# Patient Record
Sex: FEMALE | Race: BLACK OR AFRICAN AMERICAN | Employment: FULL TIME | URBAN - METROPOLITAN AREA
[De-identification: names, ages, dates, MRNs, and addresses within clinical notes are randomized per-mention and may not be internally consistent; named-entity substitution may affect disease eponyms.]

---

## 2017-01-03 ENCOUNTER — HOSPITAL ENCOUNTER (EMERGENCY)
Age: 27
Discharge: HOME | End: 2017-01-04
Attending: EMERGENCY MEDICINE | Admitting: EMERGENCY MEDICINE
Payer: MEDICAID

## 2017-01-03 DIAGNOSIS — R11.2 NON-INTRACTABLE VOMITING WITH NAUSEA, UNSPECIFIED VOMITING TYPE: Primary | ICD-10-CM

## 2017-01-03 DIAGNOSIS — R42 DIZZINESS: ICD-10-CM

## 2017-01-03 DIAGNOSIS — G43.719 INTRACTABLE CHRONIC MIGRAINE WITHOUT AURA AND WITHOUT STATUS MIGRAINOSUS: ICD-10-CM

## 2017-01-03 LAB
ALBUMIN SERPL BCP-MCNC: 3.5 G/DL (ref 3.5–5)
ALBUMIN/GLOB SERPL: 0.9 {RATIO} (ref 1.1–2.2)
ALP SERPL-CCNC: 114 U/L (ref 45–117)
ALT SERPL-CCNC: 28 U/L (ref 12–78)
ANION GAP BLD CALC-SCNC: 13 MMOL/L (ref 5–15)
APPEARANCE UR: CLEAR
AST SERPL W P-5'-P-CCNC: 15 U/L (ref 15–37)
BASOPHILS # BLD AUTO: 0 K/UL (ref 0–0.1)
BASOPHILS # BLD: 0 % (ref 0–1)
BILIRUB SERPL-MCNC: 0.2 MG/DL (ref 0.2–1)
BILIRUB UR QL: NEGATIVE
BUN SERPL-MCNC: 13 MG/DL (ref 6–20)
BUN/CREAT SERPL: 14 (ref 12–20)
CALCIUM SERPL-MCNC: 8.7 MG/DL (ref 8.5–10.1)
CHLORIDE SERPL-SCNC: 108 MMOL/L (ref 97–108)
CO2 SERPL-SCNC: 22 MMOL/L (ref 21–32)
COLOR UR: NORMAL
CREAT SERPL-MCNC: 0.92 MG/DL (ref 0.55–1.02)
DIFFERENTIAL METHOD BLD: ABNORMAL
EOSINOPHIL # BLD: 0.4 K/UL (ref 0–0.4)
EOSINOPHIL NFR BLD: 4 % (ref 0–7)
ERYTHROCYTE [DISTWIDTH] IN BLOOD BY AUTOMATED COUNT: 14.9 % (ref 11.5–14.5)
GLOBULIN SER CALC-MCNC: 3.7 G/DL (ref 2–4)
GLUCOSE SERPL-MCNC: 139 MG/DL (ref 65–100)
GLUCOSE UR STRIP.AUTO-MCNC: NEGATIVE MG/DL
HCG UR QL: NEGATIVE
HCT VFR BLD AUTO: 38.7 % (ref 35–47)
HGB BLD-MCNC: 12.6 G/DL (ref 11.5–16)
HGB UR QL STRIP: NEGATIVE
KETONES UR QL STRIP.AUTO: NEGATIVE MG/DL
LEUKOCYTE ESTERASE UR QL STRIP.AUTO: NEGATIVE
LIPASE SERPL-CCNC: 194 U/L (ref 73–393)
LYMPHOCYTES # BLD AUTO: 28 % (ref 12–49)
LYMPHOCYTES # BLD: 3.2 K/UL (ref 0.8–3.5)
MCH RBC QN AUTO: 26.1 PG (ref 26–34)
MCHC RBC AUTO-ENTMCNC: 32.6 G/DL (ref 30–36.5)
MCV RBC AUTO: 80.3 FL (ref 80–99)
MONOCYTES # BLD: 0.6 K/UL (ref 0–1)
MONOCYTES NFR BLD AUTO: 5 % (ref 5–13)
NEUTS SEG # BLD: 7.1 K/UL (ref 1.8–8)
NEUTS SEG NFR BLD AUTO: 63 % (ref 32–75)
NITRITE UR QL STRIP.AUTO: NEGATIVE
PH UR STRIP: 7 [PH] (ref 5–8)
PLATELET # BLD AUTO: 242 K/UL (ref 150–400)
POTASSIUM SERPL-SCNC: 3.7 MMOL/L (ref 3.5–5.1)
PROT SERPL-MCNC: 7.2 G/DL (ref 6.4–8.2)
PROT UR STRIP-MCNC: NEGATIVE MG/DL
RBC # BLD AUTO: 4.82 M/UL (ref 3.8–5.2)
SODIUM SERPL-SCNC: 143 MMOL/L (ref 136–145)
SP GR UR REFRACTOMETRY: 1.01 (ref 1–1.03)
UROBILINOGEN UR QL STRIP.AUTO: 0.2 EU/DL (ref 0.2–1)
WBC # BLD AUTO: 11.3 K/UL (ref 3.6–11)

## 2017-01-03 PROCEDURE — 85025 COMPLETE CBC W/AUTO DIFF WBC: CPT | Performed by: EMERGENCY MEDICINE

## 2017-01-03 PROCEDURE — 36415 COLL VENOUS BLD VENIPUNCTURE: CPT | Performed by: EMERGENCY MEDICINE

## 2017-01-03 PROCEDURE — 96361 HYDRATE IV INFUSION ADD-ON: CPT

## 2017-01-03 PROCEDURE — 74011250636 HC RX REV CODE- 250/636: Performed by: EMERGENCY MEDICINE

## 2017-01-03 PROCEDURE — 83690 ASSAY OF LIPASE: CPT | Performed by: EMERGENCY MEDICINE

## 2017-01-03 PROCEDURE — 96374 THER/PROPH/DIAG INJ IV PUSH: CPT

## 2017-01-03 PROCEDURE — 80053 COMPREHEN METABOLIC PANEL: CPT | Performed by: EMERGENCY MEDICINE

## 2017-01-03 PROCEDURE — 99284 EMERGENCY DEPT VISIT MOD MDM: CPT

## 2017-01-03 PROCEDURE — 96375 TX/PRO/DX INJ NEW DRUG ADDON: CPT

## 2017-01-03 PROCEDURE — 93005 ELECTROCARDIOGRAM TRACING: CPT

## 2017-01-03 PROCEDURE — 81025 URINE PREGNANCY TEST: CPT

## 2017-01-03 PROCEDURE — 81003 URINALYSIS AUTO W/O SCOPE: CPT | Performed by: EMERGENCY MEDICINE

## 2017-01-03 RX ORDER — KETOROLAC TROMETHAMINE 30 MG/ML
30 INJECTION, SOLUTION INTRAMUSCULAR; INTRAVENOUS
Status: COMPLETED | OUTPATIENT
Start: 2017-01-03 | End: 2017-01-03

## 2017-01-03 RX ORDER — ONDANSETRON 2 MG/ML
8 INJECTION INTRAMUSCULAR; INTRAVENOUS
Status: COMPLETED | OUTPATIENT
Start: 2017-01-03 | End: 2017-01-03

## 2017-01-03 RX ADMIN — ONDANSETRON 8 MG: 2 INJECTION INTRAMUSCULAR; INTRAVENOUS at 22:40

## 2017-01-03 RX ADMIN — SODIUM CHLORIDE 1000 ML: 900 INJECTION, SOLUTION INTRAVENOUS at 23:44

## 2017-01-03 RX ADMIN — SODIUM CHLORIDE 1000 ML: 900 INJECTION, SOLUTION INTRAVENOUS at 22:39

## 2017-01-03 RX ADMIN — KETOROLAC TROMETHAMINE 30 MG: 30 INJECTION, SOLUTION INTRAMUSCULAR at 22:41

## 2017-01-03 NOTE — LETTER
[facility] EMERGENCY DEPT 
[address] [facility] [address] [id number] 
[phone] Work/School Note Date: 1/3/2017 To Whom It May concern: 
 
Marti Palomino was seen and treated today in the emergency room by the following provider(s): 
Attending Provider: Mandy Dunn MD.   
 
Frankey Coyer may return to work on Thursday.  
 
Sincerely, 
 
 
 
 
Mandy Dunn MD

## 2017-01-04 VITALS
WEIGHT: 220 LBS | RESPIRATION RATE: 16 BRPM | SYSTOLIC BLOOD PRESSURE: 123 MMHG | HEART RATE: 71 BPM | HEIGHT: 66 IN | DIASTOLIC BLOOD PRESSURE: 63 MMHG | TEMPERATURE: 98 F | OXYGEN SATURATION: 98 % | BODY MASS INDEX: 35.36 KG/M2

## 2017-01-04 LAB
ATRIAL RATE: 58 BPM
CALCULATED P AXIS, ECG09: 36 DEGREES
CALCULATED R AXIS, ECG10: 70 DEGREES
CALCULATED T AXIS, ECG11: 35 DEGREES
DIAGNOSIS, 93000: NORMAL
P-R INTERVAL, ECG05: 136 MS
Q-T INTERVAL, ECG07: 404 MS
QRS DURATION, ECG06: 88 MS
QTC CALCULATION (BEZET), ECG08: 396 MS
VENTRICULAR RATE, ECG03: 58 BPM

## 2017-01-04 RX ORDER — ONDANSETRON 8 MG/1
8 TABLET, ORALLY DISINTEGRATING ORAL
Qty: 12 TAB | Refills: 0 | Status: SHIPPED | OUTPATIENT
Start: 2017-01-04 | End: 2017-01-11

## 2017-01-04 NOTE — ED NOTES
Bedside and Verbal shift change report given to Noelle Buerger, RN (oncoming nurse) by Anushka Lr RN (offgoing nurse). Report included the following information SBAR, ED Summary, MAR and Recent Results.

## 2017-01-04 NOTE — ED NOTES
Pt given discharge instructions by Dr Mclean Arm, she verbalizes an understanding, pt stable at time of discharge ambulates to lobby with daughter

## 2017-01-04 NOTE — ED PROVIDER NOTES
HPI Comments: The patient presents to the ED with HA and dizziness. She has chronic migraines every day. She developed vomiting x 4 yesterday with diarrhea x 2 . She also vomited twice today with no more diarrhea. She feels lightheaded. She denies any spinning. She denies fever. Headaches is similar to prior headaches and generalized. HA gradual in onset. HA is worse than her typical headaches but not worst of her life. She has no fever. No meds taken for pain. She has blurred vision with standing. She noets BP is lower with standing, but cannot recall BP. She feels thirsty and has a dry mouth. She also notes chest discomfort in her mid chest today. Of note, she has hx pituitary adenoma and has not seen endo in >1 hrs for insurance reasons. She drove to the ED. The history is provided by the patient. Past Medical History:   Diagnosis Date    Abnormal Pap smear     Anemia     Gastrointestinal disorder      Ulcers    Headache     Herniated disc     Hx gestational diabetes     HX OTHER MEDICAL      trich treated at beginning of pregnancy    HX OTHER MEDICAL      pituitary tumor-benign     Miscarriage     Molar pregnancy     Pap smear for cervical cancer screening 13     Negative    PCOS (polycystic ovarian syndrome)     Pelvic pain in female     Pituitary adenoma ([facility] [address]     Pituitary tumor     Psychiatric problem      depression never on medication    Thyroid activity decreased      hypothyroid awaiting appnt for endo       Past Surgical History:   Procedure Laterality Date    Hx colposcopy  11/3/10    Hx lipoma resection      Hx dilation and curettage      Hx  section           Family History:   Problem Relation Age of Onset    Diabetes Father     Headache Mother        Social History     Social History    Marital status: SINGLE     Spouse name: N/A    Number of children: N/A    Years of education: N/A     Occupational History    Not on file.      Social History Main Topics    Smoking status: Never Smoker    Smokeless tobacco: Never Used    Alcohol use No      Comment: =    Drug use: No    Sexual activity: Yes     Partners: Male     Birth control/ protection: Injection     Other Topics Concern    Not on file     Social History Narrative         ALLERGIES: Tomato    Review of Systems   Constitutional: Negative for appetite change and fever. HENT: Negative for congestion, nosebleeds and sore throat. Eyes: Positive for pain. Negative for discharge. Respiratory: Negative for cough and shortness of breath. Cardiovascular: Negative for chest pain. Gastrointestinal: Positive for diarrhea, nausea and vomiting. Negative for abdominal pain. Genitourinary: Negative for dysuria. Musculoskeletal: Negative. Skin: Negative for rash. Neurological: Positive for dizziness, light-headedness and headaches. Negative for weakness. Hematological: Negative for adenopathy. Psychiatric/Behavioral: Negative. All other systems reviewed and are negative. There were no vitals filed for this visit. Physical Exam   Constitutional: She is oriented to person, place, and time. She appears well-developed and well-nourished. HENT:   Head: Normocephalic and atraumatic. Mouth/Throat: Oropharynx is clear and moist.   Eyes: Conjunctivae and EOM are normal. Pupils are equal, round, and reactive to light. Neck: Normal range of motion. Neck supple. Cardiovascular: Normal rate, regular rhythm and normal heart sounds. Pulmonary/Chest: Effort normal and breath sounds normal.   Abdominal: Soft. Bowel sounds are normal. There is no tenderness. Musculoskeletal: Normal range of motion. She exhibits no edema or tenderness. Neurological: She is alert and oriented to person, place, and time. No cranial nerve deficit. She exhibits normal muscle tone. Coordination normal.   Skin: Skin is warm and dry. Psychiatric: She has a normal mood and affect.  Her behavior is normal.   Nursing note and vitals reviewed. [facility]  ED Course       Procedures             ED EKG interpretation:  Rhythm: normal sinus rhythm and sinus bradycardia; and irregular - sinus arrhythmia. Rate (approx.): 58; Axis: normal; P wave: normal; QRS interval: normal ; ST/T wave: normal; This EKG was interpreted by Luann Johns MD,ED Provider. A/P:  1. Headache - at baseline at discharge. F/u with neuro. 2. Lightheadedness - suspect dehydration in setting of viral GE. Improved. 3. N/V - none in ED. Zofran prn.  4. Pituitary adenoma - f/u with Dr. Joana Terrazas. Patient's results have been reviewed with them. Patient and/or family have verbally conveyed their understanding and agreement of the patient's signs, symptoms, diagnosis, treatment and prognosis and additionally agree to follow up as recommended or return to the Emergency Room should their condition change prior to follow-up. Discharge instructions have also been provided to the patient with some educational information regarding their diagnosis as well a list of reasons why they would want to return to the ER prior to their follow-up appointment should their condition change.

## 2017-01-04 NOTE — DISCHARGE INSTRUCTIONS
We hope that we have addressed all of your medical concerns. The examination and treatment you received in the Emergency Department were for an emergent problem and were not intended as complete care. It is important that you follow up with your healthcare provider(s) for ongoing care. If your symptoms worsen or do not improve as expected, and you are unable to reach your usual health care provider(s), you should return to the Emergency Department. Today's healthcare is undergoing tremendous change, and patient satisfaction surveys are one of the many tools to assess the quality of medical care. You may receive a survey from the [employer] regarding your experience in the Emergency Department. I hope that your experience has been completely positive, particularly the medical care that I provided. As such, please participate in the survey; anything less than excellent does not meet my expectations or intentions. [address] and [facility] participate in nationally recognized quality of care measures. If your blood pressure is greater than 120/80, as reported below, we urge that you seek medical care to address the potential of high blood pressure, commonly known as hypertension. Hypertension can be hereditary or can be caused by certain medical conditions, pain, stress, or \"white coat syndrome. \"       Please make an appointment with your health care provider(s) for follow up of your Emergency Department visit. VITALS:   Patient Vitals for the past 8 hrs:   Temp Pulse Resp BP SpO2   01/03/17 2345 - 62 16 115/64 98 %   01/03/17 2146 97.9 °F (36.6 °C) 70 16 123/71 97 %          Thank you for allowing us to provide you with medical care today. We realize that you have many choices for your emergency care needs. Please choose us in the future for any continued health care needs. [place], [address] [facility].   Office: [phone]            Recent Results (from the past 24 hour(s))   CBC WITH AUTOMATED DIFF    Collection Time: 01/03/17 10:27 PM   Result Value Ref Range    WBC 11.3 (H) 3.6 - 11.0 K/uL    RBC 4.82 3.80 - 5.20 M/uL    HGB 12.6 11.5 - 16.0 g/dL    HCT 38.7 35.0 - 47.0 %    MCV 80.3 80.0 - 99.0 FL    MCH 26.1 26.0 - 34.0 PG    MCHC 32.6 30.0 - 36.5 g/dL    RDW 14.9 (H) 11.5 - 14.5 %    PLATELET 270 771 - 041 K/uL    NEUTROPHILS 63 32 - 75 %    LYMPHOCYTES 28 12 - 49 %    MONOCYTES 5 5 - 13 %    EOSINOPHILS 4 0 - 7 %    BASOPHILS 0 0 - 1 %    ABS. NEUTROPHILS 7.1 1.8 - 8.0 K/UL    ABS. LYMPHOCYTES 3.2 0.8 - 3.5 K/UL    ABS. MONOCYTES 0.6 0.0 - 1.0 K/UL    ABS. EOSINOPHILS 0.4 0.0 - 0.4 K/UL    ABS. BASOPHILS 0.0 0.0 - 0.1 K/UL    DF AUTOMATED     METABOLIC PANEL, COMPREHENSIVE    Collection Time: 01/03/17 10:27 PM   Result Value Ref Range    Sodium 143 136 - 145 mmol/L    Potassium 3.7 3.5 - 5.1 mmol/L    Chloride 108 97 - 108 mmol/L    CO2 22 21 - 32 mmol/L    Anion gap 13 5 - 15 mmol/L    Glucose 139 (H) 65 - 100 mg/dL    BUN 13 6 - 20 MG/DL    Creatinine 0.92 0.55 - 1.02 MG/DL    BUN/Creatinine ratio 14 12 - 20      GFR est AA >60 >60 ml/min/1.73m2    GFR est non-AA >60 >60 ml/min/1.73m2    Calcium 8.7 8.5 - 10.1 MG/DL    Bilirubin, total 0.2 0.2 - 1.0 MG/DL    ALT 28 12 - 78 U/L    AST 15 15 - 37 U/L    Alk.  phosphatase 114 45 - 117 U/L    Protein, total 7.2 6.4 - 8.2 g/dL    Albumin 3.5 3.5 - 5.0 g/dL    Globulin 3.7 2.0 - 4.0 g/dL    A-G Ratio 0.9 (L) 1.1 - 2.2     LIPASE    Collection Time: 01/03/17 10:27 PM   Result Value Ref Range    Lipase 194 73 - 393 U/L   HCG URINE, QL. - POC    Collection Time: 01/03/17 10:42 PM   Result Value Ref Range    Pregnancy test,urine (POC) NEGATIVE  NEG     URINALYSIS W/ RFLX MICROSCOPIC    Collection Time: 01/03/17 10:43 PM   Result Value Ref Range    Color YELLOW/STRAW      Appearance CLEAR CLEAR      Specific gravity 1.010 1.003 - 1.030 pH (UA) 7.0 5.0 - 8.0      Protein NEGATIVE  NEG mg/dL    Glucose NEGATIVE  NEG mg/dL    Ketone NEGATIVE  NEG mg/dL    Bilirubin NEGATIVE  NEG      Blood NEGATIVE  NEG      Urobilinogen 0.2 0.2 - 1.0 EU/dL    Nitrites NEGATIVE  NEG      Leukocyte Esterase NEGATIVE  NEG     EKG, 12 LEAD, INITIAL    Collection Time: 01/03/17 11:41 PM   Result Value Ref Range    Ventricular Rate 58 BPM    Atrial Rate 58 BPM    P-R Interval 136 ms    QRS Duration 88 ms    Q-T Interval 404 ms    QTC Calculation (Bezet) 396 ms    Calculated P Axis 36 degrees    Calculated R Axis 70 degrees    Calculated T Axis 35 degrees    Diagnosis       Sinus bradycardia with sinus arrhythmia  Otherwise normal ECG  When compared with ECG of 05-MAY-2014 19:39,  No significant change was found                Migraine Headache: Care Instructions  Your Care Instructions  Migraines are painful, throbbing headaches that often start on one side of the head. They may cause nausea and vomiting and make you sensitive to light, sound, or smell. Without treatment, migraines can last from 4 hours to a few days. Medicines can help prevent migraines or stop them after they have started. Your doctor can help you find which ones work best for you. Follow-up care is a key part of your treatment and safety. Be sure to make and go to all appointments, and call your doctor if you are having problems. It's also a good idea to know your test results and keep a list of the medicines you take. How can you care for yourself at home? · Do not drive if you have taken a prescription pain medicine. · Rest in a quiet, dark room until your headache is gone. Close your eyes, and try to relax or go to sleep. Don't watch TV or read. · Put a cold, moist cloth or cold pack on the painful area for 10 to 20 minutes at a time. Put a thin cloth between the cold pack and your skin. · Use a warm, moist towel or a heating pad set on low to relax tight shoulder and neck muscles.   · Have someone gently massage your neck and shoulders. · Take your medicines exactly as prescribed. Call your doctor if you think you are having a problem with your medicine. You will get more details on the specific medicines your doctor prescribes. · Be careful not to take pain medicine more often than the instructions allow. You could get worse or more frequent headaches when the medicine wears off. To prevent migraines  · Keep a headache diary so you can figure out what triggers your headaches. Avoiding triggers may help you prevent headaches. Record when each headache began, how long it lasted, and what the pain was like. (Was it throbbing, aching, stabbing, or dull?) Write down any other symptoms you had with the headache, such as nausea, flashing lights or dark spots, or sensitivity to bright light or loud noise. Note if the headache occurred near your period. List anything that might have triggered the headache. Triggers may include certain foods (chocolate, cheese, wine) or odors, smoke, bright light, stress, or lack of sleep. · If your doctor has prescribed medicine for your migraines, take it as directed. You may have medicine that you take only when you get a migraine and medicine that you take all the time to help prevent migraines. ¨ If your doctor has prescribed medicine for when you get a headache, take it at the first sign of a migraine, unless your doctor has given you other instructions. ¨ If your doctor has prescribed medicine to prevent migraines, take it exactly as prescribed. Call your doctor if you think you are having a problem with your medicine. · Find healthy ways to deal with stress. Migraines are most common during or right after stressful times. Take time to relax before and after you do something that has caused a migraine in the past.  · Try to keep your muscles relaxed by keeping good posture. Check your jaw, face, neck, and shoulder muscles for tension. Try to relax them.  When you sit at a desk, change positions often. And make sure to stretch for 30 seconds each hour. · Get plenty of sleep and exercise. · Eat meals on a regular schedule. Avoid foods and drinks that often trigger migraines. These include chocolate, alcohol (especially red wine and port), aspartame, monosodium glutamate (MSG), and some additives found in foods (such as hot dogs, werner, cold cuts, aged cheeses, and pickled foods). · Limit caffeine. Don't drink too much coffee, tea, or soda. But don't quit caffeine suddenly. That can also give you migraines. · Do not smoke or allow others to smoke around you. If you need help quitting, talk to your doctor about stop-smoking programs and medicines. These can increase your chances of quitting for good. · If you are taking birth control pills or hormone therapy, talk to your doctor about whether they are triggering your migraines. When should you call for help? Call 911 anytime you think you may need emergency care. For example, call if:  · You have signs of a stroke. These may include:  ¨ Sudden numbness, paralysis, or weakness in your face, arm, or leg, especially on only one side of your body. ¨ Sudden vision changes. ¨ Sudden trouble speaking. ¨ Sudden confusion or trouble understanding simple statements. ¨ Sudden problems with walking or balance. ¨ A sudden, severe headache that is different from past headaches. Call your doctor now or seek immediate medical care if:  · You have new or worse nausea and vomiting. · You have a new or higher fever. · Your headache gets much worse. Watch closely for changes in your health, and be sure to contact your doctor if:  · You are not getting better after 2 days (48 hours). Where can you learn more? Go to http://mitul-leena.info/. Enter A120 in the search box to learn more about \"Migraine Headache: Care Instructions. \"  Current as of: February 19, 2016  Content Version: 11.1  © 6630-1687 Health[employer] Incorporated. Care instructions adapted under license by "[employer] (which disclaims liability or warranty for this information). If you have questions about a medical condition or this instruction, always ask your healthcare professional. [address] any warranty or liability for your use of this information. Dizziness: Care Instructions  Your Care Instructions  Dizziness is the feeling of unsteadiness or fuzziness in your head. It is different than having vertigo, which is a feeling that the room is spinning or that you are moving or falling. It is also different from lightheadedness, which is the feeling that you are about to faint. It can be hard to know what causes dizziness. Some people feel dizzy when they have migraine headaches. Sometimes bouts of flu can make you feel dizzy. Some medical conditions, such as heart problems or high blood pressure, can make you feel dizzy. Many medicines can cause dizziness, including medicines for high blood pressure, pain, or anxiety. If a medicine causes your symptoms, your doctor may recommend that you stop or change the medicine. If it is a problem with your heart, you may need medicine to help your heart work better. If there is no clear reason for your symptoms, your doctor may suggest watching and waiting for a while to see if the dizziness goes away on its own. Follow-up care is a key part of your treatment and safety. Be sure to make and go to all appointments, and call your doctor if you are having problems. It's also a good idea to know your test results and keep a list of the medicines you take. How can you care for yourself at home? · If your doctor recommends or prescribes medicine, take it exactly as directed. Call your doctor if you think you are having a problem with your medicine. · Do not drive while you feel dizzy. · Try to prevent falls.  Steps you can take include:  ¨ Using nonskid mats, adding grab bars near the tub, and using night-lights. ¨ Clearing your home so that walkways are free of anything you might trip on. ¨ Letting family and friends know that you have been feeling dizzy. This will help them know how to help you. When should you call for help? Call 911 anytime you think you may need emergency care. For example, call if:  · You passed out (lost consciousness). · You have dizziness along with symptoms of a heart attack. These may include:  ¨ Chest pain or pressure, or a strange feeling in the chest.  ¨ Sweating. ¨ Shortness of breath. ¨ Nausea or vomiting. ¨ Pain, pressure, or a strange feeling in the back, neck, jaw, or upper belly or in one or both shoulders or arms. ¨ Lightheadedness or sudden weakness. ¨ A fast or irregular heartbeat. · You have symptoms of a stroke. These may include:  ¨ Sudden numbness, tingling, weakness, or loss of movement in your face, arm, or leg, especially on only one side of your body. ¨ Sudden vision changes. ¨ Sudden trouble speaking. ¨ Sudden confusion or trouble understanding simple statements. ¨ Sudden problems with walking or balance. ¨ A sudden, severe headache that is different from past headaches. Call your doctor now or seek immediate medical care if:  · You feel dizzy and have a fever, headache, or ringing in your ears. · You have new or increased nausea and vomiting. · Your dizziness does not go away or comes back. Watch closely for changes in your health, and be sure to contact your doctor if:  · You do not get better as expected. Where can you learn more? Go to http://mitul-leena.info/. Enter X303 in the search box to learn more about \"Dizziness: Care Instructions. \"  Current as of: May 27, 2016  Content Version: 11.1  © 4088-4240 [employer]. Care instructions adapted under license by [employer] (which disclaims liability or warranty for this information).  If you have questions about a medical condition or this instruction, always ask your healthcare professional. [address] any warranty or liability for your use of this information. Nausea and Vomiting: Care Instructions  Your Care Instructions    When you are nauseated, you may feel weak and sweaty and notice a lot of saliva in your mouth. Nausea often leads to vomiting. Most of the time you do not need to worry about nausea and vomiting, but they can be signs of other illnesses. Two common causes of nausea and vomiting are stomach flu and food poisoning. Nausea and vomiting from viral stomach flu will usually start to improve within 24 hours. Nausea and vomiting from food poisoning may last from 12 to 48 hours. The doctor has checked you carefully, but problems can develop later. If you notice any problems or new symptoms, get medical treatment right away. Follow-up care is a key part of your treatment and safety. Be sure to make and go to all appointments, and call your doctor if you are having problems. It's also a good idea to know your test results and keep a list of the medicines you take. How can you care for yourself at home? · To prevent dehydration, drink plenty of fluids, enough so that your urine is light yellow or clear like water. Choose water and other caffeine-free clear liquids until you feel better. If you have kidney, heart, or liver disease and have to limit fluids, talk with your doctor before you increase the amount of fluids you drink. · Rest in bed until you feel better. · When you are able to eat, try clear soups, mild foods, and liquids until all symptoms are gone for 12 to 48 hours. Other good choices include dry toast, crackers, cooked cereal, and gelatin dessert, such as Jell-O. When should you call for help? Call 911 anytime you think you may need emergency care. For example, call if:  · You passed out (lost consciousness).   Call your doctor now or seek immediate medical care if:  · You have symptoms of dehydration, such as:  ¨ Dry eyes and a dry mouth. ¨ Passing only a little dark urine. ¨ Feeling thirstier than usual.  · You have new or worsening belly pain. · You have a new or higher fever. · You vomit blood or what looks like coffee grounds. Watch closely for changes in your health, and be sure to contact your doctor if:  · You have ongoing nausea and vomiting. · Your vomiting is getting worse. · Your vomiting lasts longer than 2 days. · You are not getting better as expected. Where can you learn more? Go to http://mitul-lenea.info/. Enter 25 [id number] in the search box to learn more about \"Nausea and Vomiting: Care Instructions. \"  Current as of: May 27, 2016  Content Version: 11.1  © 9323-7338 [employer]. Care instructions adapted under license by [employer] (which disclaims liability or warranty for this information). If you have questions about a medical condition or this instruction, always ask your healthcare professional. [place] [address] any warranty or liability for your use of this information. Reduced Vision: Care Instructions  Your Care Instructions    Reduced vision can be caused by many things. These include macular degeneration and glaucoma. When you can't see as well, daily life can be more challenging. But you can do some things to stay independent and keep doing the activities you enjoy. Follow-up care is a key part of your treatment and safety. Be sure to make and go to all appointments, and call your doctor if you are having problems. It's also a good idea to know your test results and keep a list of the medicines you take. How can you care for yourself at home? Use lighting  · Point lighting at what you want to see. Don't point it at your eyes. · Add lamps where you need extra lighting. · Use curtains or shades to adjust how much natural light there is.   · Use good lighting in places where you could easily fall. These include entries and stairways. Use labels  · Label things that are hard to recognize or that could be confusing. This might include medicines, spices, and foods. Use black letters on a white background. Or you can color-code the items. · Shannan Lunch the positions of the temperature settings you use the most on your stove and oven. Also raymon the \"on\" and \"off\" positions. · Raymon the water temperatures you use on faucets in the kitchen and bathroom. To prevent overfilling a sink or bathtub, use waterproof markers or tape to raymon the water level you want. Avoid falls in your home  · Replace or remove any worn carpeting. Tape down or remove area rugs. · Do not wax your floors. Use nonskid, nonglare  on smooth floors. · Remove electrical cords from areas where you need to walk. Or tape them down so you won't trip on them. · Make sure furniture doesn't stick out into areas where you walk. Keep chairs pushed in under tables and desks. Keep all drawers closed. · Keep doors fully opened or fully closed. Don't leave them half-way open or shut. · Use handrails on stairways and ramps. Make sure that they go beyond the top and bottom steps. Then you won't stumble if you miss a step. Use helpful technology  · Use a magnifying lens. You can buy ones that you hold. Or you can buy ones that attach to glasses. Some have lights built in.  · If your budget allows, you may want to think about a video magnifier system. These systems can make print, pictures, or other items bigger on a screen. · If you have a computer:  ¨ Try to adjust the display. You can often change how big the text and pictures appear. Then they will be easier to see and read. ¨ You may want to try special software. Some software can recognize spoken commands or change dictated speech into text. Other software allows computers to speak text and read documents. · Use large-print items.  These include books, newspapers, magazines, and medicine labels. You can also listen to recordings of books. · Think about using devices made for people with low vision. Examples are clocks and watches that announce the time. There are also clocks, telephones, and calculators with extra-large buttons. Be safe while you stay active  · Ask your doctor what physical activities are safe for you. If you bend, lift things, or move fast, it may affect your health or vision. · Ask a friend to read you the instructions for a new exercise and to check your technique. · Walk with someone who can help look for things that may be a danger. · If you swim laps, use a pool that has ropes between the lanes. When should you call for help? Call your doctor now or seek immediate medical care if:  · You have a sudden change in vision. Watch closely for changes in your health, and be sure to contact your doctor if you have new changes in either eye. Where can you learn more? Go to http://mitul-leena.info/. Enter U376 in the search box to learn more about \"Reduced Vision: Care Instructions. \"  Current as of: May 23, 2016  Content Version: 11.1  © 6367-7781 [employer]. Care instructions adapted under license by [employer] (which disclaims liability or warranty for this information). If you have questions about a medical condition or this instruction, always ask your healthcare professional. [address] any warranty or liability for your use of this information. [employer] Activation    Thank you for requesting access to [employer]. Please follow the instructions below to securely access and download your online medical record. [employer] allows you to send messages to your doctor, view your test results, renew your prescriptions, schedule appointments, and more. How Do I Sign Up? 1. In your internet browser, go to [email or web address]  2. Click on the First Time User?  Click Here link in the Sign In box. You will be redirect to the New Member Sign Up page. 3. Enter your [employer] Access Code exactly as it appears below. You will not need to use this code after youve completed the sign-up process. If you do not sign up before the expiration date, you must request a new code. MyChart Access Code: Activation code not generated  Current [employer] Status: Active (This is the date your MyCOilext access code will )    4. Enter the last four digits of your Social Security Number (xxxx) and Date of Birth (mm/dd/yyyy) as indicated and click Submit. You will be taken to the next sign-up page. 5. Create a [employer] ID. This will be your [employer] login ID and cannot be changed, so think of one that is secure and easy to remember. 6. Create a [employer] password. You can change your password at any time. 7. Enter your Password Reset Question and Answer. This can be used at a later time if you forget your password. 8. Enter your e-mail address. You will receive e-mail notification when new information is available in [address] 9. Click Sign Up. You can now view and download portions of your medical record. 10. Click the Download Summary menu link to download a portable copy of your medical information. Additional Information    If you have questions, please visit the Frequently Asked Questions section of the [employer] website at [email or web address] Remember, [employer] is NOT to be used for urgent needs. For medical emergencies, dial 911.

## 2017-01-04 NOTE — ED NOTES
Patient resting on stretcher in no distress. IV fluids infusing via gravity without difficulty as ordered. Patient medicated as ordered. Patient tolerated medication well. Lights dimmed and blanket provided for comfort. Call bell in reach.

## 2017-03-03 ENCOUNTER — OFFICE VISIT (OUTPATIENT)
Dept: ENDOCRINOLOGY | Age: 27
End: 2017-03-03

## 2017-03-03 VITALS
WEIGHT: 218 LBS | HEIGHT: 66 IN | SYSTOLIC BLOOD PRESSURE: 142 MMHG | HEART RATE: 69 BPM | DIASTOLIC BLOOD PRESSURE: 79 MMHG | BODY MASS INDEX: 35.03 KG/M2

## 2017-03-03 DIAGNOSIS — D35.2 PITUITARY ADENOMA (HCC): Primary | ICD-10-CM

## 2017-03-03 DIAGNOSIS — O24.419 GESTATIONAL DIABETES MELLITUS (GDM), ANTEPARTUM, GESTATIONAL DIABETES METHOD OF CONTROL UNSPECIFIED: ICD-10-CM

## 2017-03-03 DIAGNOSIS — R79.89 ELEVATED PROLACTIN LEVEL: ICD-10-CM

## 2017-03-03 NOTE — PATIENT INSTRUCTIONS
History of pituitary adenoma and high prolactin  - check prolactin  - start cabergoline if elevated and follow prolactin      History of gestational diabetes  - check hemoglobin A1c    Watch carbohydrate intake with meals and aim to keep this less than 30-45 grams per meal.    A Mediterranean style diet with 55% or less of calories from carbohydrates has been shown to be very helpful for people with diabetes/pre-diabetes. This diet consists of vegetables, whole fruit, nuts, whole grains, beans, lentils, olive oil, fish, chicken, and less refined grains, red and processed meats. Exercise: work up to 30 minutes 5 days weekly of walking, or any other activity that gets your heart rate up. Make sure you are getting enough sleep - at least 7 hours/night. Work on weight loss efforts by eating less.

## 2017-03-03 NOTE — MR AVS SNAPSHOT
Visit Information Date & Time Provider Department Dept. Phone Encounter #  
 3/3/2017  2:30 PM Le Mendez, [address] [facility] [phone] Follow-up Instructions Return in about 3 months (around 6/3/2017). Upcoming Health Maintenance Date Due INFLUENZA AGE 9 TO ADULT 8/1/2016 PAP AKA CERVICAL CYTOLOGY 10/11/2019 DTaP/Tdap/Td series (2 - Td) 2/21/2024 Allergies as of 3/3/2017  Review Complete On: 3/3/2017 By: Le Mendez MD  
  
 Severity Noted Reaction Type Reactions Tomato  07/31/2013    Angioedema Current Immunizations  Never Reviewed Name Date Tdap 2/21/2014 12:54 PM  
  
 Not reviewed this visit You Were Diagnosed With   
  
 Codes Comments [facility]    -  Primary ICD-10-CM: D35.2 ICD-9-CM: 227.3 Elevated prolactin level (HCC)     ICD-10-CM: E22.9 ICD-9-CM: 253.1 Gestational diabetes mellitus (GDM), antepartum, gestational diabetes method of control unspecified     ICD-10-CM: O23.80 ICD-9-CM: 502.97 Vitals BP  
  
  
  
  
  
 142/79 Vitals History BMI and BSA Data Body Mass Index Body Surface Area  
 35.19 kg/m 2 2.15 m 2 Preferred Pharmacy Pharmacy Name Phone [id number] [address] [facility] [address] [phone] Your Updated Medication List  
  
   
This list is accurate as of: 3/3/17  3:48 PM.  Always use your most recent med list.  
  
  
  
  
 ibuprofen 600 mg tablet Commonly known as:  MOTRIN Take 1 Tab by mouth every six (6) hours as needed for Pain.  
  
 medroxyPROGESTERone 150 mg/mL Syrg Commonly known as:  DEPO-PROVERA INJECT 1ML INTRAMUSCULARLY ONCE FOR 1 DOSE  
  
 topiramate 50 mg tablet Commonly known as:  TOPAMAX  
1 tab in AM and 1-2 tabs in evening. For migraine prevention We Performed the Following HEMOGLOBIN A1C WITH EAG [93181 CPT(R)] LIPID PANEL [35261 CPT(R)] METABOLIC PANEL, COMPREHENSIVE [41441 CPT(R)] PROLACTIN [28222 CPT(R)] T4, FREE [id number] CPT(R)] TSH 3RD GENERATION [90184 CPT(R)] Follow-up Instructions Return in about 3 months (around 6/3/2017). Patient Instructions History of pituitary adenoma and high prolactin - check prolactin 
- start cabergoline if elevated and follow prolactin History of gestational diabetes - check hemoglobin A1c Watch carbohydrate intake with meals and aim to keep this less than 30-45 grams per meal. 
 
A Mediterranean style diet with 55% or less of calories from carbohydrates has been shown to be very helpful for people with diabetes/pre-diabetes. This diet consists of vegetables, whole fruit, nuts, whole grains, beans, lentils, olive oil, fish, chicken, and less refined grains, red and processed meats. Exercise: work up to 30 minutes 5 days weekly of walking, or any other activity that gets your heart rate up. Make sure you are getting enough sleep - at least 7 hours/night. Work on weight loss efforts by eating less. Introducing [facility]! Dear Jeromy Kaminski: Thank you for requesting a [employer] account. Our records indicate that you already have an active [employer] account. You can access your account anytime at [email or web address] Did you know that you can access your hospital and ER discharge instructions at any time in [employer]? You can also review all of your test results from your hospital stay or ER visit. Additional Information If you have questions, please visit the Frequently Asked Questions section of the [employer] website at [email or web address] Remember, [employer] is NOT to be used for urgent needs. For medical emergencies, dial 911. Now available from your iPhone and Android! Please provide this summary of care documentation to your next provider. Your primary care clinician is listed as Galina Lloyd. If you have any questions after today's visit, please call [phone].

## 2017-03-03 NOTE — PROGRESS NOTES
Chief Complaint   Patient presents with    Pituitary Problem    New Patient     History of Present Illness: Sherri Sky is a 28 y.o. female presents for evaluation of pituitary adenoma  She has history of elevated prolactin. She also history of gestational DM (). Dx with prolactinoma at age 15. Had galactorrhea at that time and has had problems with menstrual cycles and fertility as adult. Currently has fullness of breasts and said she has a lot of discharge if she expresses it. Reports that her eye doctors noted temporal vision loss in . They wanted her to see neurosurgeon. MRI at that time showed 6 mm adenoma. Was seeing Dr Hernan Molina as endocrinologist.  Has not seen in years due to insurance problems     History of gestational DM. Feels glucoses are high. Uses daughter's glucose monitor and reports she can have lows in 60s and highs over 400    Currently taking depo-provera for irregular menses and iron defiency anemia that developed.       Social:  2 yo fraternal twins (both girls), 7 yo girl  Daughter has diabetes - dx as type II  About 1 month ago       Past Medical History:   Diagnosis Date    Abnormal Pap smear     Anemia     Gastrointestinal disorder     Ulcers    Headache     Herniated disc     Hx gestational diabetes     HX OTHER MEDICAL     trich treated at beginning of pregnancy    HX OTHER MEDICAL     pituitary tumor-benign     Miscarriage     Molar pregnancy     Pap smear for cervical cancer screening 13    Negative    PCOS (polycystic ovarian syndrome)     Pelvic pain in female     [facility] ([facility]     Pituitary tumor     Psychiatric problem     depression never on medication    Thyroid activity decreased     hypothyroid awaiting appnt for endo     Past Surgical History:   Procedure Laterality Date    HX  SECTION      HX COLPOSCOPY  11/3/10    HX DILATION AND CURETTAGE      HX LIPOMA RESECTION       Current Outpatient Prescriptions Medication Sig    topiramate (TOPAMAX) 50 mg tablet 1 tab in AM and 1-2 tabs in evening. For migraine prevention    ibuprofen (MOTRIN) 600 mg tablet Take 1 Tab by mouth every six (6) hours as needed for Pain.  medroxyPROGESTERone (DEPO-PROVERA) 150 mg/mL syrg INJECT 1ML INTRAMUSCULARLY ONCE FOR 1 DOSE     No current facility-administered medications for this visit. Allergies   Allergen Reactions    Tomato Angioedema     Family History   Problem Relation Age of Onset    Diabetes Father     Headache Mother      Social History     Social History    Marital status: SINGLE     Spouse name: N/A    Number of children: N/A    Years of education: N/A     Occupational History    Not on file. Social History Main Topics    Smoking status: Never Smoker    Smokeless tobacco: Never Used    Alcohol use No      Comment: =    Drug use: No    Sexual activity: Yes     Partners: Male     Birth control/ protection: Injection     Other Topics Concern    Not on file     Social History Narrative       Review of Systems:  - Constitutional Symptoms: no fevers, chills, has had mild wt gain  - Eyes: see HPI  - Cardiovascular: no chest pain or palpitations  - Respiratory: no cough or shortness of breath  - Gastrointestinal: no dysphagia or abdominal pain  - Musculoskeletal: no joint pains or weakness  - Integumentary: no rashes  - Neurological: migraines. Doing better with topiramate.    - Psychiatric: no depression or anxiety  - Endocrine: no heat or cold intolerance, no polyuria or polydipsia    Physical Examination:  Visit Vitals    /79    Pulse 69    Ht 5' 6\" (1.676 m)    Wt 218 lb (98.9 kg)    BMI 35.19 kg/m2   -   - General: pleasant, no distress, normal gait  - HEENT:No scleral/conjunctival injection, EOMI,  MMM, + acanthosis nigricans on neck  - Cardiovascular: regular, normal rate  - Respiratory: normal effort  - Integumentary:  no edema  - Neurological: alert  - Psychiatric: normal mood and affect    Data Reviewed:   MRI brain 1/2015  Reason: Abnormal involuntary movements, headache        REPORT:  INDICATION: 781.0, 784.0, pituitary adenoma, headaches, breast leakage  TECHNIQUE: Thin 2 mm sagittal and coronal T1 weighted images centered on the   sella were obtained followed by intravenous infusion 9.5 mL intravenous   Gadavist repeat thin sagittal and coronal T1-weighted images. Axial FLAIR   T1, and T2-weighted images as well as axial diffusion weighted images and   whole head post gadolinium axial T1 images of the head were also obtained. During the rapid bolus infusion of contrast and dynamic coronal T1-weighted   images through the pituitary. COMPARISON: Prior studies are not available. FINDINGS:  A posterior pituitary bright area demonstrated. The infundibulum is in the midline. Pituitary tissue volume is within normal limits. The right side of the pituitary has a focal area of slight asymmetric   fullness measuring approximately 6 mm which may represent a pituitary   microadenoma. There is also slight heterogeneity on the left, however no   other definite evidence of pituitary mass. The suprasellar cistern and hypothalamus have normal appearance. The parasellar structures including paranasal sinuses and other structures   of the skull base are unremarkable. The ventricular size and configuration are normal.   Tiny focus of T2 hyperintensity in the right anterior insula is nonspecific   and of no definite clinical significance. Otherwise normal signal in the   cerebral hemispheres, brainstem and cerebellum. No abnormal intracranial enhancement. The craniocervical junction is normal.         IMPRESSION:  1. Possible right pituitary 6 mm microadenoma. 2. Attempts are being made to obtain the prior MRI. If successful, an   addendum will be made. Assessment/Plan:   1. Pituitary adenoma ([facility]   - prolactinoma by history  - check prolactin.  Start cabergoline if elevated and follow prolactin to assess response  - also check TSH, Free T4. Taking provera currently, so will not assess gonadotropins. No sx of excess growth hormone. May consider screening for Cushing's once. 2. Elevated prolactin level (HCC)   - see above. 3. Gestational diabetes mellitus (GDM), antepartum, gestational diabetes method of control unspecified   - check A1c.  - see recommendations below regarding diet, exercise and wt loss. 4. BMI 35. Encouraged wt loss via dieting and exercise. Patient Instructions   History of pituitary adenoma and high prolactin  - check prolactin  - start cabergoline if elevated and follow prolactin      History of gestational diabetes  - check hemoglobin A1c    Watch carbohydrate intake with meals and aim to keep this less than 30-45 grams per meal.    A Mediterranean style diet with 55% or less of calories from carbohydrates has been shown to be very helpful for people with diabetes/pre-diabetes. This diet consists of vegetables, whole fruit, nuts, whole grains, beans, lentils, olive oil, fish, chicken, and less refined grains, red and processed meats. Exercise: work up to 30 minutes 5 days weekly of walking, or any other activity that gets your heart rate up. Make sure you are getting enough sleep - at least 7 hours/night. Work on weight loss efforts by eating less. Follow-up Disposition:  Return in about 3 months (around 6/3/2017). Copy sent to:     Addendum:  Component      Latest Ref Rng & Units 3/3/2017 3/3/2017 3/3/2017 3/3/2017           4:12 PM  4:12 PM  4:12 PM  4:12 PM   Glucose      65 - 99 mg/dL  77     BUN      6 - 20 mg/dL  11     Creatinine      0.57 - 1.00 mg/dL  0.81     GFR est non-AA      >59 mL/min/1.73  99     GFR est AA      >59 mL/min/1.73  114     BUN/Creatinine ratio      8 - 20  14     Sodium      134 - 144 mmol/L  144     Potassium      3.5 - 5.2 mmol/L  4.2     Chloride      96 - 106 mmol/L  104     CO2      18 - 29 mmol/L  20 Calcium      8.7 - 10.2 mg/dL  9.4     Protein, total      6.0 - 8.5 g/dL  6.9     Albumin      3.5 - 5.5 g/dL  4.4     GLOBULIN, TOTAL      1.5 - 4.5 g/dL  2.5     A-G Ratio      1.1 - 2.5  1.8     Bilirubin, total      0.0 - 1.2 mg/dL  0.2     Alk. phosphatase      39 - 117 IU/L  117     AST      0 - 40 IU/L  21     ALT      0 - 32 IU/L  25     Cholesterol, total      100 - 199 mg/dL 206 (H)      Triglyceride      0 - 149 mg/dL 93      HDL Cholesterol      >39 mg/dL 47      VLDL, calculated      5 - 40 mg/dL 19      LDL, calculated      0 - 99 mg/dL 140 (H)      Hemoglobin A1c, (calculated)      4.8 - 5.6 %   5.8 (H)    Estimated average glucose      mg/dL   120    Prolactin      4.8 - 23.3 ng/mL       TSH      0.450 - 4.500 uIU/mL       T4, Free      0.82 - 1.77 ng/dL    0.98     Component      Latest Ref Rng & Units 3/3/2017 3/3/2017           4:12 PM  4:12 PM   Glucose      65 - 99 mg/dL     BUN      6 - 20 mg/dL     Creatinine      0.57 - 1.00 mg/dL     GFR est non-AA      >59 mL/min/1.73     GFR est AA      >59 mL/min/1.73     BUN/Creatinine ratio      8 - 20     Sodium      134 - 144 mmol/L     Potassium      3.5 - 5.2 mmol/L     Chloride      96 - 106 mmol/L     CO2      18 - 29 mmol/L     Calcium      8.7 - 10.2 mg/dL     Protein, total      6.0 - 8.5 g/dL     Albumin      3.5 - 5.5 g/dL     GLOBULIN, TOTAL      1.5 - 4.5 g/dL     A-G Ratio      1.1 - 2.5     Bilirubin, total      0.0 - 1.2 mg/dL     Alk.  phosphatase      39 - 117 IU/L     AST      0 - 40 IU/L     ALT      0 - 32 IU/L     Cholesterol, total      100 - 199 mg/dL     Triglyceride      0 - 149 mg/dL     HDL Cholesterol      >39 mg/dL     VLDL, calculated      5 - 40 mg/dL     LDL, calculated      0 - 99 mg/dL     Hemoglobin A1c, (calculated)      4.8 - 5.6 %     Estimated average glucose      mg/dL     Prolactin      4.8 - 23.3 ng/mL  13.9   TSH      0.450 - 4.500 uIU/mL 1.510    T4, Free      0.82 - 1.77 ng/dL     Prolactin normal, as are thyroid tests. Cholesterol and A1c mildly elevated. will recommend wt loss  To evaluate her history of pituitary adenoma, will obtain another MRI and then consider further evaluation with IGF-1 and dexamethasone suppression test.      MRI normal:  INDICATION:  pituitary adenoma    COMPARISON:  1/15/2015    TECHNIQUE:    MR imaging of the brain was performed with particular attention to the pituitary  gland including sagittal T1, axial T1, T2, FLAIR, DWI/ADC; Pre and post contrast  imaging was performed using 9 mL of Gadavist with particular attention to the  sella turcica. FINDINGS:    The ventricles are midline without hydrocephalus. Chevis Hallmark is no significant white  matter disease .  The major intracranial vascular flow-voids are patent. Chevis Hallmark  are no areas of abnormal enhancement. There is mild mucosal thickening and  minimal fluid in the left maxillary sinus.     The pituitary gland is normal in size. There is mild fullness of the right side  of the gland. The subtle focus of hypoenhancement in the right side of the gland  which is approximately 6 mm in size, best seen on coronal images (image 9) is  unchanged.  The infundibulum is midline.  The optic chiasm is normal.  There is  no suprasellar mass.             Impression             IMPRESSION:   1. No significant interval change in 6 mm focus of hypoenhancement in the right  side of the pituitary gland, possibly representing a microadenoma. 2. Otherwise negative MRI of the brain and pituitary. Candance Huger

## 2017-03-04 LAB
ALBUMIN SERPL-MCNC: 4.4 G/DL (ref 3.5–5.5)
ALBUMIN/GLOB SERPL: 1.8 {RATIO} (ref 1.1–2.5)
ALP SERPL-CCNC: 117 IU/L (ref 39–117)
ALT SERPL-CCNC: 25 IU/L (ref 0–32)
AST SERPL-CCNC: 21 IU/L (ref 0–40)
BILIRUB SERPL-MCNC: 0.2 MG/DL (ref 0–1.2)
BUN SERPL-MCNC: 11 MG/DL (ref 6–20)
BUN/CREAT SERPL: 14 (ref 8–20)
CALCIUM SERPL-MCNC: 9.4 MG/DL (ref 8.7–10.2)
CHLORIDE SERPL-SCNC: 104 MMOL/L (ref 96–106)
CHOLEST SERPL-MCNC: 206 MG/DL (ref 100–199)
CO2 SERPL-SCNC: 20 MMOL/L (ref 18–29)
CREAT SERPL-MCNC: 0.81 MG/DL (ref 0.57–1)
EST. AVERAGE GLUCOSE BLD GHB EST-MCNC: 120 MG/DL
GLOBULIN SER CALC-MCNC: 2.5 G/DL (ref 1.5–4.5)
GLUCOSE SERPL-MCNC: 77 MG/DL (ref 65–99)
HBA1C MFR BLD: 5.8 % (ref 4.8–5.6)
HDLC SERPL-MCNC: 47 MG/DL
LDLC SERPL CALC-MCNC: 140 MG/DL (ref 0–99)
POTASSIUM SERPL-SCNC: 4.2 MMOL/L (ref 3.5–5.2)
PROLACTIN SERPL-MCNC: 13.9 NG/ML (ref 4.8–23.3)
PROT SERPL-MCNC: 6.9 G/DL (ref 6–8.5)
SODIUM SERPL-SCNC: 144 MMOL/L (ref 134–144)
T4 FREE SERPL-MCNC: 0.98 NG/DL (ref 0.82–1.77)
TRIGL SERPL-MCNC: 93 MG/DL (ref 0–149)
TSH SERPL DL<=0.005 MIU/L-ACNC: 1.51 UIU/ML (ref 0.45–4.5)
VLDLC SERPL CALC-MCNC: 19 MG/DL (ref 5–40)

## 2017-03-04 NOTE — PROGRESS NOTES
Prolactin normal, as are thyroid tests. Cholesterol and A1c mildly elevated. will recommend wt loss  To evaluate her history of pituitary adenoma, will obtain another MRI and then consider further evaluation with IGF-1 and dexamethasone suppression test.

## 2017-03-09 DIAGNOSIS — D35.2 PITUITARY ADENOMA (HCC): Primary | ICD-10-CM

## 2017-03-22 ENCOUNTER — HOSPITAL ENCOUNTER (OUTPATIENT)
Dept: MRI IMAGING | Age: 27
Discharge: HOME | End: 2017-03-22
Attending: INTERNAL MEDICINE
Payer: MEDICAID

## 2017-03-22 DIAGNOSIS — D35.2 PITUITARY ADENOMA (HCC): ICD-10-CM

## 2017-03-22 PROCEDURE — A9585 GADOBUTROL INJECTION: HCPCS | Performed by: INTERNAL MEDICINE

## 2017-03-22 PROCEDURE — 74011250636 HC RX REV CODE- 250/636: Performed by: INTERNAL MEDICINE

## 2017-03-22 PROCEDURE — 70553 MRI BRAIN STEM W/O & W/DYE: CPT

## 2017-03-22 RX ADMIN — GADOBUTROL 9 ML: 604.72 INJECTION INTRAVENOUS at 18:30

## 2017-04-05 ENCOUNTER — TELEPHONE (OUTPATIENT)
Dept: OBGYN CLINIC | Age: 27
End: 2017-04-05

## 2017-04-05 RX ORDER — MEDROXYPROGESTERONE ACETATE 150 MG/ML
INJECTION, SUSPENSION INTRAMUSCULAR
Qty: 1 SYRINGE | Refills: 1 | Status: SHIPPED | OUTPATIENT
Start: 2017-04-05 | End: 2018-11-02

## 2017-07-20 ENCOUNTER — OFFICE VISIT (OUTPATIENT)
Dept: OBGYN CLINIC | Age: 27
End: 2017-07-20

## 2017-07-20 DIAGNOSIS — Z20.2 POSSIBLE EXPOSURE TO STD: ICD-10-CM

## 2017-07-20 DIAGNOSIS — N89.8 VAGINAL DISCHARGE: Primary | ICD-10-CM

## 2017-07-20 DIAGNOSIS — N93.8 DUB (DYSFUNCTIONAL UTERINE BLEEDING): ICD-10-CM

## 2017-07-20 LAB
HCG URINE, QL. (POC): NEGATIVE
VALID INTERNAL CONTROL?: YES

## 2017-07-20 RX ORDER — FLUCONAZOLE 150 MG/1
150 TABLET ORAL DAILY
Qty: 1 TAB | Refills: 0 | Status: SHIPPED | OUTPATIENT
Start: 2017-07-20 | End: 2017-07-21

## 2017-07-20 NOTE — PROGRESS NOTES
Chief Complaint   Vaginal Discharge      HPI  28 y.o. female complains of dark and bloody vaginal discharge for several days. .No LMP recorded. Patient is not currently having periods (Reason: Chemically Induced). She admits to additional symptoms at this time. Itching and irritation  The patient  denies aggravating factors  She denies exposure to new chemicals ot hygenic agents  Previous treatment included:  None  She also reprots DUB with her last depo shot-- upt negative in the office     Past Medical History:   Diagnosis Date    Abnormal Pap smear     Anemia     Gastrointestinal disorder     Ulcers    Headache     Herniated disc     Hx gestational diabetes     HX OTHER MEDICAL     trich treated at beginning of pregnancy    HX OTHER MEDICAL     pituitary tumor-benign     Miscarriage     Molar pregnancy     Pap smear for cervical cancer screening 13    Negative    PCOS (polycystic ovarian syndrome)     Pelvic pain in female     [facility]     Pituitary tumor     Psychiatric problem     depression never on medication    Thyroid activity decreased     hypothyroid awaiting appnt for endo     Past Surgical History:   Procedure Laterality Date    HX  SECTION      HX COLPOSCOPY  11/3/10    HX DILATION AND CURETTAGE      HX LIPOMA RESECTION       Social History     Occupational History    Not on file. Social History Main Topics    Smoking status: Never Smoker    Smokeless tobacco: Never Used    Alcohol use No      Comment: =    Drug use: No    Sexual activity: Yes     Partners: Male     Birth control/ protection: Injection     Family History   Problem Relation Age of Onset    Diabetes Father     Headache Mother         Allergies   Allergen Reactions    Tomato Angioedema     Prior to Admission medications    Medication Sig Start Date End Date Taking?  Authorizing Provider   medroxyPROGESTERone (DEPO-PROVERA) 150 mg/mL syrg INJECT 1ML INTRAMUSCULARLY ONCE FOR 1 DOSE 4/5/17  Yes Shanda Dallas MD   topiramate (TOPAMAX) 50 mg tablet 1 tab in AM and 1-2 tabs in evening. For migraine prevention 11/16/16   Rosalva Estevez MD   ibuprofen (MOTRIN) 600 mg tablet Take 1 Tab by mouth every six (6) hours as needed for Pain.  11/5/16   Rajwinder Schwab PA-C                      Review of Systems - History obtained from the patient  Constitutional: negative for weight loss, fever, night sweats  Breast: negative for breast lumps, nipple discharge, galactorrhea  GI: negative for change in bowel habits, abdominal pain, black or bloody stools  : negative for frequency, dysuria, hematuria  MSK: negative for back pain, joint pain, muscle pain  Skin: negative for itching, rash, hives  Neuro: negative for dizziness, headache, confusion, weakness  Psych: negative for anxiety, depression, change in mood  Heme/lymph: negative for bleeding, bruising, pallor       Objective:    Visit Vitals    Breastfeeding No       Physical Exam:   PHYSICAL EXAMINATION    Constitutional  · Appearance: well-nourished, well developed, alert, in no acute distress    HENT  · Head and Face: appears normal    Genitourinary  · External Genitalia: normal appearance for age, + discharge present, no tenderness present, no inflammatory lesions present, no masses present, no atrophy present  · Vagina:  + discharge present, otherwise normal vaginal vault without central or paravaginal defects, no inflammatory lesions present, no masses present  · Bladder: non-tender to palpation  · Urethra: appears normal  · Cervix: normal   · Uterus: normal size, shape and consistency  · Adnexa: no adnexal tenderness present, no adnexal masses present  · Perineum: perineum within normal limits, no evidence of trauma, no rashes or skin lesions present  · Anus: anus within normal limits, no hemorrhoids present  · Inguinal Lymph Nodes: no lymphadenopathy present    Skin  · General Inspection: no rash, no lesions identified    Neurologic/Psychiatric  · Mental Status:  · Orientation: grossly oriented to person, place and time  · Mood and Affect: mood normal, affect appropriate    Assessment:   Vaginitis will f/u with nuswab    Plan:   ROV prn if symptoms persist or worsen.

## 2017-07-20 NOTE — MR AVS SNAPSHOT
Visit Information Date & Time Provider Department Dept. Phone Encounter #  
 7/20/2017 10:20 AM Romie Vela MD [facility] [phone] [id number] Upcoming Health Maintenance Date Due INFLUENZA AGE 9 TO ADULT 8/1/2017 PAP AKA CERVICAL CYTOLOGY 10/11/2019 Allergies as of 7/20/2017  Review Complete On: 7/20/2017 By: Romie Vela MD  
  
 Severity Noted Reaction Type Reactions Tomato  07/31/2013    Angioedema Current Immunizations  Never Reviewed Name Date Tdap 2/21/2014 12:54 PM  
  
 Not reviewed this visit You Were Diagnosed With   
  
 Codes Comments Vaginal discharge    -  Primary ICD-10-CM: N89.8 ICD-9-CM: 623.5 Possible exposure to STD     ICD-10-CM: Z20.2 ICD-9-CM: V01.6 DUB (dysfunctional uterine bleeding)     ICD-10-CM: N93.8 ICD-9-CM: 626.8 Vitals Breastfeeding? OB Status Smoking Status No Chemically Induced Never Smoker Preferred Pharmacy Pharmacy Name Phone [id number] [address] [phone] Your Updated Medication List  
  
   
This list is accurate as of: 7/20/17 10:29 AM.  Always use your most recent med list.  
  
  
  
  
 ibuprofen 600 mg tablet Commonly known as:  MOTRIN Take 1 Tab by mouth every six (6) hours as needed for Pain.  
  
 medroxyPROGESTERone 150 mg/mL Syrg Commonly known as:  DEPO-PROVERA INJECT 1ML INTRAMUSCULARLY ONCE FOR 1 DOSE  
  
 topiramate 50 mg tablet Commonly known as:  TOPAMAX  
1 tab in AM and 1-2 tabs in evening. For migraine prevention We Performed the Following AMB POC URINE PREGNANCY TEST, VISUAL COLOR COMPARISON [89512 CPT(R)] [address] [id number] Custom] Introducing [facility]! Dear Selene Dangelo: Thank you for requesting a [employer] account. Our records indicate that you already have an active [employer] account.   You can access your account anytime at [email or web address] Did you know that you can access your hospital and ER discharge instructions at any time in [employer]? You can also review all of your test results from your hospital stay or ER visit. Additional Information If you have questions, please visit the Frequently Asked Questions section of the [employer] website at [email or web address] Remember, [employer] is NOT to be used for urgent needs. For medical emergencies, dial 911. Now available from your iPhone and Android! Please provide this summary of care documentation to your next provider. Your primary care clinician is listed as Tavo Palmerver. If you have any questions after today's visit, please call [phone].

## 2017-07-23 LAB
A VAGINAE DNA VAG QL NAA+PROBE: ABNORMAL SCORE
BVAB2 DNA VAG QL NAA+PROBE: ABNORMAL SCORE
C ALBICANS DNA VAG QL NAA+PROBE: NEGATIVE
C GLABRATA DNA VAG QL NAA+PROBE: NEGATIVE
C TRACH RRNA SPEC QL NAA+PROBE: NEGATIVE
MEGA1 DNA VAG QL NAA+PROBE: ABNORMAL SCORE
N GONORRHOEA RRNA SPEC QL NAA+PROBE: NEGATIVE
T VAGINALIS RRNA SPEC QL NAA+PROBE: POSITIVE

## 2017-07-25 ENCOUNTER — HOSPITAL ENCOUNTER (EMERGENCY)
Age: 27
Discharge: HOME | End: 2017-07-25
Attending: STUDENT IN AN ORGANIZED HEALTH CARE EDUCATION/TRAINING PROGRAM
Payer: MEDICAID

## 2017-07-25 ENCOUNTER — APPOINTMENT (OUTPATIENT)
Dept: GENERAL RADIOLOGY | Age: 27
End: 2017-07-25
Attending: STUDENT IN AN ORGANIZED HEALTH CARE EDUCATION/TRAINING PROGRAM
Payer: MEDICAID

## 2017-07-25 ENCOUNTER — TELEPHONE (OUTPATIENT)
Dept: OBGYN CLINIC | Age: 27
End: 2017-07-25

## 2017-07-25 VITALS
DIASTOLIC BLOOD PRESSURE: 73 MMHG | RESPIRATION RATE: 18 BRPM | OXYGEN SATURATION: 99 % | HEIGHT: 66 IN | SYSTOLIC BLOOD PRESSURE: 137 MMHG | TEMPERATURE: 98.4 F | WEIGHT: 220 LBS | HEART RATE: 73 BPM | BODY MASS INDEX: 35.36 KG/M2

## 2017-07-25 DIAGNOSIS — M25.572 CHRONIC PAIN OF LEFT ANKLE: Primary | ICD-10-CM

## 2017-07-25 DIAGNOSIS — G89.29 CHRONIC PAIN OF LEFT ANKLE: Primary | ICD-10-CM

## 2017-07-25 PROCEDURE — 99283 EMERGENCY DEPT VISIT LOW MDM: CPT

## 2017-07-25 PROCEDURE — L1930 AFO PLASTIC: HCPCS

## 2017-07-25 PROCEDURE — 73610 X-RAY EXAM OF ANKLE: CPT

## 2017-07-25 RX ORDER — METRONIDAZOLE 500 MG/1
2000 TABLET ORAL
Qty: 4 TAB | Refills: 0 | Status: SHIPPED | OUTPATIENT
Start: 2017-07-25 | End: 2017-09-05 | Stop reason: SDUPTHER

## 2017-07-25 NOTE — DISCHARGE INSTRUCTIONS
We hope that we have addressed all of your medical concerns. The examination and treatment you received in the Emergency Department were for an emergent problem and were not intended as complete care. It is important that you follow up with your healthcare provider(s) for ongoing care. If your symptoms worsen or do not improve as expected, and you are unable to reach your usual health care provider(s), you should return to the Emergency Department. Today's healthcare is undergoing tremendous change, and patient satisfaction surveys are one of the many tools to assess the quality of medical care. You may receive a survey from the [employer] organization regarding your experience in the Emergency Department. I hope that your experience has been completely positive, particularly the medical care that I provided. As such, please participate in the survey; anything less than excellent does not meet my expectations or intentions. [facility] and 8 [facility]3] participate in nationally recognized quality of care measures. If your blood pressure is greater than 120/80, as reported below, we urge that you seek medical care to address the potential of high blood pressure, commonly known as hypertension. Hypertension can be hereditary or can be caused by certain medical conditions, pain, stress, or \"white coat syndrome. \"       Please make an appointment with your health care provider(s) for follow up of your Emergency Department visit. VITALS:   Patient Vitals for the past 8 hrs:   Temp Pulse Resp BP SpO2   07/25/17 1047 98.3 °F (36.8 °C) 81 16 131/82 100 %          Thank you for allowing us to provide you with medical care today. We realize that you have many choices for your emergency care needs. Please choose us in the future for any continued health care needs. Cherie Padilla, [address] [place] [address] [address] Office: [phone]            No results found for this or any previous visit (from the past 24 hour(s)). Xr Ankle Lt Min 3 V    Result Date: 7/25/2017  INDICATION:  eval for ankle fx. Left ankle pain for 6 months, worse with walking and makes a popping sound. Exam: AP, lateral, oblique views of the left ankle. FINDINGS: There is no acute fracture or dislocation. Ankle mortise is congruent. Soft tissues are normal. Bones are well-mineralized. IMPRESSION: No acute fracture or dislocation. Chronic Ankle Laxity: Exercises  Your Care Instructions  Here are some examples of typical rehabilitation exercises for your condition. Start each exercise slowly. Ease off the exercise if you start to have pain. Your doctor or physical therapist will tell you when you can start these exercises and which ones will work best for you. How to do the exercises  Resisted ankle inversion    1. Sit on the floor with your good leg crossed over your other leg. 2. Hold both ends of an exercise band and loop the band around the inside of your affected foot. Then press your other foot against the band. 3. Keeping your legs crossed, slowly push your affected foot against the band so that foot moves away from your other foot. Then slowly relax. 4. Repeat 8 to 12 times. Resisted ankle eversion    1. Sit on the floor with your legs straight. 2. Hold both ends of an exercise band and loop the band around the outside of your affected foot. Then press your other foot against the band. 3. Keeping your leg straight, slowly push your affected foot outward against the band and away from your other foot without letting your leg rotate. Then slowly relax. 4. Repeat 8 to 12 times. Resisted ankle dorsiflexion    1. Tie the ends of an exercise band together to form a loop. Attach one end of the loop to a secure object or shut a door on it to hold it in place.  (Or you can have someone hold one end of the loop to provide resistance.)  2. While sitting on the floor or in a chair, loop the other end of the band over the top of your affected foot. 3. Keeping your knee and leg straight, slowly flex your foot to pull back on the exercise band, and then slowly relax. 4. Repeat 8 to 12 times. Single-leg balance    1. Stand on a flat surface with your arms stretched out to your sides like you are making the letter \"T. \" Then lift your good leg off the floor, bending it at the knee. If you are not steady on your feet, use one hand to hold on to a chair, counter, or wall. 2. Standing on the leg with your affected ankle, keep that knee straight. Try to balance on that leg for up to 30 seconds. Then rest for up to 10 seconds. 3. Repeat 6 to 8 times. 4. When you can balance on your affected leg for 30 seconds with your eyes open, try to balance on it with your eyes closed. When you can do this exercise with your eyes closed for 30 seconds and with ease and no pain, try standing on a pillow or piece of foam, and repeat steps 1 through 4. Follow-up care is a key part of your treatment and safety. Be sure to make and go to all appointments, and call your doctor if you are having problems. It's also a good idea to know your test results and keep a list of the medicines you take. Where can you learn more? Go to http://mitul-leena.info/. Enter V742 in the search box to learn more about \"Chronic Ankle Laxity: Exercises. \"  Current as of: March 21, 2017  Content Version: 11.3  © 6905-6293 [employer]. Care instructions adapted under license by [employer] (which disclaims liability or warranty for this information). If you have questions about a medical condition or this instruction, always ask your healthcare professional. [address] any warranty or liability for your use of this information.

## 2017-07-25 NOTE — TELEPHONE ENCOUNTER
Notes Recorded by Ankush Cummings MD on 7/24/2017 at 3:50 PM  Results abnormal, notify pt of results, +trich.  2 flagyl, partner also needs treatment or will get reinfected. Jackeline Beatty 1 month    Pt notified and rx pended for signature

## 2017-07-25 NOTE — ED NOTES
The patient was discharged home by Dr. Kary Coppola and Ezequiel San RN in stable condition. The patient is alert and oriented, is in no respiratory distress. The patient's diagnosis, condition and treatment were explained to patient or parent/guardian. The patient/responsible party expressed understanding. 3 prescriptions given to pt. No work/school note given to pt. A discharge plan has been developed. A  was not involved in the process. Aftercare instructions were given to the patient.

## 2017-07-25 NOTE — ED TRIAGE NOTES
Pt brought to treatment area via wheelchair with c/o \"left ankle pain since 6 months but recently it has gotten worse with walking and it keeps making this popping sound. \"  Pt denies injury to area. Pt reports taking tylenol for pain this morning.

## 2017-07-30 NOTE — ED PROVIDER NOTES
HPI Comments: Ms. Sharee Beasley is a 58-year-old female presenting with left ankle pain. Patient states that she's had left ankle pain for months now suddenly from a softball injury when she inverted her ankle. She is unable to tolerate weight feels that her ankle is \"loose\" patient denies any sensory or weakness in ankle joint. Patient has not seen orthopedics or had physical therapy nor she had any imaging. Patient is a 28 y.o. female presenting with ankle pain. The history is provided by the patient. Ankle Pain    This is a chronic problem. The current episode started more than 1 week ago. The problem occurs constantly. The problem has not changed since onset. The pain is present in the left ankle. The quality of the pain is described as aching. The pain is mild. Pertinent negatives include no numbness and no back pain. The symptoms are aggravated by activity and standing.         Past Medical History:   Diagnosis Date    Abnormal Pap smear     Anemia     Gastrointestinal disorder     Ulcers    Headache     Herniated disc     Hx gestational diabetes     HX OTHER MEDICAL     trich treated at beginning of pregnancy    HX OTHER MEDICAL     pituitary tumor-benign     Miscarriage     Molar pregnancy     Pap smear for cervical cancer screening 13    Negative    PCOS (polycystic ovarian syndrome)     Pelvic pain in female     Pituitary adenoma ([facility] [address]     Pituitary tumor     Psychiatric problem     depression never on medication    Thyroid activity decreased     hypothyroid awaiting appnt for endo       Past Surgical History:   Procedure Laterality Date    HX  SECTION      HX COLPOSCOPY  11/3/10    HX DILATION AND CURETTAGE      HX LIPOMA RESECTION           Family History:   Problem Relation Age of Onset    Diabetes Father     Headache Mother        Social History     Social History    Marital status: SINGLE     Spouse name: N/A    Number of children: N/A    Years of education: N/A Occupational History    Not on file. Social History Main Topics    Smoking status: Never Smoker    Smokeless tobacco: Never Used    Alcohol use No      Comment: =    Drug use: No    Sexual activity: Yes     Partners: Male     Birth control/ protection: Injection     Other Topics Concern    Not on file     Social History Narrative         ALLERGIES: Tomato    Review of Systems   Constitutional: Negative for activity change, diaphoresis, fatigue and fever. HENT: Negative for congestion and sore throat. Eyes: Negative for photophobia and visual disturbance. Respiratory: Negative for chest tightness and shortness of breath. Cardiovascular: Negative for chest pain, palpitations and leg swelling. Gastrointestinal: Negative for abdominal pain, blood in stool, constipation, diarrhea, nausea and vomiting. Genitourinary: Negative for difficulty urinating, dysuria, flank pain, frequency and hematuria. Musculoskeletal: Negative for back pain. Neurological: Negative for dizziness, syncope, numbness and headaches. Vitals:    07/25/17 1047 07/25/17 1206   BP: 131/82 137/73   Pulse: 81 73   Resp: 16 18   Temp: 98.3 °F (36.8 °C) 98.4 °F (36.9 °C)   SpO2: 100% 99%   Weight: 99.8 kg (220 lb)    Height: 5' 6\" (1.676 m)             Physical Exam   Constitutional: She is oriented to person, place, and time. She appears well-developed and well-nourished. No distress. HENT:   Head: Normocephalic and atraumatic. Nose: Nose normal.   Mouth/Throat: Oropharynx is clear and moist. No oropharyngeal exudate. Eyes: Conjunctivae and EOM are normal. Right eye exhibits no discharge. Left eye exhibits no discharge. No scleral icterus. Neck: Normal range of motion. Neck supple. No JVD present. No tracheal deviation present. No thyromegaly present. Cardiovascular: Normal rate, regular rhythm, normal heart sounds and intact distal pulses. Exam reveals no gallop and no friction rub.     No murmur heard.  Pulmonary/Chest: Effort normal and breath sounds normal. No stridor. No respiratory distress. She has no wheezes. She has no rales. She exhibits no tenderness. Abdominal: Bowel sounds are normal. She exhibits no distension and no mass. There is no tenderness. There is no rebound. Musculoskeletal: Normal range of motion. She exhibits no edema, tenderness or deformity. Lymphadenopathy:     She has no cervical adenopathy. Neurological: She is alert and oriented to person, place, and time. No cranial nerve deficit. Coordination normal.   Skin: Skin is warm and dry. No rash noted. She is not diaphoretic. No erythema. No pallor. Psychiatric: She has a normal mood and affect. Her behavior is normal. Judgment and thought content normal.        MDM  Number of Diagnoses or Management Options  Chronic pain of left ankle:   Diagnosis management comments: Chronic ankle pain, ligament injury, fracture. 26-year-old female with left chronic ankle pain and bear weight we'll obtain x-ray to rule out any fracture likely ligamentous versus tendon injury will require orthopedic followup.        Amount and/or Complexity of Data Reviewed  Tests in the radiology section of CPT®: ordered and reviewed    Risk of Complications, Morbidity, and/or Mortality  Presenting problems: low  Diagnostic procedures: low  Management options: low    Patient Progress  Patient progress: stable    ED Course       Procedures

## 2017-08-29 ENCOUNTER — OFFICE VISIT (OUTPATIENT)
Dept: OBGYN CLINIC | Age: 27
End: 2017-08-29

## 2017-08-29 DIAGNOSIS — Z20.2 POSSIBLE EXPOSURE TO STD: Primary | ICD-10-CM

## 2017-08-29 DIAGNOSIS — N89.8 VAGINAL DISCHARGE: ICD-10-CM

## 2017-08-29 RX ORDER — METRONIDAZOLE 500 MG/1
500 TABLET ORAL 2 TIMES DAILY
Qty: 14 TAB | Refills: 0 | Status: SHIPPED | OUTPATIENT
Start: 2017-08-29 | End: 2017-09-05 | Stop reason: SDUPTHER

## 2017-08-29 NOTE — MR AVS SNAPSHOT
Visit Information Date & Time Provider Department Dept. Phone Encounter #  
 8/29/2017 11:10 AM MD Kevin Nassar [phone] [id number] Your Appointments 8/29/2017 11:10 AM  
FOLLOW UP 10 with MD Kevin Nassar ([facility]) Appt Note: fu  
 [address] [facility] [address] [facility] [address] [id number]  
[facility] [address] Upcoming Health Maintenance Date Due INFLUENZA AGE 9 TO ADULT 8/1/2017 PAP AKA CERVICAL CYTOLOGY 10/11/2019 Allergies as of 8/29/2017  Review Complete On: 8/29/2017 By: Atilio Gandhi MD  
  
 Severity Noted Reaction Type Reactions Tomato  07/31/2013    Angioedema Current Immunizations  Never Reviewed Name Date Tdap 2/21/2014 12:54 PM  
  
 Not reviewed this visit You Were Diagnosed With   
  
 Codes Comments Possible exposure to STD    -  Primary ICD-10-CM: Z20.2 ICD-9-CM: V01.6 Vaginal discharge     ICD-10-CM: N89.8 ICD-9-CM: 623.5 Vitals Breastfeeding? OB Status Smoking Status No Chemically Induced Never Smoker Preferred Pharmacy Pharmacy Name Phone [id number] [place] - [address] [phone] Your Updated Medication List  
  
   
This list is accurate as of: 8/29/17 10:41 AM.  Always use your most recent med list.  
  
  
  
  
 ibuprofen 600 mg tablet Commonly known as:  MOTRIN Take 1 Tab by mouth every six (6) hours as needed for Pain.  
  
 medroxyPROGESTERone 150 mg/mL Syrg Commonly known as:  DEPO-PROVERA INJECT 1ML INTRAMUSCULARLY ONCE FOR 1 DOSE  
  
 metroNIDAZOLE 500 mg tablet Commonly known as:  FLAGYL Take 1 Tab by mouth two (2) times a day for 7 days. Prescriptions Sent to Pharmacy  Refills  
 metroNIDAZOLE (FLAGYL) 500 mg tablet 0  
 Sig: Take 1 Tab by mouth two (2) times a day for 7 days. Class: Normal  
 Pharmacy: [id number] - [place] - [address]  #: [phone] Route: Oral  
  
We Performed the Following [address] [id number] Custom] Introducing [facility] SERVICES! Dear Annmarie Saleh: Thank you for requesting a [employer] account. Our records indicate that you already have an active [employer] account. You can access your account anytime at [email or web address]. [employer] Did you know that you can access your hospital and ER discharge instructions at any time in [employer]? You can also review all of your test results from your hospital stay or ER visit. Additional Information If you have questions, please visit the Frequently Asked Questions section of the [employer] website at [email or web address] [employer]/. Remember, [employer] is NOT to be used for urgent needs. For medical emergencies, dial 911. Now available from your iPhone and Android! Please provide this summary of care documentation to your next provider. Your primary care clinician is listed as Kristine Henry. If you have any questions after today's visit, please call [phone].

## 2017-08-29 NOTE — PROGRESS NOTES
Chief Complaint   Vaginitis and Exposure to STD      HPI  28 y.o. female complains of scant vaginal discharge for several weeks. .No LMP recorded. Patient is not currently having periods (Reason: Chemically Induced). She admits to additional symptoms at this time. irritation  The patient  denies aggravating factors  She denies exposure to new chemicals ot hygenic agents  Previous treatment included:  None  ZEYNEP for trich today also. Past Medical History:   Diagnosis Date    Abnormal Pap smear     Anemia     Gastrointestinal disorder     Ulcers    Headache     Herniated disc     Hx gestational diabetes     HX OTHER MEDICAL     trich treated at beginning of pregnancy    HX OTHER MEDICAL     pituitary tumor-benign     Miscarriage     Molar pregnancy     Pap smear for cervical cancer screening 13    Negative    PCOS (polycystic ovarian syndrome)     Pelvic pain in female     Pit[facility] ([facility] [address]     Pituitary tumor     Psychiatric problem     depression never on medication    Thyroid activity decreased     hypothyroid awaiting appnt for endo     Past Surgical History:   Procedure Laterality Date    HX  SECTION      HX COLPOSCOPY  11/3/10    HX DILATION AND CURETTAGE      HX LIPOMA RESECTION       Social History     Occupational History    Not on file. Social History Main Topics    Smoking status: Never Smoker    Smokeless tobacco: Never Used    Alcohol use No      Comment: =    Drug use: No    Sexual activity: Yes     Partners: Male     Birth control/ protection: Injection     Family History   Problem Relation Age of Onset    Diabetes Father     Headache Mother         Allergies   Allergen Reactions    Tomato Angioedema     Prior to Admission medications    Medication Sig Start Date End Date Taking? Authorizing Provider   metroNIDAZOLE (FLAGYL) 500 mg tablet Take 1 Tab by mouth two (2) times a day for 7 days.  17  Torrey Min MD medroxyPROGESTERone (DEPO-PROVERA) 150 mg/mL syrg INJECT 1ML INTRAMUSCULARLY ONCE FOR 1 DOSE 4/5/17   Chetan Ladd MD   ibuprofen (MOTRIN) 600 mg tablet Take 1 Tab by mouth every six (6) hours as needed for Pain.  11/5/16   Rajwinder Schwab PA-C                      Review of Systems - History obtained from the patient  Constitutional: negative for weight loss, fever, night sweats  Breast: negative for breast lumps, nipple discharge, galactorrhea  GI: negative for change in bowel habits, abdominal pain, black or bloody stools  : negative for frequency, dysuria, hematuria  MSK: negative for back pain, joint pain, muscle pain  Skin: negative for itching, rash, hives  Neuro: negative for dizziness, headache, confusion, weakness  Psych: negative for anxiety, depression, change in mood  Heme/lymph: negative for bleeding, bruising, pallor       Objective:    Visit Vitals    Breastfeeding No       Physical Exam:   PHYSICAL EXAMINATION    Constitutional  · Appearance: well-nourished, well developed, alert, in no acute distress    HENT  · Head and Face: appears normal    Genitourinary  · External Genitalia: normal appearance for age, + discharge present, no tenderness present, no inflammatory lesions present, no masses present, no atrophy present  · Vagina:  + discharge present, otherwise normal vaginal vault without central or paravaginal defects, no inflammatory lesions present, no masses present  · Bladder: non-tender to palpation  · Urethra: appears normal  · Cervix: normal   · Uterus: normal size, shape and consistency  · Adnexa: no adnexal tenderness present, no adnexal masses present  · Perineum: perineum within normal limits, no evidence of trauma, no rashes or skin lesions present  · Anus: anus within normal limits, no hemorrhoids present  · Inguinal Lymph Nodes: no lymphadenopathy present    Skin  · General Inspection: no rash, no lesions identified    Neurologic/Psychiatric  · Mental Status:  · Orientation: grossly oriented to person, place and time  · Mood and Affect: mood normal, affect appropriate    Assessment:   Vaginitis- will treat for bv and f/u with nuswab  H/O trich- will f/u with nuswab for eden    Plan:   ROV prn if symptoms persist or worsen.

## 2017-09-05 RX ORDER — METRONIDAZOLE 500 MG/1
2000 TABLET ORAL
Qty: 4 TAB | Refills: 0 | Status: SHIPPED | OUTPATIENT
Start: 2017-09-05 | End: 2017-09-05

## 2017-09-06 ENCOUNTER — PATIENT MESSAGE (OUTPATIENT)
Dept: OBGYN CLINIC | Age: 27
End: 2017-09-06

## 2017-09-06 RX ORDER — NORGESTIMATE AND ETHINYL ESTRADIOL 0.25-0.035
1 KIT ORAL DAILY
Qty: 3 PACKAGE | Refills: 4 | Status: SHIPPED | OUTPATIENT
Start: 2017-09-06 | End: 2017-09-15

## 2017-09-15 ENCOUNTER — HOSPITAL ENCOUNTER (EMERGENCY)
Age: 27
Discharge: HOME | End: 2017-09-15
Attending: EMERGENCY MEDICINE
Payer: MEDICAID

## 2017-09-15 VITALS
HEIGHT: 66 IN | HEART RATE: 74 BPM | BODY MASS INDEX: 35.36 KG/M2 | RESPIRATION RATE: 19 BRPM | SYSTOLIC BLOOD PRESSURE: 170 MMHG | WEIGHT: 220 LBS | OXYGEN SATURATION: 99 % | DIASTOLIC BLOOD PRESSURE: 78 MMHG | TEMPERATURE: 98.5 F

## 2017-09-15 DIAGNOSIS — M79.672 INFLAMMATORY HEEL PAIN, LEFT: Primary | ICD-10-CM

## 2017-09-15 PROCEDURE — 99283 EMERGENCY DEPT VISIT LOW MDM: CPT

## 2017-09-15 RX ORDER — INDOMETHACIN 25 MG/1
25 CAPSULE ORAL 3 TIMES DAILY
Qty: 30 CAP | Refills: 0 | Status: SHIPPED | OUTPATIENT
Start: 2017-09-15 | End: 2017-09-25

## 2017-09-15 NOTE — ED TRIAGE NOTES
PT ambulates to treatment room with steady gate. States she was seen about a month ago for an ankle sprain and [facility] and just [facility]. PT complains of pain 10/10 in left leg and ankle, describes as a sharp stabbing pain.

## 2017-09-15 NOTE — DISCHARGE INSTRUCTIONS
Heel Pain: Care Instructions  Your Care Instructions  You can have heel pain from an injury or from everyday overuse, such as running or walking a lot. Plantar fasciitis is the most common cause of heel pain. In this condition, the bottom of your foot from the front of the heel to the base of the toes is sore and hard to walk on. Your heel can get better with rest, anti-inflammatory pain medicines, and stretching exercises. Follow-up care is a key part of your treatment and safety. Be sure to make and go to all appointments, and call your doctor if you are having problems. Its also a good idea to know your test results and keep a list of the medicines you take. How can you care for yourself at home? · Rest your feet often. Reduce your activity to a level that lets you avoid pain. If possible, do not run or walk on hard surfaces. · Take anti-inflammatory medicines to reduce heel pain. These include ibuprofen (Advil, Motrin) and naproxen (Aleve). Read and follow all instructions on the label. · Put ice or a cold pack on your heel for 10 to 20 minutes at a time. Try to do this every 1 to 2 hours for the next 3 days (when you are awake). Put a thin cloth between the ice and your skin. · If ice isn't helping after 2 or 3 days, try heat, such as a heating pad set on low. · If your doctor says it is okay, try these calf stretches. Tight calf muscles can cause heel pain or make it worse. ¨ Stand about 1 foot from a wall. Place the palms of both hands against the wall at chest level and lean forward against the wall. Put the leg you want to stretch about a step behind your other leg. Keep your back heel on the floor and bend your front knee until you feel a stretch in the back leg. Hold this position for 15 to 30 seconds. Repeat the exercise 2 to 4 times a session. Do 3 to 4 sessions a day. ¨ Sit down on the floor or a mat with your feet stretched in front of you.  Roll up a towel lengthwise, and loop it over the ball of your foot. Holding the towel at both ends, gently pull the towel toward you to stretch your foot. Hold this position for 15 to 30 seconds. Repeat the exercise 2 to 4 times a session. Do 3 to 4 sessions a day. · Wear a night splint if your doctor suggests it. A night splint holds your foot with the toes pointed up. This position gives the bottom of your foot a constant, gentle stretch. · Wear shoes with good arch support. Athletic shoes or shoes with a well-cushioned sole are good choices. · Try a heel lift, heel cup or shoe insert (orthotic) to help cushion your heel. You can buy these at many shoe stores. Use them in both shoes, even if only one foot hurts. · Maintain a healthy weight. This puts less strain on your feet. When should you call for help? Call your doctor now or seek immediate medical care if:  · You have heel pain with fever, redness, or warmth in your heel. · You have numbness or tingling in your heel. Watch closely for changes in your health, and be sure to contact your doctor if:  · You cannot put weight on the sore foot. · Your heel pain lasts more than 2 weeks. Where can you learn more? Go to http://mitul-leena.info/. Enter S299 in the search box to learn more about \"Heel Pain: Care Instructions. \"  Current as of: March 20, 2017  Content Version: 11.3  © 9199-9811 [employer]. Care instructions adapted under license by [employer] (which disclaims liability or warranty for this information). If you have questions about a medical condition or this instruction, always ask your healthcare professional. [place] [address] any warranty or liability for your use of this information.

## 2017-09-15 NOTE — ED PROVIDER NOTES
HPI Comments: Patient is a very pleasant 70-year-old black female who presents emergency Department with complaints of pain in her left heel. She states that she had a left ankle injury within the past few months and has been seeing Dr. Meme Price with [facility] for the same. She is an active female with 3 kids, who plays softball. She has developed some degree of chronic ankle pain. She wears an ankle brace regularly. Over the past 24 hours she has developed pain in her left heel. There was no acute injury. She states the pain is sharp. She denies any other symptoms or complaints at this time. The history is provided by the patient. Past Medical History:   Diagnosis Date    Abnormal Pap smear     Anemia     Gastrointestinal disorder     Ulcers    Headache     Herniated disc     Hx gestational diabetes     HX OTHER MEDICAL     trich treated at beginning of pregnancy    HX OTHER MEDICAL     pituitary tumor-benign     Miscarriage     Molar pregnancy     Pap smear for cervical cancer screening 13    Negative    PCOS (polycystic ovarian syndrome)     Pelvic pain in female     Pituitary adenoma ([facility] [address]     Pituitary tumor     Psychiatric problem     depression never on medication    Thyroid activity decreased     hypothyroid awaiting appnt for endo       Past Surgical History:   Procedure Laterality Date    HX  SECTION      HX COLPOSCOPY  11/3/10    HX DILATION AND CURETTAGE      HX LIPOMA RESECTION           Family History:   Problem Relation Age of Onset    Diabetes Father     Headache Mother        Social History     Social History    Marital status: SINGLE     Spouse name: N/A    Number of children: N/A    Years of education: N/A     Occupational History    Not on file.      Social History Main Topics    Smoking status: Never Smoker    Smokeless tobacco: Never Used    Alcohol use No      Comment: =    Drug use: No    Sexual activity: Yes     Partners: Male Birth control/ protection: Injection     Other Topics Concern    Not on file     Social History Narrative         ALLERGIES: Tomato    Review of Systems   Constitutional: Negative. Negative for appetite change, fever and unexpected weight change. HENT: Negative. Negative for ear pain, hearing loss, nosebleeds, rhinorrhea, sore throat and trouble swallowing. Respiratory: Negative. Negative for cough, chest tightness and shortness of breath. Cardiovascular: Negative. Negative for chest pain and palpitations. Gastrointestinal: Negative. Negative for abdominal distention, abdominal pain, blood in stool and vomiting. Endocrine: Negative. Genitourinary: Negative for dysuria and hematuria. Musculoskeletal: Negative. Negative for back pain and myalgias. Skin: Negative. Negative for rash. Allergic/Immunologic: Negative. Neurological: Negative. Negative for dizziness, syncope, weakness and numbness. Hematological: Negative. Psychiatric/Behavioral: Negative. All other systems reviewed and are negative. Vitals:    09/15/17 0846   BP: 170/78   Pulse: 74   Resp: 19   Temp: 98.5 °F (36.9 °C)   SpO2: 99%   Weight: 99.8 kg (220 lb)   Height: 5' 6\" (1.676 m)            Physical Exam   Constitutional: She is oriented to person, place, and time. She appears well-developed and well-nourished. No distress. HENT:   Head: Normocephalic and atraumatic. Right Ear: External ear normal.   Left Ear: External ear normal.   Nose: Nose normal.   Mouth/Throat: Oropharynx is clear and moist.   Eyes: Conjunctivae and EOM are normal. Pupils are equal, round, and reactive to light. Neck: Normal range of motion. Neck supple. No JVD present. No thyromegaly present. Cardiovascular: Normal rate, regular rhythm, normal heart sounds and intact distal pulses. No murmur heard. Pulmonary/Chest: Effort normal and breath sounds normal. No respiratory distress. She has no wheezes. She has no rales. Abdominal: Soft. Bowel sounds are normal. She exhibits no distension. There is no tenderness. Musculoskeletal: Normal range of motion. She exhibits no edema. Left foot shows no signs of swelling in the ankle or other area. She has a focal point tenderness at the insertion of the Achilles tendon and the dorsal aspect of the calcaneus. There is no erythema. She is neurovascularly intact. She has no plantar tenderness of her foot and no midfoot tenderness. Her ankle is stable. The Achilles tendon is intact. There is no calf tenderness or swelling. Neurological: She is alert and oriented to person, place, and time. No cranial nerve deficit. Skin: Skin is warm and dry. No rash noted. Psychiatric: She has a normal mood and affect. Her behavior is normal. Thought content normal.   Vitals reviewed. MDM  Number of Diagnoses or Management Options  Inflammatory heel pain, left:   Diagnosis management comments: Assessment and plan: Tenderness at the Achilles tendon insertion site. Likely inflammatory. Have her continue to use her ankle brace as previously directed. Provide crutches to help rest the area. Trial of indomethacin. Patient is to follow up with Dr. Janene Schreiber in one week as scheduled. Patient understands and agrees with plan.     Risk of Complications, Morbidity, and/or Mortality  Presenting problems: low  Diagnostic procedures: low  Management options: low    Patient Progress  Patient progress: stable    ED Course       Procedures

## 2017-09-15 NOTE — ED NOTES
Ongoing plan of care with patient discussed and PT verbalized understanding. PT was given crutches and taught proper use, PT was able to demonstrate back proper use. PT resting comfortably on stretcher with call bell within reach; waiting for discharge.

## 2018-03-07 ENCOUNTER — HOSPITAL ENCOUNTER (EMERGENCY)
Age: 28
Discharge: HOME | End: 2018-03-07
Attending: EMERGENCY MEDICINE | Admitting: EMERGENCY MEDICINE
Payer: MEDICAID

## 2018-03-07 ENCOUNTER — APPOINTMENT (OUTPATIENT)
Dept: ULTRASOUND IMAGING | Age: 28
End: 2018-03-07
Attending: EMERGENCY MEDICINE
Payer: MEDICAID

## 2018-03-07 VITALS
OXYGEN SATURATION: 99 % | HEART RATE: 78 BPM | RESPIRATION RATE: 20 BRPM | TEMPERATURE: 98.2 F | WEIGHT: 218.7 LBS | DIASTOLIC BLOOD PRESSURE: 80 MMHG | SYSTOLIC BLOOD PRESSURE: 154 MMHG | BODY MASS INDEX: 35.3 KG/M2

## 2018-03-07 DIAGNOSIS — R10.30 LOWER ABDOMINAL PAIN: ICD-10-CM

## 2018-03-07 DIAGNOSIS — N93.9 VAGINAL BLEEDING: Primary | ICD-10-CM

## 2018-03-07 LAB
ALBUMIN SERPL-MCNC: 3.9 G/DL (ref 3.5–5)
ALBUMIN/GLOB SERPL: 1.1 {RATIO} (ref 1.1–2.2)
ALP SERPL-CCNC: 105 U/L (ref 45–117)
ALT SERPL-CCNC: 30 U/L (ref 12–78)
ANION GAP SERPL CALC-SCNC: 7 MMOL/L (ref 5–15)
AST SERPL-CCNC: 19 U/L (ref 15–37)
BASOPHILS # BLD: 0 K/UL (ref 0–0.1)
BASOPHILS NFR BLD: 0 % (ref 0–1)
BILIRUB SERPL-MCNC: 0.3 MG/DL (ref 0.2–1)
BUN SERPL-MCNC: 13 MG/DL (ref 6–20)
BUN/CREAT SERPL: 15 (ref 12–20)
CALCIUM SERPL-MCNC: 8.8 MG/DL (ref 8.5–10.1)
CHLORIDE SERPL-SCNC: 106 MMOL/L (ref 97–108)
CLUE CELLS VAG QL WET PREP: NORMAL
CO2 SERPL-SCNC: 24 MMOL/L (ref 21–32)
CREAT SERPL-MCNC: 0.88 MG/DL (ref 0.55–1.02)
DIFFERENTIAL METHOD BLD: ABNORMAL
EOSINOPHIL # BLD: 0.3 K/UL (ref 0–0.4)
EOSINOPHIL NFR BLD: 3 % (ref 0–7)
ERYTHROCYTE [DISTWIDTH] IN BLOOD BY AUTOMATED COUNT: 14.9 % (ref 11.5–14.5)
GLOBULIN SER CALC-MCNC: 3.5 G/DL (ref 2–4)
GLUCOSE SERPL-MCNC: 119 MG/DL (ref 65–100)
HCG UR QL: NEGATIVE
HCT VFR BLD AUTO: 37.4 % (ref 35–47)
HGB BLD-MCNC: 12.4 G/DL (ref 11.5–16)
KOH PREP SPEC: NORMAL
LYMPHOCYTES # BLD: 3.8 K/UL (ref 0.8–3.5)
LYMPHOCYTES NFR BLD: 32 % (ref 12–49)
MCH RBC QN AUTO: 27 PG (ref 26–34)
MCHC RBC AUTO-ENTMCNC: 33.2 G/DL (ref 30–36.5)
MCV RBC AUTO: 81.3 FL (ref 80–99)
MONOCYTES # BLD: 0.5 K/UL (ref 0–1)
MONOCYTES NFR BLD: 4 % (ref 5–13)
NEUTS SEG # BLD: 7.5 K/UL (ref 1.8–8)
NEUTS SEG NFR BLD: 61 % (ref 32–75)
PLATELET # BLD AUTO: 238 K/UL (ref 150–400)
PMV BLD AUTO: 10.7 FL (ref 8.9–12.9)
POTASSIUM SERPL-SCNC: 3.3 MMOL/L (ref 3.5–5.1)
PROT SERPL-MCNC: 7.4 G/DL (ref 6.4–8.2)
RBC # BLD AUTO: 4.6 M/UL (ref 3.8–5.2)
SERVICE CMNT-IMP: NORMAL
SODIUM SERPL-SCNC: 137 MMOL/L (ref 136–145)
T VAGINALIS VAG QL WET PREP: NORMAL
WBC # BLD AUTO: 12.2 K/UL (ref 3.6–11)
XXWBCSUS: 0

## 2018-03-07 PROCEDURE — 87491 CHLMYD TRACH DNA AMP PROBE: CPT | Performed by: EMERGENCY MEDICINE

## 2018-03-07 PROCEDURE — 85025 COMPLETE CBC W/AUTO DIFF WBC: CPT | Performed by: EMERGENCY MEDICINE

## 2018-03-07 PROCEDURE — 36415 COLL VENOUS BLD VENIPUNCTURE: CPT | Performed by: EMERGENCY MEDICINE

## 2018-03-07 PROCEDURE — 76856 US EXAM PELVIC COMPLETE: CPT

## 2018-03-07 PROCEDURE — 96374 THER/PROPH/DIAG INJ IV PUSH: CPT

## 2018-03-07 PROCEDURE — 96361 HYDRATE IV INFUSION ADD-ON: CPT

## 2018-03-07 PROCEDURE — 76830 TRANSVAGINAL US NON-OB: CPT

## 2018-03-07 PROCEDURE — 80053 COMPREHEN METABOLIC PANEL: CPT | Performed by: EMERGENCY MEDICINE

## 2018-03-07 PROCEDURE — 81025 URINE PREGNANCY TEST: CPT

## 2018-03-07 PROCEDURE — 87210 SMEAR WET MOUNT SALINE/INK: CPT | Performed by: EMERGENCY MEDICINE

## 2018-03-07 PROCEDURE — 74011250636 HC RX REV CODE- 250/636: Performed by: EMERGENCY MEDICINE

## 2018-03-07 PROCEDURE — 99284 EMERGENCY DEPT VISIT MOD MDM: CPT

## 2018-03-07 RX ORDER — KETOROLAC TROMETHAMINE 30 MG/ML
30 INJECTION, SOLUTION INTRAMUSCULAR; INTRAVENOUS
Status: COMPLETED | OUTPATIENT
Start: 2018-03-07 | End: 2018-03-07

## 2018-03-07 RX ADMIN — SODIUM CHLORIDE 1000 ML: 900 INJECTION, SOLUTION INTRAVENOUS at 20:32

## 2018-03-07 RX ADMIN — KETOROLAC TROMETHAMINE 30 MG: 30 INJECTION, SOLUTION INTRAMUSCULAR at 20:33

## 2018-03-08 NOTE — ED TRIAGE NOTES
Pt arrives ambulatory complaining of \"really bad pains in my stomach and vagina. You know it's like the brown discharge after your period it just started gushing out\". Pt reports that after moving furniture a week ago she had an episode of bright red bleeding and shooting pain for about 2 hours, and it stopped. A few days went by without further incident. She was walking around the mall and felt a gush, went to the restroom and had the brown discharge. This has happened one more time while walking today. No discharge into toilet, only underwear. Pt feels dizzy when she has the bleeding. She is anemic and has needed iron infusions in the past. No menstrual cycle for several years, pt is on depo.

## 2018-03-08 NOTE — ED NOTES
The patient was discharged home by Dr. Mac Aguayo  in stable condition. The patient is alert and oriented, in no respiratory distress and discharge vital signs obtained. The patient's diagnosis, condition and treatment were explained. The patient expressed understanding. No prescriptions given. No work/school note given. A discharge plan has been developed. A  was not involved in the process. Aftercare instructions were given. Pt ambulatory out of the ED.

## 2018-03-08 NOTE — DISCHARGE INSTRUCTIONS
Abdominal Pain: Care Instructions  Your Care Instructions    Abdominal pain has many possible causes. Some aren't serious and get better on their own in a few days. Others need more testing and treatment. If your pain continues or gets worse, you need to be rechecked and may need more tests to find out what is wrong. You may need surgery to correct the problem. Don't ignore new symptoms, such as fever, nausea and vomiting, urination problems, pain that gets worse, and dizziness. These may be signs of a more serious problem. Your doctor may have recommended a follow-up visit in the next 8 to 12 hours. If you are not getting better, you may need more tests or treatment. The doctor has checked you carefully, but problems can develop later. If you notice any problems or new symptoms, get medical treatment right away. Follow-up care is a key part of your treatment and safety. Be sure to make and go to all appointments, and call your doctor if you are having problems. It's also a good idea to know your test results and keep a list of the medicines you take. How can you care for yourself at home? · Rest until you feel better. · To prevent dehydration, drink plenty of fluids, enough so that your urine is light yellow or clear like water. Choose water and other caffeine-free clear liquids until you feel better. If you have kidney, heart, or liver disease and have to limit fluids, talk with your doctor before you increase the amount of fluids you drink. · If your stomach is upset, eat mild foods, such as rice, dry toast or crackers, bananas, and applesauce. Try eating several small meals instead of two or three large ones. · Wait until 48 hours after all symptoms have gone away before you have spicy foods, alcohol, and drinks that contain caffeine. · Do not eat foods that are high in fat. · Avoid anti-inflammatory medicines such as aspirin, ibuprofen (Advil, Motrin), and naproxen (Aleve).  These can cause stomach upset. Talk to your doctor if you take daily aspirin for another health problem. When should you call for help? Call 911 anytime you think you may need emergency care. For example, call if:  ? · You passed out (lost consciousness). ? · You pass maroon or very bloody stools. ? · You vomit blood or what looks like coffee grounds. ? · You have new, severe belly pain. ?Call your doctor now or seek immediate medical care if:  ? · Your pain gets worse, especially if it becomes focused in one area of your belly. ? · You have a new or higher fever. ? · Your stools are black and look like tar, or they have streaks of blood. ? · You have unexpected vaginal bleeding. ? · You have symptoms of a urinary tract infection. These may include:  ¨ Pain when you urinate. ¨ Urinating more often than usual.  ¨ Blood in your urine. ? · You are dizzy or lightheaded, or you feel like you may faint. ? Watch closely for changes in your health, and be sure to contact your doctor if:  ? · You are not getting better after 1 day (24 hours). Where can you learn more? Go to http://mitul-leena.info/. Enter D157 in the search box to learn more about \"Abdominal Pain: Care Instructions. \"  Current as of: March 20, 2017  Content Version: 11.4  © 8163-0006 [employer]. Care instructions adapted under license by [email or web address] (which disclaims liability or warranty for this information). If you have questions about a medical condition or this instruction, always ask your healthcare professional. [place] [address] any warranty or liability for your use of this information. Abnormal Uterine Bleeding: Care Instructions  Your Care Instructions    Abnormal uterine bleeding (AUB) is irregular bleeding from the uterus that is longer or heavier than usual or does not occur at your regular time. Sometimes it is caused by changes in hormone levels.  It can also be caused by growths in the uterus, such as fibroids or polyps. Sometimes a cause cannot be found. You may have heavy bleeding when you are not expecting your period. Your doctor may suggest a pregnancy test, if you think you are pregnant. Follow-up care is a key part of your treatment and safety. Be sure to make and go to all appointments, and call your doctor if you are having problems. It's also a good idea to know your test results and keep a list of the medicines you take. How can you care for yourself at home? · Be safe with medicines. Take pain medicines exactly as directed. ¨ If the doctor gave you a prescription medicine for pain, take it as prescribed. ¨ If you are not taking a prescription pain medicine, ask your doctor if you can take an over-the-counter medicine. · You may be low in iron because of blood loss. Eat a balanced diet that is high in iron and vitamin C. Foods rich in iron include red meat, shellfish, eggs, beans, and leafy green vegetables. Talk to your doctor about whether you need to take iron pills or a multivitamin. When should you call for help? Call 911 anytime you think you may need emergency care. For example, call if:  ? · You passed out (lost consciousness). ?Call your doctor now or seek immediate medical care if:  ? · You have new or worse belly or pelvic pain. ? · You have severe vaginal bleeding. ? · You feel dizzy or lightheaded, or you feel like you may faint. ? Watch closely for changes in your health, and be sure to contact your doctor if:  ? · You think you may be pregnant. ? · Your bleeding gets worse. ? · You do not get better as expected. Where can you learn more? Go to http://mitul-leena.info/. Enter S616 in the search box to learn more about \"Abnormal Uterine Bleeding: Care Instructions. \"  Current as of: October 13, 2016  Content Version: 11.4  © 8506-2400 [employer].  Care instructions adapted under license by [employer] (which disclaims liability or warranty for this information). If you have questions about a medical condition or this instruction, always ask your healthcare professional. [address] any warranty or liability for your use of this information.

## 2018-03-08 NOTE — ED NOTES
Discharge note: The patient was discharged home in stable condition. The patient is alert and oriented, is in no respiratory distress and has vital signsnoted. The patient's diagnosis, condition and treatment were explained to patient by Dr Enma Campvoerde. The patient expressed understanding of discharge instructions and plan of care. A discharge plan has been developed. A  was not involved in the process. Patient offered a wheelchair to ED lobby for discharge but declined at this time. Patient ambulatory to ED lobby to go home.

## 2018-03-09 LAB
C TRACH DNA SPEC QL NAA+PROBE: NEGATIVE
N GONORRHOEA DNA SPEC QL NAA+PROBE: NEGATIVE
SAMPLE TYPE: NORMAL
SERVICE CMNT-IMP: NORMAL
SPECIMEN SOURCE: NORMAL

## 2018-06-04 ENCOUNTER — HOSPITAL ENCOUNTER (OUTPATIENT)
Dept: GENERAL RADIOLOGY | Age: 28
Discharge: HOME | End: 2018-06-04
Attending: FAMILY MEDICINE
Payer: MEDICAID

## 2018-06-04 DIAGNOSIS — R51.9 FACIAL PAIN: ICD-10-CM

## 2018-06-04 PROCEDURE — 70250 X-RAY EXAM OF SKULL: CPT

## 2018-11-02 ENCOUNTER — HOSPITAL ENCOUNTER (EMERGENCY)
Age: 28
Discharge: HOME | End: 2018-11-02
Attending: EMERGENCY MEDICINE
Payer: SUBSIDIZED

## 2018-11-02 VITALS
OXYGEN SATURATION: 98 % | TEMPERATURE: 98.3 F | HEART RATE: 78 BPM | SYSTOLIC BLOOD PRESSURE: 160 MMHG | DIASTOLIC BLOOD PRESSURE: 72 MMHG | RESPIRATION RATE: 16 BRPM | BODY MASS INDEX: 35.16 KG/M2 | WEIGHT: 217.81 LBS

## 2018-11-02 DIAGNOSIS — J06.9 VIRAL UPPER RESPIRATORY TRACT INFECTION: Primary | ICD-10-CM

## 2018-11-02 LAB
FLUAV AG NPH QL IA: NEGATIVE
FLUBV AG NOSE QL IA: NEGATIVE
GLUCOSE BLD STRIP.AUTO-MCNC: 100 MG/DL (ref 65–100)
SERVICE CMNT-IMP: NORMAL

## 2018-11-02 PROCEDURE — 99283 EMERGENCY DEPT VISIT LOW MDM: CPT

## 2018-11-02 PROCEDURE — 82962 GLUCOSE BLOOD TEST: CPT

## 2018-11-02 PROCEDURE — 87804 INFLUENZA ASSAY W/OPTIC: CPT | Performed by: EMERGENCY MEDICINE

## 2018-11-02 RX ORDER — BENZONATATE 100 MG/1
100 CAPSULE ORAL
Qty: 30 CAP | Refills: 0 | Status: SHIPPED | OUTPATIENT
Start: 2018-11-02 | End: 2018-11-09

## 2018-11-03 NOTE — DISCHARGE INSTRUCTIONS
Upper Respiratory Infection (Cold): Care Instructions  Your Care Instructions    An upper respiratory infection, or URI, is an infection of the nose, sinuses, or throat. URIs are spread by coughs, sneezes, and direct contact. The common cold is the most frequent kind of URI. The flu and sinus infections are other kinds of URIs. Almost all URIs are caused by viruses. Antibiotics won't cure them. But you can treat most infections with home care. This may include drinking lots of fluids and taking over-the-counter pain medicine. You will probably feel better in 4 to 10 days. The doctor has checked you carefully, but problems can develop later. If you notice any problems or new symptoms, get medical treatment right away. Follow-up care is a key part of your treatment and safety. Be sure to make and go to all appointments, and call your doctor if you are having problems. It's also a good idea to know your test results and keep a list of the medicines you take. How can you care for yourself at home? · To prevent dehydration, drink plenty of fluids, enough so that your urine is light yellow or clear like water. Choose water and other caffeine-free clear liquids until you feel better. If you have kidney, heart, or liver disease and have to limit fluids, talk with your doctor before you increase the amount of fluids you drink. · Take an over-the-counter pain medicine, such as acetaminophen (Tylenol), ibuprofen (Advil, Motrin), or naproxen (Aleve). Read and follow all instructions on the label. · Before you use cough and cold medicines, check the label. These medicines may not be safe for young children or for people with certain health problems. · Be careful when taking over-the-counter cold or flu medicines and Tylenol at the same time. Many of these medicines have acetaminophen, which is Tylenol. Read the labels to make sure that you are not taking more than the recommended dose.  Too much acetaminophen (Tylenol) can be harmful. · Get plenty of rest.  · Do not smoke or allow others to smoke around you. If you need help quitting, talk to your doctor about stop-smoking programs and medicines. These can increase your chances of quitting for good. When should you call for help? Call 911 anytime you think you may need emergency care. For example, call if:    · You have severe trouble breathing.    Call your doctor now or seek immediate medical care if:    · You seem to be getting much sicker.     · You have new or worse trouble breathing.     · You have a new or higher fever.     · You have a new rash.    Watch closely for changes in your health, and be sure to contact your doctor if:    · You have a new symptom, such as a sore throat, an earache, or sinus pain.     · You cough more deeply or more often, especially if you notice more mucus or a change in the color of your mucus.     · You do not get better as expected. Where can you learn more? Go to http://mitul-leena.info/. Enter S949 in the search box to learn more about \"Upper Respiratory Infection (Cold): Care Instructions. \"  Current as of: December 6, 2017  Content Version: 11.8  © 9865-1170 [employer]. Care instructions adapted under license by [employer] (which disclaims liability or warranty for this information). If you have questions about a medical condition or this instruction, always ask your healthcare professional. [place] [address] any warranty or liability for your use of this information. Viral Respiratory Infection: Care Instructions  Your Care Instructions    Viruses are very small organisms. They grow in number after they enter your body. There are many types that cause different illnesses, such as colds and the mumps. The symptoms of a viral respiratory infection often start quickly. They include a fever, sore throat, and runny nose. You may also just not feel well. Or you may not want to eat much. Most viral respiratory infections are not serious. They usually get better with time and self-care. Antibiotics are not used to treat a viral infection. That's because antibiotics will not help cure a viral illness. In some cases, antiviral medicine can help your body fight a serious viral infection. Follow-up care is a key part of your treatment and safety. Be sure to make and go to all appointments, and call your doctor if you are having problems. It's also a good idea to know your test results and keep a list of the medicines you take. How can you care for yourself at home? · Rest as much as possible until you feel better. · Be safe with medicines. Take your medicine exactly as prescribed. Call your doctor if you think you are having a problem with your medicine. You will get more details on the specific medicine your doctor prescribes. · Take an over-the-counter pain medicine, such as acetaminophen (Tylenol), ibuprofen (Advil, Motrin), or naproxen (Aleve), as needed for pain and fever. Read and follow all instructions on the label. Do not give aspirin to anyone younger than 20. It has been linked to Reye syndrome, a serious illness. · Drink plenty of fluids, enough so that your urine is light yellow or clear like water. Hot fluids, such as tea or soup, may help relieve congestion in your nose and throat. If you have kidney, heart, or liver disease and have to limit fluids, talk with your doctor before you increase the amount of fluids you drink. · Try to clear mucus from your lungs by breathing deeply and coughing. · Gargle with warm salt water once an hour. This can help reduce swelling and throat pain. Use 1 teaspoon of salt mixed in 1 cup of warm water. · Do not smoke or allow others to smoke around you. If you need help quitting, talk to your doctor about stop-smoking programs and medicines. These can increase your chances of quitting for good.   To avoid spreading the virus  · Cough or sneeze into a tissue. Then throw the tissue away. · If you don't have a tissue, use your hand to cover your cough or sneeze. Then clean your hand. You can also cough into your sleeve. · Wash your hands often. Use soap and warm water. Wash for 15 to 20 seconds each time. · If you don't have soap and water near you, you can clean your hands with alcohol wipes or gel. When should you call for help? Call your doctor now or seek immediate medical care if:    · You have a new or higher fever.     · Your fever lasts more than 48 hours.     · You have trouble breathing.     · You have a fever with a stiff neck or a severe headache.     · You are sensitive to light.     · You feel very sleepy or confused.    Watch closely for changes in your health, and be sure to contact your doctor if:    · You do not get better as expected. Where can you learn more? Go to http://mitul-leena.info/. Enter H401 in the search box to learn more about \"Viral Respiratory Infection: Care Instructions. \"  Current as of: December 6, 2017  Content Version: 11.8  © 4123-6467 [employer]. Care instructions adapted under license by [employer] (which disclaims liability or warranty for this information). If you have questions about a medical condition or this instruction, always ask your healthcare professional. [address] any warranty or liability for your use of this information.

## 2018-11-03 NOTE — ED PROVIDER NOTES
Arcadio Emerson is a 34 yo F with history of diabetes who has had cough, nasal congestion, body aches and fever since yesterday. She has been taking Advil cold and sinus but her symptoms have worsened. She denies nausea or vomiting. Past Medical History:  
Diagnosis Date  Abnormal Pap smear  Anemia  Gastrointestinal disorder Ulcers  Headache  Herniated disc  Hx gestational diabetes  HX OTHER MEDICAL   
 trich treated at beginning of pregnancy  HX OTHER MEDICAL   
 pituitary tumor-benign  Miscarriage  Molar pregnancy  Pap smear for cervical cancer screening 13 Negative  PCOS (polycystic ovarian syndrome)  Pelvic pain in female  Pituitary adenoma ([facility] [address]  Pituitary tumor  Psychiatric problem   
 depression never on medication  Thyroid activity decreased   
 hypothyroid awaiting appnt for endo Past Surgical History:  
Procedure Laterality Date  HX  SECTION    HX COLPOSCOPY  11/3/10  
 HX DILATION AND CURETTAGE    
 HX LIPOMA RESECTION Family History:  
Problem Relation Age of Onset  Diabetes Father  Headache Mother Social History Socioeconomic History  Marital status: SINGLE Spouse name: Not on file  Number of children: Not on file  Years of education: Not on file  Highest education level: Not on file Social Needs  Financial resource strain: Not on file  Food insecurity - worry: Not on file  Food insecurity - inability: Not on file  Transportation needs - medical: Not on file  Transportation needs - non-medical: Not on file Occupational History  Not on file Tobacco Use  Smoking status: Never Smoker  Smokeless tobacco: Never Used Substance and Sexual Activity  Alcohol use: No  
  Comment: =  
 Drug use: No  
 Sexual activity: Yes  
  Partners: Male Birth control/protection: Injection Other Topics Concern  Not on file Social History Narrative  Not on file ALLERGIES: Tomato Review of Systems Constitutional: Positive for fever. HENT: Positive for congestion and sore throat. Eyes: Negative for visual disturbance. Respiratory: Positive for cough. Cardiovascular: Negative for chest pain. Gastrointestinal: Negative for abdominal pain. Genitourinary: Negative for dysuria. Musculoskeletal: Positive for myalgias. Skin: Negative for rash. Neurological: Negative for headaches. Vitals:  
 11/02/18 2205 BP: 160/72 Pulse: 78 Resp: 16 Temp: 98.3 °F (36.8 °C) SpO2: 98% Weight: 98.8 kg (217 lb 13 oz) Physical Exam  
Constitutional: She appears well-developed and well-nourished. No distress. HENT:  
Head: Normocephalic and atraumatic. Right Ear: Tympanic membrane normal.  
Left Ear: Tympanic membrane normal.  
Mouth/Throat: Oropharynx is clear and moist.  
Eyes: Conjunctivae and EOM are normal.  
Neck: Normal range of motion and phonation normal.  
Cardiovascular: Normal rate and intact distal pulses. Pulmonary/Chest: Effort normal. No respiratory distress. She has no wheezes. She has no rales. Abdominal: She exhibits no distension. Musculoskeletal: Normal range of motion. She exhibits no tenderness. Neurological: She is alert. She is not disoriented. She exhibits normal muscle tone. Skin: Skin is warm and dry. Capillary refill takes less than 2 seconds. Nursing note and vitals reviewed. MDM Procedures

## 2019-01-17 ENCOUNTER — HOSPITAL ENCOUNTER (EMERGENCY)
Age: 29
Discharge: HOME | End: 2019-01-17
Attending: EMERGENCY MEDICINE
Payer: MEDICAID

## 2019-01-17 VITALS
OXYGEN SATURATION: 100 % | RESPIRATION RATE: 18 BRPM | DIASTOLIC BLOOD PRESSURE: 75 MMHG | HEIGHT: 66 IN | WEIGHT: 217.15 LBS | BODY MASS INDEX: 34.9 KG/M2 | TEMPERATURE: 98.1 F | SYSTOLIC BLOOD PRESSURE: 151 MMHG | HEART RATE: 65 BPM

## 2019-01-17 DIAGNOSIS — M54.16 LUMBAR RADICULOPATHY: ICD-10-CM

## 2019-01-17 DIAGNOSIS — R55 NEAR SYNCOPE: Primary | ICD-10-CM

## 2019-01-17 LAB
ANION GAP BLD CALC-SCNC: 14 MMOL/L (ref 10–20)
APPEARANCE UR: CLEAR
ATRIAL RATE: 64 BPM
BACTERIA URNS QL MICRO: NEGATIVE /HPF
BILIRUB UR QL: NEGATIVE
BUN BLD-MCNC: 15 MG/DL (ref 9–20)
CA-I BLD-MCNC: 0.92 MMOL/L (ref 1.12–1.32)
CALCULATED P AXIS, ECG09: 73 DEGREES
CALCULATED R AXIS, ECG10: 65 DEGREES
CALCULATED T AXIS, ECG11: 25 DEGREES
CHLORIDE BLD-SCNC: 106 MMOL/L (ref 98–107)
CO2 BLD-SCNC: 23 MMOL/L (ref 21–32)
COLOR UR: ABNORMAL
CREAT BLD-MCNC: 0.8 MG/DL (ref 0.6–1.3)
DIAGNOSIS, 93000: NORMAL
EPITH CASTS URNS QL MICRO: ABNORMAL /LPF
GLUCOSE BLD-MCNC: 95 MG/DL (ref 65–100)
GLUCOSE UR STRIP.AUTO-MCNC: NEGATIVE MG/DL
HCG UR QL: NEGATIVE
HCT VFR BLD CALC: 41 % (ref 35–47)
HGB UR QL STRIP: NEGATIVE
KETONES UR QL STRIP.AUTO: NEGATIVE MG/DL
LEUKOCYTE ESTERASE UR QL STRIP.AUTO: NEGATIVE
MUCOUS THREADS URNS QL MICRO: ABNORMAL /LPF
NITRITE UR QL STRIP.AUTO: NEGATIVE
P-R INTERVAL, ECG05: 122 MS
PH UR STRIP: 6.5 [PH] (ref 5–8)
POTASSIUM BLD-SCNC: 4.9 MMOL/L (ref 3.5–5.1)
PROT UR STRIP-MCNC: NEGATIVE MG/DL
Q-T INTERVAL, ECG07: 410 MS
QRS DURATION, ECG06: 96 MS
QTC CALCULATION (BEZET), ECG08: 422 MS
RBC #/AREA URNS HPF: ABNORMAL /HPF (ref 0–5)
SERVICE CMNT-IMP: ABNORMAL
SODIUM BLD-SCNC: 138 MMOL/L (ref 136–145)
SP GR UR REFRACTOMETRY: 1.02 (ref 1–1.03)
UR CULT HOLD, URHOLD: NORMAL
UROBILINOGEN UR QL STRIP.AUTO: 0.2 EU/DL (ref 0.2–1)
VENTRICULAR RATE, ECG03: 64 BPM
WBC URNS QL MICRO: ABNORMAL /HPF (ref 0–4)

## 2019-01-17 PROCEDURE — 96374 THER/PROPH/DIAG INJ IV PUSH: CPT

## 2019-01-17 PROCEDURE — 81001 URINALYSIS AUTO W/SCOPE: CPT

## 2019-01-17 PROCEDURE — 80047 BASIC METABLC PNL IONIZED CA: CPT

## 2019-01-17 PROCEDURE — 74011250636 HC RX REV CODE- 250/636: Performed by: EMERGENCY MEDICINE

## 2019-01-17 PROCEDURE — 93005 ELECTROCARDIOGRAM TRACING: CPT

## 2019-01-17 PROCEDURE — 99284 EMERGENCY DEPT VISIT MOD MDM: CPT

## 2019-01-17 PROCEDURE — 81025 URINE PREGNANCY TEST: CPT

## 2019-01-17 RX ORDER — METHYLPREDNISOLONE 4 MG/1
TABLET ORAL
Qty: 1 DOSE PACK | Refills: 0 | Status: SHIPPED | OUTPATIENT
Start: 2019-01-17 | End: 2019-02-13

## 2019-01-17 RX ORDER — CYCLOBENZAPRINE HCL 10 MG
10 TABLET ORAL
Qty: 12 TAB | Refills: 0 | Status: SHIPPED | OUTPATIENT
Start: 2019-01-17 | End: 2019-02-13

## 2019-01-17 RX ORDER — DEXAMETHASONE SODIUM PHOSPHATE 10 MG/ML
10 INJECTION INTRAMUSCULAR; INTRAVENOUS
Status: COMPLETED | OUTPATIENT
Start: 2019-01-17 | End: 2019-01-17

## 2019-01-17 RX ADMIN — DEXAMETHASONE SODIUM PHOSPHATE 10 MG: 10 INJECTION, SOLUTION INTRAMUSCULAR; INTRAVENOUS at 12:03

## 2019-01-17 NOTE — ED TRIAGE NOTES
\"my back pain started earlier this week and I went to the doctor and he put me on prednisone. I have been dizzy and nauseous. I've thrown up, I have a headache but that's normal for me. My right foot is numb and right leg gives out. \"

## 2019-01-17 NOTE — ED PROVIDER NOTES
34 yo female with hx anemia, pcos, herniated disc pituitary tumor presents with c/o back pain. Reports hx of same but restarted . Denies recent injury. Reports pain in right back raidiating to right posterior thigh. Reports chronic right foot numbness from the injury at the age of 24. Saw her pcp 2 days ago and was placed on 20 mg prednisone daily which hasnt helped. She denies perianal numbness, bowel or bladder incontinence. Reports during episode today she was standing and cooking when she felt lightheaded, sweaty, became nauseated and vomited. Denies cp or sob. Does not think she is pregnant but hasnt had a cycle in months because she was on the depot shot. Reports daily chronic HA's but today is no different. Has taken bc powder for her back with no relief Past Medical History:  
Diagnosis Date  Abnormal Pap smear  Anemia  Gastrointestinal disorder Ulcers  Headache  Herniated disc  Hx gestational diabetes  HX OTHER MEDICAL   
 trich treated at beginning of pregnancy  HX OTHER MEDICAL   
 pituitary tumor-benign  Miscarriage  Molar pregnancy  Pap smear for cervical cancer screening 13 Negative  PCOS (polycystic ovarian syndrome)  Pelvic pain in female  Pituitary adenoma ([facility] [address]  Pituitary tumor  Psychiatric problem   
 depression never on medication  Thyroid activity decreased   
 hypothyroid awaiting appnt for endo Past Surgical History:  
Procedure Laterality Date  HX  SECTION    HX COLPOSCOPY  11/3/10  
 HX DILATION AND CURETTAGE    
 HX LIPOMA RESECTION Family History:  
Problem Relation Age of Onset  Diabetes Father  Headache Mother Social History Socioeconomic History  Marital status: SINGLE Spouse name: Not on file  Number of children: Not on file  Years of education: Not on file  Highest education level: Not on file Social Needs  Financial resource strain: Not on file  Food insecurity - worry: Not on file  Food insecurity - inability: Not on file  Transportation needs - medical: Not on file  Transportation needs - non-medical: Not on file Occupational History  Not on file Tobacco Use  Smoking status: Never Smoker  Smokeless tobacco: Never Used Substance and Sexual Activity  Alcohol use: No  
  Comment: =  
 Drug use: No  
 Sexual activity: Yes  
  Partners: Male Birth control/protection: Injection Other Topics Concern  Not on file Social History Narrative  Not on file ALLERGIES: Tomato Review of Systems Constitutional: Negative for fever. Respiratory: Negative for shortness of breath. Cardiovascular: Negative for chest pain. Gastrointestinal: Positive for nausea and vomiting. Negative for abdominal pain. Genitourinary: Negative for dysuria. Musculoskeletal: Positive for back pain. Neurological: Positive for light-headedness and headaches. Negative for syncope and weakness. All other systems reviewed and are negative. Vitals:  
 01/17/19 1111 BP: 151/75 Pulse: 65 Resp: 18 Temp: 98.1 °F (36.7 °C) SpO2: 100% Weight: 98.5 kg (217 lb 2.5 oz) Height: 5' 6\" (1.676 m) Physical Exam  
Constitutional: She is oriented to person, place, and time. She appears well-developed and well-nourished. HENT:  
Head: Normocephalic and atraumatic. Eyes: Conjunctivae are normal.  
Neck: Normal range of motion. Cardiovascular: Normal rate, regular rhythm, normal heart sounds and intact distal pulses. Pulmonary/Chest: Effort normal and breath sounds normal. No stridor. No respiratory distress. She has no wheezes. Abdominal: Soft. Bowel sounds are normal. She exhibits no distension. There is no tenderness. There is no guarding. Musculoskeletal: Normal range of motion.   
No midline T or L spine ttp or step off; + right slr; sensation grossly intact; 2+ patellar reflexes; 5/5 strength b/l LE; ambulates with steady gait Neurological: She is alert and oriented to person, place, and time. Skin: Skin is warm and dry. Nursing note and vitals reviewed. MDM Number of Diagnoses or Management Options Lumbar radiculopathy:  
Near syncope:  
Diagnosis management comments: No sx of spinal cord compression sounds radicular- steroid dose low. Increase as long as not diabetic, muscle relaxers and refer to [facility] for imaging, PT possible stephane Near syncope- l;ikely due to pain but check ekg, electrolytes, for pregnancy, uti, hct Amount and/or Complexity of Data Reviewed Clinical lab tests: ordered and reviewed Independent visualization of images, tracings, or specimens: yes (ekg) Patient Progress Patient progress: stable Procedures ED EKG interpretation: 
Rhythm: normal sinus rhythm; and regular . Rate (approx.): 64; Axis: normal; P wave: normal; QRS interval: normal ; ST/T wave: non-specific changes EKG documented by Rylie Trotter MD, scribe, as interpreted by Megan London MD, ED MD. 
  
 
syncope likely vasovagal. Follow up pcp. Back appears redicular. Increase steroid dose, flexeril, follow up with ortho spine and return if worsening sx. Dc instructions and rx's given to pt and reviewed

## 2019-01-17 NOTE — DISCHARGE INSTRUCTIONS
Patient Education            We hope that we have addressed all of your medical concerns. The examination and treatment you received in the Emergency Department were for an emergent problem and were not intended as complete care. It is important that you follow up with your healthcare provider(s) for ongoing care. If your symptoms worsen or do not improve as expected, and you are unable to reach your usual health care provider(s), you should return to the Emergency Department. Today's healthcare is undergoing tremendous change, and patient satisfaction surveys are one of the many tools to assess the quality of medical care. You may receive a survey from the [email or web address] regarding your experience in the Emergency Department. I hope that your experience has been completely positive, particularly the medical care that I provided. As such, please participate in the survey; anything less than excellent does not meet my expectations or intentions. [facility] and 8 [facility] participate in nationally recognized quality of care measures. If your blood pressure is greater than 120/80, as reported below, we urge that you seek medical care to address the potential of high blood pressure, commonly known as hypertension. Hypertension can be hereditary or can be caused by certain medical conditions, pain, stress, or \"white coat syndrome. \"       Please make an appointment with your health care provider(s) for follow up of your Emergency Department visit. VITALS:   Patient Vitals for the past 8 hrs:   Temp Pulse Resp BP SpO2   01/17/19 1111 98.1 °F (36.7 °C) 65 18 151/75 100 %          Thank you for allowing us to provide you with medical care today. We realize that you have many choices for your emergency care needs. Please choose us in the future for any continued health care needs.       Javier Wilkerson MD    [facility] Emergency [facility].   Office: [phone]            Recent Results (from the past 24 hour(s))   EKG, 12 LEAD, INITIAL    Collection Time: 01/17/19 11:23 AM   Result Value Ref Range    Ventricular Rate 64 BPM    Atrial Rate 64 BPM    P-R Interval 122 ms    QRS Duration 96 ms    Q-T Interval 410 ms    QTC Calculation (Bezet) 422 ms    Calculated P Axis 73 degrees    Calculated R Axis 65 degrees    Calculated T Axis 25 degrees    Diagnosis       Normal sinus rhythm  Normal ECG  When compared with ECG of 03-JAN-2017 23:41,  No significant change was found     URINALYSIS W/MICROSCOPIC    Collection Time: 01/17/19 11:32 AM   Result Value Ref Range    Color YELLOW/STRAW      Appearance CLEAR CLEAR      Specific gravity 1.020 1.003 - 1.030      pH (UA) 6.5 5.0 - 8.0      Protein NEGATIVE  NEG mg/dL    Glucose NEGATIVE  NEG mg/dL    Ketone NEGATIVE  NEG mg/dL    Bilirubin NEGATIVE  NEG      Blood NEGATIVE  NEG      Urobilinogen 0.2 0.2 - 1.0 EU/dL    Nitrites NEGATIVE  NEG      Leukocyte Esterase NEGATIVE  NEG      WBC 0-4 0 - 4 /hpf    RBC 0-5 0 - 5 /hpf    Epithelial cells FEW FEW /lpf    Bacteria NEGATIVE  NEG /hpf    Mucus 2+ (A) NEG /lpf   URINE CULTURE HOLD SAMPLE    Collection Time: 01/17/19 11:32 AM   Result Value Ref Range    Urine culture hold        URINE ON HOLD IN MICROBIOLOGY DEPT FOR 3 DAYS. IF UNPRESERVED URINE IS SUBMITTED, IT CANNOT BE USED FOR ADDITIONAL TESTING AFTER 24 HRS, RECOLLECTION WILL BE REQUIRED.    POC CHEM8    Collection Time: 01/17/19 11:33 AM   Result Value Ref Range    Calcium, ionized (POC) 0.92 (L) 1.12 - 1.32 mmol/L    Sodium (POC) 138 136 - 145 mmol/L    Potassium (POC) 4.9 3.5 - 5.1 mmol/L    Chloride (POC) 106 98 - 107 mmol/L    CO2 (POC) 23 21 - 32 mmol/L    Anion gap (POC) 14 10 - 20 mmol/L    Glucose (POC) 95 65 - 100 mg/dL    BUN (POC) 15 9 - 20 mg/dL    Creatinine (POC) 0.8 0.6 - 1.3 mg/dL    GFRAA, POC >60 >60 ml/min/1.73m2    GFRNA, POC >60 >60 ml/min/1.73m2    Hematocrit (POC) 41 35.0 - 47.0 %    Comment Notified RN or MD immediately by      HCG URINE, QL. - POC    Collection Time: 01/17/19 11:35 AM   Result Value Ref Range    Pregnancy test,urine (POC) NEGATIVE  NEG         No results found. Back Pain: Care Instructions  Your Care Instructions    Back pain has many possible causes. It is often related to problems with muscles and ligaments of the back. It may also be related to problems with the nerves, discs, or bones of the back. Moving, lifting, standing, sitting, or sleeping in an awkward way can strain the back. Sometimes you don't notice the injury until later. Arthritis is another common cause of back pain. Although it may hurt a lot, back pain usually improves on its own within several weeks. Most people recover in 12 weeks or less. Using good home treatment and being careful not to stress your back can help you feel better sooner. Follow-up care is a key part of your treatment and safety. Be sure to make and go to all appointments, and call your doctor if you are having problems. It's also a good idea to know your test results and keep a list of the medicines you take. How can you care for yourself at home? · Sit or lie in positions that are most comfortable and reduce your pain. Try one of these positions when you lie down:  ? Lie on your back with your knees bent and supported by large pillows. ? Lie on the floor with your legs on the seat of a sofa or chair. ? Lie on your side with your knees and hips bent and a pillow between your legs. ? Lie on your stomach if it does not make pain worse. · Do not sit up in bed, and avoid soft couches and twisted positions. Bed rest can help relieve pain at first, but it delays healing. Avoid bed rest after the first day of back pain. · Change positions every 30 minutes. If you must sit for long periods of time, take breaks from sitting. Get up and walk around, or lie in a comfortable position.   · Try using a heating pad on a low or medium setting for 15 to 20 minutes every 2 or 3 hours. Try a warm shower in place of one session with the heating pad. · You can also try an ice pack for 10 to 15 minutes every 2 to 3 hours. Put a thin cloth between the ice pack and your skin. · Take pain medicines exactly as directed. ? If the doctor gave you a prescription medicine for pain, take it as prescribed. ? If you are not taking a prescription pain medicine, ask your doctor if you can take an over-the-counter medicine. · Take short walks several times a day. You can start with 5 to 10 minutes, 3 or 4 times a day, and work up to longer walks. Walk on level surfaces and avoid hills and stairs until your back is better. · Return to work and other activities as soon as you can. Continued rest without activity is usually not good for your back. · To prevent future back pain, do exercises to stretch and strengthen your back and stomach. Learn how to use good posture, safe lifting techniques, and proper body mechanics. When should you call for help? Call your doctor now or seek immediate medical care if:    · You have new or worsening numbness in your legs.     · You have new or worsening weakness in your legs. (This could make it hard to stand up.)     · You lose control of your bladder or bowels.    Watch closely for changes in your health, and be sure to contact your doctor if:    · You have a fever, lose weight, or don't feel well.     · You do not get better as expected. Where can you learn more? Go to http://mitul-leena.info/. Enter O047 in the search box to learn more about \"Back Pain: Care Instructions. \"  Current as of: November 29, 2017  Content Version: 11.8  © 8845-1058 [employer]. Care instructions adapted under license by [employer] (which disclaims liability or warranty for this information).  If you have questions about a medical condition or this instruction, always ask your healthcare professional. [place] [address] any warranty or liability for your use of this information. Patient Education        Learning About How to Have a Healthy Back  What causes back pain? Back pain is often caused by overuse, strain, or injury. For example, people often hurt their backs playing sports or working in the yard, being jolted in a car accident, or lifting something too heavy. Aging plays a part too. Your bones and muscles tend to lose strength as you age, which makes injury more likely. The spongy discs between the bones of the spine (vertebrae) may suffer from wear and tear and no longer provide enough cushion between the bones. A disc that bulges or breaks open (herniated disc) can press on nerves, causing back pain. In some people, back pain is the result of arthritis, broken vertebrae caused by bone loss (osteoporosis), illness, or a spine problem. Although most people have back pain at one time or another, there are steps you can take to make it less likely. How can you have a healthy back? Reduce stress on your back through good posture  Slumping or slouching alone may not cause low back pain. But after the back has been strained or injured, bad posture can make pain worse. · Sleep in a position that maintains your back's normal curves and on a mattress that feels comfortable. Sleep on your side with a pillow between your knees, or sleep on your back with a pillow under your knees. These positions can reduce strain on your back. · Stand and sit up straight. \"Good posture\" generally means your ears, shoulders, and hips are in a straight line. · If you must stand for a long time, put one foot on a stool, ledge, or box. Switch feet every now and then. · Sit in a chair that is low enough to let you place both feet flat on the floor with both knees nearly level with your hips. If your chair or desk is too high, use a footrest to raise your knees.  Place a small pillow, a rolled-up towel, or a lumbar roll in the curve of your back if you need extra support. · Try a kneeling chair, which helps tilt your hips forward. This takes pressure off your lower back. · Try sitting on an exercise ball. It can rock from side to side, which helps keep your back loose. · When driving, keep your knees nearly level with your hips. Sit straight, and drive with both hands on the steering wheel. Your arms should be in a slightly bent position. Reduce stress on your back through careful lifting  · Squat down, bending at the hips and knees only. If you need to, put one knee to the floor and extend your other knee in front of you, bent at a right angle (half kneeling). · Press your chest straight forward. This helps keep your upper back straight while keeping a slight arch in your low back. · Hold the load as close to your body as possible, at the level of your belly button (navel). · Use your feet to change direction, taking small steps. · Lead with your hips as you change direction. Keep your shoulders in line with your hips as you move. · Set down your load carefully, squatting with your knees and hips only. Exercise and stretch your back  · Do some exercise on most days of the week, if your doctor says it is okay. You can walk, run, swim, or cycle. · Stretch your back muscles. Here are a few exercises to try:  ? Lie on your back, and gently pull one bent knee to your chest. Put that foot back on the floor, and then pull the other knee to your chest.  ? Do pelvic tilts. Lie on your back with your knees bent. Tighten your stomach muscles. Pull your belly button (navel) in and up toward your ribs. You should feel like your back is pressing to the floor and your hips and pelvis are slightly lifting off the floor. Hold for 6 seconds while breathing smoothly. ? Sit with your back flat against a wall. · Keep your core muscles strong.  The muscles of your back, belly (abdomen), and buttocks support your spine. ? Pull in your belly and imagine pulling your navel toward your spine. Hold this for 6 seconds, then relax. Remember to keep breathing normally as you tense your muscles. ? Do curl-ups. Always do them with your knees bent. Keep your low back on the floor, and curl your shoulders toward your knees using a smooth, slow motion. Keep your arms folded across your chest. If this bothers your neck, try putting your hands behind your neck (not your head), with your elbows spread apart. ? Lie on your back with your knees bent and your feet flat on the floor. Tighten your belly muscles, and then push with your feet and raise your buttocks up a few inches. Hold this position 6 seconds as you continue to breathe normally, then lower yourself slowly to the floor. Repeat 8 to 12 times. ? If you like group exercise, try Pilates or yoga. These classes have poses that strengthen the core muscles. Lead a healthy lifestyle  · Stay at a healthy weight to avoid strain on your back. · Do not smoke. Smoking increases the risk of osteoporosis, which weakens the spine. If you need help quitting, talk to your doctor about stop-smoking programs and medicines. These can increase your chances of quitting for good. Where can you learn more? Go to http://mitul-leena.info/. Enter L315 in the search box to learn more about \"Learning About How to Have a Healthy Back. \"  Current as of: November 29, 2017  Content Version: 11.8  © 7413-9930 [employer]. Care instructions adapted under license by [employer] (which disclaims liability or warranty for this information). If you have questions about a medical condition or this instruction, always ask your healthcare professional. [place] [address] any warranty or liability for your use of this information.          Patient Education        Back Pain, Emergency or Urgent Symptoms: Care Instructions  Your Care Instructions    Many people have back pain at one time or another. In most cases, pain gets better with self-care that includes over-the-counter pain medicine, ice, heat, and exercises. Unless you have symptoms of a severe injury or heart attack, you may be able to give yourself a few days before you call a doctor. But some back problems are very serious. Do not ignore symptoms that need to be checked right away. Follow-up care is a key part of your treatment and safety. Be sure to make and go to all appointments, and call your doctor if you are having problems. It's also a good idea to know your test results and keep a list of the medicines you take. How can you care for yourself at home? · Sit or lie in positions that are most comfortable and that reduce your pain. Try one of these positions when you lie down:  ? Lie on your back with your knees bent and supported by large pillows. ? Lie on the floor with your legs on the seat of a sofa or chair. ? Lie on your side with your knees and hips bent and a pillow between your legs. ? Lie on your stomach if it does not make pain worse. · Do not sit up in bed, and avoid soft couches and twisted positions. Bed rest can help relieve pain at first, but it delays healing. Avoid bed rest after the first day. · Change positions every 30 minutes. If you must sit for long periods of time, take breaks from sitting. Get up and walk around, or lie flat. · Try using a heating pad on a low or medium setting, for 15 to 20 minutes every 2 or 3 hours. Try a warm shower in place of one session with the heating pad. You can also buy single-use heat wraps that last up to 8 hours. You can also try ice or cold packs on your back for 10 to 20 minutes at a time, several times a day. (Put a thin cloth between the ice pack and your skin.) This reduces pain and makes it easier to be active and exercise. · Take pain medicines exactly as directed.   ? If the doctor gave you a prescription medicine for pain, take it as prescribed. ? If you are not taking a prescription pain medicine, ask your doctor if you can take an over-the-counter medicine. When should you call for help? Call 911 anytime you think you may need emergency care. For example, call if:    · You are unable to move a leg at all.     · You have back pain with severe belly pain.     · You have symptoms of a heart attack. These may include:  ? Chest pain or pressure, or a strange feeling in the chest.  ? Sweating. ? Shortness of breath. ? Nausea or vomiting. ? Pain, pressure, or a strange feeling in the back, neck, jaw, or upper belly or in one or both shoulders or arms. ? Lightheadedness or sudden weakness. ? A fast or irregular heartbeat. After you call 911, the  may tell you to chew 1 adult-strength or 2 to 4 low-dose aspirin. Wait for an ambulance. Do not try to drive yourself.    Call your doctor now or seek immediate medical care if:    · You have new or worse symptoms in your arms, legs, chest, belly, or buttocks. Symptoms may include:  ? Numbness or tingling. ? Weakness. ? Pain.     · You lose bladder or bowel control.     · You have back pain and:  ? You have injured your back while lifting or doing some other activity. Call if the pain is severe, has not gone away after 1 or 2 days, and you cannot do your normal daily activities. ? You have had a back injury before that needed treatment. ? Your pain has lasted longer than 4 weeks. ? You have had weight loss you cannot explain. ? You have a fever. ? You are age 48 or older. ? You have cancer now or have had it before.    Watch closely for changes in your health, and be sure to contact your doctor if you are not getting better as expected. Where can you learn more? Go to http://mitul-leena.info/. Enter P053 in the search box to learn more about \"Back Pain, Emergency or Urgent Symptoms: Care Instructions. \"  Current as of: November 20, 2017  Content Version: 11.8  © 3408-8751 [employer]. Care instructions adapted under license by [employer] (which disclaims liability or warranty for this information). If you have questions about a medical condition or this instruction, always ask your healthcare professional. [address] any warranty or liability for your use of this information. Patient Education        Lightheadedness or Faintness: Care Instructions  Your Care Instructions  Lightheadedness is a feeling that you are about to faint or \"pass out. \" You do not feel as if you or your surroundings are moving. It is different from vertigo, which is the feeling that you or things around you are spinning or tilting. Lightheadedness usually goes away or gets better when you lie down. If lightheadedness gets worse, it can lead to a fainting spell. It is common to feel lightheaded from time to time. Lightheadedness usually is not caused by a serious problem. It often is caused by a short-lasting drop in blood pressure and blood flow to your head that occurs when you get up too quickly from a seated or lying position. Follow-up care is a key part of your treatment and safety. Be sure to make and go to all appointments, and call your doctor if you are having problems. It's also a good idea to know your test results and keep a list of the medicines you take. How can you care for yourself at home? · Lie down for 1 or 2 minutes when you feel lightheaded. After lying down, sit up slowly and remain sitting for 1 to 2 minutes before slowly standing up. · Avoid movements, positions, or activities that have made you lightheaded in the past.  · Get plenty of rest, especially if you have a cold or flu, which can cause lightheadedness. · Make sure you drink plenty of fluids, especially if you have a fever or have been sweating.   · Do not drive or put yourself and others in danger while you feel lightheaded. When should you call for help? Call 911 anytime you think you may need emergency care. For example, call if:    · You have symptoms of a stroke. These may include:  ? Sudden numbness, tingling, weakness, or loss of movement in your face, arm, or leg, especially on only one side of your body. ? Sudden vision changes. ? Sudden trouble speaking. ? Sudden confusion or trouble understanding simple statements. ? Sudden problems with walking or balance. ? A sudden, severe headache that is different from past headaches.     · You have symptoms of a heart attack. These may include:  ? Chest pain or pressure, or a strange feeling in the chest.  ? Sweating. ? Shortness of breath. ? Nausea or vomiting. ? Pain, pressure, or a strange feeling in the back, neck, jaw, or upper belly or in one or both shoulders or arms. ? Lightheadedness or sudden weakness. ? A fast or irregular heartbeat. After you call 911, the  may tell you to chew 1 adult-strength or 2 to 4 low-dose aspirin. Wait for an ambulance. Do not try to drive yourself.    Watch closely for changes in your health, and be sure to contact your doctor if:    · Your lightheadedness gets worse or does not get better with home care. Where can you learn more? Go to http://mitul-leena.info/. Enter Q562 in the search box to learn more about \"Lightheadedness or Faintness: Care Instructions. \"  Current as of: November 20, 2017  Content Version: 11.8  © 9280-7327 [employer]. Care instructions adapted under license by [employer] (which disclaims liability or warranty for this information). If you have questions about a medical condition or this instruction, always ask your healthcare professional. [address] any warranty or liability for your use of this information.

## 2019-01-20 ENCOUNTER — HOSPITAL ENCOUNTER (EMERGENCY)
Age: 29
Discharge: HOME | End: 2019-01-20
Attending: EMERGENCY MEDICINE | Admitting: EMERGENCY MEDICINE
Payer: MEDICAID

## 2019-01-20 ENCOUNTER — APPOINTMENT (OUTPATIENT)
Dept: GENERAL RADIOLOGY | Age: 29
End: 2019-01-20
Attending: EMERGENCY MEDICINE
Payer: MEDICAID

## 2019-01-20 VITALS
RESPIRATION RATE: 17 BRPM | HEART RATE: 89 BPM | HEIGHT: 66 IN | BODY MASS INDEX: 34.72 KG/M2 | TEMPERATURE: 98.3 F | SYSTOLIC BLOOD PRESSURE: 143 MMHG | WEIGHT: 216 LBS | DIASTOLIC BLOOD PRESSURE: 81 MMHG | OXYGEN SATURATION: 99 %

## 2019-01-20 DIAGNOSIS — S83.91XA SPRAIN OF RIGHT KNEE, UNSPECIFIED LIGAMENT, INITIAL ENCOUNTER: Primary | ICD-10-CM

## 2019-01-20 PROCEDURE — 99283 EMERGENCY DEPT VISIT LOW MDM: CPT

## 2019-01-20 PROCEDURE — L1830 KO IMMOB CANVAS LONG PRE OTS: HCPCS

## 2019-01-20 PROCEDURE — 73562 X-RAY EXAM OF KNEE 3: CPT

## 2019-01-20 NOTE — ED PROVIDER NOTES
The history is provided by the patient. Fall Incident onset: this morning. She fell from a height of ground level. She landed on hard floor. The point of impact was the right knee. The pain is present in the right knee. The pain is severe. Pertinent negatives include no loss of consciousness and no laceration. The symptoms are aggravated by use of injured limb and ambulation. Knee Pain This is a new problem. The problem has been gradually worsening. The pain is present in the right knee. The quality of the pain is described as aching (burning). The pain is at a severity of 10/10. Associated symptoms include stiffness and back pain. Pertinent negatives include no neck pain. The symptoms are aggravated by standing, palpation and movement. There has been a history of trauma (fell after her leg gave out and hit the knee). Past Medical History:  
Diagnosis Date  Abnormal Pap smear  Anemia  Gastrointestinal disorder Ulcers  Headache  Herniated disc  Hx gestational diabetes  HX OTHER MEDICAL   
 trich treated at beginning of pregnancy  HX OTHER MEDICAL   
 pituitary tumor-benign  Miscarriage  Molar pregnancy  Pap smear for cervical cancer screening 13 Negative  PCOS (polycystic ovarian syndrome)  Pelvic pain in female  Pituitary adenoma ([facility] [address]  Pituitary tumor  Psychiatric problem   
 depression never on medication  Thyroid activity decreased   
 hypothyroid awaiting appnt for endo Past Surgical History:  
Procedure Laterality Date  HX  SECTION    HX COLPOSCOPY  11/3/10  
 HX DILATION AND CURETTAGE    
 HX LIPOMA RESECTION Family History:  
Problem Relation Age of Onset  Diabetes Father  Headache Mother Social History Socioeconomic History  Marital status: SINGLE Spouse name: Not on file  Number of children: Not on file  Years of education: Not on file  Highest education level: Not on file Social Needs  Financial resource strain: Not on file  Food insecurity - worry: Not on file  Food insecurity - inability: Not on file  Transportation needs - medical: Not on file  Transportation needs - non-medical: Not on file Occupational History  Not on file Tobacco Use  Smoking status: Never Smoker  Smokeless tobacco: Never Used Substance and Sexual Activity  Alcohol use: No  
  Comment: =  
 Drug use: No  
 Sexual activity: Yes  
  Partners: Male Birth control/protection: Injection Other Topics Concern  Not on file Social History Narrative  Not on file ALLERGIES: Tomato Review of Systems Respiratory: Negative. Cardiovascular: Negative for chest pain, palpitations and leg swelling. Genitourinary: Negative. Musculoskeletal: Positive for back pain and stiffness. Negative for neck pain. Right knee pain Neurological: Negative. Negative for loss of consciousness. Vitals:  
 01/20/19 1723 BP: 143/81 Pulse: 89 Resp: 17 Temp: 98.3 °F (36.8 °C) SpO2: 99% Weight: 98 kg (216 lb) Height: 5' 6\" (1.676 m) Physical Exam  
Constitutional: She is oriented to person, place, and time. She appears well-developed and well-nourished. HENT:  
Head: Normocephalic and atraumatic. Cardiovascular: Normal rate and intact distal pulses. Musculoskeletal: She exhibits tenderness. She exhibits no deformity. Right knee: She exhibits decreased range of motion and swelling. She exhibits no effusion, no ecchymosis, no deformity, no laceration and no erythema. Tenderness found. Medial joint line and lateral joint line tenderness noted. Neurological: She is alert and oriented to person, place, and time. Ambulated into the ED with limp - keeping right leg stiff and not bending the knee due to pain Skin: No laceration noted. Nursing note and vitals reviewed.  
  
 
TORY 
 Number of Diagnoses or Management Options Sprain of right knee, unspecified ligament, initial encounter:  
Diagnosis management comments: Right knee injury - check x-ray and if no fracture - will place in knee immobilizer and treat as a sprain and refer to ortho Amount and/or Complexity of Data Reviewed Tests in the radiology section of CPT®: ordered and reviewed Independent visualization of images, tracings, or specimens: yes (Right knee - no fracture seen) Splint, Long Leg Date/Time: 1/20/2019 6:24 PM 
Performed by: Meghana Dobbins MD 
Authorized by: Meghana Dobbins MD  
 
Consent:  
  Consent obtained:  Verbal 
  Consent given by:  Patient Risks discussed:  Pain Alternatives discussed:  Referral 
Pre-procedure details:  
  Sensation:  Normal 
  Skin color:  Normal 
Procedure details:  
  Laterality:  Right Location:  Knee Knee:  R knee Splint type:  Knee immobilizer Supplies:  Prefabricated splint Post-procedure details:  
  Pain:  Improved Sensation:  Normal 
  Patient tolerance of procedure: Tolerated well, no immediate complications VITALS:  
Patient Vitals for the past 8 hrs: 
 Temp Pulse Resp BP SpO2  
01/20/19 1723 98.3 °F (36.8 °C) 89 17 143/81 99 % No results found for this or any previous visit (from the past 24 hour(s)). Xr Knee Rt 3 V Result Date: 1/20/2019 EXAM: XR KNEE RT 3 V INDICATION: trauma. COMPARISON: 6/9/2011. FINDINGS: Three views of the right knee demonstrate no fracture or other acute osseous or articular abnormality. There is no effusion. IMPRESSION: No acute abnormality.

## 2019-01-20 NOTE — ED NOTES
Patient discharged home after receiving discharge instructions from MD.  Patient voiced understanding and doesn't have any questions at this time. Patient in no distress at this time. Pt ambulated out of the ER with knee immobilizer intact

## 2019-01-20 NOTE — DISCHARGE INSTRUCTIONS
Patient Education        Knee Sprain: Care Instructions  Your Care Instructions    A knee sprain is one or more stretched, partly torn, or completely torn knee ligaments. Ligaments are bands of ropelike tissue that connect bone to bone and make the knee stable. The knee has four main ligaments. Knee sprains often happen because of a twisting or bending injury from sports such as skiing, basketball, soccer, or football. The knee turns one way while the lower or upper leg goes another way. A sprain also can happen when the knee is hit from the side or the front. If a knee ligament is slightly stretched, you will probably need only home treatment. You may need a splint or brace (immobilizer) for a partly torn ligament. A complete tear may need surgery. A minor knee sprain may take up to 6 weeks to heal, while a severe sprain may take months. Follow-up care is a key part of your treatment and safety. Be sure to make and go to all appointments, and call your doctor if you are having problems. It's also a good idea to know your test results and keep a list of the medicines you take. How can you care for yourself at home? · Follow instructions about how much weight you can put on your leg and how to walk with crutches. · Prop up your leg on a pillow when you ice it or anytime you sit or lie down for the next 3 days. Try to keep it above the level of your heart. This will help reduce swelling. · Put ice or a cold pack on your knee for 10 to 20 minutes at a time. Try to do this every 1 to 2 hours for the next 3 days (when you are awake) or until the swelling goes down. Put a thin cloth between the ice and your skin. Do not get the splint wet. · If you have an elastic bandage, make sure it is snug but not so tight that your leg is numb, tingles, or swells below the bandage. You can loosen the bandage if it is too tight. · Your doctor may recommend a brace (immobilizer) to support your knee while it heals.  Wear it as directed. · Ask your doctor if you can take an over-the-counter pain medicine, such as acetaminophen (Tylenol), ibuprofen (Advil, Motrin), or naproxen (Aleve). Be safe with medicines. Read and follow all instructions on the label. When should you call for help? Call 911 anytime you think you may need emergency care. For example, call if:    · You have sudden chest pain and shortness of breath, or you cough up blood.    Call your doctor now or seek immediate medical care if:    · You have increased or severe pain.     · You cannot move your toes or ankle.     · Your foot is cool or pale or changes color.     · You have tingling, weakness, or numbness in your foot or leg.     · Your splint or brace feels too tight.     · You are unable to straighten the knee, or the knee \"locks. \"     · You have signs of a blood clot in your leg, such as:  ? Pain in your calf, back of the knee, thigh, or groin. ? Redness and swelling in your leg.    Watch closely for changes in your health, and be sure to contact your doctor if:    · Your pain is not getting better or is getting worse. Where can you learn more? Go to http://mitul-leena.info/. Enter N406 in the search box to learn more about \"Knee Sprain: Care Instructions. \"  Current as of: September 20, 2018  Content Version: 11.9  © 9708-3728 [email or web address], [employer]. Care instructions adapted under license by [employer] (which disclaims liability or warranty for this information). If you have questions about a medical condition or this instruction, always ask your healthcare professional. [address] any warranty or liability for your use of this information.

## 2019-01-20 NOTE — ED TRIAGE NOTES
Pt presents to ED with c/o pain in right knee after falling today. Pt states she was seen here two days ago for back pain. Pt is able to ambulate w/o difficulty.

## 2019-02-13 ENCOUNTER — OFFICE VISIT (OUTPATIENT)
Dept: OBGYN CLINIC | Age: 29
End: 2019-02-13

## 2019-02-13 VITALS
HEIGHT: 66 IN | SYSTOLIC BLOOD PRESSURE: 122 MMHG | DIASTOLIC BLOOD PRESSURE: 82 MMHG | BODY MASS INDEX: 35.03 KG/M2 | WEIGHT: 218 LBS

## 2019-02-13 DIAGNOSIS — Z01.419 WELL FEMALE EXAM WITH ROUTINE GYNECOLOGICAL EXAM: Primary | ICD-10-CM

## 2019-02-13 DIAGNOSIS — N89.8 VAGINAL DISCHARGE: ICD-10-CM

## 2019-02-13 DIAGNOSIS — N91.2 AMENORRHEA: ICD-10-CM

## 2019-02-13 PROBLEM — E66.01 SEVERE OBESITY (HCC): Status: ACTIVE | Noted: 2019-02-13

## 2019-02-13 LAB
HCG URINE, QL. (POC): NEGATIVE
VALID INTERNAL CONTROL?: YES

## 2019-02-13 RX ORDER — MEDROXYPROGESTERONE ACETATE 10 MG/1
10 TABLET ORAL DAILY
Qty: 10 TAB | Refills: 0 | Status: SHIPPED | OUTPATIENT
Start: 2019-02-13 | End: 2019-03-16

## 2019-02-13 RX ORDER — FLUCONAZOLE 150 MG/1
150 TABLET ORAL DAILY
Qty: 3 TAB | Refills: 2 | Status: SHIPPED | OUTPATIENT
Start: 2019-02-13 | End: 2019-02-16

## 2019-02-13 NOTE — PATIENT INSTRUCTIONS
Pap Test: Care Instructions  Your Care Instructions    The Pap test (also called a Pap smear) is a screening test for cancer of the cervix, which is the lower part of the uterus that opens into the vagina. The test can help your doctor find early changes in the cells that could lead to cancer. The sample of cells taken during your test has been sent to a lab so that an expert can look at the cells. It usually takes a week or two to get the results back. Follow-up care is a key part of your treatment and safety. Be sure to make and go to all appointments, and call your doctor if you are having problems. It's also a good idea to know your test results and keep a list of the medicines you take. What do the results mean? · A normal result means that the test did not find any abnormal cells in the sample. · An abnormal result can mean many things. Most of these are not cancer. The results of your test may be abnormal because:  ? You have an infection of the vagina or cervix, such as a yeast infection. ? You have an IUD (intrauterine device for birth control). ? You have low estrogen levels after menopause that are causing the cells to change. ? You have cell changes that may be a sign of precancer or cancer. The results are ranked based on how serious the changes might be. There are many other reasons why you might not get a normal result. If the results were abnormal, you may need to get another test within a few weeks or months. If the results show changes that could be a sign of cancer, you may need a test called a colposcopy, which provides a more complete view of the cervix. Sometimes the lab cannot use the sample because it does not contain enough cells or was not preserved well. If so, you may need to have the test again. This is not common, but it does happen from time to time. When should you call for help?   Watch closely for changes in your health, and be sure to contact your doctor if:    · You have vaginal bleeding or pain for more than 2 days after the test. It is normal to have a small amount of bleeding for a day or two after the test.   Where can you learn more? Go to http://mitul-leena.info/. Enter G307 in the search box to learn more about \"Pap Test: Care Instructions. \"  Current as of: March 27, 2018  Content Version: 11.9  © 4709-0996 [employer]. Care instructions adapted under license by [employer] (which disclaims liability or warranty for this information). If you have questions about a medical condition or this instruction, always ask your healthcare professional. [address] any warranty or liability for your use of this information.

## 2019-02-13 NOTE — PROGRESS NOTES
Annual exam ages 21-44    [facility] is a [address] [place] [address],  26 y.o. female [address] No LMP recorded. Patient is not currently having periods (Reason: Medically Induced). .    She presents for her annual checkup. She is having significant amenorrhea x 6 months since stopping depo. She is trying for pregnancy. She also c/o vaginal discharge and itching. Menstrual status:    Her periods have been absent. She denies dysmenorrhea. She reports no premenstrual symptoms. Contraception:    The current method of family planning is none. Sexual history:     She  reports that she currently engages in sexual activity and has had partners who are Male. She reports using the following method of birth control/protection: None. .    Medical conditions:    Since her last annual GYN exam about two years ago, she has not the following changes in her health history: none. Pap and Mammogram History:    Her most recent Pap smear was normal, obtained 2 year(s) ago.     The patient has never had a mammogram.    Breast Cancer History/Substance Abuse: negative    Past Medical History:   Diagnosis Date    Abnormal Pap smear     Anemia     Gastrointestinal disorder     Ulcers    Headache     Herniated disc     Hx gestational diabetes     HX OTHER MEDICAL     trich treated at beginning of pregnancy    HX OTHER MEDICAL     pituitary tumor-benign     Miscarriage     Molar pregnancy     Pap smear for cervical cancer screening 13    Negative    PCOS (polycystic ovarian syndrome)     Pelvic pain in female     [facility] adenoma ([facility] [address]     Pituitary tumor     Psychiatric problem     depression never on medication    Thyroid activity decreased     hypothyroid awaiting appnt for endo     Past Surgical History:   Procedure Laterality Date    HX  SECTION      HX COLPOSCOPY  11/3/10    HX DILATION AND CURETTAGE      HX LIPOMA RESECTION           Allergies: Tomato     Tobacco History: reports that  has never smoked. she has never used smokeless tobacco.  Alcohol Abuse:  reports that she does not drink alcohol. Drug Abuse:  reports that she does not use drugs.     Family Medical/Cancer History:   Family History   Problem Relation Age of Onset    Diabetes Father     Headache Mother         Review of Systems - History obtained from the patient  Constitutional: negative for weight loss, fever, night sweats  HEENT: negative for hearing loss, earache, congestion, snoring, sorethroat  CV: negative for chest pain, palpitations, edema  Resp: negative for cough, shortness of breath, wheezing  GI: negative for change in bowel habits, abdominal pain, black or bloody stools  : negative for frequency, dysuria, hematuria, vaginal discharge  MSK: negative for back pain, joint pain, muscle pain  Breast: negative for breast lumps, nipple discharge, galactorrhea  Skin :negative for itching, rash, hives  Neuro: negative for dizziness, headache, confusion, weakness  Psych: negative for anxiety, depression, change in mood  Heme/lymph: negative for bleeding, bruising, pallor    Physical Exam    Visit Vitals  /82   Ht 5' 6\" (1.676 m)   Wt 218 lb (98.9 kg)   BMI 35.19 kg/m²       Constitutional  · Appearance: well-nourished, well developed, alert, in no acute distress    HENT  · Head and Face: appears normal    Neck  · Inspection/Palpation: normal appearance, no masses or tenderness  · Lymph Nodes: no lymphadenopathy present  · Thyroid: gland size normal, nontender, no nodules or masses present on palpation    Chest  · Respiratory Effort: breathing unlabored  · Auscultation: normal breath sounds    Cardiovascular  · Heart:  · Auscultation: regular rate and rhythm without murmur    Breasts  · Inspection of Breasts: breasts symmetrical, no skin changes, no discharge present, nipple appearance normal, no skin retraction present  · Palpation of Breasts and Axillae: no masses present on palpation, no breast tenderness  · Axillary Lymph Nodes: no lymphadenopathy present    Gastrointestinal  · Abdominal Examination: abdomen non-tender to palpation, normal bowel sounds, no masses present  · Liver and spleen: no hepatomegaly present, spleen not palpable  · Hernias: no hernias identified    Genitourinary  · External Genitalia: normal appearance for age, no discharge present, no tenderness present, no inflammatory lesions present, no masses present, no atrophy present  · Vagina: normal vaginal vault without central or paravaginal defects, + discharge present, no inflammatory lesions present, no masses present  · Bladder: non-tender to palpation  · Urethra: appears normal  · Cervix: normal   · Uterus: normal size, shape and consistency  · Adnexa: no adnexal tenderness present, no adnexal masses present  · Perineum: perineum within normal limits, no evidence of trauma, no rashes or skin lesions present  · Anus: anus within normal limits, no hemorrhoids present  · Inguinal Lymph Nodes: no lymphadenopathy present    Skin  · General Inspection: no rash, no lesions identified    Neurologic/Psychiatric  · Mental Status:  · Orientation: grossly oriented to person, place and time  · Mood and Affect: mood normal, affect appropriate    . Assessment:  Routine gynecologic examination  Her current medical status is satisfactory with no evidence of significant gynecologic issues. Amenorrhea- h/o hyperproloactinemia and PCOS. Will repeat labs today.     Vaginal d/c- c/w yeast, will treat with diflucan    Plan:  Counseled re: diet, exercise, healthy lifestyle  Return for yearly wellness visits  Pt counseled regarding co-testing for high risk HPV with pap  Rec screening mammo at either 34 or 39

## 2019-02-15 ENCOUNTER — OFFICE VISIT (OUTPATIENT)
Dept: NEUROLOGY | Age: 29
End: 2019-02-15

## 2019-02-15 ENCOUNTER — TELEPHONE (OUTPATIENT)
Dept: NEUROLOGY | Age: 29
End: 2019-02-15

## 2019-02-15 VITALS
HEART RATE: 82 BPM | HEIGHT: 66 IN | OXYGEN SATURATION: 97 % | WEIGHT: 215 LBS | SYSTOLIC BLOOD PRESSURE: 108 MMHG | BODY MASS INDEX: 34.55 KG/M2 | DIASTOLIC BLOOD PRESSURE: 70 MMHG | RESPIRATION RATE: 16 BRPM

## 2019-02-15 DIAGNOSIS — G43.719 INTRACTABLE CHRONIC MIGRAINE WITHOUT AURA AND WITHOUT STATUS MIGRAINOSUS: Primary | ICD-10-CM

## 2019-02-15 DIAGNOSIS — R20.0 NUMBNESS OF RIGHT HAND: ICD-10-CM

## 2019-02-15 RX ORDER — TOPIRAMATE 50 MG/1
TABLET, FILM COATED ORAL
Qty: 63 TAB | Refills: 0 | Status: SHIPPED | OUTPATIENT
Start: 2019-02-15 | End: 2019-03-14 | Stop reason: SDUPTHER

## 2019-02-15 RX ORDER — TOPIRAMATE 50 MG/1
TABLET, FILM COATED ORAL
Qty: 90 TAB | Refills: 1 | Status: SHIPPED | OUTPATIENT
Start: 2019-02-15 | End: 2019-03-15

## 2019-02-15 RX ORDER — RIZATRIPTAN BENZOATE 10 MG/1
TABLET ORAL
Qty: 9 TAB | Refills: 2 | Status: SHIPPED | OUTPATIENT
Start: 2019-02-15 | End: 2019-07-01

## 2019-02-15 NOTE — TELEPHONE ENCOUNTER
----- Message from Sarah Allred sent at 2/15/2019 11:43 AM EST -----  Regarding: Dr. Harman Goodell  Pt stated she was just in and didn't receive one  of the medications that were prescribed. She would like it  electronically sent to [facility] on [address] in [place]. She still needs \"rivatriptan\" sent. Pt's contact is [phone].

## 2019-02-15 NOTE — PROGRESS NOTES
Interval HPI:   This is a 26 y.o. female who is following up for     Chief Complaint   Patient presents with    Migraine       1) Chronic daily headache, chronic migraine  Last seen in Nov 2016. Couldn't follow up till now due to loss of insurance. Was taking Topamax 50 mg BID at the time and complaining of daily headache with 2-3 days a month of migraine. Hasn't been able to get her Topamax for a long time, having daily headache with more frequent migraine and taking tylenol every day to treat headaches. # of headache days a week: 7   # of migraine days a month: 5       2) Right hand twitching, numbness  Pt describes that episodically she's awakened at night by burning sensation in right hand. Separately, when her her headaches are very painful, she notices that the muscles in her right hand seem to twitch, finger tips feel numb, and 3-4th fingers lock or stiffen. Brief ROS: as above or otherwise negative    Allergies   Allergen Reactions    Tomato Angioedema     Current Outpatient Medications   Medication Sig Dispense Refill    topiramate (TOPAMAX) 50 mg tablet Week 1: one tab at bedtime. Week 2: one tab twice a day. Week 3 and 4: one tab in AM and 2 tabs in PM.  NO REFILL 63 Tab 0    topiramate (TOPAMAX) 50 mg tablet Take 1 tab in AM and 2 tabs in PM.  To reduce headache frequency. 90 Tab 1    rizatriptan (MAXALT) 10 mg tablet 1 tablet at onset of migraine. May repeat in 2 hours if needed. Limit use to 2 days per week 9 Tab 2    medroxyPROGESTERone (PROVERA) 10 mg tablet Take 1 Tab by mouth daily. 10 Tab 0    fluconazole (DIFLUCAN) 150 mg tablet Take 1 Tab by mouth daily for 3 doses.  3 Tab 2       Past Medical History:   Diagnosis Date    Abnormal Pap smear     Anemia     Gastrointestinal disorder     Ulcers    Headache     Herniated disc     Hx gestational diabetes     HX OTHER MEDICAL     trich treated at beginning of pregnancy    HX OTHER MEDICAL     pituitary tumor-benign     Miscarriage     Molar pregnancy     Pap smear for cervical cancer screening 13    Negative    PCOS (polycystic ovarian syndrome)     Pelvic pain in female     [facility] [address]     Pituitary tumor     Psychiatric problem     depression never on medication    Thyroid activity decreased     hypothyroid awaiting appnt for endo       Past Surgical History:   Procedure Laterality Date    HX  SECTION      HX COLPOSCOPY  11/3/10    HX DILATION AND CURETTAGE      HX LIPOMA RESECTION         Family History   Problem Relation Age of Onset    Diabetes Father     Headache Mother        Social History     Socioeconomic History    Marital status: SINGLE     Spouse name: Not on file    Number of children: Not on file    Years of education: Not on file    Highest education level: Not on file   Social Needs    Financial resource strain: Not on file    Food insecurity - worry: Not on file    Food insecurity - inability: Not on file   [employer] needs - medical: Not on file   [employer] needs - non-medical: Not on file   Occupational History    Not on file   Tobacco Use    Smoking status: Never Smoker    Smokeless tobacco: Never Used   Substance and Sexual Activity    Alcohol use: No     Comment: =    Drug use: No    Sexual activity: Yes     Partners: Male     Birth control/protection: None   Other Topics Concern    Not on file   Social History Narrative    Not on file       Physical Exam  Blood pressure 108/70, pulse 82, resp. rate 16, height 5' 6\" (1.676 m), weight 97.5 kg (215 lb), SpO2 97 %. No acute distress  Neck: no stiffness  Skin: no rashes    Focused Neurological Exam     Mental status: Alert and oriented to person, place situation. Language: normal fluency and comprehension; no dysarthria.       CNs:   Visual fields grossly normal  Extraocular movements intact, no nystagmus  Face appears symmetric and facial strength normal.    Hearing is intact to casual conversation. Sensory:  Intact LT in all extremities  Motor: 5/5 strength in all extremities  Reflexes: 2+ patellars, 1+ biceps   Gait: normal    Impression      ICD-10-CM ICD-9-CM    1. Intractable chronic migraine without aura and without status migrainosus G43.719 346.71 topiramate (TOPAMAX) 50 mg tablet      topiramate (TOPAMAX) 50 mg tablet      rizatriptan (MAXALT) 10 mg tablet   2. Numbness of right hand R20.0 782. 0 EMG LIMITED         1) Chronic daily headache, intractable migraine without aura, medication overuse HA  Advised pt to stop all OTC headache pain relievers  Restarted Topamax 50 mg working up to 50 mg AM/ 100 mg PM  Rx'd Maxalt 10 mg to use up to 2 days/ week for migraine    2) Right hand numbness/ pain  Check EMG right arm    3) Right hand muscle twitching/ tremor  Discussed with patient that this is most likely some physical response to her migraine as it seems to occur when her headache is severe.     4) Follow up in 3 months

## 2019-02-15 NOTE — TELEPHONE ENCOUNTER
Left VM for patient to let her know that the rizatriptan was e-scribed to her pharmacy already. I told her in the message to call back if she has any other questions.

## 2019-02-17 LAB
17OHP SERPL-MCNC: 67 NG/DL
ALBUMIN SERPL-MCNC: 4.7 G/DL (ref 3.5–5.5)
ALBUMIN/GLOB SERPL: 2 {RATIO} (ref 1.2–2.2)
ALP SERPL-CCNC: 109 IU/L (ref 39–117)
ALT SERPL-CCNC: 21 IU/L (ref 0–32)
AST SERPL-CCNC: 22 IU/L (ref 0–40)
BILIRUB SERPL-MCNC: 0.3 MG/DL (ref 0–1.2)
BUN SERPL-MCNC: 13 MG/DL (ref 6–20)
BUN/CREAT SERPL: 15 (ref 9–23)
CALCIUM SERPL-MCNC: 9.7 MG/DL (ref 8.7–10.2)
CHLORIDE SERPL-SCNC: 105 MMOL/L (ref 96–106)
CO2 SERPL-SCNC: 23 MMOL/L (ref 20–29)
CREAT SERPL-MCNC: 0.89 MG/DL (ref 0.57–1)
ESTRADIOL SERPL-MCNC: 43.2 PG/ML
FSH SERPL-ACNC: 7.2 MIU/ML
FT4I SERPL CALC-MCNC: 1.6 (ref 1.2–4.9)
GLOBULIN SER CALC-MCNC: 2.3 G/DL (ref 1.5–4.5)
GLUCOSE SERPL-MCNC: 91 MG/DL (ref 65–99)
LH SERPL-ACNC: 9.8 MIU/ML
POTASSIUM SERPL-SCNC: 4.3 MMOL/L (ref 3.5–5.2)
PROLACTIN SERPL-MCNC: 12.6 NG/ML (ref 4.8–23.3)
PROT SERPL-MCNC: 7 G/DL (ref 6–8.5)
SODIUM SERPL-SCNC: 145 MMOL/L (ref 134–144)
T3RU NFR SERPL: 23 % (ref 24–39)
T4 SERPL-MCNC: 6.8 UG/DL (ref 4.5–12)
TESTOST FREE SERPL-MCNC: 3.2 PG/ML (ref 0–4.2)
TESTOST SERPL-MCNC: 47 NG/DL (ref 8–48)
TSH SERPL DL<=0.005 MIU/L-ACNC: 1.39 UIU/ML (ref 0.45–4.5)

## 2019-02-20 LAB
CYTOLOGIST CVX/VAG CYTO: ABNORMAL
CYTOLOGY CVX/VAG DOC THIN PREP: ABNORMAL
CYTOLOGY HISTORY:: ABNORMAL
DX ICD CODE: ABNORMAL
HPV I/H RISK 1 DNA CVX QL PROBE+SIG AMP: POSITIVE
HPV16 DNA CVX QL PROBE+SIG AMP: NEGATIVE
HPV18 DNA CVX QL PROBE+SIG AMP: NEGATIVE
Lab: ABNORMAL
Lab: ABNORMAL
OTHER STN SPEC: ABNORMAL
PATH REPORT.FINAL DX SPEC: ABNORMAL
STAT OF ADQ CVX/VAG CYTO-IMP: ABNORMAL

## 2019-02-25 ENCOUNTER — OFFICE VISIT (OUTPATIENT)
Dept: NEUROLOGY | Age: 29
End: 2019-02-25

## 2019-02-25 ENCOUNTER — TELEPHONE (OUTPATIENT)
Dept: OBGYN CLINIC | Age: 29
End: 2019-02-25

## 2019-02-25 DIAGNOSIS — R20.0 NUMBNESS OF RIGHT HAND: Primary | ICD-10-CM

## 2019-02-25 NOTE — PROCEDURES
EMG/ NCS Report  DR[facility]ITATION  - [facility]  [facility]  [facility], [address]   Ph: [phone]   FAX: [phone]/ [phone]  Test Date:  2019    Patient: Jodie Underwood : 1990 Physician: Selena Lagos M.D. Sex: Female Height: ' \" Ref Phys: Selena Lagos M.D.   ID#: [id number] Weight:  lbs. Technician: Bibiana Dawson     Patient History:  CC: numbness, tingling in the rt. hand for the last 2 yrs. Focused exam:  Normal strength, sensation and 1+ reflexes in right upper extremity    EMG & NCV Findings:  Evaluation of the right median motor nerve showed normal distal onset latency (3.0 ms), normal amplitude (14.4 mV), and normal conduction velocity (Elbow-Wrist, 58 m/s). The right ulnar motor nerve showed normal distal onset latency (2.7 ms), normal amplitude (15.5 mV), normal conduction velocity (B Elbow-Wrist, 54 m/s), and normal conduction velocity (A Elbow-B Elbow, 56 m/s). The right median sensory, the right radial sensory, and the right ulnar sensory nerves showed normal distal peak latency (R3.1, R2.0, R3.1 ms) and normal amplitude (R24.7, R69.7, R37.0 µV). The right median/ulnar (palm) comparison nerve showed normal distal onset latency (Median Palm, 1.3 ms), normal distal peak latency (Median Palm, 1.9 ms), normal amplitude (Median Palm, 16.1 µV), normal distal onset latency (Ulnar Palm, 1.3 ms), normal distal peak latency (Ulnar Palm, 1.8 ms), and normal amplitude (Ulnar Palm, 11.6 µV). All F Wave latencies were within normal limits. All examined muscles (as indicated in the following table) showed no evidence of electrical instability. Impression:    Normal right upper extremity nerve conduction study and EMG. No electrodiagnostic evidence of CTS, ulnar neuropathy or right cervical radiculopathy.      ___________________________  Selena Lagos M.D.      Nerve Conduction Studies  Anti Sensory Summary Table     Stim Site NR Peak (ms) Norm Peak (ms) P-T Amp (µV) Norm P-T Amp Site1 Site2 Dist (cm)   Right Median Anti Sensory (2nd Digit)  28.7°C   Wrist    3.1 <4 24.7 >13 Wrist 2nd Digit 14.0   Elbow    3.1  25.0  Elbow Wrist 0.0   Right Radial Anti Sensory (Base 1st Digit)  29.4°C   Wrist    2.0 <2.8 69.7 >11 Wrist Base 1st Digit 10.0   Right Ulnar Anti Sensory (5th Digit)  29.2°C   Wrist    3.1 <4.0 37.0 >9 Wrist 5th Digit 14.0   B Elbow    3.1  41.0  B Elbow Wrist 0.0     Motor Summary Table     Stim Site NR Onset (ms) Norm Onset (ms) O-P Amp (mV) Norm O-P Amp Amp (Prev) (%) Site1 Site2 Dist (cm) Young (m/s) Norm Young (m/s)   Right Median Motor (Abd Poll Brev)  29.4°C   Wrist    3.0 <4.5 14.4 >4.1 100.0 Wrist Abd Poll Brev 8.0     Elbow    6.6  11.8  81.9 Elbow Wrist 21.0 58 >49   Right Ulnar Motor (Abd Dig Minimi)  29.4°C   Wrist    2.7 <3.1 15.5 >7.0 100.0 Wrist Abd Dig Minimi 8.0  >50   B Elbow    6.2  12.1  78.1 B Elbow Wrist 19.0 54 >50   A Elbow    8.0  13.2  109.1 A Elbow B Elbow 10.0 56 >50     Comparison Summary Table     Stim Site NR Peak (ms) P-T Amp (µV) Site1 Site2 Dist (cm) Delta-0 (ms)   Right Median/Ulnar Palm Comparison (Wrist)  29.2°C   Median Palm    1.9 23.5 Median Palm Ulnar Palm 8.0 0.0   Ulnar Palm    1.8 20.9         F Wave Studies     NR F-Lat (ms) Lat Norm (ms) L-R F-Lat (ms) L-R Lat Norm   Right Ulnar (Mrkrs) (Abd Dig Min)  29.4°C      25.59 <32  <2.5     EMG     Side Muscle Nerve Root Ins Act Fibs Psw Recrt Duration Amp Poly Comment   Right Abd Poll Brev Median C8-T1 Nml Nml Nml Nml Nml Nml Nml    Right 1stDorInt Ulnar C8-T1 Nml Nml Nml Nml Nml Nml Nml    Right PronatorTeres Median C6-7 Nml Nml Nml Nml Nml Nml Nml    Right Biceps Musculocut C5-6 Nml Nml Nml Nml Nml Nml Nml    Right Deltoid Axillary C5-6 Nml Nml Nml Nml Nml Nml Nml    Right Lower Cerv Parasp Rami C7,T1 Nml Nml Nml Nml Nml Nml Nml      Waveforms:

## 2019-02-25 NOTE — TELEPHONE ENCOUNTER
Patient is calling to discuss lab work. Result Notes for PAP IG, HPV AND RFX HPV 34/98,89([id number])     Notes recorded by Pawan Major on 2/25/2019 at 12:51 PM EST  mychart msg sent  ------    Notes recorded by Jennyfer Medrano MD on 2/20/2019 at 2:12 PM EST  Results abnormal, notify pt of results, +HPV but 16/18 negative, repeat cotesting in 1 year, tickle     patient Result Comments     Viewed by Jacobo Murguia on 2/18/2019  3:49 PM   Written by Jennyfer Medrano MD on 2/18/2019  3:42 PM   Your recent blood work was normal.         Discussed with patient and need for cotesting in one year. Stop smoking if a smoker. Add Folic acid in a prenatal vitamin. Call prn for any questions or concerns.

## 2019-03-01 DIAGNOSIS — R20.0 NUMBNESS OF RIGHT HAND: ICD-10-CM

## 2019-03-11 ENCOUNTER — HOSPITAL ENCOUNTER (EMERGENCY)
Age: 29
Discharge: HOME | End: 2019-03-11
Attending: EMERGENCY MEDICINE | Admitting: EMERGENCY MEDICINE
Payer: MEDICAID

## 2019-03-11 VITALS
OXYGEN SATURATION: 99 % | SYSTOLIC BLOOD PRESSURE: 135 MMHG | HEIGHT: 66 IN | TEMPERATURE: 98.1 F | BODY MASS INDEX: 33.75 KG/M2 | DIASTOLIC BLOOD PRESSURE: 97 MMHG | RESPIRATION RATE: 16 BRPM | WEIGHT: 210 LBS | HEART RATE: 74 BPM

## 2019-03-11 DIAGNOSIS — J06.9 VIRAL URI: Primary | ICD-10-CM

## 2019-03-11 DIAGNOSIS — B34.9 VIRAL ILLNESS: ICD-10-CM

## 2019-03-11 LAB
FLUAV AG NPH QL IA: NEGATIVE
FLUBV AG NOSE QL IA: NEGATIVE

## 2019-03-11 PROCEDURE — 99282 EMERGENCY DEPT VISIT SF MDM: CPT

## 2019-03-11 PROCEDURE — 87804 INFLUENZA ASSAY W/OPTIC: CPT

## 2019-03-12 NOTE — ED PROVIDER NOTES
Pt ambulates to treatment area she states that today she began with headache, runny nose, sore throat, chills and feeling bad all over. She is concerned for the flu. The history is provided by the patient. No  was used. Flu   This is a new problem. The current episode started 6 to 12 hours ago. The problem occurs constantly. The problem has not changed since onset. Patient reports a subjective fever - was not measured. Associated symptoms include chills and sore throat. Pertinent negatives include no chest pain, no ear pain, no rhinorrhea, no myalgias, no shortness of breath, no vomiting and no confusion. She has tried nothing for the symptoms. She is not a smoker. Her past medical history does not include bronchitis, pneumonia or asthma.         Past Medical History:   Diagnosis Date    Abnormal Pap smear     Anemia     Gastrointestinal disorder     Ulcers    Headache     Herniated disc     Hx gestational diabetes     HX OTHER MEDICAL     trich treated at beginning of pregnancy    HX OTHER MEDICAL     pituitary tumor-benign     Miscarriage     Molar pregnancy     Pap smear for cervical cancer screening 19 neg HPV POS- rp pap one year    PCOS (polycystic ovarian syndrome)     Pelvic pain in female     Pituitary adenoma ([facility] [address]     Pituitary tumor     Psychiatric problem     depression never on medication    Thyroid activity decreased     hypothyroid awaiting appnt for endo       Past Surgical History:   Procedure Laterality Date    HX  SECTION      HX COLPOSCOPY  11/3/10    HX DILATION AND CURETTAGE      HX LIPOMA RESECTION           Family History:   Problem Relation Age of Onset    Diabetes Father     Headache Mother        Social History     Socioeconomic History    Marital status: SINGLE     Spouse name: Not on file    Number of children: Not on file    Years of education: Not on file    Highest education level: Not on file   Social Needs    Financial resource strain: Not on file    Food insecurity - worry: Not on file    Food insecurity - inability: Not on file   [employer] needs - medical: Not on file   [employer] needs - non-medical: Not on file   Occupational History    Not on file   Tobacco Use    Smoking status: Never Smoker    Smokeless tobacco: Never Used   Substance and Sexual Activity    Alcohol use: No     Comment: =    Drug use: No    Sexual activity: Yes     Partners: Male     Birth control/protection: None   Other Topics Concern    Not on file   Social History Narrative    Not on file     ALLERGIES: Tomato    Review of Systems   Constitutional: Positive for chills. Negative for appetite change, fever and unexpected weight change. HENT: Positive for sore throat. Negative for ear pain, hearing loss, rhinorrhea and trouble swallowing. Eyes: Negative for pain and visual disturbance. Respiratory: Negative for cough, chest tightness and shortness of breath. Cardiovascular: Negative for chest pain and palpitations. Gastrointestinal: Negative for abdominal distention, abdominal pain, blood in stool and vomiting. Genitourinary: Negative for dysuria, hematuria and urgency. Musculoskeletal: Negative for back pain and myalgias. Skin: Negative for rash. Neurological: Negative for dizziness, syncope, weakness and numbness. Psychiatric/Behavioral: Negative for confusion and suicidal ideas. All other systems reviewed and are negative. Vitals:    03/11/19 2231   BP: (!) 135/97   Pulse: 74   Resp: 16   Temp: 98.1 °F (36.7 °C)   SpO2: 99%   Weight: 95.3 kg (210 lb)   Height: 5' 6\" (1.676 m)            Physical Exam   Constitutional: She is oriented to person, place, and time. She appears well-developed and well-nourished. No distress. HENT:   Head: Normocephalic and atraumatic.    Right Ear: External ear normal.   Left Ear: External ear normal.   Nose: Nose normal.   Mouth/Throat: Oropharynx is clear and moist. No oropharyngeal exudate. Eyes: Conjunctivae and EOM are normal. Pupils are equal, round, and reactive to light. Right eye exhibits no discharge. Left eye exhibits no discharge. No scleral icterus. Neck: Normal range of motion. Neck supple. No JVD present. No tracheal deviation present. Cardiovascular: Normal rate, regular rhythm, normal heart sounds and intact distal pulses. Exam reveals no gallop and no friction rub. No murmur heard. Pulmonary/Chest: Effort normal and breath sounds normal. No stridor. No respiratory distress. She has no decreased breath sounds. She has no wheezes. She has no rhonchi. She has no rales. She exhibits no tenderness. Abdominal: Soft. Bowel sounds are normal. She exhibits no distension. There is no tenderness. There is no rebound and no guarding. Musculoskeletal: Normal range of motion. She exhibits no edema or tenderness. Neurological: She is alert and oriented to person, place, and time. She has normal strength and normal reflexes. She displays normal reflexes. No cranial nerve deficit or sensory deficit. She exhibits normal muscle tone. Coordination normal. GCS eye subscore is 4. GCS verbal subscore is 5. GCS motor subscore is 6. Skin: Skin is warm and dry. Capillary refill takes less than 2 seconds. No rash noted. She is not diaphoretic. No erythema. No pallor. Psychiatric: She has a normal mood and affect. Her behavior is normal. Judgment and thought content normal.   Nursing note and vitals reviewed.        MDM  Number of Diagnoses or Management Options  Viral illness:   Viral URI:      Amount and/or Complexity of Data Reviewed  Clinical lab tests: ordered and reviewed    Risk of Complications, Morbidity, and/or Mortality  Presenting problems: moderate  Diagnostic procedures: low  Management options: low    Patient Progress  Patient progress: stable         Procedures    Chief Complaint   Patient presents with    Flu Like Symptoms       The patient's presenting problems have been discussed, and they are in agreement with the care plan formulated and outlined with them. I have encouraged them to ask questions as they arise throughout their visit. MEDICATIONS GIVEN:  Medications - No data to display    LABS REVIEWED:  Recent Results (from the past 24 hour(s))   INFLUENZA A & B AG (RAPID TEST)    Collection Time: 03/11/19 10:37 PM   Result Value Ref Range    Influenza A Antigen NEGATIVE  NEG      Influenza B Antigen NEGATIVE  NEG         VITAL SIGNS:  Patient Vitals for the past 12 hrs:   Temp Pulse Resp BP SpO2   03/11/19 2231 98.1 °F (36.7 °C) 74 16 (!) 135/97 99 %       RADIOLOGY RESULTS:  The following have been ordered and reviewed:  No results found. PROGRESS NOTES:  Discussed results and plan with patient. Patient will be discharged home with pcp follow up. Patient instructed to return to the emergency room for any worsening symptoms or any other concerns. DIAGNOSIS:    1. Viral URI    2. Viral illness        PLAN:  Follow-up Information     Follow up With Specialties Details Why Contact Info    Eliana Dalton MD Pediatrics, Family Practice In 1 week As needed [address]  [address] [address] [phone]      [address] [place] [address] DEPT Emergency Medicine  If symptoms worsen [address] [place] [address] Road  [facility]  [phone]        Discharge Medication List as of 3/11/2019 11:15 PM      CONTINUE these medications which have NOT CHANGED    Details   !! topiramate (TOPAMAX) 50 mg tablet Week 1: one tab at bedtime. Week 2: one tab twice a day. Week 3 and 4: one tab in AM and 2 tabs in PM.  NO REFILL, Print, Disp-63 Tab, R-0      rizatriptan (MAXALT) 10 mg tablet 1 tablet at onset of migraine. May repeat in 2 hours if needed. Limit use to 2 days per week, Normal, Disp-9 Tab, R-2      !! topiramate (TOPAMAX) 50 mg tablet Take 1 tab in AM and 2 tabs in PM.  To reduce headache frequency. , Print, Disp-90 Tab, R-1      medroxyPROGESTERone (PROVERA) 10 mg tablet Take 1 Tab by mouth daily. , Normal, Disp-10 Tab, R-0       !! - Potential duplicate medications found. Please discuss with provider. ED COURSE: The patient's hospital course has been uncomplicated.

## 2019-03-12 NOTE — ED TRIAGE NOTES
Pt ambulates to treatment area she states that today she began with headache, runny nose, sore throat, chills and feeling bad all over.   She is concerned for the flu

## 2019-03-12 NOTE — DISCHARGE INSTRUCTIONS
We hope that we have addressed all of your medical concerns. The examination and treatment you received in the Emergency Department were for an emergent problem and were not intended as complete care. It is important that you follow up with your healthcare provider(s) for ongoing care. If your symptoms worsen or do not improve as expected, and you are unable to reach your usual health care provider(s), you should return to the Emergency Department. Today's healthcare is undergoing tremendous change, and patient satisfaction surveys are one of the many tools to assess the quality of medical care. You may receive a survey from the [employer] regarding your experience in the Emergency Department. I hope that your experience has been completely positive, particularly the medical care that I provided. As such, please participate in the survey; anything less than excellent does not meet my expectations or intentions. [address] [place] [address] and [employer] participate in nationally recognized quality of care measures. If your blood pressure is greater than 120/80, as reported below, we urge that you seek medical care to address the potential of high blood pressure, commonly known as hypertension. Hypertension can be hereditary or can be caused by certain medical conditions, pain, stress, or \"white coat syndrome. \"       Please make an appointment with your health care provider(s) for follow up of your Emergency Department visit. VITALS:   Patient Vitals for the past 8 hrs:   Temp Pulse Resp BP SpO2   19 2231 98.1 °F (36.7 °C) 74 16 (!) 135/97 99 %          Thank you for allowing us to provide you with medical care today. We realize that you have many choices for your emergency care needs. Please choose us in the future for any continued health care needs. Vera Ortega, [address] Office: [phone]            Recent Results (from the past 24 hour(s))   INFLUENZA A & B AG (RAPID TEST)    Collection Time: 03/11/19 10:37 PM   Result Value Ref Range    Influenza A Antigen NEGATIVE  NEG      Influenza B Antigen NEGATIVE  NEG         No results found. Patient Education        Viral Respiratory Infection: Care Instructions  Your Care Instructions    Viruses are very small organisms. They grow in number after they enter your body. There are many types that cause different illnesses, such as colds and the mumps. The symptoms of a viral respiratory infection often start quickly. They include a fever, sore throat, and runny nose. You may also just not feel well. Or you may not want to eat much. Most viral respiratory infections are not serious. They usually get better with time and self-care. Antibiotics are not used to treat a viral infection. That's because antibiotics will not help cure a viral illness. In some cases, antiviral medicine can help your body fight a serious viral infection. Follow-up care is a key part of your treatment and safety. Be sure to make and go to all appointments, and call your doctor if you are having problems. It's also a good idea to know your test results and keep a list of the medicines you take. How can you care for yourself at home? · Rest as much as possible until you feel better. · Be safe with medicines. Take your medicine exactly as prescribed. Call your doctor if you think you are having a problem with your medicine. You will get more details on the specific medicine your doctor prescribes. · Take an over-the-counter pain medicine, such as acetaminophen (Tylenol), ibuprofen (Advil, Motrin), or naproxen (Aleve), as needed for pain and fever. Read and follow all instructions on the label. Do not give aspirin to anyone younger than 20. It has been linked to Reye syndrome, a serious illness.   · Drink plenty of fluids, enough so that your urine is light yellow or clear like water. Hot fluids, such as tea or soup, may help relieve congestion in your nose and throat. If you have kidney, heart, or liver disease and have to limit fluids, talk with your doctor before you increase the amount of fluids you drink. · Try to clear mucus from your lungs by breathing deeply and coughing. · Gargle with warm salt water once an hour. This can help reduce swelling and throat pain. Use 1 teaspoon of salt mixed in 1 cup of warm water. · Do not smoke or allow others to smoke around you. If you need help quitting, talk to your doctor about stop-smoking programs and medicines. These can increase your chances of quitting for good. To avoid spreading the virus  · Cough or sneeze into a tissue. Then throw the tissue away. · If you don't have a tissue, use your hand to cover your cough or sneeze. Then clean your hand. You can also cough into your sleeve. · Wash your hands often. Use soap and warm water. Wash for 15 to 20 seconds each time. · If you don't have soap and water near you, you can clean your hands with alcohol wipes or gel. When should you call for help? Call your doctor now or seek immediate medical care if:    · You have a new or higher fever.     · Your fever lasts more than 48 hours.     · You have trouble breathing.     · You have a fever with a stiff neck or a severe headache.     · You are sensitive to light.     · You feel very sleepy or confused.    Watch closely for changes in your health, and be sure to contact your doctor if:    · You do not get better as expected. Where can you learn more? Go to http://mitul-leena.info/. Enter G698 in the search box to learn more about \"Viral Respiratory Infection: Care Instructions. \"  Current as of: September 5, 2018  Content Version: 11.9  © 8540-9965 [employer].  Care instructions adapted under license by [employer] (which disclaims liability or warranty for this information). If you have questions about a medical condition or this instruction, always ask your healthcare professional. [address] any warranty or liability for your use of this information. Patient Education        Viral Infections: Care Instructions  Your Care Instructions    You don't feel well, but it's not clear what's causing it. You may have a viral infection. Viruses cause many illnesses, such as the common cold, influenza, fever, rashes, and the diarrhea, nausea, and vomiting that are often called \"stomach flu. \" You may wonder if antibiotic medicines could make you feel better. But antibiotics only treat infections caused by bacteria. They don't work on viruses. The good news is that viral infections usually aren't serious. Most will go away in a few days without medical treatment. In the meantime, there are a few things you can do to make yourself more comfortable. Follow-up care is a key part of your treatment and safety. Be sure to make and go to all appointments, and call your doctor if you are having problems. It's also a good idea to know your test results and keep a list of the medicines you take. How can you care for yourself at home? · Get plenty of rest if you feel tired. · Take an over-the-counter pain medicine if needed, such as acetaminophen (Tylenol), ibuprofen (Advil, Motrin), or naproxen (Aleve). Read and follow all instructions on the label. · Be careful when taking over-the-counter cold or flu medicines and Tylenol at the same time. Many of these medicines have acetaminophen, which is Tylenol. Read the labels to make sure that you are not taking more than the recommended dose. Too much acetaminophen (Tylenol) can be harmful. · Drink plenty of fluids, enough so that your urine is light yellow or clear like water.  If you have kidney, heart, or liver disease and have to limit fluids, talk with your doctor before you increase the amount of fluids you drink.  · Stay home from work, school, and other public places while you have a fever. When should you call for help? Call 911 anytime you think you may need emergency care. For example, call if:    · You have severe trouble breathing.     · You passed out (lost consciousness).    Call your doctor now or seek immediate medical care if:    · You seem to be getting much sicker.     · You have a new or higher fever.     · You have blood in your stools.     · You have new belly pain, or your pain gets worse.     · You have a new rash.    Watch closely for changes in your health, and be sure to contact your doctor if:    · You start to get better and then get worse.     · You do not get better as expected. Where can you learn more? Go to http://mitul-leena.info/. Enter X065 in the search box to learn more about \"Viral Infections: Care Instructions. \"  Current as of: July 30, 2018  Content Version: 11.9  © 7615-2719 [employer]. Care instructions adapted under license by [employer] (which disclaims liability or warranty for this information). If you have questions about a medical condition or this instruction, always ask your healthcare professional. [place] [address] any warranty or liability for your use of this information.

## 2019-03-16 ENCOUNTER — HOSPITAL ENCOUNTER (EMERGENCY)
Age: 29
Discharge: HOME | End: 2019-03-16
Attending: EMERGENCY MEDICINE | Admitting: EMERGENCY MEDICINE
Payer: MEDICAID

## 2019-03-16 VITALS
BODY MASS INDEX: 33.91 KG/M2 | HEART RATE: 77 BPM | TEMPERATURE: 98.1 F | SYSTOLIC BLOOD PRESSURE: 144 MMHG | RESPIRATION RATE: 20 BRPM | OXYGEN SATURATION: 98 % | WEIGHT: 211 LBS | HEIGHT: 66 IN | DIASTOLIC BLOOD PRESSURE: 70 MMHG

## 2019-03-16 DIAGNOSIS — G89.29 CHRONIC PAIN OF LEFT KNEE: Primary | ICD-10-CM

## 2019-03-16 DIAGNOSIS — M25.562 CHRONIC PAIN OF LEFT KNEE: Primary | ICD-10-CM

## 2019-03-16 PROCEDURE — 99282 EMERGENCY DEPT VISIT SF MDM: CPT

## 2019-03-16 RX ORDER — NAPROXEN 500 MG/1
500 TABLET ORAL 2 TIMES DAILY WITH MEALS
Qty: 20 TAB | Refills: 0 | Status: SHIPPED | OUTPATIENT
Start: 2019-03-16 | End: 2019-03-26

## 2019-03-16 NOTE — ED TRIAGE NOTES
Patient complains of right knee pain and swelling. Reports symptoms have been ongoing for two months.  Being seen by ortho

## 2019-03-16 NOTE — ED PROVIDER NOTES
25 yo F with a hx of anemia, POCS, pituitary tumor (see list)  presents ambulatory to the ED for evaluation of chronic R knee pain which she states has been evaluated in the ED and by Dr. Naibl Moore at [facility]. Pt states sx started in January after she states she fell onto R knee. Denies new trauma or injury. States she was advised by Orthopedics to apply ice which she states makes sx worse. Pt reports pain and swelling to R knee worse after activity. Tx PTA includes heat and BC Powder. Past Medical History:  No date: Abnormal Pap smear  No date: Anemia  No date: Gastrointestinal disorder      Comment:  Ulcers  No date: Headache  No date: Herniated disc  No date: Hx gestational diabetes  No date: HX OTHER MEDICAL      Comment:  trich treated at beginning of pregnancy  No date: HX OTHER MEDICAL      Comment:  pituitary tumor-benign   No date: Miscarriage  No date: Molar pregnancy  2/13/19 neg HPV POS- rp pap one year: Pap smear for cervical cancer   screening  No date: PCOS (polycystic ovarian syndrome)  No date: Pelvic pain in female  No date: Pituitary adenoma ([facility] [address]  No date: Pituitary tumor  No date: Psychiatric problem      Comment:  depression never on medication  No date: Thyroid activity decreased      Comment:  hypothyroid awaiting appnt for endo      Social History    Socioeconomic History      Marital status: SINGLE      Spouse name: Not on file      Number of children: Not on file      Years of education: Not on file      Highest education level: Not on file    Social Needs      Financial resource strain: Not on file      Food insecurity - worry: Not on file      Food insecurity - inability: Not on file      Transportation needs - medical: Not on file      Transportation needs - non-medical: Not on file    Occupational History      Not on file    Tobacco Use      Smoking status: Never Smoker      Smokeless tobacco: Never Used    Substance and Sexual Activity      Alcohol use:  No Comment: =      Drug use: No      Sexual activity: Yes        Partners: Male        Birth control/protection: None    Other Topics      Concerns:        Not on file    Social History Narrative      Not on file               Past Medical History:   Diagnosis Date    Abnormal Pap smear     Anemia     Gastrointestinal disorder     Ulcers    Headache     Herniated disc     Hx gestational diabetes     HX OTHER MEDICAL     trich treated at beginning of pregnancy    HX OTHER MEDICAL     pituitary tumor-benign     Miscarriage     Molar pregnancy     Pap smear for cervical cancer screening 19 neg HPV POS- rp pap one year    PCOS (polycystic ovarian syndrome)     Pelvic pain in female     Pituitary adenoma ([facility] [address]     Pituitary tumor     Psychiatric problem     depression never on medication    Thyroid activity decreased     hypothyroid awaiting appnt for endo       Past Surgical History:   Procedure Laterality Date    HX  SECTION      HX COLPOSCOPY  11/3/10    HX DILATION AND CURETTAGE      HX LIPOMA RESECTION           Family History:   Problem Relation Age of Onset    Diabetes Father     Headache Mother        Social History     Socioeconomic History    Marital status: SINGLE     Spouse name: Not on file    Number of children: Not on file    Years of education: Not on file    Highest education level: Not on file   Social Needs    Financial resource strain: Not on file    Food insecurity - worry: Not on file    Food insecurity - inability: Not on file   quietrevolution needs - medical: Not on file   [employer]revolution needs - non-medical: Not on file   Occupational History    Not on file   Tobacco Use    Smoking status: Never Smoker    Smokeless tobacco: Never Used   Substance and Sexual Activity    Alcohol use: No     Comment: =    Drug use: No    Sexual activity: Yes     Partners: Male     Birth control/protection: None   Other Topics Concern    Not on file   Social History Narrative    Not on file         ALLERGIES: Tomato    Review of Systems   Constitutional: Negative for appetite change, fatigue and fever. HENT: Negative for congestion and facial swelling. Eyes: Negative for discharge. Respiratory: Negative for cough and shortness of breath. Cardiovascular: Negative for leg swelling. Gastrointestinal: Negative for nausea. Musculoskeletal: Negative for back pain, joint swelling and neck pain. R knee pain    Skin: Negative for color change. Neurological: Negative for seizures and facial asymmetry. Psychiatric/Behavioral: Negative for confusion. Vitals:    03/16/19 1934   BP: 144/70   Pulse: 77   Resp: 20   Temp: 98.1 °F (36.7 °C)   SpO2: 98%   Weight: 95.7 kg (211 lb)   Height: 5' 6\" (1.676 m)            Physical Exam   Constitutional: She is oriented to person, place, and time. She appears well-developed and well-nourished. No distress. HENT:   Head: Normocephalic and atraumatic. Eyes: Conjunctivae and EOM are normal. Pupils are equal, round, and reactive to light. Neck: Normal range of motion. Neck supple. Cardiovascular: Normal rate, regular rhythm, normal heart sounds and intact distal pulses. Pulmonary/Chest: Effort normal and breath sounds normal. No respiratory distress. No evidence of SOB, tachycardia or tachypnea. Abdominal: Soft. Bowel sounds are normal.   Abdomen soft, nontender    Musculoskeletal: Normal range of motion. She exhibits no edema or deformity. Right knee: She exhibits normal range of motion, no swelling, no ecchymosis, no deformity, no laceration, no erythema, normal patellar mobility and no bony tenderness. R knee tenderness with no no obvious swelling or deformity noted. 5/5 strength of BLE with +2 DP bilaterally. No decreased ROM but pain with ROM. Neurological: She is alert and oriented to person, place, and time. She has normal strength. No cranial nerve deficit or sensory deficit. Coordination normal. GCS eye subscore is 4. GCS verbal subscore is 5. GCS motor subscore is 6. Skin: Skin is warm and dry. Psychiatric: She has a normal mood and affect. Her behavior is normal. Judgment and thought content normal.   Nursing note and vitals reviewed. MDM  Number of Diagnoses or Management Options  Chronic pain of left knee:   Diagnosis management comments: 25 yo F with chronic R knee pain for which she has been evaluated in ED and by orthopedic specialist.  Reports pain and swelling tonight with no new trauma or injury. No decrease in ROM, but reports pain with ROM. States she has not been able to decrease activity and has been able to  softball but has pain after activity/work. Has been applying heat to knee and has been taking BC powder for discomfort. Denies new trauma or injury and therefore repeat imaging will not be performed. Xray imaging from 1/2019 reviewed. ACE wrap applied to R knee. Recommended RICE therapy, FU with orthopedist, anti-inflammatories for discomfort. Return to ED for worsening sx which were reviewed with pt prior to discharge.       Patient Progress  Patient progress: stable         Procedures

## 2019-03-16 NOTE — DISCHARGE INSTRUCTIONS
Patient Education        Chronic Pain: Care Instructions  Your Care Instructions    Chronic pain is pain that lasts a long time (months or even years) and may or may not have a clear cause. It is different from acute pain, which usually does have a clear cause--like an injury or illness--and gets better over time. Chronic pain:  · Lasts over time but may vary from day to day. · Does not go away despite efforts to end it. · May disrupt your sleep and lead to fatigue. · May cause depression or anxiety. · May make your muscles tense, causing more pain. · Can disrupt your work, hobbies, home life, and relationships with friends and family. Chronic pain is a very real condition. It is not just in your head. Treatment can help and usually includes several methods used together, such as medicines, physical therapy, exercise, and other treatments. Learning how to relax and changing negative thought patterns can also help you cope. Chronic pain is complex. Taking an active role in your treatment will help you better manage your pain. Tell your doctor if you have trouble dealing with your pain. You may have to try several things before you find what works best for you. Follow-up care is a key part of your treatment and safety. Be sure to make and go to all appointments, and call your doctor if you are having problems. It's also a good idea to know your test results and keep a list of the medicines you take. How can you care for yourself at home? · Pace yourself. Break up large jobs into smaller tasks. Save harder tasks for days when you have less pain, or go back and forth between hard tasks and easier ones. Take rest breaks. · Relax, and reduce stress. Relaxation techniques such as deep breathing or meditation can help. · Keep moving. Gentle, daily exercise can help reduce pain over the long run. Try low- or no-impact exercises such as walking, swimming, and stationary biking.  Do stretches to stay flexible. · Try heat, cold packs, and massage. · Get enough sleep. Chronic pain can make you tired and drain your energy. Talk with your doctor if you have trouble sleeping because of pain. · Think positive. Your thoughts can affect your pain level. Do things that you enjoy to distract yourself when you have pain instead of focusing on the pain. See a movie, read a book, listen to music, or spend time with a friend. · If you think you are depressed, talk to your doctor about treatment. · Keep a daily pain diary. Record how your moods, thoughts, sleep patterns, activities, and medicine affect your pain. You may find that your pain is worse during or after certain activities or when you are feeling a certain emotion. Having a record of your pain can help you and your doctor find the best ways to treat your pain. · Take pain medicines exactly as directed. ? If the doctor gave you a prescription medicine for pain, take it as prescribed. ? If you are not taking a prescription pain medicine, ask your doctor if you can take an over-the-counter medicine. Reducing constipation caused by pain medicine  · Include fruits, vegetables, beans, and whole grains in your diet each day. These foods are high in fiber. · Drink plenty of fluids, enough so that your urine is light yellow or clear like water. If you have kidney, heart, or liver disease and have to limit fluids, talk with your doctor before you increase the amount of fluids you drink. · If your doctor recommends it, get more exercise. Walking is a good choice. Bit by bit, increase the amount you walk every day. Try for at least 30 minutes on most days of the week. · Schedule time each day for a bowel movement. A daily routine may help. Take your time and do not strain when having a bowel movement. When should you call for help?   Call your doctor now or seek immediate medical care if:    · Your pain gets worse or is out of control.     · You feel down or blue, or you do not enjoy things like you once did. You may be depressed, which is common in people with chronic pain. Depression can be treated.     · You have vomiting or cramps for more than 2 hours.    Watch closely for changes in your health, and be sure to contact your doctor if:    · You cannot sleep because of pain.     · You are very worried or anxious about your pain.     · You have trouble taking your pain medicine.     · You have any concerns about your pain medicine.     · You have trouble with bowel movements, such as:  ? No bowel movement in 3 days. ? Blood in the anal area, in your stool, or on the toilet paper. ? Diarrhea for more than 24 hours. Where can you learn more? Go to http://mitul-leena.info/. Enter N004 in the search box to learn more about \"Chronic Pain: Care Instructions. \"  Current as of: Betty 3, 2018  Content Version: 11.9  © 3165-1086 [employer]. Care instructions adapted under license by [employer] (which disclaims liability or warranty for this information). If you have questions about a medical condition or this instruction, always ask your healthcare professional. [place] [address] any warranty or liability for your use of this information. Patient Education        Joint Pain: Care Instructions  Your Care Instructions    Many people have small aches and pains from overuse or injury to muscles and joints. Joint injuries often happen during sports or recreation, work tasks, or projects around the home. An overuse injury can happen when you put too much stress on a joint or when you do an activity that stresses the joint over and over, such as using the computer or rowing a boat. You can take action at home to help your muscles and joints get better. You should feel better in 1 to 2 weeks, but it can take 3 months or more to heal completely. Follow-up care is a key part of your treatment and safety.  Be sure to make and go to all appointments, and call your doctor if you are having problems. It's also a good idea to know your test results and keep a list of the medicines you take. How can you care for yourself at home? · Do not put weight on the injured joint for at least a day or two. · For the first day or two after an injury, do not take hot showers or baths, and do not use hot packs. The heat could make swelling worse. · Put ice or a cold pack on the sore joint for 10 to 20 minutes at a time. Try to do this every 1 to 2 hours for the next 3 days (when you are awake) or until the swelling goes down. Put a thin cloth between the ice and your skin. · Wrap the injury in an elastic bandage. Do not wrap it too tightly because this can cause more swelling. · Prop up the sore joint on a pillow when you ice it or anytime you sit or lie down during the next 3 days. Try to keep it above the level of your heart. This will help reduce swelling. · Take an over-the-counter pain medicine, such as acetaminophen (Tylenol), ibuprofen (Advil, Motrin), or naproxen (Aleve). Read and follow all instructions on the label. · After 1 or 2 days of rest, begin moving the joint gently. While the joint is still healing, you can begin to exercise using activities that do not strain or hurt the painful joint. When should you call for help? Call your doctor now or seek immediate medical care if:    · You have signs of infection, such as:  ? Increased pain, swelling, warmth, and redness. ? Red streaks leading from the joint. ? A fever.    Watch closely for changes in your health, and be sure to contact your doctor if:    · Your movement or symptoms are not getting better after 1 to 2 weeks of home treatment. Where can you learn more? Go to http://mitul-leena.info/. Enter P205 in the search box to learn more about \"Joint Pain: Care Instructions. \"  Current as of: September 20, 2018  Content Version: 11.9  © 6789-5893 [employer]. Care instructions adapted under license by [employer] (which disclaims liability or warranty for this information). If you have questions about a medical condition or this instruction, always ask your healthcare professional. [place] [address] any warranty or liability for your use of this information. Patient Education        Knee Pain or Injury: Care Instructions  Your Care Instructions    Injuries are a common cause of knee problems. Sudden (acute) injuries may be caused by a direct blow to the knee. They can also be caused by abnormal twisting, bending, or falling on the knee. Pain, bruising, or swelling may be severe, and may start within minutes of the injury. Overuse is another cause of knee pain. Other causes are climbing stairs, kneeling, and other activities that use the knee. Everyday wear and tear, especially as you get older, also can cause knee pain. Rest, along with home treatment, often relieves pain and allows your knee to heal. If you have a serious knee injury, you may need tests and treatment. Follow-up care is a key part of your treatment and safety. Be sure to make and go to all appointments, and call your doctor if you are having problems. It's also a good idea to know your test results and keep a list of the medicines you take. How can you care for yourself at home? · Be safe with medicines. Read and follow all instructions on the label. ? If the doctor gave you a prescription medicine for pain, take it as prescribed. ? If you are not taking a prescription pain medicine, ask your doctor if you can take an over-the-counter medicine. · Rest and protect your knee. Take a break from any activity that may cause pain. · Put ice or a cold pack on your knee for 10 to 20 minutes at a time. Put a thin cloth between the ice and your skin.   · Prop up a sore knee on a pillow when you ice it or anytime you sit or lie down for the next 3 days. Try to keep it above the level of your heart. This will help reduce swelling. · If your knee is not swollen, you can put moist heat, a heating pad, or a warm cloth on your knee. · If your doctor recommends an elastic bandage, sleeve, or other type of support for your knee, wear it as directed. · Follow your doctor's instructions about how much weight you can put on your leg. Use a cane, crutches, or a walker as instructed. · Follow your doctor's instructions about activity during your healing process. If you can do mild exercise, slowly increase your activity. · Reach and stay at a healthy weight. Extra weight can strain the joints, especially the knees and hips, and make the pain worse. Losing even a few pounds may help. When should you call for help? Call 911 anytime you think you may need emergency care. For example, call if:    · You have symptoms of a blood clot in your lung (called a pulmonary embolism). These may include:  ? Sudden chest pain. ? Trouble breathing. ? Coughing up blood.    Call your doctor now or seek immediate medical care if:    · You have severe or increasing pain.     · Your leg or foot turns cold or changes color.     · You cannot stand or put weight on your knee.     · Your knee looks twisted or bent out of shape.     · You cannot move your knee.     · You have signs of infection, such as:  ? Increased pain, swelling, warmth, or redness. ? Red streaks leading from the knee. ? Pus draining from a place on your knee. ? A fever.     · You have signs of a blood clot in your leg (called a deep vein thrombosis), such as:  ? Pain in your calf, back of the knee, thigh, or groin. ?  Redness and swelling in your leg or groin.    Watch closely for changes in your health, and be sure to contact your doctor if:    · You have tingling, weakness, or numbness in your knee.     · You have any new symptoms, such as swelling.     · You have bruises from a knee injury that last longer than 2 weeks.     · You do not get better as expected. Where can you learn more? Go to http://mitul-leena.info/. Enter K195 in the search box to learn more about \"Knee Pain or Injury: Care Instructions. \"  Current as of: September 23, 2018  Content Version: 11.9  © 5219-8832 [employer]. Care instructions adapted under license by [employer] (which disclaims liability or warranty for this information). If you have questions about a medical condition or this instruction, always ask your healthcare professional. [address] any warranty or liability for your use of this information.

## 2019-03-26 ENCOUNTER — HOSPITAL ENCOUNTER (OUTPATIENT)
Dept: MRI IMAGING | Age: 29
Discharge: HOME | End: 2019-03-26
Attending: SPECIALIST
Payer: MEDICAID

## 2019-03-26 DIAGNOSIS — Z78.9 DIETING: ICD-10-CM

## 2019-03-26 DIAGNOSIS — S83.209S TEAR OF MENISCUS OF KNEE, UNSPECIFIED LATERALITY, UNSPECIFIED MENISCUS, UNSPECIFIED TEAR TYPE, UNSPECIFIED WHETHER OLD OR CURRENT TEAR, SEQUELA: ICD-10-CM

## 2019-03-26 PROCEDURE — 73721 MRI JNT OF LWR EXTRE W/O DYE: CPT

## 2019-04-01 ENCOUNTER — OFFICE VISIT (OUTPATIENT)
Dept: FAMILY MEDICINE CLINIC | Age: 29
End: 2019-04-01

## 2019-04-01 VITALS
OXYGEN SATURATION: 99 % | HEART RATE: 59 BPM | HEIGHT: 66 IN | WEIGHT: 218.4 LBS | SYSTOLIC BLOOD PRESSURE: 128 MMHG | BODY MASS INDEX: 35.1 KG/M2 | DIASTOLIC BLOOD PRESSURE: 82 MMHG | RESPIRATION RATE: 18 BRPM | TEMPERATURE: 98 F

## 2019-04-01 DIAGNOSIS — R73.03 PREDIABETES: Primary | ICD-10-CM

## 2019-04-01 DIAGNOSIS — D35.2 PITUITARY ADENOMA (HCC): ICD-10-CM

## 2019-04-01 DIAGNOSIS — R79.89 ELEVATED PROLACTIN LEVEL: ICD-10-CM

## 2019-04-01 DIAGNOSIS — E55.9 VITAMIN D DEFICIENCY: ICD-10-CM

## 2019-04-01 DIAGNOSIS — E66.01 SEVERE OBESITY (HCC): ICD-10-CM

## 2019-04-01 DIAGNOSIS — Z11.1 SCREENING EXAMINATION FOR PULMONARY TUBERCULOSIS: ICD-10-CM

## 2019-04-01 DIAGNOSIS — Z13.220 SCREENING FOR HYPERLIPIDEMIA: ICD-10-CM

## 2019-04-01 PROBLEM — I73.9 PERIPHERAL VASCULAR DISEASE (HCC): Status: ACTIVE | Noted: 2019-04-01

## 2019-04-01 PROBLEM — I73.9 PERIPHERAL VASCULAR DISEASE (HCC): Status: RESOLVED | Noted: 2019-04-01 | Resolved: 2019-04-01

## 2019-04-01 RX ORDER — NAPROXEN 500 MG/1
500 TABLET ORAL 2 TIMES DAILY WITH MEALS
COMMUNITY
End: 2019-07-01

## 2019-04-01 NOTE — ASSESSMENT & PLAN NOTE
This condition is managed by Specialist. Dr. Chiara Brock was her last endocrinologist. It was recently checked by Dr. Noel Duong in February and normal.  
Lab Results Component Value Date/Time  WBC 12.2 03/07/2018 08:27 PM  
 HGB 12.4 03/07/2018 08:27 PM  
 HCT 37.4 03/07/2018 08:27 PM  
 Hematocrit (POC) 41 01/17/2019 11:33 AM  
 PLATELET 500 54/12/3288 08:27 PM  
 Creatinine 0.89 02/13/2019 01:38 PM  
 Creatinine (POC) 0.8 01/17/2019 11:33 AM  
 BUN 13 02/13/2019 01:38 PM  
 BUN (POC) 15 01/17/2019 11:33 AM  
 Potassium 4.3 02/13/2019 01:38 PM  
 Potassium (POC) 4.9 01/17/2019 11:33 AM

## 2019-04-01 NOTE — PATIENT INSTRUCTIONS
The best diet for you is a low glycemic index (GI), Mediterranean-style diet. Glycemic index is a measure of how quickly food turns into sugar in your blood. You need to eat foods with a LOWER GI. It is easy to search the Internet for lists of foods and their associated GIs. Things that are white: sugar, white flour, white pasta, white bread, white rice -- are high GI foods, as are white potatoes. Avoid these and eat foods with a lower GI. This will help keep your blood sugar more stable and lower. Adopt a Mediterranean-style lifestyle: 1. Lots of fresh, locally-grown fruits and vegetables (aim for 7 or more servings a day). 2. Complex carbohydrates like whole grains, nuts, beans and bread (with at least 4 grams of fiber per serving). Avoid the whites  white flour, sugar, white pasta, white rice. 3. Minimally processed foods (5 or fewer listed ingredients on the label). 4. Olive oil as the primary source of fat. 5. Low to moderate amounts of dairy, fish and poultry. 6. Red meat rarely (grass-fed, organic when you do eat it). 7. Low amounts of wine with meals (optional). 8. Unhurried meals with loved ones. 9. Daily exercise (30  60 minutes). 10.  
Resources:  
[employer]Soraa  
http://www.[employer]/health/mediterranean-diet/UB62613 Cookbooks:  
Mediterranean [place] by Jack Franco The Best of Recipes for Health by Jack Franco The Navmii by Abdiel Jacome The Food You Crave by Lela Delvalle So Easy by Lela Delvalle Please limit the amount of sodium (salt) in your diet. You should be getting no more than 2300 mg of sodium/salt per day. Remember, if you eat anything that is prepared or comes out of a box, a bag or a can, it has salt in it! Please read labels!

## 2019-04-01 NOTE — PROGRESS NOTES
Alvaro De La Cruz is a 26 y.o. female Fasting Last pap on file 02/13/2019 Patient has chronic hx of back pain. Patient has knee problem in which patient is being treated for by [facility] ortho Chief Complaint Patient presents with  New Patient Marnie Establish Care 1. Have you been to the ER, urgent care clinic since your last visit? Hospitalized since your last visit? 3/16/2019 right knee with swelling, knee popping [facility] 
2. Have you seen or consulted any other health care providers outside of the [address] [place] [address] since your last visit? Include any pap smears or colon screening. No 
 
 
Visit Vitals /82 (BP 1 Location: Left arm, BP Patient Position: Sitting) Pulse (!) 59 Temp 98 °F (36.7 °C) (Oral) Resp 18 Ht 5' 6\" (1.676 m) Wt 218 lb 6.4 oz (99.1 kg) SpO2 99% BMI 35.25 kg/m² There are no preventive care reminders to display for this patient. Medication Reconciliation completed, changes noted.   Please  Update medication list.

## 2019-04-01 NOTE — PROGRESS NOTES
CHIEF COMPLAINT:  
Chief Complaint Patient presents with  New Patient Laine Harden Establish Care HISTORY OF PRESENT ILLNESS:  
29[facility] as a [facility]. She had been a former patient or Dr. Evans. She is here to establish care and is fasting for labs. Of note, she is currently being worked up for R-knee pain and had a recent MRI of the R-knee. She reports that she will be seeing Dr. Miley Pacheco at [facility] this week to review the findings. PAST MEDICAL HISTORY: 
Past Medical History:  
Diagnosis Date  Abnormal Pap smear  Anemia  Gastrointestinal disorder Ulcers  Headache  Herniated disc  Hx gestational diabetes  HX OTHER MEDICAL   
 trich treated at beginning of pregnancy  HX OTHER MEDICAL   
 pituitary tumor-benign  Miscarriage  Molar pregnancy  Pap smear for cervical cancer screening 19 neg HPV POS- rp pap one year  PCOS (polycystic ovarian syndrome)  Pelvic pain in female  Pituitary adenoma ([address]  Pituitary tumor  Psychiatric problem   
 depression never on medication  Thyroid activity decreased   
 hypothyroid awaiting appnt for endo PAST SURGICAL HISTORY: 
Past Surgical History:  
Procedure Laterality Date  HX  SECTION    HX COLPOSCOPY  11/3/10  
 HX DILATION AND CURETTAGE    
 HX LIPOMA RESECTION    
 
 
MEDICATIONS: 
Current Outpatient Medications Medication Sig Dispense Refill  naproxen (NAPROSYN) 500 mg tablet Take 500 mg by mouth two (2) times daily (with meals).  topiramate (TOPAMAX) 50 mg tablet Take one tablet in morning and 2 tablets in evening. To reduce headache frequency. 90 Tab 1  rizatriptan (MAXALT) 10 mg tablet 1 tablet at onset of migraine. May repeat in 2 hours if needed. Limit use to 2 days per week 9 Tab 2 ALLERGIES: 
Allergies Allergen Reactions  Tomato Angioedema SOCIAL HISTORY:  
Social History Socioeconomic History  Marital status: SINGLE Spouse name: Not on file  Number of children: 1(11, 11year old twins)  Years of education:  High School Graduate.  Highest education level: Occupational History  Not on file Social Needs  Financial resource strain: Not on file  Food insecurity:  
  Worry: Not on file Inability: Not on file  Transportation needs:  
  Medical: Not on file Non-medical: Not on file Tobacco Use  Smoking status: Never Smoker  Smokeless tobacco: Never Used Substance and Sexual Activity  Alcohol use: No  
  Comment: =  
 Drug use: No  
 Sexual activity: Yes  
  Partners: Male Birth control/protection: None FAMILY HISTORY:  
Mother - Chronic back pain, HTN Father - s/P CABG, Diabetes and HTN 
1 sister - Graves disease. 1 brother - healthy Daughter has pituitary adenoma as well. REVIEW OF SYSTEMS: 
Review of Systems - Negative except for documented in the HPI. PHYSICAL EXAMINATION: 
/82 (BP 1 Location: Left arm, BP Patient Position: Sitting)   Pulse (!) 59   Temp 98 °F (36.7 °C) (Oral)   Resp 18   Ht 5' 6\" (1.676 m)   Wt 218 lb 6.4 oz (99.1 kg)   LMP 03/01/2019   SpO2 99%   BMI 35.25 kg/m² General:  Alert, cooperative, no distress. Head:  Normocephalic, without obvious abnormality, atraumatic. Eyes:  Conjunctivae/corneas clear. Pupils equal, round, reactive to light. Extraocular movements intact. Lungs:   Clear to auscultation bilaterally. Chest wall:  No tenderness or deformity. Heart:  Regular rate and rhythm, S1, S2 normal, no murmur, click, rub, or gallop. Abdomen:   Soft, non-tender. Bowel sounds normal. No masses. No organomegaly. Extremities: Extremities normal, atraumatic, no cyanosis or edema. Pulses: 2+ and symmetric all extremities. Skin: Skin color, texture, turgor normal. No rashes or lesions.   
Lymph nodes: Cervical, supraclavicular, and axillary nodes normal.  
 Neurologic: CNII-XII intact. Normal strength, sensation, and reflexes throughout. LABORATORY DATA: 
Ordered today. IMPRESSION AND PLAN:  
 
29F with the above PMH. She is here to establish care and needs labs. She has been seen by several [place]  including Dr. Dipti Quach (OB/GYN 0 and Dr. Azeb Henderson (endo). She is also now seeing an orthopedist for ongoing issues with the R-knee and currently had an MRI which will be reviewed with them later this week. She is fasting for labs. Needs a Primary care physician. I see multiple people in her family. Will send for labs as outlined and follow up accordingly. Diagnoses and all orders for this visit: 1. Prediabetes -     METABOLIC PANEL, COMPREHENSIVE 
-     HEMOGLOBIN A1C WITH EAG 2. Pituitary adenoma ([facility] Assessment & Plan: 
Stable, based on history, physical exam and review of pertinent labs, studies and medications; meds reconciled; continue current treatment plan. Key Oncology Meds Patient is on no Oncologic meds. Key Pain Meds   
    
  
 naproxen (NAPROSYN) 500 mg tablet (Taking) Take 500 mg by mouth two (2) times daily (with meals). rizatriptan (MAXALT) 10 mg tablet (Taking) 1 tablet at onset of migraine. May repeat in 2 hours if needed. Limit use to 2 days per week Lab Results Component Value Date/Time WBC 12.2 03/07/2018 08:27 PM  
 ABS. NEUTROPHILS 7.5 03/07/2018 08:27 PM  
 HGB 12.4 03/07/2018 08:27 PM  
 HCT 37.4 03/07/2018 08:27 PM  
 Hematocrit (POC) 41 01/17/2019 11:33 AM  
 PLATELET 689 81/72/6760 08:27 PM  
 Creatinine 0.89 02/13/2019 01:38 PM  
 Creatinine (POC) 0.8 01/17/2019 11:33 AM  
 BUN 13 02/13/2019 01:38 PM  
 BUN (POC) 15 01/17/2019 11:33 AM  
 ALT (SGPT) 21 02/13/2019 01:38 PM  
 AST (SGOT) 22 02/13/2019 01:38 PM  
 Albumin 4.7 02/13/2019 01:38 PM  
 
 
Orders: 
-     THYROID CASCADE PROFILE 3. Severe obesity ([facility] Assessment & Plan: Stable, based on history, physical exam and review of pertinent labs, studies and medications; meds reconciled; continue current treatment plan. Key Obesity Meds Patient is on no anti-obesity meds. Lab Results Component Value Date/Time Glucose 91 02/13/2019 01:38 PM  
 TSH 1.390 02/13/2019 01:38 PM  
 Sodium 145 02/13/2019 01:38 PM  
 Sodium (POC) 138 01/17/2019 11:33 AM  
 Potassium 4.3 02/13/2019 01:38 PM  
 Potassium (POC) 4.9 01/17/2019 11:33 AM  
 ALT (SGPT) 21 02/13/2019 01:38 PM  
 AST (SGOT) 22 02/13/2019 01:38 PM  
 
 
4. Elevated prolactin level ([address] Assessment & Plan: This condition is managed by Specialist. Dr. Benjamín Forte was her last endocrinologist. It was recently checked by Dr. Glenys Cruz in February and normal.  
Lab Results Component Value Date/Time WBC 12.2 03/07/2018 08:27 PM  
 HGB 12.4 03/07/2018 08:27 PM  
 HCT 37.4 03/07/2018 08:27 PM  
 Hematocrit (POC) 41 01/17/2019 11:33 AM  
 PLATELET 873 89/77/7616 08:27 PM  
 Creatinine 0.89 02/13/2019 01:38 PM  
 Creatinine (POC) 0.8 01/17/2019 11:33 AM  
 BUN 13 02/13/2019 01:38 PM  
 BUN (POC) 15 01/17/2019 11:33 AM  
 Potassium 4.3 02/13/2019 01:38 PM  
 Potassium (POC) 4.9 01/17/2019 11:33 AM  
 
 
 
5. Screening for hyperlipidemia -     METABOLIC PANEL, COMPREHENSIVE 
-     CBC WITH AUTOMATED DIFF 
-     LIPID PANEL 6. Vitamin D deficiency 
-     VITAMIN D, 25 HYDROXY 7. Screening examination for pulmonary tuberculosis -     QUANTIFERON-TB GOLD PLUS I have discussed the diagnosis with the patient and the intended treatment plan as seen in the above orders. The patient has received an after-visit summary and questions were answered concerning future plans. Asked to return should symptoms worsen or not improve with treatment. Any pending labs and studies will be relayed to patient when they become available.   
 
Pt verbalizes understanding of plan of care and denies further questions or concerns at this time. Follow-up and Dispositions · Return in about 3 months (around 7/1/2019), or if symptoms worsen or fail to improve. Cristina Lobo MD 
 
Patient Instructions The best diet for you is a low glycemic index (GI), Mediterranean-style diet. Glycemic index is a measure of how quickly food turns into sugar in your blood. You need to eat foods with a LOWER GI. It is easy to search the Internet for lists of foods and their associated GIs. Things that are white: sugar, white flour, white pasta, white bread, white rice -- are high GI foods, as are white potatoes. Avoid these and eat foods with a lower GI. This will help keep your blood sugar more stable and lower. Adopt a Mediterranean-style lifestyle: 1. Lots of fresh, locally-grown fruits and vegetables (aim for 7 or more servings a day). 2. Complex carbohydrates like whole grains, nuts, beans and bread (with at least 4 grams of fiber per serving). Avoid the whites  white flour, sugar, white pasta, white rice. 3. Minimally processed foods (5 or fewer listed ingredients on the label). 4. Olive oil as the primary source of fat. 5. Low to moderate amounts of dairy, fish and poultry. 6. Red meat rarely (grass-fed, organic when you do eat it). 7. Low amounts of wine with meals (optional). 8. Unhurried meals with loved ones. 9. Daily exercise (30  60 minutes). 10.  
Resources:  
[employer]  
http://www.JellyCloud/health/mediterranean-diet/DP18497 Cookbooks:  
Mediterranean [place] by Carrie Stauffer The Best of Recipes for Health by Carrie Stauffer [employer] by Nemo Floyd The Food You Crave by Mago Flores So Easy by Mago Flores Please limit the amount of sodium (salt) in your diet. You should be getting no more than 2300 mg of sodium/salt per day.  Remember, if you eat anything that is prepared or comes out of a box, a bag or a can, it has salt in it! Please read labels!

## 2019-04-01 NOTE — ASSESSMENT & PLAN NOTE
Stable, based on history, physical exam and review of pertinent labs, studies and medications; meds reconciled; continue current treatment plan. Key Oncology Meds Patient is on no Oncologic meds. Key Pain Meds   
    
  
 naproxen (NAPROSYN) 500 mg tablet (Taking) Take 500 mg by mouth two (2) times daily (with meals). rizatriptan (MAXALT) 10 mg tablet (Taking) 1 tablet at onset of migraine. May repeat in 2 hours if needed. Limit use to 2 days per week Lab Results Component Value Date/Time WBC 12.2 03/07/2018 08:27 PM  
 ABS.  NEUTROPHILS 7.5 03/07/2018 08:27 PM  
 HGB 12.4 03/07/2018 08:27 PM  
 HCT 37.4 03/07/2018 08:27 PM  
 Hematocrit (POC) 41 01/17/2019 11:33 AM  
 PLATELET 541 13/68/3935 08:27 PM  
 Creatinine 0.89 02/13/2019 01:38 PM  
 Creatinine (POC) 0.8 01/17/2019 11:33 AM  
 BUN 13 02/13/2019 01:38 PM  
 BUN (POC) 15 01/17/2019 11:33 AM  
 ALT (SGPT) 21 02/13/2019 01:38 PM  
 AST (SGOT) 22 02/13/2019 01:38 PM  
 Albumin 4.7 02/13/2019 01:38 PM

## 2019-04-01 NOTE — ASSESSMENT & PLAN NOTE
Stable, based on history, physical exam and review of pertinent labs, studies and medications; meds reconciled; continue current treatment plan. Key Obesity Meds Patient is on no anti-obesity meds. Lab Results Component Value Date/Time  Glucose 91 02/13/2019 01:38 PM  
 TSH 1.390 02/13/2019 01:38 PM  
 Sodium 145 02/13/2019 01:38 PM  
 Sodium (POC) 138 01/17/2019 11:33 AM  
 Potassium 4.3 02/13/2019 01:38 PM  
 Potassium (POC) 4.9 01/17/2019 11:33 AM  
 ALT (SGPT) 21 02/13/2019 01:38 PM  
 AST (SGOT) 22 02/13/2019 01:38 PM

## 2019-04-01 NOTE — ASSESSMENT & PLAN NOTE
Stable, based on history, physical exam and review of pertinent labs, studies and medications; meds reconciled; continue current treatment plan., Patient does not have this problem. She does have Peripheral Vision issues due to her pituitary adenoma. Lab Results Component Value Date/Time  WBC 12.2 03/07/2018 08:27 PM  
 HGB 12.4 03/07/2018 08:27 PM  
 HCT 37.4 03/07/2018 08:27 PM  
 Hematocrit (POC) 41 01/17/2019 11:33 AM  
 PLATELET 931 86/62/5534 08:27 PM  
 Creatinine 0.89 02/13/2019 01:38 PM  
 Creatinine (POC) 0.8 01/17/2019 11:33 AM  
 BUN 13 02/13/2019 01:38 PM  
 BUN (POC) 15 01/17/2019 11:33 AM

## 2019-04-04 LAB
25(OH)D3+25(OH)D2 SERPL-MCNC: 18.5 NG/ML (ref 30–100)
ALBUMIN SERPL-MCNC: 4 G/DL (ref 3.5–5.5)
ALBUMIN/GLOB SERPL: 1.7 {RATIO} (ref 1.2–2.2)
ALP SERPL-CCNC: 100 IU/L (ref 39–117)
ALT SERPL-CCNC: 22 IU/L (ref 0–32)
AST SERPL-CCNC: 26 IU/L (ref 0–40)
BASOPHILS # BLD AUTO: 0 X10E3/UL (ref 0–0.2)
BASOPHILS NFR BLD AUTO: 0 %
BILIRUB SERPL-MCNC: 0.3 MG/DL (ref 0–1.2)
BUN SERPL-MCNC: 13 MG/DL (ref 6–20)
BUN/CREAT SERPL: 16 (ref 9–23)
CALCIUM SERPL-MCNC: 9.1 MG/DL (ref 8.7–10.2)
CHLORIDE SERPL-SCNC: 105 MMOL/L (ref 96–106)
CHOLEST SERPL-MCNC: 180 MG/DL (ref 100–199)
CO2 SERPL-SCNC: 20 MMOL/L (ref 20–29)
CREAT SERPL-MCNC: 0.81 MG/DL (ref 0.57–1)
EOSINOPHIL # BLD AUTO: 0.2 X10E3/UL (ref 0–0.4)
EOSINOPHIL NFR BLD AUTO: 3 %
ERYTHROCYTE [DISTWIDTH] IN BLOOD BY AUTOMATED COUNT: 15.2 % (ref 12.3–15.4)
EST. AVERAGE GLUCOSE BLD GHB EST-MCNC: 117 MG/DL
GAMMA INTERFERON BACKGROUND BLD IA-ACNC: 0.01 IU/ML
GLOBULIN SER CALC-MCNC: 2.4 G/DL (ref 1.5–4.5)
GLUCOSE SERPL-MCNC: 88 MG/DL (ref 65–99)
HBA1C MFR BLD: 5.7 % (ref 4.8–5.6)
HCT VFR BLD AUTO: 38.1 % (ref 34–46.6)
HDLC SERPL-MCNC: 57 MG/DL
HGB BLD-MCNC: 12.3 G/DL (ref 11.1–15.9)
IMM GRANULOCYTES # BLD AUTO: 0 X10E3/UL (ref 0–0.1)
IMM GRANULOCYTES NFR BLD AUTO: 0 %
INTERPRETATION, 910389: NORMAL
LDLC SERPL CALC-MCNC: 104 MG/DL (ref 0–99)
LYMPHOCYTES # BLD AUTO: 2.2 X10E3/UL (ref 0.7–3.1)
LYMPHOCYTES NFR BLD AUTO: 29 %
M TB IFN-G BLD-IMP: NEGATIVE
M TB IFN-G CD4+ BCKGRND COR BLD-ACNC: 0.01 IU/ML
MCH RBC QN AUTO: 26.4 PG (ref 26.6–33)
MCHC RBC AUTO-ENTMCNC: 32.3 G/DL (ref 31.5–35.7)
MCV RBC AUTO: 82 FL (ref 79–97)
MITOGEN IGNF BLD-ACNC: >10 IU/ML
MONOCYTES # BLD AUTO: 0.4 X10E3/UL (ref 0.1–0.9)
MONOCYTES NFR BLD AUTO: 5 %
NEUTROPHILS # BLD AUTO: 4.8 X10E3/UL (ref 1.4–7)
NEUTROPHILS NFR BLD AUTO: 63 %
PLATELET # BLD AUTO: 210 X10E3/UL (ref 150–379)
POTASSIUM SERPL-SCNC: 4.2 MMOL/L (ref 3.5–5.2)
PROT SERPL-MCNC: 6.4 G/DL (ref 6–8.5)
QUANTIFERON INCUBATION, QF1T: NORMAL
QUANTIFERON TB2 AG: 0.01 IU/ML
RBC # BLD AUTO: 4.66 X10E6/UL (ref 3.77–5.28)
SERVICE CMNT-IMP: NORMAL
SODIUM SERPL-SCNC: 142 MMOL/L (ref 134–144)
TRIGL SERPL-MCNC: 97 MG/DL (ref 0–149)
TSH SERPL DL<=0.005 MIU/L-ACNC: 1.06 UIU/ML (ref 0.45–4.5)
VLDLC SERPL CALC-MCNC: 19 MG/DL (ref 5–40)
WBC # BLD AUTO: 7.6 X10E3/UL (ref 3.4–10.8)

## 2019-05-20 ENCOUNTER — OFFICE VISIT (OUTPATIENT)
Dept: FAMILY MEDICINE CLINIC | Age: 29
End: 2019-05-20

## 2019-05-20 VITALS
DIASTOLIC BLOOD PRESSURE: 78 MMHG | TEMPERATURE: 97.7 F | SYSTOLIC BLOOD PRESSURE: 126 MMHG | WEIGHT: 213.8 LBS | HEART RATE: 79 BPM | OXYGEN SATURATION: 99 % | HEIGHT: 66 IN | RESPIRATION RATE: 18 BRPM | BODY MASS INDEX: 34.36 KG/M2

## 2019-05-20 DIAGNOSIS — J01.00 ACUTE NON-RECURRENT MAXILLARY SINUSITIS: Primary | ICD-10-CM

## 2019-05-20 RX ORDER — MELOXICAM 15 MG/1
TABLET ORAL
COMMUNITY
Start: 2019-04-29 | End: 2019-07-01

## 2019-05-20 RX ORDER — AZITHROMYCIN 250 MG/1
TABLET, FILM COATED ORAL
Qty: 6 TAB | Refills: 0 | Status: SHIPPED | OUTPATIENT
Start: 2019-05-20 | End: 2019-05-25

## 2019-05-20 NOTE — PATIENT INSTRUCTIONS
Sinusitis: Care Instructions  Your Care Instructions    Sinusitis is an infection of the lining of the sinus cavities in your head. Sinusitis often follows a cold. It causes pain and pressure in your head and face. In most cases, sinusitis gets better on its own in 1 to 2 weeks. But some mild symptoms may last for several weeks. Sometimes antibiotics are needed. Follow-up care is a key part of your treatment and safety. Be sure to make and go to all appointments, and call your doctor if you are having problems. It's also a good idea to know your test results and keep a list of the medicines you take. How can you care for yourself at home? · Take an over-the-counter pain medicine, such as acetaminophen (Tylenol), ibuprofen (Advil, Motrin), or naproxen (Aleve). Read and follow all instructions on the label. · If the doctor prescribed antibiotics, take them as directed. Do not stop taking them just because you feel better. You need to take the full course of antibiotics. · Be careful when taking over-the-counter cold or flu medicines and Tylenol at the same time. Many of these medicines have acetaminophen, which is Tylenol. Read the labels to make sure that you are not taking more than the recommended dose. Too much acetaminophen (Tylenol) can be harmful. · Breathe warm, moist air from a steamy shower, a hot bath, or a sink filled with hot water. Avoid cold, dry air. Using a humidifier in your home may help. Follow the directions for cleaning the machine. · Use saline (saltwater) nasal washes to help keep your nasal passages open and wash out mucus and bacteria. You can buy saline nose drops at a grocery store or drugstore. Or you can make your own at home by adding 1 teaspoon of salt and 1 teaspoon of baking soda to 2 cups of distilled water. If you make your own, fill a bulb syringe with the solution, insert the tip into your nostril, and squeeze gently. [place] your nose.   · Put a hot, wet towel or a warm gel pack on your face 3 or 4 times a day for 5 to 10 minutes each time. · Try a decongestant nasal spray like oxymetazoline (Afrin). Do not use it for more than 3 days in a row. Using it for more than 3 days can make your congestion worse. When should you call for help? Call your doctor now or seek immediate medical care if:    · You have new or worse swelling or redness in your face or around your eyes.     · You have a new or higher fever.    Watch closely for changes in your health, and be sure to contact your doctor if:    · You have new or worse facial pain.     · The mucus from your nose becomes thicker (like pus) or has new blood in it.     · You are not getting better as expected. Where can you learn more? Go to http://mitul-leena.info/. Enter E919 in the search box to learn more about \"Sinusitis: Care Instructions. \"  Current as of: March 27, 2018  Content Version: 11.9  © 9418-0323 [employer]. Care instructions adapted under license by [employer] (which disclaims liability or warranty for this information). If you have questions about a medical condition or this instruction, always ask your healthcare professional. [place] [address] any warranty or liability for your use of this information.

## 2019-05-20 NOTE — PROGRESS NOTES
Abdi Estrada is a 26 y.o. female    Chief Complaint   Patient presents with    Nasal Congestion     chest congestion x 3 days    Cough       1. Have you been to the ER, urgent care clinic since your last visit? Hospitalized since your last visit?no  M  2. Have you seen or consulted any other health care providers outside of the [address] [place] [address] since your last visit? Include any pap smears or colon screening. No      Visit Vitals  /78 (BP 1 Location: Left arm, BP Patient Position: Sitting)   Pulse 79   Temp 97.7 °F (36.5 °C) (Oral)   Resp 18   Ht 5' 6\" (1.676 m)   Wt 213 lb 12.8 oz (97 kg)   SpO2 99%   BMI 34.51 kg/m²           There are no preventive care reminders to display for this patient. Medication Reconciliation completed, changes noted.   Please  Update medication list.

## 2019-05-20 NOTE — PROGRESS NOTES
HISTORY OF PRESENT ILLNESS  Marti Hinton is a 26 y.o. female. HPI  Pt presents with \"cold symptoms\"    Symptoms have been present for a couple of days and have been worsening  Nasal congestion  Nasal drainage  Ear pain and pressure  Cough, dry  OTC; Zyrtec and flonase  Review of Systems   Constitutional: Negative for fever. HENT: Positive for congestion and ear pain. Respiratory: Positive for cough. Gastrointestinal: Negative for diarrhea and vomiting. Physical Exam   Constitutional: She is oriented to person, place, and time. She appears well-developed and well-nourished. HENT:   Head: Normocephalic and atraumatic. Right Ear: Hearing, external ear and ear canal normal. Tympanic membrane is erythematous. Left Ear: Hearing, external ear and ear canal normal. Tympanic membrane is erythematous. Nose: Mucosal edema and rhinorrhea present. Mouth/Throat: Oropharynx is clear and moist.   Neck: Normal range of motion. Neck supple. Cardiovascular: Normal rate, regular rhythm and normal heart sounds. Pulmonary/Chest: Effort normal and breath sounds normal.   Lymphadenopathy:     She has no cervical adenopathy. Neurological: She is alert and oriented to person, place, and time. Skin: Skin is warm and dry. Psychiatric: She has a normal mood and affect. Her behavior is normal.       ASSESSMENT and PLAN    ICD-10-CM ICD-9-CM    1. Acute non-recurrent maxillary sinusitis J01.00 461.0 azithromycin (ZITHROMAX) 250 mg tablet     Informed patient that I have sent medication to the pharmacy, and she should take as prescribed  Educated about staying well hydrated, and treating fever as needed    Pt informed to return to office with worsening of symptoms, or PRN with any questions or concerns. Pt verbalizes understanding of plan of care and denies further questions or concerns at this time.

## 2019-05-24 ENCOUNTER — TELEPHONE (OUTPATIENT)
Dept: FAMILY MEDICINE CLINIC | Age: 29
End: 2019-05-24

## 2019-05-24 NOTE — TELEPHONE ENCOUNTER
Patient called and stated that she has finished her antibiotics now and her right ear is still hurting really bad. Please call to advise what she should do at this point. If something else can be called in she would like for it to go to [facility].  She can be reached at [phone]

## 2019-05-28 ENCOUNTER — TELEPHONE (OUTPATIENT)
Dept: FAMILY MEDICINE CLINIC | Age: 29
End: 2019-05-28

## 2019-05-28 DIAGNOSIS — T36.95XA ANTIBIOTIC-INDUCED YEAST INFECTION: Primary | ICD-10-CM

## 2019-05-28 DIAGNOSIS — B37.9 ANTIBIOTIC-INDUCED YEAST INFECTION: Primary | ICD-10-CM

## 2019-05-28 RX ORDER — FLUCONAZOLE 150 MG/1
150 TABLET ORAL DAILY
Qty: 1 TAB | Refills: 0 | Status: SHIPPED | OUTPATIENT
Start: 2019-05-28 | End: 2019-05-29

## 2019-05-28 NOTE — TELEPHONE ENCOUNTER
Patient is calling and stated that now that she has finished taking antibiotics she now has a yeast infection. She is asking the medication for this can be sent over. To [facility]. She can be reached at [phone] if there are any questions.

## 2019-07-01 ENCOUNTER — HOSPITAL ENCOUNTER (EMERGENCY)
Age: 29
Discharge: HOME | End: 2019-07-01
Attending: EMERGENCY MEDICINE
Payer: MEDICAID

## 2019-07-01 VITALS
HEART RATE: 71 BPM | BODY MASS INDEX: 33.75 KG/M2 | DIASTOLIC BLOOD PRESSURE: 59 MMHG | OXYGEN SATURATION: 99 % | WEIGHT: 210 LBS | RESPIRATION RATE: 16 BRPM | TEMPERATURE: 98.3 F | SYSTOLIC BLOOD PRESSURE: 125 MMHG | HEIGHT: 66 IN

## 2019-07-01 DIAGNOSIS — M54.6 ACUTE BILATERAL THORACIC BACK PAIN: ICD-10-CM

## 2019-07-01 DIAGNOSIS — R42 LIGHTHEADEDNESS: Primary | ICD-10-CM

## 2019-07-01 LAB
ALBUMIN SERPL-MCNC: 3.9 G/DL (ref 3.5–5)
ALBUMIN/GLOB SERPL: 1.2 {RATIO} (ref 1.1–2.2)
ALP SERPL-CCNC: 103 U/L (ref 45–117)
ALT SERPL-CCNC: 32 U/L (ref 12–78)
ANION GAP SERPL CALC-SCNC: 8 MMOL/L (ref 5–15)
APPEARANCE UR: ABNORMAL
AST SERPL-CCNC: 17 U/L (ref 15–37)
BACTERIA URNS QL MICRO: ABNORMAL /HPF
BASOPHILS # BLD: 0 K/UL (ref 0–0.1)
BASOPHILS NFR BLD: 0 % (ref 0–1)
BILIRUB SERPL-MCNC: 0.2 MG/DL (ref 0.2–1)
BILIRUB UR QL: NEGATIVE
BUN SERPL-MCNC: 13 MG/DL (ref 6–20)
BUN/CREAT SERPL: 14 (ref 12–20)
CALCIUM SERPL-MCNC: 9.1 MG/DL (ref 8.5–10.1)
CHLORIDE SERPL-SCNC: 102 MMOL/L (ref 97–108)
CO2 SERPL-SCNC: 29 MMOL/L (ref 21–32)
COLOR UR: ABNORMAL
CREAT SERPL-MCNC: 0.96 MG/DL (ref 0.55–1.02)
DIFFERENTIAL METHOD BLD: ABNORMAL
EOSINOPHIL # BLD: 0.2 K/UL (ref 0–0.4)
EOSINOPHIL NFR BLD: 2 % (ref 0–7)
EPITH CASTS URNS QL MICRO: ABNORMAL /LPF
ERYTHROCYTE [DISTWIDTH] IN BLOOD BY AUTOMATED COUNT: 14.8 % (ref 11.5–14.5)
FLUAV AG NPH QL IA: NEGATIVE
FLUBV AG NOSE QL IA: NEGATIVE
GLOBULIN SER CALC-MCNC: 3.3 G/DL (ref 2–4)
GLUCOSE SERPL-MCNC: 81 MG/DL (ref 65–100)
GLUCOSE UR STRIP.AUTO-MCNC: NEGATIVE MG/DL
HCG UR QL: NEGATIVE
HCT VFR BLD AUTO: 38.6 % (ref 35–47)
HGB BLD-MCNC: 12.8 G/DL (ref 11.5–16)
HGB UR QL STRIP: ABNORMAL
KETONES UR QL STRIP.AUTO: NEGATIVE MG/DL
LEUKOCYTE ESTERASE UR QL STRIP.AUTO: NEGATIVE
LIPASE SERPL-CCNC: 173 U/L (ref 73–393)
LYMPHOCYTES # BLD: 2.7 K/UL (ref 0.8–3.5)
LYMPHOCYTES NFR BLD: 28 % (ref 12–49)
MCH RBC QN AUTO: 27.3 PG (ref 26–34)
MCHC RBC AUTO-ENTMCNC: 33.2 G/DL (ref 30–36.5)
MCV RBC AUTO: 82.3 FL (ref 80–99)
MONOCYTES # BLD: 0.6 K/UL (ref 0–1)
MONOCYTES NFR BLD: 7 % (ref 5–13)
NEUTS SEG # BLD: 6.3 K/UL (ref 1.8–8)
NEUTS SEG NFR BLD: 63 % (ref 32–75)
NITRITE UR QL STRIP.AUTO: NEGATIVE
PH UR STRIP: 6.5 [PH] (ref 5–8)
PLATELET # BLD AUTO: 203 K/UL (ref 150–400)
PMV BLD AUTO: 11.7 FL (ref 8.9–12.9)
POTASSIUM SERPL-SCNC: 3.6 MMOL/L (ref 3.5–5.1)
PROT SERPL-MCNC: 7.2 G/DL (ref 6.4–8.2)
PROT UR STRIP-MCNC: NEGATIVE MG/DL
RBC # BLD AUTO: 4.69 M/UL (ref 3.8–5.2)
RBC #/AREA URNS HPF: ABNORMAL /HPF (ref 0–5)
SODIUM SERPL-SCNC: 139 MMOL/L (ref 136–145)
SP GR UR REFRACTOMETRY: 1.01 (ref 1–1.03)
TROPONIN I SERPL-MCNC: <0.05 NG/ML
UR CULT HOLD, URHOLD: NORMAL
UROBILINOGEN UR QL STRIP.AUTO: 0.2 EU/DL (ref 0.2–1)
WBC # BLD AUTO: 9.9 K/UL (ref 3.6–11)
WBC URNS QL MICRO: ABNORMAL /HPF (ref 0–4)

## 2019-07-01 PROCEDURE — 74011250636 HC RX REV CODE- 250/636: Performed by: EMERGENCY MEDICINE

## 2019-07-01 PROCEDURE — 84484 ASSAY OF TROPONIN QUANT: CPT

## 2019-07-01 PROCEDURE — 99284 EMERGENCY DEPT VISIT MOD MDM: CPT

## 2019-07-01 PROCEDURE — 96374 THER/PROPH/DIAG INJ IV PUSH: CPT

## 2019-07-01 PROCEDURE — 80053 COMPREHEN METABOLIC PANEL: CPT

## 2019-07-01 PROCEDURE — 93005 ELECTROCARDIOGRAM TRACING: CPT

## 2019-07-01 PROCEDURE — 81025 URINE PREGNANCY TEST: CPT

## 2019-07-01 PROCEDURE — 85025 COMPLETE CBC W/AUTO DIFF WBC: CPT

## 2019-07-01 PROCEDURE — 96361 HYDRATE IV INFUSION ADD-ON: CPT

## 2019-07-01 PROCEDURE — 81001 URINALYSIS AUTO W/SCOPE: CPT

## 2019-07-01 PROCEDURE — 83690 ASSAY OF LIPASE: CPT

## 2019-07-01 PROCEDURE — 87804 INFLUENZA ASSAY W/OPTIC: CPT

## 2019-07-01 PROCEDURE — 36415 COLL VENOUS BLD VENIPUNCTURE: CPT

## 2019-07-01 RX ORDER — SODIUM CHLORIDE 0.9 % (FLUSH) 0.9 %
5-40 SYRINGE (ML) INJECTION AS NEEDED
Status: DISCONTINUED | OUTPATIENT
Start: 2019-07-01 | End: 2019-07-02 | Stop reason: HOSPADM

## 2019-07-01 RX ORDER — SODIUM CHLORIDE 0.9 % (FLUSH) 0.9 %
5-40 SYRINGE (ML) INJECTION EVERY 8 HOURS
Status: DISCONTINUED | OUTPATIENT
Start: 2019-07-01 | End: 2019-07-02 | Stop reason: HOSPADM

## 2019-07-01 RX ORDER — KETOROLAC TROMETHAMINE 30 MG/ML
30 INJECTION, SOLUTION INTRAMUSCULAR; INTRAVENOUS
Status: COMPLETED | OUTPATIENT
Start: 2019-07-01 | End: 2019-07-01

## 2019-07-01 RX ORDER — DICLOFENAC SODIUM 75 MG/1
75 TABLET, DELAYED RELEASE ORAL 2 TIMES DAILY
Qty: 30 TAB | Refills: 0 | Status: SHIPPED | OUTPATIENT
Start: 2019-07-01 | End: 2019-09-01

## 2019-07-01 RX ADMIN — KETOROLAC TROMETHAMINE 30 MG: 30 INJECTION, SOLUTION INTRAMUSCULAR at 21:26

## 2019-07-01 RX ADMIN — SODIUM CHLORIDE 1000 ML: 900 INJECTION, SOLUTION INTRAVENOUS at 21:26

## 2019-07-02 LAB
ATRIAL RATE: 67 BPM
CALCULATED P AXIS, ECG09: 52 DEGREES
CALCULATED R AXIS, ECG10: 63 DEGREES
CALCULATED T AXIS, ECG11: 18 DEGREES
DIAGNOSIS, 93000: NORMAL
P-R INTERVAL, ECG05: 126 MS
Q-T INTERVAL, ECG07: 368 MS
QRS DURATION, ECG06: 88 MS
QTC CALCULATION (BEZET), ECG08: 388 MS
VENTRICULAR RATE, ECG03: 67 BPM

## 2019-07-02 NOTE — ED PROVIDER NOTES
Pt reports that two days ago she started with bilateral flank pain, nausea, light headedness and dizziness. Pt states that she was working in the yard on Saturday. Pt has not taken any meds as of yet. The history is provided by the patient. No  was used. Dizziness   This is a new problem. The current episode started 2 days ago. The problem has been gradually worsening. There was no focality noted. Pertinent negatives include no focal weakness, no loss of sensation, no loss of balance, no slurred speech, no speech difficulty, no memory loss, no movement disorder, no agitation, no visual change, no auditory change, no mental status change, no unresponsiveness and no disorientation. There has been no fever. Pertinent negatives include no shortness of breath, no chest pain, no vomiting, no altered mental status, no confusion, no headaches, no choking, no nausea and no bladder incontinence. There were no medications administered prior to arrival. Associated medical issues do not include trauma.         Past Medical History:   Diagnosis Date    Abnormal Pap smear     Anemia     Gastrointestinal disorder     Ulcers    Headache     Herniated disc     Hx gestational diabetes     HX OTHER MEDICAL     trich treated at beginning of pregnancy    HX OTHER MEDICAL     pituitary tumor-benign     Miscarriage     Molar pregnancy     Pap smear for cervical cancer screening 19 neg HPV POS- rp pap one year    PCOS (polycystic ovarian syndrome)     Pelvic pain in female     Pituitary adenoma ([facility] [address]     Pituitary tumor     Psychiatric problem     depression never on medication    Thyroid activity decreased     hypothyroid awaiting appnt for endo       Past Surgical History:   Procedure Laterality Date    HX  SECTION      HX COLPOSCOPY  11/3/10    HX DILATION AND CURETTAGE      HX LIPOMA RESECTION           Family History:   Problem Relation Age of Onset    Diabetes Father    [facility] Headache Mother        Social History     Socioeconomic History    Marital status: SINGLE     Spouse name: Not on file    Number of children: Not on file    Years of education: Not on file    Highest education level: Not on file   Occupational History    Not on file   Social Needs    Financial resource strain: Not on file    Food insecurity:     Worry: Not on file     Inability: Not on file    Transportation needs:     Medical: Not on file     Non-medical: Not on file   Tobacco Use    Smoking status: Never Smoker    Smokeless tobacco: Never Used   Substance and Sexual Activity    Alcohol use: No     Comment: =    Drug use: No    Sexual activity: Yes     Partners: Male     Birth control/protection: None   Lifestyle    Physical activity:     Days per week: Not on file     Minutes per session: Not on file    Stress: Not on file   Relationships    Social connections:     Talks on phone: Not on file     Gets together: Not on file     Attends [employer] service: Not on file     Active member of club or organization: Not on file     Attends meetings of clubs or organizations: Not on file     Relationship status: Not on file    Intimate partner violence:     Fear of current or ex partner: Not on file     Emotionally abused: Not on file     Physically abused: Not on file     Forced sexual activity: Not on file   Other Topics Concern    Not on file   Social History Narrative    Not on file     ALLERGIES: Tomato    Review of Systems   Constitutional: Negative for appetite change, chills, fever and unexpected weight change. HENT: Negative for ear pain, hearing loss, rhinorrhea and trouble swallowing. Eyes: Negative for pain and visual disturbance. Respiratory: Negative for cough, choking, chest tightness and shortness of breath. Cardiovascular: Negative for chest pain and palpitations. Gastrointestinal: Negative for abdominal distention, abdominal pain, blood in stool, nausea and vomiting. Genitourinary: Positive for flank pain. Negative for bladder incontinence, dysuria, hematuria and urgency. Musculoskeletal: Positive for back pain. Negative for myalgias. Skin: Negative for rash. Neurological: Positive for dizziness. Negative for focal weakness, syncope, speech difficulty, weakness, numbness, headaches and loss of balance. Psychiatric/Behavioral: Negative for agitation, confusion, memory loss and suicidal ideas. All other systems reviewed and are negative. Vitals:    07/01/19 2118 07/01/19 2120 07/01/19 2130 07/01/19 2145   BP: 115/58  134/55 125/59   Pulse:       Resp:       Temp:       SpO2:  99% 99% 99%   Weight:       Height:                Physical Exam   Constitutional: She is oriented to person, place, and time. She appears well-developed and well-nourished. No distress. HENT:   Head: Normocephalic and atraumatic. Right Ear: External ear normal.   Left Ear: External ear normal.   Nose: Nose normal.   Mouth/Throat: Oropharynx is clear and moist. No oropharyngeal exudate. Eyes: Pupils are equal, round, and reactive to light. Conjunctivae and EOM are normal. Right eye exhibits no discharge. Left eye exhibits no discharge. No scleral icterus. Neck: Normal range of motion. Neck supple. No JVD present. No tracheal deviation present. Cardiovascular: Normal rate, regular rhythm, normal heart sounds and intact distal pulses. Exam reveals no gallop and no friction rub. No murmur heard. Pulmonary/Chest: Effort normal and breath sounds normal. No stridor. No respiratory distress. She has no decreased breath sounds. She has no wheezes. She has no rhonchi. She has no rales. She exhibits no tenderness. Abdominal: Soft. Bowel sounds are normal. She exhibits no distension. There is no tenderness. There is no rebound and no guarding. Musculoskeletal: Normal range of motion. She exhibits no edema. Thoracic back: She exhibits tenderness and pain.  She exhibits normal range of motion, no bony tenderness, no swelling, no edema, no deformity, no laceration, no spasm and normal pulse. Back:    Neurological: She is alert and oriented to person, place, and time. She has normal strength and normal reflexes. She displays normal reflexes. No cranial nerve deficit or sensory deficit. She exhibits normal muscle tone. Coordination normal. GCS eye subscore is 4. GCS verbal subscore is 5. GCS motor subscore is 6. Reflex Scores:       Patellar reflexes are 2+ on the right side and 2+ on the left side. Achilles reflexes are 2+ on the right side and 2+ on the left side. Skin: Skin is warm and dry. Capillary refill takes less than 2 seconds. No rash noted. She is not diaphoretic. No erythema. No pallor. Psychiatric: She has a normal mood and affect. Her behavior is normal. Judgment and thought content normal.   Nursing note and vitals reviewed. MDM  Number of Diagnoses or Management Options  Acute bilateral thoracic back pain:   Lightheadedness:      Amount and/or Complexity of Data Reviewed  Clinical lab tests: ordered and reviewed  Tests in the medicine section of CPT®: ordered and reviewed  Independent visualization of images, tracings, or specimens: yes (ekg)    Risk of Complications, Morbidity, and/or Mortality  Presenting problems: moderate  Diagnostic procedures: low  Management options: moderate    Patient Progress  Patient progress: stable         Procedures    Chief Complaint   Patient presents with    Dizziness    Flank Pain       The patient's presenting problems have been discussed, and they are in agreement with the care plan formulated and outlined with them. I have encouraged them to ask questions as they arise throughout their visit.     MEDICATIONS GIVEN:  Medications   sodium chloride (NS) flush 5-40 mL (has no administration in time range)   sodium chloride (NS) flush 5-40 mL (has no administration in time range)   sodium chloride 0.9 % bolus infusion 1,000 mL (0 mL IntraVENous IV Completed 7/1/19 2213)   ketorolac (TORADOL) injection 30 mg (30 mg IntraVENous Given 7/1/19 2126)       LABS REVIEWED:  Recent Results (from the past 24 hour(s))   EKG, 12 LEAD, INITIAL    Collection Time: 07/01/19  8:47 PM   Result Value Ref Range    Ventricular Rate 67 BPM    Atrial Rate 67 BPM    P-R Interval 126 ms    QRS Duration 88 ms    Q-T Interval 368 ms    QTC Calculation (Bezet) 388 ms    Calculated P Axis 52 degrees    Calculated R Axis 63 degrees    Calculated T Axis 18 degrees    Diagnosis       Normal sinus rhythm with sinus arrhythmia  Normal ECG  When compared with ECG of 17-JAN-2019 11:23,  No significant change was found     CBC WITH AUTOMATED DIFF    Collection Time: 07/01/19  8:51 PM   Result Value Ref Range    WBC 9.9 3.6 - 11.0 K/uL    RBC 4.69 3.80 - 5.20 M/uL    HGB 12.8 11.5 - 16.0 g/dL    HCT 38.6 35.0 - 47.0 %    MCV 82.3 80.0 - 99.0 FL    MCH 27.3 26.0 - 34.0 PG    MCHC 33.2 30.0 - 36.5 g/dL    RDW 14.8 (H) 11.5 - 14.5 %    PLATELET 847 044 - 172 K/uL    MPV 11.7 8.9 - 12.9 FL    NEUTROPHILS 63 32 - 75 %    LYMPHOCYTES 28 12 - 49 %    MONOCYTES 7 5 - 13 %    EOSINOPHILS 2 0 - 7 %    BASOPHILS 0 0 - 1 %    ABS. NEUTROPHILS 6.3 1.8 - 8.0 K/UL    ABS. LYMPHOCYTES 2.7 0.8 - 3.5 K/UL    ABS. MONOCYTES 0.6 0.0 - 1.0 K/UL    ABS. EOSINOPHILS 0.2 0.0 - 0.4 K/UL    ABS.  BASOPHILS 0.0 0.0 - 0.1 K/UL    DF AUTOMATED     METABOLIC PANEL, COMPREHENSIVE    Collection Time: 07/01/19  8:51 PM   Result Value Ref Range    Sodium 139 136 - 145 mmol/L    Potassium 3.6 3.5 - 5.1 mmol/L    Chloride 102 97 - 108 mmol/L    CO2 29 21 - 32 mmol/L    Anion gap 8 5 - 15 mmol/L    Glucose 81 65 - 100 mg/dL    BUN 13 6 - 20 MG/DL    Creatinine 0.96 0.55 - 1.02 MG/DL    BUN/Creatinine ratio 14 12 - 20      GFR est AA >60 >60 ml/min/1.73m2    GFR est non-AA >60 >60 ml/min/1.73m2    Calcium 9.1 8.5 - 10.1 MG/DL    Bilirubin, total 0.2 0.2 - 1.0 MG/DL    ALT (SGPT) 32 12 - 78 U/L    AST (SGOT) 17 15 - 37 U/L    Alk. phosphatase 103 45 - 117 U/L    Protein, total 7.2 6.4 - 8.2 g/dL    Albumin 3.9 3.5 - 5.0 g/dL    Globulin 3.3 2.0 - 4.0 g/dL    A-G Ratio 1.2 1.1 - 2.2     TROPONIN I    Collection Time: 07/01/19  8:51 PM   Result Value Ref Range    Troponin-I, Qt. <0.05 <0.05 ng/mL   LIPASE    Collection Time: 07/01/19  8:51 PM   Result Value Ref Range    Lipase 173 73 - 393 U/L   HCG URINE, QL. - POC    Collection Time: 07/01/19  8:56 PM   Result Value Ref Range    Pregnancy test,urine (POC) NEGATIVE  NEG     URINALYSIS W/MICROSCOPIC    Collection Time: 07/01/19  8:57 PM   Result Value Ref Range    Color YELLOW/STRAW      Appearance HAZY (A) CLEAR      Specific gravity 1.010 1.003 - 1.030      pH (UA) 6.5 5.0 - 8.0      Protein NEGATIVE  NEG mg/dL    Glucose NEGATIVE  NEG mg/dL    Ketone NEGATIVE  NEG mg/dL    Bilirubin NEGATIVE  NEG      Blood TRACE (A) NEG      Urobilinogen 0.2 0.2 - 1.0 EU/dL    Nitrites NEGATIVE  NEG      Leukocyte Esterase NEGATIVE  NEG      WBC 0-4 0 - 4 /hpf    RBC 0-5 0 - 5 /hpf    Epithelial cells MODERATE (A) FEW /lpf    Bacteria 1+ (A) NEG /hpf   URINE CULTURE HOLD SAMPLE    Collection Time: 07/01/19  8:57 PM   Result Value Ref Range    Urine culture hold        URINE ON HOLD IN MICROBIOLOGY DEPT FOR 3 DAYS. IF UNPRESERVED URINE IS SUBMITTED, IT CANNOT BE USED FOR ADDITIONAL TESTING AFTER 24 HRS, RECOLLECTION WILL BE REQUIRED. INFLUENZA A & B AG (RAPID TEST)    Collection Time: 07/01/19  9:28 PM   Result Value Ref Range    Influenza A Antigen NEGATIVE  NEG      Influenza B Antigen NEGATIVE  NEG         VITAL SIGNS:  Patient Vitals for the past 24 hrs:   Temp Pulse Resp BP SpO2   07/01/19 2145    125/59 99 %   07/01/19 2130    134/55 99 %   07/01/19 2120     99 %   07/01/19 2118    115/58    07/01/19 2050 98.3 °F (36.8 °C) 71 16 128/68 100 %       RADIOLOGY RESULTS:  The following have been ordered and reviewed:  No results found.     ED EKG interpretation:  Rhythm: normal sinus rhythm; and regular . Rate (approx.): 67; Axis: normal; P wave: normal; QRS interval: normal ; ST/T wave: normal; Other findings: abnormal ekg. This EKG was interpreted by Marlo Lyles DO, ED Provider. PROGRESS NOTES:  Discussed results and plan with patient. Patient will be discharged home with PCP follow up. Patient instructed to return to the emergency room for any worsening symptoms or any other concerns. DIAGNOSIS:    1. Lightheadedness    2. Acute bilateral thoracic back pain        PLAN:  Follow-up Information     Follow up With Specialties Details Why Contact Info    Stella Little MD Pediatrics, Family Practice Schedule an appointment as soon as possible for a visit  [address] [place] [address] Road      [address] [place] [address] DEPT Emergency Medicine  If symptoms worsen [address] [facility]  [phone]        Discharge Medication List as of 7/1/2019 10:03 PM      START taking these medications    Details   diclofenac EC (VOLTAREN) 75 mg EC tablet Take 1 Tab by mouth two (2) times a day., Print, Disp-30 Tab, R-0         CONTINUE these medications which have NOT CHANGED    Details   topiramate (TOPAMAX) 50 mg tablet TAKE 1 TABLET BY MOUTH IN MORNING AND 2 TABS IN EVENING. TO REDUCE HEADACHE FREQUENCY, NormalNo further refills. Pt to follow up for any furhter med requestsDisp-90 Tab, R-0             ED COURSE: The patient's hospital course has been uncomplicated.

## 2019-07-02 NOTE — ED NOTES
The patient was discharged home by Dr. Myranda Baig and Corinne Sparrow, rn in stable condition, accompanied by family. The patient is alert and oriented, is in no respiratory distress and has vital signs within normal limits . The patient's diagnosis, condition and treatment were explained to patient. The patient expressed understanding. Prescriptions given to pt. No work/school note given to pt. A discharge plan has been developed. A  was not involved in the process. Aftercare instructions were given to the patient. pt's saline lock removed without complications. Pt will transport self home.

## 2019-07-02 NOTE — ED NOTES
Pt's testing is complete and she is awaiting test results. Pt on monitor x2. Pt given a blanket per her request. Call bell within reach. Family at bedside.

## 2019-07-02 NOTE — ED TRIAGE NOTES
Pt reports that two days ago she started with bilateral flank pain, nausea, light headedness and dizziness.

## 2019-07-02 NOTE — DISCHARGE INSTRUCTIONS
We hope that we have addressed all of your medical concerns. The examination and treatment you received in the Emergency Department were for an emergent problem and were not intended as complete care. It is important that you follow up with your healthcare provider(s) for ongoing care. If your symptoms worsen or do not improve as expected, and you are unable to reach your usual health care provider(s), you should return to the Emergency Department. Today's healthcare is undergoing tremendous change, and patient satisfaction surveys are one of the many tools to assess the quality of medical care. You may receive a survey from the [employer] regarding your experience in the Emergency Department. I hope that your experience has been completely positive, particularly the medical care that I provided. As such, please participate in the survey; anything less than excellent does not meet my expectations or intentions. [address] [place] [address] and [employer] participate in nationally recognized quality of care measures. If your blood pressure is greater than 120/80, as reported below, we urge that you seek medical care to address the potential of high blood pressure, commonly known as hypertension. Hypertension can be hereditary or can be caused by certain medical conditions, pain, stress, or \"white coat syndrome. \"       Please make an appointment with your health care provider(s) for follow up of your Emergency Department visit. VITALS:   Patient Vitals for the past 8 hrs:   Temp Pulse Resp BP SpO2   07/01/19 2145 -- -- -- 125/59 99 %   07/01/19 2130 -- -- -- 134/55 99 %   07/01/19 2120 -- -- -- -- 99 %   07/01/19 2118 -- -- -- 115/58 --   07/01/19 2050 98.3 °F (36.8 °C) 71 16 128/68 100 %          Thank you for allowing us to provide you with medical care today. We realize that you have many choices for your emergency care needs.   Please choose us in the future for any continued health care needs. Libia Moreno [facility].   Office: [phone]            Recent Results (from the past 24 hour(s))   EKG, 12 LEAD, INITIAL    Collection Time: 07/01/19  8:47 PM   Result Value Ref Range    Ventricular Rate 67 BPM    Atrial Rate 67 BPM    P-R Interval 126 ms    QRS Duration 88 ms    Q-T Interval 368 ms    QTC Calculation (Bezet) 388 ms    Calculated P Axis 52 degrees    Calculated R Axis 63 degrees    Calculated T Axis 18 degrees    Diagnosis       Normal sinus rhythm with sinus arrhythmia  Normal ECG  When compared with ECG of 17-JAN-2019 11:23,  No significant change was found     CBC WITH AUTOMATED DIFF    Collection Time: 07/01/19  8:51 PM   Result Value Ref Range    WBC 9.9 3.6 - 11.0 K/uL    RBC 4.69 3.80 - 5.20 M/uL    HGB 12.8 11.5 - 16.0 g/dL    HCT 38.6 35.0 - 47.0 %    MCV 82.3 80.0 - 99.0 FL    MCH 27.3 26.0 - 34.0 PG    MCHC 33.2 30.0 - 36.5 g/dL    RDW 14.8 (H) 11.5 - 14.5 %    PLATELET 005 316 - 164 K/uL    MPV 11.7 8.9 - 12.9 FL    NEUTROPHILS 63 32 - 75 %    LYMPHOCYTES 28 12 - 49 %    MONOCYTES 7 5 - 13 %    EOSINOPHILS 2 0 - 7 %    BASOPHILS 0 0 - 1 %    ABS. NEUTROPHILS 6.3 1.8 - 8.0 K/UL    ABS. LYMPHOCYTES 2.7 0.8 - 3.5 K/UL    ABS. MONOCYTES 0.6 0.0 - 1.0 K/UL    ABS. EOSINOPHILS 0.2 0.0 - 0.4 K/UL    ABS.  BASOPHILS 0.0 0.0 - 0.1 K/UL    DF AUTOMATED     METABOLIC PANEL, COMPREHENSIVE    Collection Time: 07/01/19  8:51 PM   Result Value Ref Range    Sodium 139 136 - 145 mmol/L    Potassium 3.6 3.5 - 5.1 mmol/L    Chloride 102 97 - 108 mmol/L    CO2 29 21 - 32 mmol/L    Anion gap 8 5 - 15 mmol/L    Glucose 81 65 - 100 mg/dL    BUN 13 6 - 20 MG/DL    Creatinine 0.96 0.55 - 1.02 MG/DL    BUN/Creatinine ratio 14 12 - 20      GFR est AA >60 >60 ml/min/1.73m2    GFR est non-AA >60 >60 ml/min/1.73m2    Calcium 9.1 8.5 - 10.1 MG/DL    Bilirubin, total 0.2 0.2 - 1.0 MG/DL    ALT (SGPT) 32 12 - 78 U/L    AST (SGOT) 17 15 - 37 U/L    Alk. phosphatase 103 45 - 117 U/L    Protein, total 7.2 6.4 - 8.2 g/dL    Albumin 3.9 3.5 - 5.0 g/dL    Globulin 3.3 2.0 - 4.0 g/dL    A-G Ratio 1.2 1.1 - 2.2     TROPONIN I    Collection Time: 07/01/19  8:51 PM   Result Value Ref Range    Troponin-I, Qt. <0.05 <0.05 ng/mL   LIPASE    Collection Time: 07/01/19  8:51 PM   Result Value Ref Range    Lipase 173 73 - 393 U/L   HCG URINE, QL. - POC    Collection Time: 07/01/19  8:56 PM   Result Value Ref Range    Pregnancy test,urine (POC) NEGATIVE  NEG     URINALYSIS W/MICROSCOPIC    Collection Time: 07/01/19  8:57 PM   Result Value Ref Range    Color YELLOW/STRAW      Appearance HAZY (A) CLEAR      Specific gravity 1.010 1.003 - 1.030      pH (UA) 6.5 5.0 - 8.0      Protein NEGATIVE  NEG mg/dL    Glucose NEGATIVE  NEG mg/dL    Ketone NEGATIVE  NEG mg/dL    Bilirubin NEGATIVE  NEG      Blood TRACE (A) NEG      Urobilinogen 0.2 0.2 - 1.0 EU/dL    Nitrites NEGATIVE  NEG      Leukocyte Esterase NEGATIVE  NEG      WBC 0-4 0 - 4 /hpf    RBC 0-5 0 - 5 /hpf    Epithelial cells MODERATE (A) FEW /lpf    Bacteria 1+ (A) NEG /hpf   URINE CULTURE HOLD SAMPLE    Collection Time: 07/01/19  8:57 PM   Result Value Ref Range    Urine culture hold        URINE ON HOLD IN MICROBIOLOGY DEPT FOR 3 DAYS. IF UNPRESERVED URINE IS SUBMITTED, IT CANNOT BE USED FOR ADDITIONAL TESTING AFTER 24 HRS, RECOLLECTION WILL BE REQUIRED. INFLUENZA A & B AG (RAPID TEST)    Collection Time: 07/01/19  9:28 PM   Result Value Ref Range    Influenza A Antigen NEGATIVE  NEG      Influenza B Antigen NEGATIVE  NEG         No results found. Patient Education        Lightheadedness or Faintness: Care Instructions  Your Care Instructions  Lightheadedness is a feeling that you are about to faint or \"pass out. \" You do not feel as if you or your surroundings are moving.  It is different from vertigo, which is the feeling that you or things around you are spinning or tilting. Lightheadedness usually goes away or gets better when you lie down. If lightheadedness gets worse, it can lead to a fainting spell. It is common to feel lightheaded from time to time. Lightheadedness usually is not caused by a serious problem. It often is caused by a short-lasting drop in blood pressure and blood flow to your head that occurs when you get up too quickly from a seated or lying position. Follow-up care is a key part of your treatment and safety. Be sure to make and go to all appointments, and call your doctor if you are having problems. It's also a good idea to know your test results and keep a list of the medicines you take. How can you care for yourself at home? · Lie down for 1 or 2 minutes when you feel lightheaded. After lying down, sit up slowly and remain sitting for 1 to 2 minutes before slowly standing up. · Avoid movements, positions, or activities that have made you lightheaded in the past.  · Get plenty of rest, especially if you have a cold or flu, which can cause lightheadedness. · Make sure you drink plenty of fluids, especially if you have a fever or have been sweating. · Do not drive or put yourself and others in danger while you feel lightheaded. When should you call for help? Call 911 anytime you think you may need emergency care. For example, call if:    · You have symptoms of a stroke. These may include:  ? Sudden numbness, tingling, weakness, or loss of movement in your face, arm, or leg, especially on only one side of your body. ? Sudden vision changes. ? Sudden trouble speaking. ? Sudden confusion or trouble understanding simple statements. ? Sudden problems with walking or balance. ? A sudden, severe headache that is different from past headaches.     · You have symptoms of a heart attack. These may include:  ? Chest pain or pressure, or a strange feeling in the chest.  ? Sweating. ? Shortness of breath. ? Nausea or vomiting.   ? Pain, pressure, or a strange feeling in the back, neck, jaw, or upper belly or in one or both shoulders or arms. ? Lightheadedness or sudden weakness. ? A fast or irregular heartbeat. After you call 911, the  may tell you to chew 1 adult-strength or 2 to 4 low-dose aspirin. Wait for an ambulance. Do not try to drive yourself.    Watch closely for changes in your health, and be sure to contact your doctor if:    · Your lightheadedness gets worse or does not get better with home care. Where can you learn more? Go to http://mitul-leena.info/. Enter L840 in the search box to learn more about \"Lightheadedness or Faintness: Care Instructions. \"  Current as of: September 23, 2018  Content Version: 11.9  © 5578-6594 [employer]. Care instructions adapted under license by [employer] (which disclaims liability or warranty for this information). If you have questions about a medical condition or this instruction, always ask your healthcare professional. [place] [address] any warranty or liability for your use of this information. Patient Education        Back Pain: Care Instructions  Your Care Instructions    Back pain has many possible causes. It is often related to problems with muscles and ligaments of the back. It may also be related to problems with the nerves, discs, or bones of the back. Moving, lifting, standing, sitting, or sleeping in an awkward way can strain the back. Sometimes you don't notice the injury until later. Arthritis is another common cause of back pain. Although it may hurt a lot, back pain usually improves on its own within several weeks. Most people recover in 12 weeks or less. Using good home treatment and being careful not to stress your back can help you feel better sooner. Follow-up care is a key part of your treatment and safety. Be sure to make and go to all appointments, and call your doctor if you are having problems.  It's also a good idea to know your test results and keep a list of the medicines you take. How can you care for yourself at home? · Sit or lie in positions that are most comfortable and reduce your pain. Try one of these positions when you lie down:  ? Lie on your back with your knees bent and supported by large pillows. ? Lie on the floor with your legs on the seat of a sofa or chair. ? Lie on your side with your knees and hips bent and a pillow between your legs. ? Lie on your stomach if it does not make pain worse. · Do not sit up in bed, and avoid soft couches and twisted positions. Bed rest can help relieve pain at first, but it delays healing. Avoid bed rest after the first day of back pain. · Change positions every 30 minutes. If you must sit for long periods of time, take breaks from sitting. Get up and walk around, or lie in a comfortable position. · Try using a heating pad on a low or medium setting for 15 to 20 minutes every 2 or 3 hours. Try a warm shower in place of one session with the heating pad. · You can also try an ice pack for 10 to 15 minutes every 2 to 3 hours. Put a thin cloth between the ice pack and your skin. · Take pain medicines exactly as directed. ? If the doctor gave you a prescription medicine for pain, take it as prescribed. ? If you are not taking a prescription pain medicine, ask your doctor if you can take an over-the-counter medicine. · Take short walks several times a day. You can start with 5 to 10 minutes, 3 or 4 times a day, and work up to longer walks. Walk on level surfaces and avoid hills and stairs until your back is better. · Return to work and other activities as soon as you can. Continued rest without activity is usually not good for your back. · To prevent future back pain, do exercises to stretch and strengthen your back and stomach. Learn how to use good posture, safe lifting techniques, and proper body mechanics. When should you call for help?   Call your doctor now or seek immediate medical care if:    · You have new or worsening numbness in your legs.     · You have new or worsening weakness in your legs. (This could make it hard to stand up.)     · You lose control of your bladder or bowels.    Watch closely for changes in your health, and be sure to contact your doctor if:    · You have a fever, lose weight, or don't feel well.     · You do not get better as expected. Where can you learn more? Go to http://mitul-leena.info/. Enter J125 in the search box to learn more about \"Back Pain: Care Instructions. \"  Current as of: September 20, 2018  Content Version: 11.9  © 3194-8190 [employer], [employer]. Care instructions adapted under license by [employer] (which disclaims liability or warranty for this information). If you have questions about a medical condition or this instruction, always ask your healthcare professional. [address] any warranty or liability for your use of this information.

## 2019-07-10 ENCOUNTER — OFFICE VISIT (OUTPATIENT)
Dept: OBGYN CLINIC | Age: 29
End: 2019-07-10

## 2019-07-10 DIAGNOSIS — Z11.3 SCREENING EXAMINATION FOR SEXUALLY TRANSMITTED DISEASE: ICD-10-CM

## 2019-07-10 DIAGNOSIS — N89.8 VAGINAL DISCHARGE: Primary | ICD-10-CM

## 2019-07-10 RX ORDER — MEDROXYPROGESTERONE ACETATE 150 MG/ML
150 INJECTION, SUSPENSION INTRAMUSCULAR ONCE
Qty: 1 SYRINGE | Refills: 4 | Status: SHIPPED | OUTPATIENT
Start: 2019-07-10 | End: 2019-07-10

## 2019-07-10 NOTE — PROGRESS NOTES
Chief Complaint   Vaginitis      HPI  26 y.o. female complains of white vaginal discharge for a few months. .Patient's last menstrual period was 2019 (exact date). She admits to additional symptoms at this time. itching  The patient  denies aggravating factors  She denies exposure to new chemicals ot hygenic agents  Previous treatment included:  none  She wants std testing today  Past Medical History:   Diagnosis Date    Abnormal Pap smear     Anemia     Gastrointestinal disorder     Ulcers    Headache     Herniated disc     Hx gestational diabetes     HX OTHER MEDICAL     trich treated at beginning of pregnancy    HX OTHER MEDICAL     pituitary tumor-benign     Miscarriage     Molar pregnancy     Pap smear for cervical cancer screening 19 neg HPV POS- rp pap one year    PCOS (polycystic ovarian syndrome)     Pelvic pain in female     Pituitary adenoma ([facility] [address]     Pituitary tumor     Psychiatric problem     depression never on medication    Thyroid activity decreased     hypothyroid awaiting appnt for endo     Past Surgical History:   Procedure Laterality Date    HX  SECTION      HX COLPOSCOPY  11/3/10    HX DILATION AND CURETTAGE      HX LIPOMA RESECTION       Social History     Occupational History    Not on file   Tobacco Use    Smoking status: Never Smoker    Smokeless tobacco: Never Used   Substance and Sexual Activity    Alcohol use: No     Comment: =    Drug use: No    Sexual activity: Yes     Partners: Male     Birth control/protection: None     Family History   Problem Relation Age of Onset    Diabetes Father     Headache Mother         Allergies   Allergen Reactions    Tomato Angioedema     Prior to Admission medications    Medication Sig Start Date End Date Taking? Authorizing Provider   diclofenac EC (VOLTAREN) 75 mg EC tablet Take 1 Tab by mouth two (2) times a day.  19   [facility],    topiramate (TOPAMAX) 50 mg tablet TAKE 1 TABLET BY MOUTH IN MORNING AND 2 TABS IN EVENING.  TO REDUCE HEADACHE FREQUENCY 5/8/19   Maira Lozano MD                      Review of Systems - History obtained from the patient  Constitutional: negative for weight loss, fever, night sweats  Breast: negative for breast lumps, nipple discharge, galactorrhea  GI: negative for change in bowel habits, abdominal pain, black or bloody stools  : negative for frequency, dysuria, hematuria  MSK: negative for back pain, joint pain, muscle pain  Skin: negative for itching, rash, hives  Neuro: negative for dizziness, headache, confusion, weakness  Psych: negative for anxiety, depression, change in mood  Heme/lymph: negative for bleeding, bruising, pallor       Objective:    Visit Vitals  LMP 06/12/2019 (Exact Date)       Physical Exam:   PHYSICAL EXAMINATION    Constitutional  · Appearance: well-nourished, well developed, alert, in no acute distress    HENT  · Head and Face: appears normal    Genitourinary  · External Genitalia: normal appearance for age, + discharge present, no tenderness present, no inflammatory lesions present, no masses present, no atrophy present  · Vagina:  + discharge present, otherwise normal vaginal vault without central or paravaginal defects, no inflammatory lesions present, no masses present  · Bladder: non-tender to palpation  · Urethra: appears normal  · Cervix: normal   · Uterus: normal size, shape and consistency  · Adnexa: no adnexal tenderness present, no adnexal masses present  · Perineum: perineum within normal limits, no evidence of trauma, no rashes or skin lesions present  · Anus: anus within normal limits, no hemorrhoids present  · Inguinal Lymph Nodes: no lymphadenopathy present    Skin  · General Inspection: no rash, no lesions identified    Neurologic/Psychiatric  · Mental Status:  · Orientation: grossly oriented to person, place and time  · Mood and Affect: mood normal, affect appropriate      Assessment:   Vaginitis/ vaginal discharge- will f/u with nuswab    Plan:     ROV prn if symptoms persist or worsen.

## 2019-07-16 ENCOUNTER — CLINICAL SUPPORT (OUTPATIENT)
Dept: OBGYN CLINIC | Age: 29
End: 2019-07-16

## 2019-07-16 DIAGNOSIS — Z30.42 ENCOUNTER FOR DEPO-PROVERA CONTRACEPTION: Primary | ICD-10-CM

## 2019-07-16 LAB
A VAGINAE DNA VAG QL NAA+PROBE: ABNORMAL SCORE
BVAB2 DNA VAG QL NAA+PROBE: ABNORMAL SCORE
C ALBICANS DNA VAG QL NAA+PROBE: NEGATIVE
C GLABRATA DNA VAG QL NAA+PROBE: NEGATIVE
C TRACH RRNA SPEC QL NAA+PROBE: NEGATIVE
HCG URINE, QL. (POC): NEGATIVE
MEGA1 DNA VAG QL NAA+PROBE: ABNORMAL SCORE
N GONORRHOEA RRNA SPEC QL NAA+PROBE: NEGATIVE
T VAGINALIS RRNA SPEC QL NAA+PROBE: NEGATIVE
VALID INTERNAL CONTROL?: YES

## 2019-07-16 RX ORDER — MEDROXYPROGESTERONE ACETATE 150 MG/ML
150 INJECTION, SUSPENSION INTRAMUSCULAR ONCE
Qty: 1 ML | Refills: 0 | Status: SHIPPED | COMMUNITY
Start: 2019-07-16 | End: 2019-07-16

## 2019-07-16 NOTE — PROGRESS NOTES
Last pap 2/13/19  Last depo inj: this is the first injection  Urine UPT needed: yes and it is negative  Next depo inj due between October 1- 15, 2019    Pt tolerated injection well. Encouraged her to call if any problems or concerns arrive. After obtaining consent, and per orders of dr Carmelo Berger, injection of depo provera 150 mg given in left glute by Rachel Morales RN. Patient instructed to remain in clinic for 20 minutes afterwards, and to report any adverse reaction to me immediately. Lot: [id number] Exp: 4/30/23 . [address]: [id number].

## 2019-07-17 ENCOUNTER — PATIENT MESSAGE (OUTPATIENT)
Dept: OBGYN CLINIC | Age: 29
End: 2019-07-17

## 2019-07-18 RX ORDER — CLINDAMYCIN PHOSPHATE 20 MG/G
1 CREAM VAGINAL
Qty: 40 G | Refills: 0 | Status: SHIPPED | OUTPATIENT
Start: 2019-07-18 | End: 2019-09-01

## 2019-07-18 RX ORDER — CLINDAMYCIN HYDROCHLORIDE 300 MG/1
300 CAPSULE ORAL 2 TIMES DAILY
Qty: 14 CAP | Refills: 0 | Status: SHIPPED | OUTPATIENT
Start: 2019-07-18 | End: 2019-07-25

## 2019-07-18 NOTE — TELEPHONE ENCOUNTER
Nicolas Harkins 7/18/2019 1:54 PM EDT    Notes recorded by Lanette Elizondo MD on 7/18/2019 at 12:42 PM EDT  Bv- can treat with clindamycin 2% vaginal cream pv qhs x 1 week.  #1 tube refills 0    Will you please send this to the pharmacy?  Thank you!   ----- Message -----  From: Alisha Rued: 7/18/2019 1:16 PM EDT  To: Rosalino Kyle  Subject: RE: RE: Test Results Question     Are you gonna call in a antibiotic for me?    ----- Message -----  From: Triston Platt  Sent: 7/17/19 2:38 PM  To: Bari Zurita  Subject: RE: Test Results Question    I will let you know as soon as it is back and reviewed by Dr. Paris Ayon     ----- Message -----   From: Alisha Rued: 7/17/2019 1:28 PM EDT   To: Kaushal Iglesias MD  Subject: Test Results Question    Did my results come back yet from my pap

## 2019-07-18 NOTE — TELEPHONE ENCOUNTER
Patient called back stating that her insurance company will not cover the clindamycin vaginal medication and needs to have the pill form. Per Dr. Viviana rojas to convert to the pills. rx sent per MD order. Patient aware.

## 2019-09-01 ENCOUNTER — HOSPITAL ENCOUNTER (EMERGENCY)
Age: 29
Discharge: HOME | End: 2019-09-02
Attending: EMERGENCY MEDICINE
Payer: MEDICAID

## 2019-09-01 VITALS
BODY MASS INDEX: 35.75 KG/M2 | DIASTOLIC BLOOD PRESSURE: 79 MMHG | TEMPERATURE: 97.8 F | OXYGEN SATURATION: 99 % | HEART RATE: 93 BPM | HEIGHT: 66 IN | RESPIRATION RATE: 18 BRPM | SYSTOLIC BLOOD PRESSURE: 139 MMHG | WEIGHT: 222.44 LBS

## 2019-09-01 DIAGNOSIS — S69.92XA FINGER INJURY, LEFT, INITIAL ENCOUNTER: ICD-10-CM

## 2019-09-01 DIAGNOSIS — M25.562 ACUTE PAIN OF LEFT KNEE: Primary | ICD-10-CM

## 2019-09-01 DIAGNOSIS — M54.50 LUMBAR PAIN: ICD-10-CM

## 2019-09-01 PROCEDURE — 99283 EMERGENCY DEPT VISIT LOW MDM: CPT

## 2019-09-01 RX ORDER — MEDROXYPROGESTERONE ACETATE 150 MG/ML
150 INJECTION, SUSPENSION INTRAMUSCULAR ONCE
COMMUNITY
End: 2020-06-25 | Stop reason: SDUPTHER

## 2019-09-02 ENCOUNTER — APPOINTMENT (OUTPATIENT)
Dept: GENERAL RADIOLOGY | Age: 29
End: 2019-09-02
Attending: EMERGENCY MEDICINE
Payer: MEDICAID

## 2019-09-02 PROCEDURE — 74011250637 HC RX REV CODE- 250/637: Performed by: EMERGENCY MEDICINE

## 2019-09-02 PROCEDURE — 73140 X-RAY EXAM OF FINGER(S): CPT

## 2019-09-02 PROCEDURE — 73562 X-RAY EXAM OF KNEE 3: CPT

## 2019-09-02 PROCEDURE — 72100 X-RAY EXAM L-S SPINE 2/3 VWS: CPT

## 2019-09-02 RX ORDER — IBUPROFEN 800 MG/1
800 TABLET ORAL
Status: COMPLETED | OUTPATIENT
Start: 2019-09-02 | End: 2019-09-02

## 2019-09-02 RX ADMIN — IBUPROFEN 800 MG: 800 TABLET ORAL at 00:36

## 2019-09-02 NOTE — ED PROVIDER NOTES
History anemia, ulcers, headaches, benign pituitary tumor, polycystic ovarian syndrome, depression; she presents accompanied by her children after sustaining a fall. She states that she was walking down some concrete steps when her left knee gave out. She she reports having some ongoing pain with her left knee. With the fall, her left knee pain is worsened. She also is now having low back pain and left third finger pain since falling. No head injury, LOC or headache. No other complaints.   No treatment prior to arrival.           Past Medical History:   Diagnosis Date    Abnormal Pap smear     Anemia     Gastrointestinal disorder     Ulcers    Headache     Herniated disc     Hx gestational diabetes     HX OTHER MEDICAL     trich treated at beginning of pregnancy    HX OTHER MEDICAL     pituitary tumor-benign     Miscarriage     Molar pregnancy     Pap smear for cervical cancer screening 19 neg HPV POS- rp pap one year    PCOS (polycystic ovarian syndrome)     Pelvic pain in female     [facility] adenoma ([facility] [address]     Pituitary tumor     Psychiatric problem     depression never on medication    Thyroid activity decreased     hypothyroid awaiting appnt for endo       Past Surgical History:   Procedure Laterality Date    HX  SECTION      HX COLPOSCOPY  11/3/10    HX DILATION AND CURETTAGE      HX LIPOMA RESECTION           Family History:   Problem Relation Age of Onset    Diabetes Father     Headache Mother        Social History     Socioeconomic History    Marital status: SINGLE     Spouse name: Not on file    Number of children: Not on file    Years of education: Not on file    Highest education level: Not on file   Occupational History    Not on file   Social Needs    Financial resource strain: Not on file    Food insecurity:     Worry: Not on file     Inability: Not on file    Transportation needs:     Medical: Not on file     Non-medical: Not on file   Tobacco Use  Smoking status: Never Smoker    Smokeless tobacco: Never Used   Substance and Sexual Activity    Alcohol use: No     Comment: =    Drug use: No    Sexual activity: Yes     Partners: Male     Birth control/protection: None   Lifestyle    Physical activity:     Days per week: Not on file     Minutes per session: Not on file    Stress: Not on file   Relationships    Social connections:     Talks on phone: Not on file     Gets together: Not on file     Attends Jew service: Not on file     Active member of club or organization: Not on file     Attends meetings of clubs or organizations: Not on file     Relationship status: Not on file    Intimate partner violence:     Fear of current or ex partner: Not on file     Emotionally abused: Not on file     Physically abused: Not on file     Forced sexual activity: Not on file   Other Topics Concern    Not on file   Social History Narrative    Not on file         ALLERGIES: Tomato    Review of Systems   All other systems reviewed and are negative. Vitals:    09/01/19 2347   BP: 139/79   Pulse: 93   Resp: 18   Temp: 97.8 °F (36.6 °C)   SpO2: 99%   Weight: 100.9 kg (222 lb 7.1 oz)   Height: 5' 6\" (1.676 m)            Physical Exam   Constitutional: She appears well-developed and well-nourished. HENT:   Head: Normocephalic and atraumatic. Eyes: Conjunctivae are normal.   Neck: No tracheal deviation present. Cardiovascular: Normal rate. Pulmonary/Chest: Effort normal.   Abdominal: She exhibits no distension. Musculoskeletal: She exhibits no edema. Lumbar tenderness, diffuse left knee tenderness, and left third finger tenderness at the PIP and MCP joints. Neurovascularly intact. Neurological: She is alert. Skin: Skin is dry. Psychiatric: She has a normal mood and affect. Nursing note and vitals reviewed. MDM       Procedures    Progress Note:  Results, treatment, and follow up plan have been discussed with patient/family.   Questions were answered. Mikey Moody MD  1:28 AM    A/P: Presents status post fall down some stairs with lumbar pain, left knee pain, and left third finger pain -suspect contusions versus sprains; reassuring appearance and exam; VSS; discharge with instructions to ice, elevate, and rest her injuries. Ibuprofen for pain. PCP/Ortho follow-up.   Mikey Moody MD  1:29 AM

## 2019-09-02 NOTE — DISCHARGE INSTRUCTIONS
Patient Education        Back Pain: Care Instructions  Your Care Instructions    Back pain has many possible causes. It is often related to problems with muscles and ligaments of the back. It may also be related to problems with the nerves, discs, or bones of the back. Moving, lifting, standing, sitting, or sleeping in an awkward way can strain the back. Sometimes you don't notice the injury until later. Arthritis is another common cause of back pain. Although it may hurt a lot, back pain usually improves on its own within several weeks. Most people recover in 12 weeks or less. Using good home treatment and being careful not to stress your back can help you feel better sooner. Follow-up care is a key part of your treatment and safety. Be sure to make and go to all appointments, and call your doctor if you are having problems. It's also a good idea to know your test results and keep a list of the medicines you take. How can you care for yourself at home? · Sit or lie in positions that are most comfortable and reduce your pain. Try one of these positions when you lie down:  ? Lie on your back with your knees bent and supported by large pillows. ? Lie on the floor with your legs on the seat of a sofa or chair. ? Lie on your side with your knees and hips bent and a pillow between your legs. ? Lie on your stomach if it does not make pain worse. · Do not sit up in bed, and avoid soft couches and twisted positions. Bed rest can help relieve pain at first, but it delays healing. Avoid bed rest after the first day of back pain. · Change positions every 30 minutes. If you must sit for long periods of time, take breaks from sitting. Get up and walk around, or lie in a comfortable position. · Try using a heating pad on a low or medium setting for 15 to 20 minutes every 2 or 3 hours. Try a warm shower in place of one session with the heating pad. · You can also try an ice pack for 10 to 15 minutes every 2 to 3 hours. Put a thin cloth between the ice pack and your skin. · Take pain medicines exactly as directed. ? If the doctor gave you a prescription medicine for pain, take it as prescribed. ? If you are not taking a prescription pain medicine, ask your doctor if you can take an over-the-counter medicine. · Take short walks several times a day. You can start with 5 to 10 minutes, 3 or 4 times a day, and work up to longer walks. Walk on level surfaces and avoid hills and stairs until your back is better. · Return to work and other activities as soon as you can. Continued rest without activity is usually not good for your back. · To prevent future back pain, do exercises to stretch and strengthen your back and stomach. Learn how to use good posture, safe lifting techniques, and proper body mechanics. When should you call for help? Call your doctor now or seek immediate medical care if:    · You have new or worsening numbness in your legs.     · You have new or worsening weakness in your legs. (This could make it hard to stand up.)     · You lose control of your bladder or bowels.    Watch closely for changes in your health, and be sure to contact your doctor if:    · You have a fever, lose weight, or don't feel well.     · You do not get better as expected. Where can you learn more? Go to http://mitul-leena.info/. Enter B772 in the search box to learn more about \"Back Pain: Care Instructions. \"  Current as of: September 20, 2018  Content Version: 12.1  © 8174-3840 [employer]. Care instructions adapted under license by [employer] (which disclaims liability or warranty for this information). If you have questions about a medical condition or this instruction, always ask your healthcare professional. [place] [address] any warranty or liability for your use of this information.          Patient Education        Knee Pain or Injury: Care Instructions  Your Care Instructions    Injuries are a common cause of knee problems. Sudden (acute) injuries may be caused by a direct blow to the knee. They can also be caused by abnormal twisting, bending, or falling on the knee. Pain, bruising, or swelling may be severe, and may start within minutes of the injury. Overuse is another cause of knee pain. Other causes are climbing stairs, kneeling, and other activities that use the knee. Everyday wear and tear, especially as you get older, also can cause knee pain. Rest, along with home treatment, often relieves pain and allows your knee to heal. If you have a serious knee injury, you may need tests and treatment. Follow-up care is a key part of your treatment and safety. Be sure to make and go to all appointments, and call your doctor if you are having problems. It's also a good idea to know your test results and keep a list of the medicines you take. How can you care for yourself at home? · Be safe with medicines. Read and follow all instructions on the label. ? If the doctor gave you a prescription medicine for pain, take it as prescribed. ? If you are not taking a prescription pain medicine, ask your doctor if you can take an over-the-counter medicine. · Rest and protect your knee. Take a break from any activity that may cause pain. · Put ice or a cold pack on your knee for 10 to 20 minutes at a time. Put a thin cloth between the ice and your skin. · Prop up a sore knee on a pillow when you ice it or anytime you sit or lie down for the next 3 days. Try to keep it above the level of your heart. This will help reduce swelling. · If your knee is not swollen, you can put moist heat, a heating pad, or a warm cloth on your knee. · If your doctor recommends an elastic bandage, sleeve, or other type of support for your knee, wear it as directed. · Follow your doctor's instructions about how much weight you can put on your leg.  Use a cane, crutches, or a walker as instructed. · Follow your doctor's instructions about activity during your healing process. If you can do mild exercise, slowly increase your activity. · Reach and stay at a healthy weight. Extra weight can strain the joints, especially the knees and hips, and make the pain worse. Losing even a few pounds may help. When should you call for help? Call 911 anytime you think you may need emergency care. For example, call if:    · You have symptoms of a blood clot in your lung (called a pulmonary embolism). These may include:  ? Sudden chest pain. ? Trouble breathing. ? Coughing up blood.    Call your doctor now or seek immediate medical care if:    · You have severe or increasing pain.     · Your leg or foot turns cold or changes color.     · You cannot stand or put weight on your knee.     · Your knee looks twisted or bent out of shape.     · You cannot move your knee.     · You have signs of infection, such as:  ? Increased pain, swelling, warmth, or redness. ? Red streaks leading from the knee. ? Pus draining from a place on your knee. ? A fever.     · You have signs of a blood clot in your leg (called a deep vein thrombosis), such as:  ? Pain in your calf, back of the knee, thigh, or groin. ? Redness and swelling in your leg or groin.    Watch closely for changes in your health, and be sure to contact your doctor if:    · You have tingling, weakness, or numbness in your knee.     · You have any new symptoms, such as swelling.     · You have bruises from a knee injury that last longer than 2 weeks.     · You do not get better as expected. Where can you learn more? Go to http://mitul-leena.info/. Enter K195 in the search box to learn more about \"Knee Pain or Injury: Care Instructions. \"  Current as of: September 23, 2018  Content Version: 12.1  © 2889-7175 [employer].  Care instructions adapted under license by [employer] (which disclaims liability or warranty for this information). If you have questions about a medical condition or this instruction, always ask your healthcare professional. [place] [address] any warranty or liability for your use of this information.

## 2019-09-02 NOTE — ED TRIAGE NOTES
Pt reports that her left knee gave as she descending 4 concrete stairs. The pt reports left knee, lower back and left 3rd finger pain.

## 2019-10-10 ENCOUNTER — TELEPHONE (OUTPATIENT)
Dept: OBGYN CLINIC | Age: 29
End: 2019-10-10

## 2019-10-10 RX ORDER — MEDROXYPROGESTERONE ACETATE 150 MG/ML
150 INJECTION, SUSPENSION INTRAMUSCULAR ONCE
Qty: 1 SYRINGE | Refills: 1 | Status: SHIPPED | OUTPATIENT
Start: 2019-10-10 | End: 2019-10-10

## 2019-10-10 NOTE — TELEPHONE ENCOUNTER
rx for tomorrow's dose and 1 refill sent to pharmacy to get her through until her next AE. Rx sent and confirmed receipt.

## 2019-10-10 NOTE — TELEPHONE ENCOUNTER
Patient of FW    Last AE:  2/13/19    Patient is due her Depo injection tomorrow and she is scheduled to come in. She does not have refill remaining at her pharmacy but she Is UTD.   Needs refills to get her through until Feb 2020      [facility]

## 2019-10-11 ENCOUNTER — CLINICAL SUPPORT (OUTPATIENT)
Dept: OBGYN CLINIC | Age: 29
End: 2019-10-11

## 2019-10-11 DIAGNOSIS — Z30.9 ENCOUNTER FOR CONTRACEPTIVE MANAGEMENT, UNSPECIFIED TYPE: Primary | ICD-10-CM

## 2019-10-15 ENCOUNTER — HOSPITAL ENCOUNTER (EMERGENCY)
Age: 29
Discharge: HOME | End: 2019-10-15
Attending: EMERGENCY MEDICINE
Payer: MEDICAID

## 2019-10-15 VITALS
OXYGEN SATURATION: 99 % | WEIGHT: 210 LBS | SYSTOLIC BLOOD PRESSURE: 136 MMHG | RESPIRATION RATE: 16 BRPM | DIASTOLIC BLOOD PRESSURE: 74 MMHG | HEIGHT: 66 IN | TEMPERATURE: 98.5 F | HEART RATE: 75 BPM | BODY MASS INDEX: 33.75 KG/M2

## 2019-10-15 DIAGNOSIS — S63.501A SPRAIN OF RIGHT WRIST, INITIAL ENCOUNTER: Primary | ICD-10-CM

## 2019-10-15 PROCEDURE — L3908 WHO COCK-UP NONMOLDE PRE OTS: HCPCS

## 2019-10-15 PROCEDURE — 99283 EMERGENCY DEPT VISIT LOW MDM: CPT

## 2019-10-15 NOTE — ED TRIAGE NOTES
Pain to right wrist onset 5 days ago. Pt reports that she thinks that she injured it while shoveling a hole in the ground. Swelling and warmth noted at wrist.  Pt reports swelling extends into fingers.   Sensation of tingling to fingers but able to feel touch distal to injury

## 2019-10-15 NOTE — ED NOTES
The patient was discharged home by provider in stable condition. The patient is alert and oriented, in no respiratory distress. The patient's diagnosis, condition and treatment were explained. The patient expressed understanding. No prescriptions given. No work/school note given. A discharge plan has been developed. A  was not involved in the process. Aftercare instructions were given. Pt ambulatory out of the ED.

## 2019-10-15 NOTE — ED PROVIDER NOTES
Date of Service:  10/15/2019    Patient:  Jonas Pedro    Chief Complaint:  Wrist Pain       HPI:  Jonas Pedro is a 26 y.o.  female who presents for evaluation of right wrist pain. Patient works as an LPN and notes that several days ago the elderly couple she was working for his dog . To be nice, she shoveled a hole in the backyard so she could buried the dog. Patient notes that she usually does not do any type of manual physical labor such as this in several hours after this event started having some pain in her right wrist.  She comes in with complaints of a right swollen wrist with discomfort. She describes the pain is minimal, worse with movement in the mid wrist and forearm. She denies any numbness or tingling. She denies any trauma to the wrist.  She denies any elbow pain. No head neck or back pain. No other acute complaints. She denies any type of fevers or chills.            Past Medical History:   Diagnosis Date    Abnormal Pap smear     Anemia     Gastrointestinal disorder     Ulcers    Headache     Herniated disc     Hx gestational diabetes     HX OTHER MEDICAL     trich treated at beginning of pregnancy    HX OTHER MEDICAL     pituitary tumor-benign     Miscarriage     Molar pregnancy     Pap smear for cervical cancer screening 19 neg HPV POS- rp pap one year    PCOS (polycystic ovarian syndrome)     Pelvic pain in female     Pituitary adenoma ([facility] [address]     Pituitary tumor     Psychiatric problem     depression never on medication    Thyroid activity decreased     hypothyroid awaiting appnt for endo       Past Surgical History:   Procedure Laterality Date    HX  SECTION      HX COLPOSCOPY  11/3/10    HX DILATION AND CURETTAGE      HX LIPOMA RESECTION           Family History:   Problem Relation Age of Onset    Diabetes Father     Headache Mother        Social History     Socioeconomic History    Marital status: SINGLE     Spouse name: Not on file  Number of children: Not on file    Years of education: Not on file    Highest education level: Not on file   Occupational History    Not on file   Social Needs    Financial resource strain: Not on file    Food insecurity:     Worry: Not on file     Inability: Not on file    Transportation needs:     Medical: Not on file     Non-medical: Not on file   Tobacco Use    Smoking status: Never Smoker    Smokeless tobacco: Never Used   Substance and Sexual Activity    Alcohol use: Yes     Comment: social    Drug use: No    Sexual activity: Yes     Partners: Male     Birth control/protection: None   Lifestyle    Physical activity:     Days per week: Not on file     Minutes per session: Not on file    Stress: Not on file   Relationships    Social connections:     Talks on phone: Not on file     Gets together: Not on file     Attends Holiness service: Not on file     Active member of club or organization: Not on file     Attends meetings of clubs or organizations: Not on file     Relationship status: Not on file    Intimate partner violence:     Fear of current or ex partner: Not on file     Emotionally abused: Not on file     Physically abused: Not on file     Forced sexual activity: Not on file   Other Topics Concern    Not on file   Social History Narrative    Not on file         ALLERGIES: Tomato    Review of Systems   Constitutional: Negative for fever. HENT: Negative for hearing loss. Eyes: Negative for visual disturbance. Respiratory: Negative for shortness of breath. Cardiovascular: Negative for chest pain. Gastrointestinal: Negative for abdominal pain. Genitourinary: Negative for flank pain. Musculoskeletal: Negative for back pain. Skin: Negative for rash. Neurological: Negative for dizziness, weakness, light-headedness and numbness.        Vitals:    10/15/19 1058   BP: 136/74   Pulse: 75   Resp: 16   Temp: 98.5 °F (36.9 °C)   SpO2: 99%   Weight: 95.3 kg (210 lb)   Height: 5' 6\" (1.676 m)            Physical Exam   Constitutional: She is oriented to person, place, and time. She appears well-developed and well-nourished. HENT:   Head: Normocephalic and atraumatic. Eyes: Pupils are equal, round, and reactive to light. EOM are normal.   Neck: Normal range of motion. Cardiovascular: Normal rate, regular rhythm and intact distal pulses. Pulmonary/Chest: Effort normal. No respiratory distress. Abdominal: Soft. There is no tenderness. Musculoskeletal: Normal range of motion. The right wrist is unremarkable in appearance. Range of motion testing is normal.  Flexion, extension, abduction and abduction is normal.  Good  strength. No bony tenderness. No obvious swelling, erythema, discoloration or change from baseline when compared to the left   Neurological: She is alert and oriented to person, place, and time. No sensory deficit. She exhibits normal muscle tone. Skin: Skin is warm and dry. Capillary refill takes less than 2 seconds. No rash noted. Psychiatric: She has a normal mood and affect. MDM  Number of Diagnoses or Management Options  Sprain of right wrist, initial encounter:         VITAL SIGNS:  Patient Vitals for the past 4 hrs:   Temp Pulse Resp BP SpO2   10/15/19 1058 98.5 °F (36.9 °C) 75 16 136/74 99 %         LABS:  No results found for this or any previous visit (from the past 6 hour(s)). IMAGING:  No orders to display         Medications During Visit:  Medications - No data to display      DECISION MAKING:  Evelyn Beltran is a 26 y.o. female who comes in as above. Here, patient was offered pain medications and declined. Patient's physical exam is grossly unremarkable and there is no real bony tenderness. She does not have any true signs of carpal tunnel on exam, she had an equivocal Phalen's and reverse Phalen's test.  At this time I believe she most likely overworked her dominant right hand doing activity she does not normally do.   Because of this I think she has some inflammation and irritation similar to that of a sprain. At this time I will provide the patient with a wrist splint to help her remind herself that it is there as well as to come to give her some extra support. She can follow with her primary care doctor for additional management. Patient states that she feels safe to return to work. At this time patient is given standard follow-up and return instructions. All questions answered. IMPRESSION:  1. Sprain of right wrist, initial encounter        DISPOSITION:  Discharged      Discharge Medication List as of 10/15/2019 11:32 AM           Follow-up Information     Follow up With Specialties Details Why Contact Info    Milli Connor MD Pediatrics, Family Practice Schedule an appointment as soon as possible for a visit   [place]   [address] [facility]  [phone]              The patient is asked to follow-up with their primary care provider in the next several days. They are to call tomorrow for an appointment. The patient is asked to return promptly for any increased concerns or worsening of symptoms. They can return to this emergency department or any other emergency department.       Procedures

## 2019-10-15 NOTE — ED NOTES
Velcro Wrist splint applied to patient right wrist.  Patient tolerated well. Ice pack remains in place.

## 2019-10-21 ENCOUNTER — OFFICE VISIT (OUTPATIENT)
Dept: FAMILY MEDICINE CLINIC | Age: 29
End: 2019-10-21

## 2019-10-21 VITALS
DIASTOLIC BLOOD PRESSURE: 74 MMHG | RESPIRATION RATE: 18 BRPM | OXYGEN SATURATION: 98 % | HEART RATE: 64 BPM | HEIGHT: 66 IN | TEMPERATURE: 98 F | BODY MASS INDEX: 35.58 KG/M2 | SYSTOLIC BLOOD PRESSURE: 130 MMHG | WEIGHT: 221.4 LBS

## 2019-10-21 DIAGNOSIS — M62.838 MUSCLE SPASM: ICD-10-CM

## 2019-10-21 DIAGNOSIS — G44.52 NEW DAILY PERSISTENT HEADACHE: Primary | ICD-10-CM

## 2019-10-21 DIAGNOSIS — R11.0 NAUSEA: ICD-10-CM

## 2019-10-21 DIAGNOSIS — Z86.69 H/O MIGRAINE: ICD-10-CM

## 2019-10-21 RX ORDER — ONDANSETRON 8 MG/1
8 TABLET, ORALLY DISINTEGRATING ORAL
Qty: 18 TAB | Refills: 0 | Status: SHIPPED | OUTPATIENT
Start: 2019-10-21 | End: 2020-01-06 | Stop reason: ALTCHOICE

## 2019-10-21 RX ORDER — SUMATRIPTAN 25 MG/1
25 TABLET, FILM COATED ORAL
Qty: 9 TAB | Refills: 0 | Status: SHIPPED | OUTPATIENT
Start: 2019-10-21 | End: 2019-10-21

## 2019-10-21 RX ORDER — TIZANIDINE 2 MG/1
TABLET ORAL
Qty: 30 TAB | Refills: 1 | Status: SHIPPED | OUTPATIENT
Start: 2019-10-21 | End: 2020-05-21

## 2019-10-21 NOTE — PATIENT INSTRUCTIONS
Tension Headache: Care Instructions  Your Care Instructions  Most headaches are tension headaches. These headaches tend to happen again, especially if you are under stress. A tension headache may cause pain or a feeling of pressure all over your head. You probably can't pinpoint the center of the pain. If you keep getting tension headaches, the best thing you can do to limit them is to find out what is causing them and then make changes in those areas. Follow-up care is a key part of your treatment and safety. Be sure to make and go to all appointments, and call your doctor if you are having problems. It's also a good idea to know your test results and keep a list of the medicines you take. How can you care for yourself at home? · Rest in a quiet, dark room with a cool cloth on your forehead until your headache is gone. Close your eyes, and try to relax or go to sleep. Don't watch TV or read. Avoid using the computer. · Use a warm, moist towel or a heating pad set on low to relax tight shoulder and neck muscles. · Have someone gently massage your neck and shoulders. · Take pain medicines exactly as directed. ? If the doctor gave you a prescription medicine for pain, take it as prescribed. ? If you are not taking a prescription pain medicine, ask your doctor if you can take an over-the-counter medicine. · Be careful not to take pain medicine more often than the instructions allow, because you may get worse or more frequent headaches when the medicine wears off. · If you get another tension headache, stop what you are doing and sit quietly for a moment. Close your eyes and breathe slowly. Try to relax your head and neck muscles. · Do not ignore new symptoms that occur with a headache, such as fever, weakness or numbness, vision changes, or confusion. These may be signs of a more serious problem. To help prevent headaches  · Keep a headache diary so you can figure out what triggers your headaches. Avoiding triggers may help you prevent headaches. Record when each headache began, how long it lasted, and what the pain was like (throbbing, aching, stabbing, or dull). List anything that may have triggered the headache, such as being physically or emotionally stressed or being anxious or depressed. Other possible triggers are hunger, anger, fatigue, poor posture, and muscle strain. · Find healthy ways to deal with stress. Headaches are most common during or right after stressful times. Take time to relax before and after you do something that has caused a headache in the past.  · Exercise daily to relieve stress. Relaxation exercises may help reduce tension. · Get plenty of sleep. · Eat regularly and well. Long periods without food can trigger a headache. · Treat yourself to a massage. Some people find that massages are very helpful in relieving tension. · Try to keep your muscles relaxed by keeping good posture. Check your jaw, face, neck, and shoulder muscles for tension, and try to relax them. When sitting at a desk, change positions often, and stretch for 30 seconds each hour. · Reduce eyestrain from computers by blinking frequently and looking away from the computer screen every so often. Make sure you have proper eyewear and that your monitor is set up properly, about an arm's length away. When should you call for help? Call 911 anytime you think you may need emergency care. For example, call if:    · You have signs of a stroke. These may include:  ? Sudden numbness, paralysis, or weakness in your face, arm, or leg, especially on only one side of your body. ? Sudden vision changes. ? Sudden trouble speaking. ? Sudden confusion or trouble understanding simple statements. ? Sudden problems with walking or balance.   ? A sudden, severe headache that is different from past headaches.    Call your doctor now or seek immediate medical care if:    · You have new or worse nausea and vomiting.     · You have a new or higher fever.     · Your headache gets much worse.    Watch closely for changes in your health, and be sure to contact your doctor if:    · You are not getting better after 2 days (48 hours). Where can you learn more? Go to http://mitul-leena.info/. Enter 84 17 99 in the search box to learn more about \"Tension Headache: Care Instructions. \"  Current as of: March 28, 2019  Content Version: 12.2  © 0497-7680 [employer], "[employer] Care instructions adapted under license by [employer] (which disclaims liability or warranty for this information). If you have questions about a medical condition or this instruction, always ask your healthcare professional. [address] any warranty or liability for your use of this information.

## 2019-10-21 NOTE — PROGRESS NOTES
Patient presents for complaints of having a headache rated a 10 x 1 week now with blurred vision and nausea. Also complains that right ear has started paining today.

## 2019-10-21 NOTE — PROGRESS NOTES
Chief Complaint:  Chief Complaint   Patient presents with    Headache     Complains of headache rated a 10 with blurred vision and nausea x 1 week. No better    Ear Pain     Complains of pain in right ear that started today. History of Present Illness:  29F with h/o migraine headaches who presents today with worsening headaches x 10 days with blurred vision and nausea x 1 week. She has been taking Tylenol OTC and reports that this has not helped. In addition, she started ot experience sharp pin in her R-ear that started today. She denies fevers. This is the worst headache she has ever had. However, no alarm symptoms. She reports that she plans to go back to work after her office visit. She really just wants something to break the headache. She may also be having a viral symptoms. Reviewed PmHx, RxHx, FmHx, SocHx, AllgHx and updated and dated in the chart.     Patient Active Problem List    Diagnosis    Elevated prolactin level (HCC)    Severe obesity (HCC)    Right hand weakness    Numbness of right hand    Chronic tension-type headache, intractable    Intractable chronic migraine without aura and without status migrainosus    Pituitary adenoma ([facility] [address]       Review of Systems - negative except as listed above in the HPI    Objective:     Vitals:    10/21/19 1424   BP: 130/74   Pulse: 64   Resp: 18   Temp: 98 °F (36.7 °C)   TempSrc: Oral   SpO2: 98%   Weight: 221 lb 6.4 oz (100.4 kg)   Height: 5' 6\" (1.676 m)     Physical Examination:   General appearance - alert, well appearing, and in no distress  Mental status - alert, oriented to person, place, and time  Eyes - pupils equal and reactive, extraocular eye movements intact  Ears - bilateral TM's and external ear canals normal  Chest - clear to auscultation, no wheezes, rales or rhonchi, symmetric air entry  Heart - normal rate, regular rhythm, normal S1, S2, no murmurs, rubs, clicks or gallops  Back exam - full range of motion, no tenderness, palpable spasm or pain on motion  Neurological - alert, oriented, normal speech, no focal findings or movement disorder noted  Musculoskeletal - no joint tenderness, deformity or swelling  Extremities - peripheral pulses normal, no pedal edema, no clubbing or cyanosis    Assessment/ Plan:     29F with probable migraine headache. However, we discussed presentation and worrisome symptoms in detail. She was encouraged to return should she have little improvement in headache or worrisome symptoms - which should take her to the ER. I also evaluated her R-wrist. She has a wrist splint. No deformity. Full ROM, though painful. Recommended: Apply a compressive wrist splint. Rest and elevate the affected painful area. Apply cold compresses intermittently as needed. As pain recedes, begin normal activities slowly as tolerated. Call if symptoms persist.    Diagnoses and all orders for this visit:    1. New daily persistent headache  -     SUMAtriptan (IMITREX) 25 mg tablet; Take 1 Tab by mouth once as needed for Migraine (May repeat in 1 hour if still having pain.) for up to 1 dose. -     ondansetron (ZOFRAN ODT) 8 mg disintegrating tablet; Take 1 Tab by mouth every eight (8) hours as needed for Nausea. - Consider CT-scan if it does not respond to outlined treatment plan. 2. H/O migraine  -     SUMAtriptan (IMITREX) 25 mg tablet; Take 1 Tab by mouth once as needed for Migraine (May repeat in 1 hour if still having pain.) for up to 1 dose. 3. Nausea        -     ondansetron (ZOFRAN ODT) 8 mg disintegrating tablet; Take 1 Tab by mouth every eight (8) hours as needed for Nausea. 4. Muscle spasm  -     tiZANidine (ZANAFLEX) 2 mg tablet; 1-2 tablet, 1-2 hours before bedtime. I have discussed the diagnosis with the patient and the intended treatment plan as seen in the above orders. The patient has received an after-visit summary and questions were answered concerning future plans.  Asked to return should symptoms worsen or not improve with treatment. Any pending labs and studies will be relayed to patient when they become available. Pt verbalizes understanding of plan of care and denies further questions or concerns at this time. Follow-up and Dispositions    · Return if symptoms worsen or fail to improve. Emile Tafoya MD    Patient Instructions          Tension Headache: Care Instructions  Your Care Instructions  Most headaches are tension headaches. These headaches tend to happen again, especially if you are under stress. A tension headache may cause pain or a feeling of pressure all over your head. You probably can't pinpoint the center of the pain. If you keep getting tension headaches, the best thing you can do to limit them is to find out what is causing them and then make changes in those areas. Follow-up care is a key part of your treatment and safety. Be sure to make and go to all appointments, and call your doctor if you are having problems. It's also a good idea to know your test results and keep a list of the medicines you take. How can you care for yourself at home? · Rest in a quiet, dark room with a cool cloth on your forehead until your headache is gone. Close your eyes, and try to relax or go to sleep. Don't watch TV or read. Avoid using the computer. · Use a warm, moist towel or a heating pad set on low to relax tight shoulder and neck muscles. · Have someone gently massage your neck and shoulders. · Take pain medicines exactly as directed. ? If the doctor gave you a prescription medicine for pain, take it as prescribed. ? If you are not taking a prescription pain medicine, ask your doctor if you can take an over-the-counter medicine. · Be careful not to take pain medicine more often than the instructions allow, because you may get worse or more frequent headaches when the medicine wears off.   · If you get another tension headache, stop what you are doing and sit quietly for a moment. Close your eyes and breathe slowly. Try to relax your head and neck muscles. · Do not ignore new symptoms that occur with a headache, such as fever, weakness or numbness, vision changes, or confusion. These may be signs of a more serious problem. To help prevent headaches  · Keep a headache diary so you can figure out what triggers your headaches. Avoiding triggers may help you prevent headaches. Record when each headache began, how long it lasted, and what the pain was like (throbbing, aching, stabbing, or dull). List anything that may have triggered the headache, such as being physically or emotionally stressed or being anxious or depressed. Other possible triggers are hunger, anger, fatigue, poor posture, and muscle strain. · Find healthy ways to deal with stress. Headaches are most common during or right after stressful times. Take time to relax before and after you do something that has caused a headache in the past.  · Exercise daily to relieve stress. Relaxation exercises may help reduce tension. · Get plenty of sleep. · Eat regularly and well. Long periods without food can trigger a headache. · Treat yourself to a massage. Some people find that massages are very helpful in relieving tension. · Try to keep your muscles relaxed by keeping good posture. Check your jaw, face, neck, and shoulder muscles for tension, and try to relax them. When sitting at a desk, change positions often, and stretch for 30 seconds each hour. · Reduce eyestrain from computers by blinking frequently and looking away from the computer screen every so often. Make sure you have proper eyewear and that your monitor is set up properly, about an arm's length away. When should you call for help? Call 911 anytime you think you may need emergency care. For example, call if:    · You have signs of a stroke.  These may include:  ? Sudden numbness, paralysis, or weakness in your face, arm, or leg, especially on only one side of your body. ? Sudden vision changes. ? Sudden trouble speaking. ? Sudden confusion or trouble understanding simple statements. ? Sudden problems with walking or balance. ? A sudden, severe headache that is different from past headaches.    Call your doctor now or seek immediate medical care if:    · You have new or worse nausea and vomiting.     · You have a new or higher fever.     · Your headache gets much worse.    Watch closely for changes in your health, and be sure to contact your doctor if:    · You are not getting better after 2 days (48 hours). Where can you learn more? Go to http://mitul-leena.info/. Enter 84 17 85 in the search box to learn more about \"Tension Headache: Care Instructions. \"  Current as of: March 28, 2019  Content Version: 12.2  © 6088-4994 [employer]. Care instructions adapted under license by [employer] (which disclaims liability or warranty for this information). If you have questions about a medical condition or this instruction, always ask your healthcare professional. [place] [address] any warranty or liability for your use of this information.

## 2019-11-27 ENCOUNTER — OFFICE VISIT (OUTPATIENT)
Dept: FAMILY MEDICINE CLINIC | Age: 29
End: 2019-11-27

## 2019-11-27 VITALS
BODY MASS INDEX: 35.87 KG/M2 | OXYGEN SATURATION: 98 % | HEIGHT: 66 IN | TEMPERATURE: 97.8 F | DIASTOLIC BLOOD PRESSURE: 56 MMHG | RESPIRATION RATE: 24 BRPM | SYSTOLIC BLOOD PRESSURE: 150 MMHG | HEART RATE: 87 BPM | WEIGHT: 223.2 LBS

## 2019-11-27 DIAGNOSIS — R07.89 OTHER CHEST PAIN: Primary | ICD-10-CM

## 2019-11-27 DIAGNOSIS — M79.602 PAIN IN INFERIOR LEFT UPPER EXTREMITY: ICD-10-CM

## 2019-11-27 DIAGNOSIS — M62.838 MUSCLE SPASM: ICD-10-CM

## 2019-11-27 DIAGNOSIS — G47.09 OTHER INSOMNIA: ICD-10-CM

## 2019-11-27 DIAGNOSIS — M94.0 COSTOCHONDRITIS: ICD-10-CM

## 2019-11-27 RX ORDER — PREDNISONE 10 MG/1
TABLET ORAL
Qty: 21 TAB | Refills: 0 | Status: SHIPPED | OUTPATIENT
Start: 2019-11-27 | End: 2019-12-11 | Stop reason: ALTCHOICE

## 2019-11-27 RX ORDER — TRAZODONE HYDROCHLORIDE 50 MG/1
50 TABLET ORAL
Qty: 30 TAB | Refills: 1 | Status: SHIPPED | OUTPATIENT
Start: 2019-11-27 | End: 2019-12-20 | Stop reason: SDUPTHER

## 2019-11-27 RX ORDER — TIZANIDINE 2 MG/1
TABLET ORAL
Qty: 30 TAB | Refills: 1 | Status: SHIPPED | OUTPATIENT
Start: 2019-11-27 | End: 2020-01-06 | Stop reason: SDUPTHER

## 2019-11-27 RX ORDER — IBUPROFEN 800 MG/1
800 TABLET ORAL
Qty: 90 TAB | Refills: 0 | Status: SHIPPED | OUTPATIENT
Start: 2019-11-27 | End: 2019-12-27

## 2019-11-27 NOTE — PROGRESS NOTES
Chief Complaint:  Chief Complaint   Patient presents with    Chest Pain     Complains of chest pain x 2 days with pain in left arm and neck    Insomnia     History of Present Illness:  29F who presents with 2 days of chest pain that was in her neck and left shoulder. She has low probability for CAD. 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 The pain is reproducible. Central chest and upper L-shoulder/back. Denies SOB, dizziness or any other constitutional symptoms. BP is elevated today, possibly due to pain as previous BP's stable. The patient also c/o difficulty sleeping. She has tried multiple medications and none have worked. She has never tried Trazodone. Additionally, she has been tested for PRITESH and per her report, this was normal. Testing was done 4-years ago. She denies fever, URI symptom. Reviewed PmHx, RxHx, FmHx, SocHx, AllgHx and updated and dated in the chart.     Patient Active Problem List    Diagnosis    Elevated prolactin level (HCC)    Severe obesity (HCC)    Right hand weakness    Numbness of right hand    Chronic tension-type headache, intractable    Intractable chronic migraine without aura and without status migrainosus    Pituitary adenoma ([facility] [address]       Review of Systems - negative except as listed above in the HPI    Objective:     Vitals:    11/27/19 0925   BP: 150/56   Pulse: 87   Resp: 24   Temp: 97.8 °F (36.6 °C)   TempSrc: Oral   SpO2: 98%   Weight: 223 lb 3.2 oz (101.2 kg)   Height: 5' 6\" (1.676 m)     Physical Examination:   General appearance - alert, well appearing, and in no distress and overweight  Mental status - alert, oriented to person, place, and time  Chest - clear to auscultation, no wheezes, rales or rhonchi, symmetric air entry, tenderness chest, upper left shoulder  Heart - normal rate, regular rhythm, normal S1, S2, no murmurs, rubs, clicks or gallops  Musculoskeletal - no joint tenderness, deformity or swelling  Extremities - peripheral pulses normal, no pedal edema, no clubbing or cyanosis    Assessment/ Plan:     Diagnoses and all orders for this visit:    1. Other chest pain  -     AMB POC EKG ROUTINE W/ 12 LEADS, INTER & REP    2. Pain in inferior left upper extremity  -     AMB POC EKG ROUTINE W/ 12 LEADS, INTER & REP    3. Muscle spasm  -     tiZANidine (ZANAFLEX) 2 mg tablet; 1-2 tablets, 1-2 hours prior to bedtime. -     predniSONE (STERAPRED DS) 10 mg dose pack; See administration instruction per 10mg dose pack    4. Other insomnia  -     traZODone (DESYREL) 50 mg tablet; Take 1 Tab by mouth nightly for 30 days. 5. Costochondritis  -     predniSONE (STERAPRED DS) 10 mg dose pack; See administration instruction per 10mg dose pack  -     ibuprofen (MOTRIN) 800 mg tablet; Take 1 Tab by mouth every eight (8) hours as needed for Pain for up to 30 days. I have discussed the diagnosis with the patient and the intended treatment plan as seen in the above orders. The patient has received an after-visit summary and questions were answered concerning future plans. Asked to return should symptoms worsen or not improve with treatment. Any pending labs and studies will be relayed to patient when they become available. Pt verbalizes understanding of plan of care and denies further questions or concerns at this time. Follow-up and Dispositions    · Return in about 2 weeks (around 12/11/2019), or if symptoms worsen or fail to improve. Angie Hogan MD    Patient Instructions   Acute Pain Care  1. Salon Pas 4% Lidocaine patches. Apply directly to the area of discomfort. Leave on for 12 hours. Off for 12 hours. 2. Motrin or Ibuprofen (if not contraindicated) 600-800 mgs. WITH FOOD every 8 hours for 1-2 days, then as needed every 8 hours. 3. Warm compress or heating pad. Avoid high temperatures and getting burned. Monitor closely. 4. Muscle Relaxant as prescribed. (Tizanidine). 5. Consider PT if not getting better and/or xray.    6. Gentle stretching exercises as per the after visit summary. 7. Return as needed. Shoulder Bursitis: Exercises  Introduction  Here are some examples of exercises for you to try. The exercises may be suggested for a condition or for rehabilitation. Start each exercise slowly. Ease off the exercises if you start to have pain. You will be told when to start these exercises and which ones will work best for you. How to do the exercises  Posterior stretching exercise    1. Hold the elbow of your injured arm with your other hand. 2. Use your hand to pull your injured arm gently up and across your body. You will feel a gentle stretch across the back of your injured shoulder. 3. Hold for at least 15 to 30 seconds. Then slowly lower your arm. 4. Repeat 2 to 4 times. Up-the-back stretch    1. Put your hand in your back pocket. Let it rest there to stretch your shoulder. 2. With your other hand, hold your injured arm (palm outward) behind your back by the wrist. Pull your arm up gently to stretch your shoulder. 3. Next, put a towel over your other shoulder. Put the hand of your injured arm behind your back. Now hold the back end of the towel. With the other hand, hold the front end of the towel in front of your body. Pull gently on the front end of the towel. This will bring your hand farther up your back to stretch your shoulder. Overhead stretch    1. Standing about an arm's length away, grasp onto a solid surface. You could use a countertop, a doorknob, or the back of a sturdy chair. 2. With your knees slightly bent, bend forward with your arms straight. Lower your upper body, and let your shoulders stretch. 3. As your shoulders are able to stretch farther, you may need to take a step or two backward. 4. Hold for at least 15 to 30 seconds. Then stand up and relax. If you had stepped back during your stretch, step forward so you can keep your hands on the solid surface.   5. Repeat 2 to 4 times.    Shoulder flexion (lying down)    1. Lie on your back, holding a wand with both hands. Your palms should face down as you hold the wand. 2. Keeping your elbows straight, slowly raise your arms over your head. Raise them until you feel a stretch in your shoulders, upper back, and chest.  3. Hold for 15 to 30 seconds. 4. Repeat 2 to 4 times. Shoulder rotation (lying down)    1. Lie on your back. Hold a wand with both hands with your elbows bent and palms up. 2. Keep your elbows close to your body, and move the wand across your body toward the sore arm. 3. Hold for 8 to 12 seconds. 4. Repeat 2 to 4 times. Shoulder blade squeeze    1. Stand with your arms at your sides, and squeeze your shoulder blades together. Do not raise your shoulders up as you squeeze. 2. Hold 6 seconds. 3. Repeat 8 to 12 times. Shoulder flexor and extensor exercise    1. Push forward (flex): Stand facing a wall or doorjamb, about 6 inches or less back. Hold your injured arm against your body. Make a closed fist with your thumb on top. Then gently push your hand forward into the wall with about 25% to 50% of your strength. Don't let your body move backward as you push. Hold for about 6 seconds. Relax for a few seconds. Repeat 8 to 12 times. 2. Push backward (extend): Stand with your back flat against a wall. Your upper arm should be against the wall, with your elbow bent 90 degrees (your hand straight ahead). Push your elbow gently back against the wall with about 25% to 50% of your strength. Don't let your body move forward as you push. Hold for about 6 seconds. Relax for a few seconds. Repeat 8 to 12 times. Scapular exercise: Wall push-ups    1. Stand facing a wall, about 12 inches to 18 inches away. 2. Place your hands on the wall at shoulder height. 3. Slowly bend your elbows and bring your face to the wall. Keep your back and hips straight. 4. Push back to where you started. 5. Repeat 8 to 12 times.   6. When you can do this exercise against a wall comfortably, you can try it against a counter. You can then slowly progress to the end of a couch, then to a sturdy chair, and finally to the floor. Scapular exercise: Retraction    1. Put the band around a solid object at about waist level. (A bedpost will work well.) Each hand should hold an end of the band. 2. With your elbows at your sides and bent to 90 degrees, pull the band back. Your shoulder blades should move toward each other. Then move your arms back where you started. 3. Repeat 8 to 12 times. 4. If you have good range of motion in your shoulders, try this exercise with your arms lifted out to the sides. Keep your elbows at a 90-degree angle. Raise the elastic band up to about shoulder level. Pull the band back to move your shoulder blades toward each other. Then move your arms back where you started. Internal rotator strengthening exercise    1. Start by tying a piece of elastic exercise material to a doorknob. You can use surgical tubing or Thera-Band. 2. Stand or sit with your shoulder relaxed and your elbow bent 90 degrees. Your upper arm should rest comfortably against your side. Squeeze a rolled towel between your elbow and your body for comfort. This will help keep your arm at your side. 3. Hold one end of the elastic band in the hand of the painful arm. 4. Slowly rotate your forearm toward your body until it touches your belly. Slowly move it back to where you started. 5. Keep your elbow and upper arm firmly tucked against the towel roll or at your side. 6. Repeat 8 to 12 times. External rotator strengthening exercise    1. Start by tying a piece of elastic exercise material to a doorknob. You can use surgical tubing or Thera-Band. (You may also hold one end of the band in each hand.)  2. Stand or sit with your shoulder relaxed and your elbow bent 90 degrees. Your upper arm should rest comfortably against your side.  Squeeze a rolled towel between your elbow and your body for comfort. This will help keep your arm at your side. 3. Hold one end of the elastic band with the hand of the painful arm. 4. Start with your forearm across your belly. Slowly rotate the forearm out away from your body. Keep your elbow and upper arm tucked against the towel roll or the side of your body until you begin to feel tightness in your shoulder. Slowly move your arm back to where you started. 5. Repeat 8 to 12 times. Follow-up care is a key part of your treatment and safety. Be sure to make and go to all appointments, and call your doctor if you are having problems. It's also a good idea to know your test results and keep a list of the medicines you take. Where can you learn more? Go to http://mitul-leena.info/. Enter S989 in the search box to learn more about \"Shoulder Bursitis: Exercises. \"  Current as of: June 26, 2019  Content Version: 12.2  © 3278-3954 [employer]. Care instructions adapted under license by [employer] (which disclaims liability or warranty for this information). If you have questions about a medical condition or this instruction, always ask your healthcare professional. [address] any warranty or liability for your use of this information. Ezequiel Gandara

## 2019-11-27 NOTE — PATIENT INSTRUCTIONS
Acute Pain Care 1. Salon Pas 4% Lidocaine patches. Apply directly to the area of discomfort. Leave on for 12 hours. Off for 12 hours. 2. Motrin or Ibuprofen (if not contraindicated) 600-800 mgs. WITH FOOD every 8 hours for 1-2 days, then as needed every 8 hours. 3. Warm compress or heating pad. Avoid high temperatures and getting burned. Monitor closely. 4. Muscle Relaxant as prescribed. (Tizanidine). 5. Consider PT if not getting better and/or xray. 6. Gentle stretching exercises as per the after visit summary. 7. Return as needed. Shoulder Bursitis: Exercises Introduction Here are some examples of exercises for you to try. The exercises may be suggested for a condition or for rehabilitation. Start each exercise slowly. Ease off the exercises if you start to have pain. You will be told when to start these exercises and which ones will work best for you. How to do the exercises Posterior stretching exercise 1. Hold the elbow of your injured arm with your other hand. 2. Use your hand to pull your injured arm gently up and across your body. You will feel a gentle stretch across the back of your injured shoulder. 3. Hold for at least 15 to 30 seconds. Then slowly lower your arm. 4. Repeat 2 to 4 times. Up-the-back stretch 1. Put your hand in your back pocket. Let it rest there to stretch your shoulder. 2. With your other hand, hold your injured arm (palm outward) behind your back by the wrist. Pull your arm up gently to stretch your shoulder. 3. Next, put a towel over your other shoulder. Put the hand of your injured arm behind your back. Now hold the back end of the towel. With the other hand, hold the front end of the towel in front of your body. Pull gently on the front end of the towel. This will bring your hand farther up your back to stretch your shoulder. Overhead stretch 1. Standing about an arm's length away, grasp onto a solid surface.  You could use a countertop, a doorknob, or the back of a sturdy chair. 2. With your knees slightly bent, bend forward with your arms straight. Lower your upper body, and let your shoulders stretch. 3. As your shoulders are able to stretch farther, you may need to take a step or two backward. 4. Hold for at least 15 to 30 seconds. Then stand up and relax. If you had stepped back during your stretch, step forward so you can keep your hands on the solid surface. 5. Repeat 2 to 4 times. Shoulder flexion (lying down) 1. Lie on your back, holding a wand with both hands. Your palms should face down as you hold the wand. 2. Keeping your elbows straight, slowly raise your arms over your head. Raise them until you feel a stretch in your shoulders, upper back, and chest. 
3. Hold for 15 to 30 seconds. 4. Repeat 2 to 4 times. Shoulder rotation (lying down) 1. Lie on your back. Hold a wand with both hands with your elbows bent and palms up. 2. Keep your elbows close to your body, and move the wand across your body toward the sore arm. 3. Hold for 8 to 12 seconds. 4. Repeat 2 to 4 times. Shoulder blade squeeze 1. Stand with your arms at your sides, and squeeze your shoulder blades together. Do not raise your shoulders up as you squeeze. 2. Hold 6 seconds. 3. Repeat 8 to 12 times. Shoulder flexor and extensor exercise 1. Push forward (flex): Stand facing a wall or doorjamb, about 6 inches or less back. Hold your injured arm against your body. Make a closed fist with your thumb on top. Then gently push your hand forward into the wall with about 25% to 50% of your strength. Don't let your body move backward as you push. Hold for about 6 seconds. Relax for a few seconds. Repeat 8 to 12 times. 2. Push backward (extend): Stand with your back flat against a wall.  Your upper arm should be against the wall, with your elbow bent 90 degrees (your hand straight ahead). Push your elbow gently back against the wall with about 25% to 50% of your strength. Don't let your body move forward as you push. Hold for about 6 seconds. Relax for a few seconds. Repeat 8 to 12 times. Scapular exercise: Wall push-ups 1. Stand facing a wall, about 12 inches to 18 inches away. 2. Place your hands on the wall at shoulder height. 3. Slowly bend your elbows and bring your face to the wall. Keep your back and hips straight. 4. Push back to where you started. 5. Repeat 8 to 12 times. 6. When you can do this exercise against a wall comfortably, you can try it against a counter. You can then slowly progress to the end of a couch, then to a sturdy chair, and finally to the floor. Scapular exercise: Retraction 1. Put the band around a solid object at about waist level. (A bedpost will work well.) Each hand should hold an end of the band. 2. With your elbows at your sides and bent to 90 degrees, pull the band back. Your shoulder blades should move toward each other. Then move your arms back where you started. 3. Repeat 8 to 12 times. 4. If you have good range of motion in your shoulders, try this exercise with your arms lifted out to the sides. Keep your elbows at a 90-degree angle. Raise the elastic band up to about shoulder level. Pull the band back to move your shoulder blades toward each other. Then move your arms back where you started. Internal rotator strengthening exercise 1. Start by tying a piece of elastic exercise material to a doorknob. You can use surgical tubing or Thera-Band. 2. Stand or sit with your shoulder relaxed and your elbow bent 90 degrees. Your upper arm should rest comfortably against your side. Squeeze a rolled towel between your elbow and your body for comfort. This will help keep your arm at your side. 3. Hold one end of the elastic band in the hand of the painful arm. 4. Slowly rotate your forearm toward your body until it touches your belly. Slowly move it back to where you started. 5. Keep your elbow and upper arm firmly tucked against the towel roll or at your side. 6. Repeat 8 to 12 times. External rotator strengthening exercise 1. Start by tying a piece of elastic exercise material to a doorknob. You can use surgical tubing or Thera-Band. (You may also hold one end of the band in each hand.) 2. Stand or sit with your shoulder relaxed and your elbow bent 90 degrees. Your upper arm should rest comfortably against your side. Squeeze a rolled towel between your elbow and your body for comfort. This will help keep your arm at your side. 3. Hold one end of the elastic band with the hand of the painful arm. 4. Start with your forearm across your belly. Slowly rotate the forearm out away from your body. Keep your elbow and upper arm tucked against the towel roll or the side of your body until you begin to feel tightness in your shoulder. Slowly move your arm back to where you started. 5. Repeat 8 to 12 times. Follow-up care is a key part of your treatment and safety. Be sure to make and go to all appointments, and call your doctor if you are having problems. It's also a good idea to know your test results and keep a list of the medicines you take. Where can you learn more? Go to http://mitul-leena.info/. Enter B097 in the search box to learn more about \"Shoulder Bursitis: Exercises. \" Current as of: June 26, 2019 Content Version: 12.2 © 1633-7659 [employer]. Care instructions adapted under license by [employer] (which disclaims liability or warranty for this information). If you have questions about a medical condition or this instruction, always ask your healthcare professional. [address] any warranty or liability for your use of this information.

## 2019-12-11 ENCOUNTER — OFFICE VISIT (OUTPATIENT)
Dept: FAMILY MEDICINE CLINIC | Age: 29
End: 2019-12-11

## 2019-12-11 ENCOUNTER — HOSPITAL ENCOUNTER (OUTPATIENT)
Dept: GENERAL RADIOLOGY | Age: 29
Discharge: HOME | End: 2019-12-11
Attending: INTERNAL MEDICINE
Payer: MEDICAID

## 2019-12-11 VITALS
BODY MASS INDEX: 35.36 KG/M2 | TEMPERATURE: 98.2 F | SYSTOLIC BLOOD PRESSURE: 123 MMHG | RESPIRATION RATE: 18 BRPM | WEIGHT: 220 LBS | OXYGEN SATURATION: 99 % | HEIGHT: 66 IN | HEART RATE: 68 BPM | DIASTOLIC BLOOD PRESSURE: 80 MMHG

## 2019-12-11 DIAGNOSIS — G47.00 INSOMNIA, UNSPECIFIED TYPE: ICD-10-CM

## 2019-12-11 DIAGNOSIS — M54.2 CERVICAL PAIN (NECK): Primary | ICD-10-CM

## 2019-12-11 DIAGNOSIS — M54.2 CERVICAL PAIN (NECK): ICD-10-CM

## 2019-12-11 DIAGNOSIS — M62.838 MUSCLE SPASM: ICD-10-CM

## 2019-12-11 PROCEDURE — 72052 X-RAY EXAM NECK SPINE 6/>VWS: CPT

## 2019-12-11 NOTE — PATIENT INSTRUCTIONS
Neck: Exercises Introduction Here are some examples of exercises for you to try. The exercises may be suggested for a condition or for rehabilitation. Start each exercise slowly. Ease off the exercises if you start to have pain. You will be told when to start these exercises and which ones will work best for you. How to do the exercises Neck stretch 1. This stretch works best if you keep your shoulder down as you lean away from it. To help you remember to do this, start by relaxing your shoulders and lightly holding on to your thighs or your chair. 2. Tilt your head toward your shoulder and hold for 15 to 30 seconds. Let the weight of your head stretch your muscles. 3. If you would like a little added stretch, use your hand to gently and steadily pull your head toward your shoulder. For example, keeping your right shoulder down, lean your head to the left. 4. Repeat 2 to 4 times toward each shoulder. Diagonal neck stretch 1. Turn your head slightly toward the direction you will be stretching, and tilt your head diagonally toward your chest and hold for 15 to 30 seconds. 2. If you would like a little added stretch, use your hand to gently and steadily pull your head forward on the diagonal. 
3. Repeat 2 to 4 times toward each side. Dorsal glide stretch 1. Sit or stand tall and look straight ahead. 2. Slowly tuck your chin as you glide your head backward over your body 3. Hold for a count of 6, and then relax for up to 10 seconds. 4. Repeat 8 to 12 times. Chest and shoulder stretch 1. Sit or stand tall and glide your head backward as in the dorsal glide stretch. 2. Raise both arms so that your hands are next to your ears. 3. Take a deep breath, and as you breathe out, lower your elbows down and behind your back. You will feel your shoulder blades slide down and together, and at the same time you will feel a stretch across your chest and the front of your shoulders. 4. Hold for about 6 seconds, and then relax for up to 10 seconds. 5. Repeat 8 to 12 times. Strengthening: Hands on head 1. Move your head backward, forward, and side to side against gentle pressure from your hands, holding each position for about 6 seconds. 2. Repeat 8 to 12 times. Follow-up care is a key part of your treatment and safety. Be sure to make and go to all appointments, and call your doctor if you are having problems. It's also a good idea to know your test results and keep a list of the medicines you take. Where can you learn more? Go to http://mitul-leena.info/. Enter P975 in the search box to learn more about \"Neck: Exercises. \" Current as of: June 26, 2019 Content Version: 12.2 © 1780-2202 [employer]. Care instructions adapted under license by [employer] (which disclaims liability or warranty for this information). If you have questions about a medical condition or this instruction, always ask your healthcare professional. [address] any warranty or liability for your use of this information.

## 2019-12-12 NOTE — PROGRESS NOTES
Chief Complaint:  Chief Complaint   Patient presents with    Numbness     below the elbow    Tingling    Arm Pain    Neck Pain    Insomnia       History of Present Illness:  She is a [address] [place] y.o. female who presents with c/o continued numbness and cervical neck pain and mostly on the L-side. She reports that previous treatments have not really helped. She continues to have headaches and insomnia. She reports that the imitrex and Trazodone have been mostly unhelpful. 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 Reviewed PmHx, RxHx, FmHx, SocHx, AllgHx and updated and dated in the chart.     Patient Active Problem List    Diagnosis    Elevated prolactin level (HCC)    Severe obesity (HCC)    Right hand weakness    Numbness of right hand    Chronic tension-type headache, intractable    Intractable chronic migraine without aura and without status migrainosus    Pituitary adenoma ([facility] [address]       Review of Systems - negative except as listed above in the HPI    Objective:     Vitals:    12/11/19 0936   BP: 123/80   Pulse: 68   Resp: 18   Temp: 98.2 °F (36.8 °C)   TempSrc: Oral   SpO2: 99%   Weight: 220 lb (99.8 kg)   Height: 5' 6\" (1.676 m)     Physical Examination:   General appearance - alert, well appearing, and in no distress and overweight  Mental status - alert, oriented to person, place, and time  Neck - supple, no significant adenopathy, ROM limited by pain  Chest - clear to auscultation, no wheezes, rales or rhonchi, symmetric air entry  Heart - normal rate, regular rhythm, normal S1, S2, no murmurs, rubs, clicks or gallops  Neurological - alert, oriented, normal speech, no focal findings or movement disorder noted, cranial nerves II through XII intact, DTR's normal and symmetric, motor and sensory grossly normal bilaterally, normal muscle tone, no tremors, strength 5/5  Musculoskeletal - no joint tenderness, deformity or swelling    Assessment/ Plan:   Diagnoses and all orders for this visit:    1. Cervical pain (neck)  -     REFERRAL TO PHYSICAL THERAPY    2. Insomnia, unspecified type  -     REFERRAL TO BEHAVIORAL HEALTH    3. Muscle spasm  -     REFERRAL TO PHYSICAL THERAPY         I have discussed the diagnosis with the patient and the intended treatment plan as seen in the above orders. The patient has received an after-visit summary and questions were answered concerning future plans. Asked to return should symptoms worsen or not improve with treatment. Any pending labs and studies will be relayed to patient when they become available. Pt verbalizes understanding of plan of care and denies further questions or concerns at this time. Follow-up and Dispositions    · Return if symptoms worsen or fail to improve. Silverio Skelton MD  Patient Instructions        Neck: Exercises  Introduction  Here are some examples of exercises for you to try. The exercises may be suggested for a condition or for rehabilitation. Start each exercise slowly. Ease off the exercises if you start to have pain. You will be told when to start these exercises and which ones will work best for you. How to do the exercises  Neck stretch    1. This stretch works best if you keep your shoulder down as you lean away from it. To help you remember to do this, start by relaxing your shoulders and lightly holding on to your thighs or your chair. 2. Tilt your head toward your shoulder and hold for 15 to 30 seconds. Let the weight of your head stretch your muscles. 3. If you would like a little added stretch, use your hand to gently and steadily pull your head toward your shoulder. For example, keeping your right shoulder down, lean your head to the left. 4. Repeat 2 to 4 times toward each shoulder. Diagonal neck stretch    1.  Turn your head slightly toward the direction you will be stretching, and tilt your head diagonally toward your chest and hold for 15 to 30 seconds. 2. If you would like a little added stretch, use your hand to gently and steadily pull your head forward on the diagonal.  3. Repeat 2 to 4 times toward each side. Dorsal glide stretch    1. Sit or stand tall and look straight ahead. 2. Slowly tuck your chin as you glide your head backward over your body  3. Hold for a count of 6, and then relax for up to 10 seconds. 4. Repeat 8 to 12 times. Chest and shoulder stretch    1. Sit or stand tall and glide your head backward as in the dorsal glide stretch. 2. Raise both arms so that your hands are next to your ears. 3. Take a deep breath, and as you breathe out, lower your elbows down and behind your back. You will feel your shoulder blades slide down and together, and at the same time you will feel a stretch across your chest and the front of your shoulders. 4. Hold for about 6 seconds, and then relax for up to 10 seconds. 5. Repeat 8 to 12 times. Strengthening: Hands on head    1. Move your head backward, forward, and side to side against gentle pressure from your hands, holding each position for about 6 seconds. 2. Repeat 8 to 12 times. Follow-up care is a key part of your treatment and safety. Be sure to make and go to all appointments, and call your doctor if you are having problems. It's also a good idea to know your test results and keep a list of the medicines you take. Where can you learn more? Go to http://mitul-leena.info/. Enter P975 in the search box to learn more about \"Neck: Exercises. \"  Current as of: June 26, 2019  Content Version: 12.2  © 0065-2835 [employer]. Care instructions adapted under license by [employer] (which disclaims liability or warranty for this information).  If you have questions about a medical condition or this instruction, always ask your healthcare professional. [address] any warranty or liability for your use of this information.

## 2019-12-12 NOTE — PROGRESS NOTES
Chief Complaint: 
Chief Complaint Patient presents with  Numbness  
  below the elbow  Tingling  Arm Pain  Neck Pain  Insomnia History of Present Illness: 
She is a 26 y.o. female who presents with c/o Reviewed PmHx, RxHx, FmHx, SocHx, AllgHx and updated and dated in the chart. Patient Active Problem List  
 Diagnosis  Elevated prolactin level (HCC)  Severe obesity ([facility] [address]  Right hand weakness  Numbness of right hand  Chronic tension-type headache, intractable  Intractable chronic migraine without aura and without status migrainosus  Pituitary adenoma ([facility] [address] Review of Systems - negative except as listed above in the HPI Objective:  
 
Vitals:  
 12/11/19 0936 BP: 123/80 Pulse: 68 Resp: 18 Temp: 98.2 °F (36.8 °C) TempSrc: Oral  
SpO2: 99% Weight: 220 lb (99.8 kg) Height: 5' 6\" (1.676 m) Physical Examination:  
{male adult master:[id number]} Assessment/ Plan:  
Diagnoses and all orders for this visit: 1. Cervical pain (neck) 
-     REFERRAL TO PHYSICAL THERAPY 2. Insomnia, unspecified type 
-     REFERRAL TO BEHAVIORAL HEALTH 3. Muscle spasm 
-     REFERRAL TO PHYSICAL THERAPY I have discussed the diagnosis with the patient and the intended treatment plan as seen in the above orders. The patient has received an after-visit summary and questions were answered concerning future plans. Asked to return should symptoms worsen or not improve with treatment. Any pending labs and studies will be relayed to patient when they become available. Pt verbalizes understanding of plan of care and denies further questions or concerns at this time. Gage Kunz MD 
Patient Instructions Neck: Exercises Introduction Here are some examples of exercises for you to try. The exercises may be suggested for a condition or for rehabilitation. Start each exercise slowly. Ease off the exercises if you start to have pain. You will be told when to start these exercises and which ones will work best for you. How to do the exercises Neck stretch 1. This stretch works best if you keep your shoulder down as you lean away from it. To help you remember to do this, start by relaxing your shoulders and lightly holding on to your thighs or your chair. 2. Tilt your head toward your shoulder and hold for 15 to 30 seconds. Let the weight of your head stretch your muscles. 3. If you would like a little added stretch, use your hand to gently and steadily pull your head toward your shoulder. For example, keeping your right shoulder down, lean your head to the left. 4. Repeat 2 to 4 times toward each shoulder. Diagonal neck stretch 1. Turn your head slightly toward the direction you will be stretching, and tilt your head diagonally toward your chest and hold for 15 to 30 seconds. 2. If you would like a little added stretch, use your hand to gently and steadily pull your head forward on the diagonal. 
3. Repeat 2 to 4 times toward each side. Dorsal glide stretch 1. Sit or stand tall and look straight ahead. 2. Slowly tuck your chin as you glide your head backward over your body 3. Hold for a count of 6, and then relax for up to 10 seconds. 4. Repeat 8 to 12 times. Chest and shoulder stretch 1. Sit or stand tall and glide your head backward as in the dorsal glide stretch. 2. Raise both arms so that your hands are next to your ears. 3. Take a deep breath, and as you breathe out, lower your elbows down and behind your back. You will feel your shoulder blades slide down and together, and at the same time you will feel a stretch across your chest and the front of your shoulders. 4. Hold for about 6 seconds, and then relax for up to 10 seconds. 5. Repeat 8 to 12 times. Strengthening: Hands on head 1.  Move your head backward, forward, and side to side against gentle pressure from your hands, holding each position for about 6 seconds. 2. Repeat 8 to 12 times. Follow-up care is a key part of your treatment and safety. Be sure to make and go to all appointments, and call your doctor if you are having problems. It's also a good idea to know your test results and keep a list of the medicines you take. Where can you learn more? Go to http://mitul-leena.info/. Enter P975 in the search box to learn more about \"Neck: Exercises. \" Current as of: June 26, 2019 Content Version: 12.2 © 6984-2618 [employer]. Care instructions adapted under license by [employer] (which disclaims liability or warranty for this information). If you have questions about a medical condition or this instruction, always ask your healthcare professional. [address] any warranty or liability for your use of this information.

## 2019-12-20 DIAGNOSIS — G47.09 OTHER INSOMNIA: ICD-10-CM

## 2019-12-20 RX ORDER — TRAZODONE HYDROCHLORIDE 50 MG/1
50 TABLET ORAL
Qty: 30 TAB | Refills: 1 | Status: SHIPPED | OUTPATIENT
Start: 2019-12-20 | End: 2020-01-06 | Stop reason: ALTCHOICE

## 2020-01-03 ENCOUNTER — CLINICAL SUPPORT (OUTPATIENT)
Dept: OBGYN CLINIC | Age: 30
End: 2020-01-03

## 2020-01-03 DIAGNOSIS — Z30.9 ENCOUNTER FOR CONTRACEPTIVE MANAGEMENT, UNSPECIFIED TYPE: Primary | ICD-10-CM

## 2020-01-03 NOTE — PROGRESS NOTES
Depo 150mg administered intramuscularly in the lwft hip per Dr. Halle Mccormick with verbal consent received from patient and two patient identifiers used. No UPT, within dates. Patient tolerated well with no reactions observed for ten minutes post injection. Next injection dates were written down for the patient and the patient was encouraged to schedule next injection at checkout. Patient verbalized understanding.   Lot # [id number]  Exp- 9/30/23

## 2020-01-06 ENCOUNTER — OFFICE VISIT (OUTPATIENT)
Dept: FAMILY MEDICINE CLINIC | Age: 30
End: 2020-01-06

## 2020-01-06 VITALS
RESPIRATION RATE: 16 BRPM | HEIGHT: 66 IN | DIASTOLIC BLOOD PRESSURE: 72 MMHG | WEIGHT: 223 LBS | BODY MASS INDEX: 35.84 KG/M2 | SYSTOLIC BLOOD PRESSURE: 134 MMHG | OXYGEN SATURATION: 99 % | TEMPERATURE: 98.9 F | HEART RATE: 72 BPM

## 2020-01-06 DIAGNOSIS — R11.0 NAUSEA: ICD-10-CM

## 2020-01-06 DIAGNOSIS — H53.8 BLURRED VISION: ICD-10-CM

## 2020-01-06 DIAGNOSIS — R42 DIZZINESS: Primary | ICD-10-CM

## 2020-01-06 DIAGNOSIS — R55 NEAR SYNCOPE: ICD-10-CM

## 2020-01-06 DIAGNOSIS — R42 DIZZINESS: ICD-10-CM

## 2020-01-06 DIAGNOSIS — D35.2 PITUITARY MICROADENOMA (HCC): ICD-10-CM

## 2020-01-06 RX ORDER — MECLIZINE HYDROCHLORIDE 25 MG/1
25 TABLET ORAL
Qty: 30 TAB | Refills: 0 | Status: SHIPPED | OUTPATIENT
Start: 2020-01-06 | End: 2020-01-16

## 2020-01-06 NOTE — PROGRESS NOTES
Identified pt with two pt identifiers(name and ). Chief Complaint   Patient presents with   Barry Estill     during PT while doing exercises with her neck- was advised by PT to come and be checked for vertigo    Dizziness        Health Maintenance Due   Topic    Influenza Age 5 to Adult        Wt Readings from Last 3 Encounters:   20 223 lb (101.2 kg)   19 220 lb (99.8 kg)   19 223 lb 3.2 oz (101.2 kg)     Temp Readings from Last 3 Encounters:   20 98.9 °F (37.2 °C) (Oral)   19 98.2 °F (36.8 °C) (Oral)   19 97.8 °F (36.6 °C) (Oral)     BP Readings from Last 3 Encounters:   20 134/72   19 123/80   19 150/56     Pulse Readings from Last 3 Encounters:   20 72   19 68   19 87         Learning Assessment:  :     Learning Assessment 2016   PRIMARY LEARNER Patient   PRIMARY LANGUAGE ENGLISH   LEARNER PREFERENCE PRIMARY READING   ANSWERED BY pt   RELATIONSHIP SELF       Depression Screening:  :     3 most recent PHQ Screens 2019   Little interest or pleasure in doing things Not at all   Feeling down, depressed, irritable, or hopeless Not at all   Total Score PHQ 2 0       Fall Risk Assessment:  :     No flowsheet data found. Abuse Screening:  :     Abuse Screening Questionnaire 2020   Do you ever feel afraid of your partner? N   Are you in a relationship with someone who physically or mentally threatens you? N   Is it safe for you to go home? Y       Coordination of Care Questionnaire:  :     1) Have you been to an emergency room, urgent care clinic since your last visit? no   Hospitalized since your last visit? no             2) Have you seen or consulted any other health care providers outside of [address] [place] [address] since your last visit? no  (Include any pap smears or colon screenings in this section.)    3) Do you have an Advance Directive on file?  no  Are you interested in receiving information about Advance Directives? no    Reviewed record in preparation for visit and have obtained necessary documentation. Medication reconciliation up to date and corrected with patient at this time.

## 2020-01-06 NOTE — PROGRESS NOTES
Chief Complaint:  Chief Complaint   Patient presents with   Claus Quiñones     during PT while doing exercises with her neck- was advised by PT to come and be checked for vertigo    Dizziness     History of Present Illness:  29F who presents with worsening dizziness, nausea and vertigo that started during PT. She has been having dizziness with near syncope since the first episode of PT and it has just gotten worse. I note that about 3-years ago, she had an MRI which showed a possible microadenoma. Today, the symptoms are worsening and she feels unwell. PT advised her to come in to evaluate possible BPPV. Reviewed PmHx, RxHx, FmHx, SocHx, AllgHx and updated and dated in the chart. Patient Active Problem List    Diagnosis    Elevated prolactin level (HCC)    Severe obesity (HCC)    Right hand weakness    Numbness of right hand    Chronic tension-type headache, intractable    Intractable chronic migraine without aura and without status migrainosus    Pituitary adenoma ([facility] [address]       Review of Systems - negative except as listed above in the HPI    Objective:     Vitals:    01/06/20 1320   BP: 134/72   Pulse: 72   Resp: 16   Temp: 98.9 °F (37.2 °C)   TempSrc: Oral   SpO2: 99%   Weight: 223 lb (101.2 kg)   Height: 5' 6\" (1.676 m)     Physical Examination:   General appearance - alert, well appearing, and in no distress and overweight  Mental status - alert, oriented to person, place, and time  Chest - clear to auscultation, no wheezes, rales or rhonchi, symmetric air entry  Heart - normal rate, regular rhythm, normal S1, S2, no murmurs, rubs, clicks or gallops  Musculoskeletal - tenderness upper shoulders, neck and back. Extremities - peripheral pulses normal, no pedal edema, no clubbing or cyanosis  Skin - normal coloration and turgor, no rashes, no suspicious skin lesions noted    Assessment/ Plan:     29F with onset of dizziness, near syncope and experienced since starting PT on her neck.  She has a h/o a microadenoma and symptoms are worrisome for either BPPV or changing status of the microadenoma. The patient just does not feel well and not responding to conservative therapies. Will send for MRI brain and also will check a CMP and CBC today. Worrisome symptoms discussed in detail. ACT- FAST and she had no symptoms of stroke on exam and confrontation today. Diagnoses and all orders for this visit:    1. Dizziness  -     MRI BRAIN W WO CONT; Future  -     METABOLIC PANEL, COMPREHENSIVE; Future  -     CBC W/O DIFF; Future  -     meclizine (ANTIVERT) 25 mg tablet; Take 1 Tab by mouth three (3) times daily as needed for Dizziness or Nausea for up to 10 days. 2. Near syncope  -     MRI BRAIN W WO CONT; Future  -     METABOLIC PANEL, COMPREHENSIVE; Future  -     CBC W/O DIFF; Future    3. Blurred vision  -     MRI BRAIN W WO CONT; Future  -     METABOLIC PANEL, COMPREHENSIVE; Future  -     CBC W/O DIFF; Future    4. Nausea  -     MRI BRAIN W WO CONT; Future  -     METABOLIC PANEL, COMPREHENSIVE; Future  -     CBC W/O DIFF; Future  -     meclizine (ANTIVERT) 25 mg tablet; Take 1 Tab by mouth three (3) times daily as needed for Dizziness or Nausea for up to 10 days. 5. Pituitary microadenoma ([address]  -     MRI BRAIN W WO CONT; Future    I have discussed the diagnosis with the patient and the intended treatment plan as seen in the above orders. The patient has received an after-visit summary and questions were answered concerning future plans. Asked to return should symptoms worsen or not improve with treatment. Any pending labs and studies will be relayed to patient when they become available. Pt verbalizes understanding of plan of care and denies further questions or concerns at this time. Follow-up and Dispositions    · Return if symptoms worsen or fail to improve.        Michael Vazquez MD    Patient Instructions          Benign Paroxysmal Positional Vertigo (BPPV): Care Instructions  Your Care Instructions    Benign paroxysmal positional vertigo, also called BPPV, is an inner ear problem. It causes a spinning or whirling sensation when you move your head. This sensation is called vertigo. The vertigo usually lasts for less than a minute. People often have vertigo spells for a few days or weeks. Then the vertigo goes away. But it may come back again. The vertigo may be mild, or it may be bad enough to cause unsteadiness, nausea, and vomiting. When you move, your inner ear sends messages to the brain. This helps you keep your balance. Vertigo can happen when debris builds up in the inner ear. The buildup can cause the inner ear to send the wrong message to the brain. Your doctor may move you in different positions to help your vertigo get better faster. This is called the Epley maneuver. Your doctor may also prescribe medicines or exercises to help with your symptoms. Follow-up care is a key part of your treatment and safety. Be sure to make and go to all appointments, and call your doctor if you are having problems. It's also a good idea to know your test results and keep a list of the medicines you take. How can you care for yourself at home? · If your doctor suggests that you do Lyon-Daroff exercises:  ? Sit on the edge of a bed or sofa. Quickly lie down on the side that causes the worst vertigo. Lie on your side with your ear down. ? Stay in this position for at least 30 seconds or until the vertigo goes away. ? Sit up. If this causes vertigo, wait for it to stop. ? Repeat the procedure on the other side. ? Repeat this 10 times. Do these exercises 2 times a day until the vertigo is gone. When should you call for help? Call 911 anytime you think you may need emergency care. For example, call if:    · You have symptoms of a stroke. These may include:  ? Sudden numbness, tingling, weakness, or loss of movement in your face, arm, or leg, especially on only one side of your body.   ? Sudden vision changes. ? Sudden trouble speaking. ? Sudden confusion or trouble understanding simple statements. ? Sudden problems with walking or balance. ? A sudden, severe headache that is different from past headaches.    Call your doctor now or seek immediate medical care if:    · You have new or worse nausea and vomiting.     · You have new symptoms such as hearing loss or roaring in your ears.    Watch closely for changes in your health, and be sure to contact your doctor if:    · You are not getting better as expected.     · Your vertigo gets worse. Where can you learn more? Go to http://mitul-leena.info/. Enter  in the search box to learn more about \"Benign Paroxysmal Positional Vertigo (BPPV): Care Instructions. \"  Current as of: October 21, 2018  Content Version: 12.2  © 6359-1597 [employer]. Care instructions adapted under license by [email or web address] (which disclaims liability or warranty for this information). If you have questions about a medical condition or this instruction, always ask your healthcare professional. [place] [address] any warranty or liability for your use of this information.

## 2020-01-06 NOTE — PATIENT INSTRUCTIONS
Benign Paroxysmal Positional Vertigo (BPPV): Care Instructions  Your Care Instructions    Benign paroxysmal positional vertigo, also called BPPV, is an inner ear problem. It causes a spinning or whirling sensation when you move your head. This sensation is called vertigo. The vertigo usually lasts for less than a minute. People often have vertigo spells for a few days or weeks. Then the vertigo goes away. But it may come back again. The vertigo may be mild, or it may be bad enough to cause unsteadiness, nausea, and vomiting. When you move, your inner ear sends messages to the brain. This helps you keep your balance. Vertigo can happen when debris builds up in the inner ear. The buildup can cause the inner ear to send the wrong message to the brain. Your doctor may move you in different positions to help your vertigo get better faster. This is called the Epley maneuver. Your doctor may also prescribe medicines or exercises to help with your symptoms. Follow-up care is a key part of your treatment and safety. Be sure to make and go to all appointments, and call your doctor if you are having problems. It's also a good idea to know your test results and keep a list of the medicines you take. How can you care for yourself at home? · If your doctor suggests that you do Lyon-Daroff exercises:  ? Sit on the edge of a bed or sofa. Quickly lie down on the side that causes the worst vertigo. Lie on your side with your ear down. ? Stay in this position for at least 30 seconds or until the vertigo goes away. ? Sit up. If this causes vertigo, wait for it to stop. ? Repeat the procedure on the other side. ? Repeat this 10 times. Do these exercises 2 times a day until the vertigo is gone. When should you call for help? Call 911 anytime you think you may need emergency care. For example, call if:    · You have symptoms of a stroke.  These may include:  ? Sudden numbness, tingling, weakness, or loss of movement in your face, arm, or leg, especially on only one side of your body. ? Sudden vision changes. ? Sudden trouble speaking. ? Sudden confusion or trouble understanding simple statements. ? Sudden problems with walking or balance. ? A sudden, severe headache that is different from past headaches.    Call your doctor now or seek immediate medical care if:    · You have new or worse nausea and vomiting.     · You have new symptoms such as hearing loss or roaring in your ears.    Watch closely for changes in your health, and be sure to contact your doctor if:    · You are not getting better as expected.     · Your vertigo gets worse. Where can you learn more? Go to http://mitul-leena.info/. Enter  in the search box to learn more about \"Benign Paroxysmal Positional Vertigo (BPPV): Care Instructions. \"  Current as of: October 21, 2018  Content Version: 12.2  © 3949-9102 [employer]. Care instructions adapted under license by [employer] (which disclaims liability or warranty for this information). If you have questions about a medical condition or this instruction, always ask your healthcare professional. [place] [address] any warranty or liability for your use of this information.

## 2020-01-07 LAB
ALBUMIN SERPL-MCNC: 4.4 G/DL (ref 3.5–5.5)
ALBUMIN/GLOB SERPL: 1.7 {RATIO} (ref 1.2–2.2)
ALP SERPL-CCNC: 114 IU/L (ref 39–117)
ALT SERPL-CCNC: 20 IU/L (ref 0–32)
AST SERPL-CCNC: 21 IU/L (ref 0–40)
BILIRUB SERPL-MCNC: 0.2 MG/DL (ref 0–1.2)
BUN SERPL-MCNC: 11 MG/DL (ref 6–20)
BUN/CREAT SERPL: 14 (ref 9–23)
CALCIUM SERPL-MCNC: 9.8 MG/DL (ref 8.7–10.2)
CHLORIDE SERPL-SCNC: 106 MMOL/L (ref 96–106)
CO2 SERPL-SCNC: 19 MMOL/L (ref 20–29)
CREAT SERPL-MCNC: 0.79 MG/DL (ref 0.57–1)
ERYTHROCYTE [DISTWIDTH] IN BLOOD BY AUTOMATED COUNT: 14.5 % (ref 11.7–15.4)
GLOBULIN SER CALC-MCNC: 2.6 G/DL (ref 1.5–4.5)
GLUCOSE SERPL-MCNC: 95 MG/DL (ref 65–99)
HCT VFR BLD AUTO: 39.7 % (ref 34–46.6)
HGB BLD-MCNC: 13 G/DL (ref 11.1–15.9)
MCH RBC QN AUTO: 26 PG (ref 26.6–33)
MCHC RBC AUTO-ENTMCNC: 32.7 G/DL (ref 31.5–35.7)
MCV RBC AUTO: 79 FL (ref 79–97)
PLATELET # BLD AUTO: 253 X10E3/UL (ref 150–450)
POTASSIUM SERPL-SCNC: 4.2 MMOL/L (ref 3.5–5.2)
PROT SERPL-MCNC: 7 G/DL (ref 6–8.5)
RBC # BLD AUTO: 5 X10E6/UL (ref 3.77–5.28)
SODIUM SERPL-SCNC: 143 MMOL/L (ref 134–144)
WBC # BLD AUTO: 8.7 X10E3/UL (ref 3.4–10.8)

## 2020-01-19 ENCOUNTER — HOSPITAL ENCOUNTER (OUTPATIENT)
Dept: MRI IMAGING | Age: 30
Discharge: HOME | End: 2020-01-19
Attending: INTERNAL MEDICINE
Payer: MEDICAID

## 2020-01-19 VITALS — BODY MASS INDEX: 35.51 KG/M2 | WEIGHT: 220 LBS

## 2020-01-19 DIAGNOSIS — H53.8 BLURRED VISION: ICD-10-CM

## 2020-01-19 DIAGNOSIS — D35.2 PITUITARY MICROADENOMA (HCC): ICD-10-CM

## 2020-01-19 DIAGNOSIS — R11.0 NAUSEA: ICD-10-CM

## 2020-01-19 DIAGNOSIS — R55 NEAR SYNCOPE: ICD-10-CM

## 2020-01-19 DIAGNOSIS — R42 DIZZINESS: ICD-10-CM

## 2020-01-19 PROCEDURE — A9575 INJ GADOTERATE MEGLUMI 0.1ML: HCPCS | Performed by: INTERNAL MEDICINE

## 2020-01-19 PROCEDURE — 70553 MRI BRAIN STEM W/O & W/DYE: CPT

## 2020-01-19 PROCEDURE — 74011250636 HC RX REV CODE- 250/636: Performed by: INTERNAL MEDICINE

## 2020-01-19 RX ORDER — GADOTERATE MEGLUMINE 376.9 MG/ML
20 INJECTION INTRAVENOUS
Status: COMPLETED | OUTPATIENT
Start: 2020-01-19 | End: 2020-01-19

## 2020-01-19 RX ADMIN — GADOTERATE MEGLUMINE 20 ML: 376.9 INJECTION INTRAVENOUS at 11:42

## 2020-01-21 ENCOUNTER — HOSPITAL ENCOUNTER (EMERGENCY)
Age: 30
Discharge: HOME | End: 2020-01-21
Attending: EMERGENCY MEDICINE
Payer: MEDICAID

## 2020-01-21 ENCOUNTER — APPOINTMENT (OUTPATIENT)
Dept: GENERAL RADIOLOGY | Age: 30
End: 2020-01-21
Attending: EMERGENCY MEDICINE
Payer: MEDICAID

## 2020-01-21 VITALS
BODY MASS INDEX: 37.2 KG/M2 | SYSTOLIC BLOOD PRESSURE: 116 MMHG | DIASTOLIC BLOOD PRESSURE: 75 MMHG | HEIGHT: 66 IN | TEMPERATURE: 98.1 F | WEIGHT: 231.48 LBS | OXYGEN SATURATION: 98 % | HEART RATE: 92 BPM | RESPIRATION RATE: 16 BRPM

## 2020-01-21 DIAGNOSIS — R07.9 ACUTE CHEST PAIN: ICD-10-CM

## 2020-01-21 DIAGNOSIS — J20.9 ACUTE BRONCHITIS, UNSPECIFIED ORGANISM: Primary | ICD-10-CM

## 2020-01-21 LAB
ATRIAL RATE: 93 BPM
CALCULATED P AXIS, ECG09: 60 DEGREES
CALCULATED R AXIS, ECG10: 60 DEGREES
CALCULATED T AXIS, ECG11: -19 DEGREES
DEPRECATED S PYO AG THROAT QL EIA: NEGATIVE
DIAGNOSIS, 93000: NORMAL
P-R INTERVAL, ECG05: 124 MS
Q-T INTERVAL, ECG07: 324 MS
QRS DURATION, ECG06: 84 MS
QTC CALCULATION (BEZET), ECG08: 402 MS
VENTRICULAR RATE, ECG03: 93 BPM

## 2020-01-21 PROCEDURE — 87070 CULTURE OTHR SPECIMN AEROBIC: CPT

## 2020-01-21 PROCEDURE — 94640 AIRWAY INHALATION TREATMENT: CPT

## 2020-01-21 PROCEDURE — 71046 X-RAY EXAM CHEST 2 VIEWS: CPT

## 2020-01-21 PROCEDURE — 87880 STREP A ASSAY W/OPTIC: CPT

## 2020-01-21 PROCEDURE — 99284 EMERGENCY DEPT VISIT MOD MDM: CPT

## 2020-01-21 PROCEDURE — 93005 ELECTROCARDIOGRAM TRACING: CPT

## 2020-01-21 PROCEDURE — 74011000250 HC RX REV CODE- 250: Performed by: EMERGENCY MEDICINE

## 2020-01-21 RX ORDER — ALBUTEROL SULFATE 90 UG/1
2 AEROSOL, METERED RESPIRATORY (INHALATION)
Qty: 1 INHALER | Refills: 0 | Status: SHIPPED | OUTPATIENT
Start: 2020-01-21 | End: 2020-09-06

## 2020-01-21 RX ORDER — LIDOCAINE HYDROCHLORIDE 20 MG/ML
10 SOLUTION OROPHARYNGEAL
Status: COMPLETED | OUTPATIENT
Start: 2020-01-21 | End: 2020-01-21

## 2020-01-21 RX ADMIN — ALBUTEROL SULFATE 1 DOSE: 2.5 SOLUTION RESPIRATORY (INHALATION) at 14:48

## 2020-01-21 RX ADMIN — LIDOCAINE HYDROCHLORIDE 10 ML: 20 SOLUTION ORAL; TOPICAL at 14:48

## 2020-01-21 NOTE — DISCHARGE INSTRUCTIONS
Patient Education        Bronchitis: Care Instructions  Your Care Instructions    Bronchitis is inflammation of the bronchial tubes, which carry air to the lungs. The tubes swell and produce mucus, or phlegm. The mucus and inflamed bronchial tubes make you cough. You may have trouble breathing. Most cases of bronchitis are caused by viruses like those that cause colds. Antibiotics usually do not help and they may be harmful. Bronchitis usually develops rapidly and lasts about 2 to 3 weeks in otherwise healthy people. Follow-up care is a key part of your treatment and safety. Be sure to make and go to all appointments, and call your doctor if you are having problems. It's also a good idea to know your test results and keep a list of the medicines you take. How can you care for yourself at home? · Take all medicines exactly as prescribed. Call your doctor if you think you are having a problem with your medicine. · Get some extra rest.  · Take an over-the-counter pain medicine, such as acetaminophen (Tylenol), ibuprofen (Advil, Motrin), or naproxen (Aleve) to reduce fever and relieve body aches. Read and follow all instructions on the label. · Do not take two or more pain medicines at the same time unless the doctor told you to. Many pain medicines have acetaminophen, which is Tylenol. Too much acetaminophen (Tylenol) can be harmful. · Take an over-the-counter cough medicine that contains dextromethorphan to help quiet a dry, hacking cough so that you can sleep. Avoid cough medicines that have more than one active ingredient. Read and follow all instructions on the label. · Breathe moist air from a humidifier, hot shower, or sink filled with hot water. The heat and moisture will thin mucus so you can cough it out. · Do not smoke. Smoking can make bronchitis worse. If you need help quitting, talk to your doctor about stop-smoking programs and medicines.  These can increase your chances of quitting for good.  When should you call for help? Call 911 anytime you think you may need emergency care. For example, call if:    · You have severe trouble breathing.    Call your doctor now or seek immediate medical care if:    · You have new or worse trouble breathing.     · You cough up dark brown or bloody mucus (sputum).     · You have a new or higher fever.     · You have a new rash.    Watch closely for changes in your health, and be sure to contact your doctor if:    · You cough more deeply or more often, especially if you notice more mucus or a change in the color of your mucus.     · You are not getting better as expected. Where can you learn more? Go to http://mitul-leena.info/. Enter H333 in the search box to learn more about \"Bronchitis: Care Instructions. \"  Current as of: June 9, 2019  Content Version: 12.2  © 4854-2886 [employer]. Care instructions adapted under license by [employer] (which disclaims liability or warranty for this information). If you have questions about a medical condition or this instruction, always ask your healthcare professional. [address] any warranty or liability for your use of this information. Patient Education        Chest Pain: Care Instructions  Your Care Instructions    There are many things that can cause chest pain. Some are not serious and will get better on their own in a few days. But some kinds of chest pain need more testing and treatment. Your doctor may have recommended a follow-up visit in the next 8 to 12 hours. If you are not getting better, you may need more tests or treatment. Even though your doctor has released you, you still need to watch for any problems. The doctor carefully checked you, but sometimes problems can develop later. If you have new symptoms or if your symptoms do not get better, get medical care right away.   If you have worse or different chest pain or pressure that lasts more than 5 minutes or you passed out (lost consciousness), call 911 or seek other emergency help right away. A medical visit is only one step in your treatment. Even if you feel better, you still need to do what your doctor recommends, such as going to all suggested follow-up appointments and taking medicines exactly as directed. This will help you recover and help prevent future problems. How can you care for yourself at home? · Rest until you feel better. · Take your medicine exactly as prescribed. Call your doctor if you think you are having a problem with your medicine. · Do not drive after taking a prescription pain medicine. When should you call for help? Call 911 if:    · You passed out (lost consciousness).     · You have severe difficulty breathing.     · You have symptoms of a heart attack. These may include:  ? Chest pain or pressure, or a strange feeling in your chest.  ? Sweating. ? Shortness of breath. ? Nausea or vomiting. ? Pain, pressure, or a strange feeling in your back, neck, jaw, or upper belly or in one or both shoulders or arms. ? Lightheadedness or sudden weakness. ? A fast or irregular heartbeat. After you call 911, the  may tell you to chew 1 adult-strength or 2 to 4 low-dose aspirin. Wait for an ambulance. Do not try to drive yourself.    Call your doctor today if:    · You have any trouble breathing.     · Your chest pain gets worse.     · You are dizzy or lightheaded, or you feel like you may faint.     · You are not getting better as expected.     · You are having new or different chest pain. Where can you learn more? Go to http://mitul-leena.info/. Enter A120 in the search box to learn more about \"Chest Pain: Care Instructions. \"  Current as of: June 26, 2019  Content Version: 12.2  © 9684-9505 [employer].  Care instructions adapted under license by [employer] (which disclaims liability or warranty for this information). If you have questions about a medical condition or this instruction, always ask your healthcare professional. [place] [address] any warranty or liability for your use of this information.

## 2020-01-21 NOTE — ED TRIAGE NOTES
Patient complains of chest pain, sore throat, body aches that began yesterday. Denies fever, medication for symptoms.

## 2020-01-21 NOTE — ED NOTES
The patient was discharged home by provider in stable condition. The patient is alert and oriented, in no respiratory distress. The patient's diagnosis, condition and treatment were explained. The patient expressed understanding. Two prescriptions given. A discharge plan has been developed. A  was not involved in the process. Aftercare instructions were given. Patient ambulatory out of the ED.

## 2020-01-21 NOTE — ED PROVIDER NOTES
58-year-old female with history of headaches, gestational diabetes presents with complaint of chest pain, shortness of breath and sore throat. Patient reports yesterday she began with chest pains that occurred only with coughing. She reports today she has chest pain every time she breathes states her chest feels tight. She reports her cough is nonproductive. States she is been having a lot of chills however has not measured her temperature. She has not taken any Tylenol or ibuprofen for her symptoms today. She reports it also hurts when she swallows. She denies any recent sick contacts. She also denies any history of travel or DVT or PE. She reports she does not get menstrual periods because she has the Depakote shot. Patient reports in the past she has been diagnosed with bronchitis and pneumonia. She is not a smoker. She reports having asthma as a child.            Past Medical History:   Diagnosis Date    Abnormal Pap smear     Anemia     Gastrointestinal disorder     Ulcers    Headache     Herniated disc     Hx gestational diabetes     HX OTHER MEDICAL     trich treated at beginning of pregnancy    HX OTHER MEDICAL     pituitary tumor-benign     Miscarriage     Molar pregnancy     Pap smear for cervical cancer screening 19 neg HPV POS- rp pap one year    PCOS (polycystic ovarian syndrome)     Pelvic pain in female     Pituitary adenoma ([facility] [address]     Pituitary tumor     Psychiatric problem     depression never on medication    Thyroid activity decreased     hypothyroid awaiting appnt for endo       Past Surgical History:   Procedure Laterality Date    HX  SECTION      HX COLPOSCOPY  11/3/10    HX DILATION AND CURETTAGE      HX LIPOMA RESECTION           Family History:   Problem Relation Age of Onset    Diabetes Father     Headache Mother        Social History     Socioeconomic History    Marital status: SINGLE     Spouse name: Not on file    Number of children: Not on file    Years of education: Not on file    Highest education level: Not on file   Occupational History    Not on file   Social Needs    Financial resource strain: Not on file    Food insecurity:     Worry: Not on file     Inability: Not on file    Transportation needs:     Medical: Not on file     Non-medical: Not on file   Tobacco Use    Smoking status: Never Smoker    Smokeless tobacco: Never Used   Substance and Sexual Activity    Alcohol use: Yes     Comment: social    Drug use: No    Sexual activity: Not Currently     Birth control/protection: Injection   Lifestyle    Physical activity:     Days per week: Not on file     Minutes per session: Not on file    Stress: Not on file   Relationships    Social connections:     Talks on phone: Not on file     Gets together: Not on file     Attends Rastafarian service: Not on file     Active member of club or organization: Not on file     Attends meetings of clubs or organizations: Not on file     Relationship status: Not on file    Intimate partner violence:     Fear of current or ex partner: Not on file     Emotionally abused: Not on file     Physically abused: Not on file     Forced sexual activity: Not on file   Other Topics Concern    Not on file   Social History Narrative    Not on file         ALLERGIES: Tomato    Review of Systems   Constitutional: Positive for chills. Negative for fever. HENT: Positive for sore throat. Negative for postnasal drip. Respiratory: Positive for cough and shortness of breath. Cardiovascular: Positive for chest pain. Gastrointestinal: Negative for abdominal pain, nausea and vomiting. All other systems reviewed and are negative. Vitals:    01/21/20 1435 01/21/20 1437   BP:  128/87   Pulse:  92   Resp:  18   Temp: 98.1 °F (36.7 °C)    SpO2:  99%   Weight:  105 kg (231 lb 7.7 oz)   Height:  5' 6\" (1.676 m)            Physical Exam  Vitals signs and nursing note reviewed.    Constitutional: Appearance: She is well-developed. HENT:      Head: Normocephalic and atraumatic. Right Ear: Tympanic membrane normal.      Left Ear: Tympanic membrane normal.      Mouth/Throat:      Mouth: Mucous membranes are moist.      Pharynx: Posterior oropharyngeal erythema present. No oropharyngeal exudate. Comments: Erythema posterior pharynx with no tonsillar enlargement, exudate or uvula deviation  Eyes:      Conjunctiva/sclera: Conjunctivae normal.   Neck:      Musculoskeletal: Normal range of motion. No neck rigidity. Cardiovascular:      Rate and Rhythm: Normal rate and regular rhythm. Pulses: Normal pulses. Heart sounds: Normal heart sounds. No murmur. Pulmonary:      Effort: Pulmonary effort is normal. No respiratory distress. Breath sounds: No stridor. Wheezing present. No rhonchi or rales. Comments: Scant expiratory wheezing  Chest:      Chest wall: No tenderness. Abdominal:      General: Bowel sounds are normal. There is no distension. Palpations: Abdomen is soft. There is no mass. Tenderness: There is no tenderness. There is no guarding or rebound. Hernia: No hernia is present. Musculoskeletal: Normal range of motion. Skin:     General: Skin is warm and dry. Neurological:      Mental Status: She is alert and oriented to person, place, and time. MDM  Number of Diagnoses or Management Options  Acute bronchitis, unspecified organism:   Acute chest pain:   Diagnosis management comments: Symptoms not consistent with PE or flu. Patient with no fever here and no myalgias. Could be bronchitis versus pneumonia. We will get an EKG given her age however it seems the chest pain is more related to the coughing. We will try a neb treatment as well this patient has some scant expiratory wheezing. Will check a strep though low suspicion. Try viscous lidocaine for treatment.        Amount and/or Complexity of Data Reviewed  Tests in the radiology section of CPT®: ordered and reviewed  Independent visualization of images, tracings, or specimens: yes (EKG)    Patient Progress  Patient progress: stable         Procedures    ED EKG interpretation:  Rhythm: normal sinus rhythm; and regular . Rate (approx.): 90; Axis: normal; P wave: normal; QRS interval: normal ; ST/T wave: T wave inverted; in  Lead: 3 and avf unchanged from 11/27/19  EKG documented by Albertina Hunter MD, scribe, as interpreted by Betzaida John MD, ED MD.    3:22 PM  Pt with no expiratory wheezes and reports chest tightness is much improved      3:54 PM  Patient's results have been reviewed with them. Patient and/or family have verbally conveyed their understanding and agreement of the patient's signs, symptoms, diagnosis, treatment and prognosis and additionally agree to follow up as recommended or return to the Emergency Room should their condition change prior to follow-up. Discharge instructions have also been provided to the patient with some educational information regarding their diagnosis as well a list of reasons why they would want to return to the ER prior to their follow-up appointment should their condition change. 1. Acute bronchitis, unspecified organism    2.  Acute chest pain

## 2020-01-22 ENCOUNTER — HOSPITAL ENCOUNTER (EMERGENCY)
Age: 30
Discharge: HOME | End: 2020-01-22
Attending: EMERGENCY MEDICINE
Payer: MEDICAID

## 2020-01-22 VITALS
WEIGHT: 231 LBS | DIASTOLIC BLOOD PRESSURE: 71 MMHG | RESPIRATION RATE: 16 BRPM | OXYGEN SATURATION: 98 % | BODY MASS INDEX: 37.28 KG/M2 | HEART RATE: 97 BPM | TEMPERATURE: 98.6 F | SYSTOLIC BLOOD PRESSURE: 146 MMHG

## 2020-01-22 DIAGNOSIS — J20.9 ACUTE BRONCHITIS, UNSPECIFIED ORGANISM: Primary | ICD-10-CM

## 2020-01-22 DIAGNOSIS — J02.9 ACUTE PHARYNGITIS, UNSPECIFIED ETIOLOGY: ICD-10-CM

## 2020-01-22 PROCEDURE — 99282 EMERGENCY DEPT VISIT SF MDM: CPT

## 2020-01-22 RX ORDER — BENZONATATE 100 MG/1
100 CAPSULE ORAL
Qty: 20 CAP | Refills: 0 | Status: SHIPPED | OUTPATIENT
Start: 2020-01-22 | End: 2020-01-29

## 2020-01-22 RX ORDER — OXYMETAZOLINE HCL 0.05 %
2 SPRAY, NON-AEROSOL (ML) NASAL 2 TIMES DAILY
Qty: 15 ML | Refills: 0 | Status: SHIPPED | OUTPATIENT
Start: 2020-01-22 | End: 2020-01-25

## 2020-01-22 RX ORDER — IBUPROFEN 600 MG/1
600 TABLET ORAL
Qty: 20 TAB | Refills: 0 | Status: SHIPPED | OUTPATIENT
Start: 2020-01-22 | End: 2020-05-08 | Stop reason: ALTCHOICE

## 2020-01-22 NOTE — ED TRIAGE NOTES
Seen at [facility] last night for same, dx with bronchitis and given inhaler. Here for same, feels like wheezing. Conversation in triage, no distress noted.  - for strep

## 2020-01-22 NOTE — ED PROVIDER NOTES
26 y.o. female with past medical history significant for gastric ulcers, gestational diabetes, anemia, PCOS, and headaches  who presents via POV with chief complaint of difficulty breathing. Pt states she has difficulty breathing, and feels like she cant catch her breath when she is speaking. She has accompanying cough, congestion, sore throat, chills, and chest pain that she describes as \"burning\" when she coughs, that all began on  (3 days ago). Pt was seen at [facility] ED yesterday and diagnosed with bronchitis after and normal EKG, clear chest X-ray, and a negative strep throat test. She now also c/o body aches, particularly in her legs. She was given an albuterol inhaler yesterday and has been using it with little relief. Pt has not taken any other medications to treat her sxs. There are no other acute medical concerns at this time. Social hx: denies tobacco use; endorses social EtOH use; denies illicit drug use. PCP: Rosalie Hooker MD      Note written by David Cox, as dictated by Mac Echeverria MD 8:43 AM      The history is provided by the patient. No  was used.         Past Medical History:   Diagnosis Date    Abnormal Pap smear     Anemia     Gastrointestinal disorder     Ulcers    Headache     Herniated disc     Hx gestational diabetes     HX OTHER MEDICAL     trich treated at beginning of pregnancy    HX OTHER MEDICAL     pituitary tumor-benign     Miscarriage     Molar pregnancy     Pap smear for cervical cancer screening 19 neg HPV POS- rp pap one year    PCOS (polycystic ovarian syndrome)     Pelvic pain in female     Pituitary adenoma ([facility] [address]     Pituitary tumor     Psychiatric problem     depression never on medication    Thyroid activity decreased     hypothyroid awaiting appnt for endo       Past Surgical History:   Procedure Laterality Date    HX  SECTION      HX COLPOSCOPY  11/3/10    HX DILATION AND CURETTAGE  HX LIPOMA RESECTION           Family History:   Problem Relation Age of Onset    Diabetes Father     Headache Mother        Social History     Socioeconomic History    Marital status: SINGLE     Spouse name: Not on file    Number of children: Not on file    Years of education: Not on file    Highest education level: Not on file   Occupational History    Not on file   Social Needs    Financial resource strain: Not on file    Food insecurity:     Worry: Not on file     Inability: Not on file    Transportation needs:     Medical: Not on file     Non-medical: Not on file   Tobacco Use    Smoking status: Never Smoker    Smokeless tobacco: Never Used   Substance and Sexual Activity    Alcohol use: Yes     Comment: social    Drug use: No    Sexual activity: Not Currently     Birth control/protection: Injection   Lifestyle    Physical activity:     Days per week: Not on file     Minutes per session: Not on file    Stress: Not on file   Relationships    Social connections:     Talks on phone: Not on file     Gets together: Not on file     Attends [employer] service: Not on file     Active member of club or organization: Not on file     Attends meetings of clubs or organizations: Not on file     Relationship status: Not on file    Intimate partner violence:     Fear of current or ex partner: Not on file     Emotionally abused: Not on file     Physically abused: Not on file     Forced sexual activity: Not on file   Other Topics Concern    Not on file   Social History Narrative    Not on file         ALLERGIES: Tomato    Review of Systems   Constitutional: Positive for chills. Negative for fever. HENT: Positive for congestion and sore throat. Negative for facial swelling. Eyes: Negative for pain. Respiratory: Positive for cough and shortness of breath. Cardiovascular: Positive for chest pain. Negative for leg swelling. Gastrointestinal: Negative for abdominal pain, diarrhea and vomiting. Endocrine: Negative for polyuria. Genitourinary: Negative for difficulty urinating and flank pain. Musculoskeletal: Positive for myalgias. Negative for arthralgias and back pain. Skin: Negative for color change. Allergic/Immunologic: Negative for immunocompromised state. Neurological: Negative for dizziness and headaches. Hematological: Does not bruise/bleed easily. Psychiatric/Behavioral: Negative for confusion. All other systems reviewed and are negative. Vitals:    01/22/20 0836   BP: 146/71   Pulse: 97   Resp: 16   Temp: 98.6 °F (37 °C)   SpO2: 98%   Weight: 104.8 kg (231 lb)            Physical Exam  Vitals signs and nursing note reviewed. Constitutional:       General: She is not in acute distress. Appearance: She is well-developed. She is not diaphoretic. Comments: Well appearing, ambulating normally with no difficulty   HENT:      Head: Normocephalic and atraumatic. Right Ear: External ear normal.      Left Ear: External ear normal.      Mouth/Throat:      Pharynx: Posterior oropharyngeal erythema (mild) present. Eyes:      Conjunctiva/sclera: Conjunctivae normal.   Neck:      Musculoskeletal: Normal range of motion and neck supple. Thyroid: No thyromegaly. Trachea: No tracheal deviation. Cardiovascular:      Rate and Rhythm: Normal rate and regular rhythm. Heart sounds: Normal heart sounds. No murmur. No friction rub. No gallop. Pulmonary:      Effort: Pulmonary effort is normal. No respiratory distress. Breath sounds: Normal breath sounds. No wheezing or rales. Chest:      Chest wall: No tenderness. Abdominal:      General: Bowel sounds are normal. There is no distension. Palpations: Abdomen is soft. Tenderness: There is no tenderness. Musculoskeletal: Normal range of motion. General: No tenderness or deformity. Skin:     General: Skin is warm and dry. Findings: No erythema or rash.    Neurological:      Mental Status: She is alert and oriented to person, place, and time. Cranial Nerves: No cranial nerve deficit. Motor: No abnormal muscle tone. Coordination: Coordination normal.      Deep Tendon Reflexes: Reflexes normal.   Psychiatric:         Behavior: Behavior normal.         Thought Content: Thought content normal.         Judgment: Judgment normal.      Note written by David Skelton, as dictated by Alveda Fothergill, MD 8:49 AM      MDM  Number of Diagnoses or Management Options  Acute bronchitis, unspecified organism:   Acute pharyngitis, unspecified etiology:   Diagnosis management comments: 39-year-old -American female presents to the emergency department with cough and sore throat. Patient was seen yesterday at [facility] emergency department. She had a negative EKG, chest x-ray, strep swab. She was given albuterol. Patient presents with continued muscle aches and continued cough. Will treat with cough medications, Afrin for congestion, Motrin for muscle aches. PCP for follow-up. Patient agrees. Amount and/or Complexity of Data Reviewed  Decide to obtain previous medical records or to obtain history from someone other than the patient: yes  Review and summarize past medical records: yes  Independent visualization of images, tracings, or specimens: yes           Procedures    Good return precautions given to patient. Close follow up with PCP recommended. Patient and/or family voices understanding of this plan. Discharge instructions were explained by me and all concerns were addressed.

## 2020-01-22 NOTE — ED NOTES
The patient was discharged home by Dr. Hank Watson and Jennifer Goel RN in stable condition, accompanied by parent/guardian . The patient is alert and oriented, is in no respiratory distress. The patient's diagnosis, condition and treatment were explained to patient or parent/guardian. The patient/responsible party expressed understanding. 3 prescriptions given to pt. No work/school note given to pt. A discharge plan has been developed. A  was not involved in the process. Aftercare instructions were given to the patient. Pt ambulatory to discharge.

## 2020-01-22 NOTE — DISCHARGE INSTRUCTIONS
Patient Education        Bronchitis: Care Instructions  Your Care Instructions    Bronchitis is inflammation of the bronchial tubes, which carry air to the lungs. The tubes swell and produce mucus, or phlegm. The mucus and inflamed bronchial tubes make you cough. You may have trouble breathing. Most cases of bronchitis are caused by viruses like those that cause colds. Antibiotics usually do not help and they may be harmful. Bronchitis usually develops rapidly and lasts about 2 to 3 weeks in otherwise healthy people. Follow-up care is a key part of your treatment and safety. Be sure to make and go to all appointments, and call your doctor if you are having problems. It's also a good idea to know your test results and keep a list of the medicines you take. How can you care for yourself at home? · Take all medicines exactly as prescribed. Call your doctor if you think you are having a problem with your medicine. · Get some extra rest.  · Take an over-the-counter pain medicine, such as acetaminophen (Tylenol), ibuprofen (Advil, Motrin), or naproxen (Aleve) to reduce fever and relieve body aches. Read and follow all instructions on the label. · Do not take two or more pain medicines at the same time unless the doctor told you to. Many pain medicines have acetaminophen, which is Tylenol. Too much acetaminophen (Tylenol) can be harmful. · Take an over-the-counter cough medicine that contains dextromethorphan to help quiet a dry, hacking cough so that you can sleep. Avoid cough medicines that have more than one active ingredient. Read and follow all instructions on the label. · Breathe moist air from a humidifier, hot shower, or sink filled with hot water. The heat and moisture will thin mucus so you can cough it out. · Do not smoke. Smoking can make bronchitis worse. If you need help quitting, talk to your doctor about stop-smoking programs and medicines.  These can increase your chances of quitting for good.  When should you call for help? Call 911 anytime you think you may need emergency care. For example, call if:    · You have severe trouble breathing.    Call your doctor now or seek immediate medical care if:    · You have new or worse trouble breathing.     · You cough up dark brown or bloody mucus (sputum).     · You have a new or higher fever.     · You have a new rash.    Watch closely for changes in your health, and be sure to contact your doctor if:    · You cough more deeply or more often, especially if you notice more mucus or a change in the color of your mucus.     · You are not getting better as expected. Where can you learn more? Go to http://mitul-leena.info/. Enter H333 in the search box to learn more about \"Bronchitis: Care Instructions. \"  Current as of: June 9, 2019  Content Version: 12.2  © 2264-1614 [employer]. Care instructions adapted under license by [employer] (which disclaims liability or warranty for this information). If you have questions about a medical condition or this instruction, always ask your healthcare professional. [address] any warranty or liability for your use of this information.

## 2020-01-23 ENCOUNTER — APPOINTMENT (OUTPATIENT)
Dept: CT IMAGING | Age: 30
End: 2020-01-23
Attending: NURSE PRACTITIONER
Payer: MEDICAID

## 2020-01-23 ENCOUNTER — HOSPITAL ENCOUNTER (EMERGENCY)
Age: 30
Discharge: HOME | End: 2020-01-23
Attending: EMERGENCY MEDICINE
Payer: MEDICAID

## 2020-01-23 VITALS
TEMPERATURE: 100.3 F | HEART RATE: 108 BPM | RESPIRATION RATE: 15 BRPM | HEIGHT: 66 IN | SYSTOLIC BLOOD PRESSURE: 147 MMHG | OXYGEN SATURATION: 99 % | WEIGHT: 231 LBS | DIASTOLIC BLOOD PRESSURE: 95 MMHG | BODY MASS INDEX: 37.12 KG/M2

## 2020-01-23 DIAGNOSIS — K29.70 GASTRITIS WITHOUT BLEEDING, UNSPECIFIED CHRONICITY, UNSPECIFIED GASTRITIS TYPE: ICD-10-CM

## 2020-01-23 DIAGNOSIS — N20.0 KIDNEY STONE: ICD-10-CM

## 2020-01-23 DIAGNOSIS — J10.1 INFLUENZA B: Primary | ICD-10-CM

## 2020-01-23 LAB
ALBUMIN SERPL-MCNC: 3.9 G/DL (ref 3.5–5)
ALBUMIN/GLOB SERPL: 1.1 {RATIO} (ref 1.1–2.2)
ALP SERPL-CCNC: 108 U/L (ref 45–117)
ALT SERPL-CCNC: 31 U/L (ref 12–78)
ANION GAP SERPL CALC-SCNC: 8 MMOL/L (ref 5–15)
APPEARANCE UR: CLEAR
AST SERPL-CCNC: 29 U/L (ref 15–37)
BACTERIA SPEC CULT: NORMAL
BACTERIA URNS QL MICRO: ABNORMAL /HPF
BASOPHILS # BLD: 0 K/UL (ref 0–0.1)
BASOPHILS NFR BLD: 1 % (ref 0–1)
BILIRUB SERPL-MCNC: 0.3 MG/DL (ref 0.2–1)
BILIRUB UR QL: NEGATIVE
BUN SERPL-MCNC: 8 MG/DL (ref 6–20)
BUN/CREAT SERPL: 9 (ref 12–20)
CALCIUM SERPL-MCNC: 8.5 MG/DL (ref 8.5–10.1)
CHLORIDE SERPL-SCNC: 108 MMOL/L (ref 97–108)
CO2 SERPL-SCNC: 24 MMOL/L (ref 21–32)
COLOR UR: ABNORMAL
CREAT SERPL-MCNC: 0.9 MG/DL (ref 0.55–1.02)
DIFFERENTIAL METHOD BLD: ABNORMAL
EOSINOPHIL # BLD: 0.1 K/UL (ref 0–0.4)
EOSINOPHIL NFR BLD: 2 % (ref 0–7)
EPITH CASTS URNS QL MICRO: ABNORMAL /LPF
ERYTHROCYTE [DISTWIDTH] IN BLOOD BY AUTOMATED COUNT: 15.1 % (ref 11.5–14.5)
FLUAV AG NPH QL IA: NEGATIVE
FLUBV AG NOSE QL IA: POSITIVE
GLOBULIN SER CALC-MCNC: 3.6 G/DL (ref 2–4)
GLUCOSE SERPL-MCNC: 89 MG/DL (ref 65–100)
GLUCOSE UR STRIP.AUTO-MCNC: NEGATIVE MG/DL
HCG UR QL: NEGATIVE
HCT VFR BLD AUTO: 38.9 % (ref 35–47)
HGB BLD-MCNC: 12.7 G/DL (ref 11.5–16)
HGB UR QL STRIP: NEGATIVE
HYALINE CASTS URNS QL MICRO: ABNORMAL /LPF (ref 0–5)
IMM GRANULOCYTES # BLD AUTO: 0 K/UL (ref 0–0.04)
IMM GRANULOCYTES NFR BLD AUTO: 0 % (ref 0–0.5)
KETONES UR QL STRIP.AUTO: NEGATIVE MG/DL
LEUKOCYTE ESTERASE UR QL STRIP.AUTO: NEGATIVE
LIPASE SERPL-CCNC: 175 U/L (ref 73–393)
LYMPHOCYTES # BLD: 1.1 K/UL (ref 0.8–3.5)
LYMPHOCYTES NFR BLD: 19 % (ref 12–49)
MCH RBC QN AUTO: 26.8 PG (ref 26–34)
MCHC RBC AUTO-ENTMCNC: 32.6 G/DL (ref 30–36.5)
MCV RBC AUTO: 82.2 FL (ref 80–99)
MONOCYTES # BLD: 0.4 K/UL (ref 0–1)
MONOCYTES NFR BLD: 7 % (ref 5–13)
MUCOUS THREADS URNS QL MICRO: ABNORMAL /LPF
NEUTS SEG # BLD: 4.2 K/UL (ref 1.8–8)
NEUTS SEG NFR BLD: 71 % (ref 32–75)
NITRITE UR QL STRIP.AUTO: NEGATIVE
NRBC # BLD: 0 K/UL (ref 0–0.01)
NRBC BLD-RTO: 0 PER 100 WBC
PH UR STRIP: 6.5 [PH] (ref 5–8)
PLATELET # BLD AUTO: 228 K/UL (ref 150–400)
PMV BLD AUTO: 10.7 FL (ref 8.9–12.9)
POTASSIUM SERPL-SCNC: 3.7 MMOL/L (ref 3.5–5.1)
PROT SERPL-MCNC: 7.5 G/DL (ref 6.4–8.2)
PROT UR STRIP-MCNC: ABNORMAL MG/DL
RBC # BLD AUTO: 4.73 M/UL (ref 3.8–5.2)
RBC #/AREA URNS HPF: ABNORMAL /HPF (ref 0–5)
SERVICE CMNT-IMP: NORMAL
SODIUM SERPL-SCNC: 140 MMOL/L (ref 136–145)
SP GR UR REFRACTOMETRY: 1.02 (ref 1–1.03)
UROBILINOGEN UR QL STRIP.AUTO: 1 EU/DL (ref 0.2–1)
WBC # BLD AUTO: 5.9 K/UL (ref 3.6–11)
WBC URNS QL MICRO: ABNORMAL /HPF (ref 0–4)

## 2020-01-23 PROCEDURE — 99283 EMERGENCY DEPT VISIT LOW MDM: CPT

## 2020-01-23 PROCEDURE — 85025 COMPLETE CBC W/AUTO DIFF WBC: CPT

## 2020-01-23 PROCEDURE — 81025 URINE PREGNANCY TEST: CPT

## 2020-01-23 PROCEDURE — 74011250637 HC RX REV CODE- 250/637: Performed by: EMERGENCY MEDICINE

## 2020-01-23 PROCEDURE — 87804 INFLUENZA ASSAY W/OPTIC: CPT

## 2020-01-23 PROCEDURE — 83690 ASSAY OF LIPASE: CPT

## 2020-01-23 PROCEDURE — 81001 URINALYSIS AUTO W/SCOPE: CPT

## 2020-01-23 PROCEDURE — 74011250636 HC RX REV CODE- 250/636: Performed by: NURSE PRACTITIONER

## 2020-01-23 PROCEDURE — 36415 COLL VENOUS BLD VENIPUNCTURE: CPT

## 2020-01-23 PROCEDURE — 80053 COMPREHEN METABOLIC PANEL: CPT

## 2020-01-23 PROCEDURE — 74177 CT ABD & PELVIS W/CONTRAST: CPT

## 2020-01-23 PROCEDURE — 96374 THER/PROPH/DIAG INJ IV PUSH: CPT

## 2020-01-23 PROCEDURE — 74011636320 HC RX REV CODE- 636/320: Performed by: RADIOLOGY

## 2020-01-23 RX ORDER — KETOROLAC TROMETHAMINE 30 MG/ML
15 INJECTION, SOLUTION INTRAMUSCULAR; INTRAVENOUS
Status: COMPLETED | OUTPATIENT
Start: 2020-01-23 | End: 2020-01-23

## 2020-01-23 RX ORDER — ACETAMINOPHEN 500 MG
1000 TABLET ORAL ONCE
Status: COMPLETED | OUTPATIENT
Start: 2020-01-23 | End: 2020-01-23

## 2020-01-23 RX ORDER — FAMOTIDINE 20 MG/1
20 TABLET, FILM COATED ORAL 2 TIMES DAILY
Qty: 14 TAB | Refills: 0 | Status: SHIPPED | OUTPATIENT
Start: 2020-01-23 | End: 2020-01-30

## 2020-01-23 RX ADMIN — ACETAMINOPHEN 1000 MG: 500 TABLET ORAL at 21:45

## 2020-01-23 RX ADMIN — KETOROLAC TROMETHAMINE 15 MG: 30 INJECTION, SOLUTION INTRAMUSCULAR; INTRAVENOUS at 20:37

## 2020-01-23 RX ADMIN — IOPAMIDOL 100 ML: 755 INJECTION, SOLUTION INTRAVENOUS at 20:46

## 2020-01-24 NOTE — DISCHARGE INSTRUCTIONS
Patient Education        Gastritis: Care Instructions  Your Care Instructions    Gastritis is a sore and upset stomach. It happens when something irritates the stomach lining. Many things can cause it. These include an infection such as the flu or something you ate or drank. Medicines or a sore on the lining of the stomach (ulcer) also can cause it. Your belly may bloat and ache. You may belch, vomit, and feel sick to your stomach. You should be able to relieve the problem by taking medicine. And it may help to change your diet. If gastritis lasts, your doctor may prescribe medicine. Follow-up care is a key part of your treatment and safety. Be sure to make and go to all appointments, and call your doctor if you are having problems. It's also a good idea to know your test results and keep a list of the medicines you take. How can you care for yourself at home? · If your doctor prescribed antibiotics, take them as directed. Do not stop taking them just because you feel better. You need to take the full course of antibiotics. · Be safe with medicines. If your doctor prescribed medicine to decrease stomach acid, take it as directed. Call your doctor if you think you are having a problem with your medicine. · Do not take any other medicine, including over-the-counter pain relievers, without talking to your doctor first.  · If your doctor recommends over-the-counter medicine to reduce stomach acid, such as Pepcid AC, Prilosec, Tagamet HB, or Zantac 75, follow the directions on the label. · Drink plenty of fluids (enough so that your urine is light yellow or clear like water) to prevent dehydration. Choose water and other caffeine-free clear liquids. If you have kidney, heart, or liver disease and have to limit fluids, talk with your doctor before you increase the amount of fluids you drink. · Limit how much alcohol you drink. · Avoid coffee, tea, cola drinks, chocolate, and other foods with caffeine.  They increase stomach acid. When should you call for help? Call 911 anytime you think you may need emergency care. For example, call if:    · You vomit blood or what looks like coffee grounds.     · You pass maroon or very bloody stools.    Call your doctor now or seek immediate medical care if:    · You start breathing fast and have not produced urine in the last 8 hours.     · You cannot keep fluids down.    Watch closely for changes in your health, and be sure to contact your doctor if:    · You do not get better as expected. Where can you learn more? Go to http://mitulJenn Rykertleena.info/. Enter [id number] in the search box to learn more about \"Gastritis: Care Instructions. \"  Current as of: November 7, 2018  Content Version: 12.2  © 6089-6413 [employer]. Care instructions adapted under license by [employer] (which disclaims liability or warranty for this information). If you have questions about a medical condition or this instruction, always ask your healthcare professional. Sharee Oas any warranty or liability for your use of this information. Patient Education        Influenza (Flu): Care Instructions  Your Care Instructions    Influenza (flu) is an infection in the lungs and breathing passages. It is caused by the influenza virus. There are different strains, or types, of the flu virus from year to year. Unlike the common cold, the flu comes on suddenly and the symptoms, such as a cough, congestion, fever, chills, fatigue, aches, and pains, are more severe. These symptoms may last up to 10 days. Although the flu can make you feel very sick, it usually doesn't cause serious health problems. Home treatment is usually all you need for flu symptoms. But your doctor may prescribe antiviral medicine to prevent other health problems, such as pneumonia, from developing.  Older people and those who have a long-term health condition, such as lung disease, are most at risk for having pneumonia or other health problems. Follow-up care is a key part of your treatment and safety. Be sure to make and go to all appointments, and call your doctor if you are having problems. It's also a good idea to know your test results and keep a list of the medicines you take. How can you care for yourself at home? · Get plenty of rest.  · Drink plenty of fluids, enough so that your urine is light yellow or clear like water. If you have kidney, heart, or liver disease and have to limit fluids, talk with your doctor before you increase the amount of fluids you drink. · Take an over-the-counter pain medicine if needed, such as acetaminophen (Tylenol), ibuprofen (Advil, Motrin), or naproxen (Aleve), to relieve fever, headache, and muscle aches. Read and follow all instructions on the label. No one younger than 20 should take aspirin. It has been linked to Reye syndrome, a serious illness. · Do not smoke. Smoking can make the flu worse. If you need help quitting, talk to your doctor about stop-smoking programs and medicines. These can increase your chances of quitting for good. · Breathe moist air from a hot shower or from a sink filled with hot water to help clear a stuffy nose. · Before you use cough and cold medicines, check the label. These medicines may not be safe for young children or for people with certain health problems. · If the skin around your nose and lips becomes sore, put some petroleum jelly on the area. · To ease coughing:  ? Drink fluids to soothe a scratchy throat. ? Suck on cough drops or plain hard candy. ? Take an over-the-counter cough medicine that contains dextromethorphan to help you get some sleep. Read and follow all instructions on the label. ? Raise your head at night with an extra pillow. This may help you rest if coughing keeps you awake. · Take any prescribed medicine exactly as directed.  Call your doctor if you think you are having a problem with your medicine. To avoid spreading the flu  · Wash your hands regularly, and keep your hands away from your face. · Stay home from school, work, and other public places until you are feeling better and your fever has been gone for at least 24 hours. The fever needs to have gone away on its own without the help of medicine. · Ask people living with you to talk to their doctors about preventing the flu. They may get antiviral medicine to keep from getting the flu from you. · To prevent the flu in the future, get a flu vaccine every fall. Encourage people living with you to get the vaccine. · Cover your mouth when you cough or sneeze. When should you call for help? Call 911 anytime you think you may need emergency care. For example, call if:    · You have severe trouble breathing.    Call your doctor now or seek immediate medical care if:    · You have new or worse trouble breathing.     · You seem to be getting much sicker.     · You feel very sleepy or confused.     · You have a new or higher fever.     · You get a new rash.    Watch closely for changes in your health, and be sure to contact your doctor if:    · You begin to get better and then get worse.     · You are not getting better after 1 week. Where can you learn more? Go to http://mitul-leena.info/. Enter A402 in the search box to learn more about \"Influenza (Flu): Care Instructions. \"  Current as of: June 9, 2019  Content Version: 12.2  © 2274-1088 [employer]. Care instructions adapted under license by [email or web address] (which disclaims liability or warranty for this information). If you have questions about a medical condition or this instruction, always ask your healthcare professional. [place] [address] any warranty or liability for your use of this information.          Patient Education        Kidney Stone: Care Instructions  Your Care Instructions    Kidney stones are formed when salts, minerals, and other substances normally found in the urine clump together. They can be as small as grains of sand or, rarely, as large as golf balls. While the stone is traveling through the ureter, which is the tube that carries urine from the kidney to the bladder, you will probably feel pain. The pain may be mild or very severe. You may also have some blood in your urine. As soon as the stone reaches the bladder, any intense pain should go away. If a stone is too large to pass on its own, you may need a medical procedure to help you pass the stone. The doctor has checked you carefully, but problems can develop later. If you notice any problems or new symptoms, get medical treatment right away. Follow-up care is a key part of your treatment and safety. Be sure to make and go to all appointments, and call your doctor if you are having problems. It's also a good idea to know your test results and keep a list of the medicines you take. How can you care for yourself at home? · Drink plenty of fluids, enough so that your urine is light yellow or clear like water. If you have kidney, heart, or liver disease and have to limit fluids, talk with your doctor before you increase the amount of fluids you drink. · Take pain medicines exactly as directed. Call your doctor if you think you are having a problem with your medicine. ? If the doctor gave you a prescription medicine for pain, take it as prescribed. ? If you are not taking a prescription pain medicine, ask your doctor if you can take an over-the-counter medicine. Read and follow all instructions on the label. · Your doctor may ask you to strain your urine so that you can collect your kidney stone when it passes. You can use a kitchen strainer or a tea strainer to catch the stone. Store it in a plastic bag until you see your doctor again. Preventing future kidney stones  Some changes in your diet may help prevent kidney stones.  Depending on the cause of your stones, your doctor may recommend that you:  · Drink plenty of fluids, enough so that your urine is light yellow or clear like water. If you have kidney, heart, or liver disease and have to limit fluids, talk with your doctor before you increase the amount of fluids you drink. · Limit coffee, tea, and alcohol. Also avoid grapefruit juice. · Do not take more than the recommended daily dose of vitamins C and D.  · Avoid antacids such as Gaviscon, Maalox, Mylanta, or Tums. · Limit the amount of salt (sodium) in your diet. · Eat a balanced diet that is not too high in protein. · Limit foods that are high in a substance called oxalate, which can cause kidney stones. These foods include dark green vegetables, rhubarb, chocolate, wheat bran, nuts, cranberries, and beans. When should you call for help? Call your doctor now or seek immediate medical care if:    · You cannot keep down fluids.     · Your pain gets worse.     · You have a fever or chills.     · You have new or worse pain in your back just below your rib cage (the flank area).     · You have new or more blood in your urine.    Watch closely for changes in your health, and be sure to contact your doctor if:    · You do not get better as expected. Where can you learn more? Go to http://mitul-leena.info/. Enter H660 in the search box to learn more about \"Kidney Stone: Care Instructions. \"  Current as of: October 31, 2018  Content Version: 12.2  © 8203-7485 [employer]. Care instructions adapted under license by [employer] (which disclaims liability or warranty for this information). If you have questions about a medical condition or this instruction, always ask your healthcare professional. [address] any warranty or liability for your use of this information. We hope that we have addressed all of your medical concerns.  The examination and treatment you received in the Emergency Department were for an emergent problem and were not intended as complete care. It is important that you follow up with your healthcare provider(s) for ongoing care. If your symptoms worsen or do not improve as expected, and you are unable to reach your usual health care provider(s), you should return to the Emergency Department. Today's healthcare is undergoing tremendous change, and patient satisfaction surveys are one of the many tools to assess the quality of medical care. You may receive a survey from the [employer] organization regarding your experience in the Emergency Department. I hope that your experience has been completely positive, particularly the medical care that I provided. As such, please participate in the survey; anything less than excellent does not meet my expectations or intentions. [facility] and [facility] participate in nationally recognized quality of care measures. If your blood pressure is greater than 120/80, as reported below, we urge that you seek medical care to address the potential of high blood pressure, commonly known as hypertension. Hypertension can be hereditary or can be caused by certain medical conditions, pain, stress, or \"white coat syndrome. \"       Please make an appointment with your health care provider(s) for follow up of your Emergency Department visit. VITALS:   Patient Vitals for the past 8 hrs:   Temp Pulse Resp BP SpO2   01/23/20 1927 100.3 °F (37.9 °C) (!) 108 15 (!) 147/95 99 %          Thank you for allowing us to provide you with medical care today. We realize that you have many choices for your emergency care needs. Please choose us in the future for any continued health care needs. Kemi Edmonds, [address] [place], [place] [address].   Office: [phone]            Recent Results (from the past 24 hour(s))   INFLUENZA A & B AG (RAPID TEST)    Collection Time: 01/23/20  7:49 PM   Result Value Ref Range    Influenza A Antigen NEGATIVE  NEG      Influenza B Antigen POSITIVE (A) NEG     CBC WITH AUTOMATED DIFF    Collection Time: 01/23/20  7:49 PM   Result Value Ref Range    WBC 5.9 3.6 - 11.0 K/uL    RBC 4.73 3.80 - 5.20 M/uL    HGB 12.7 11.5 - 16.0 g/dL    HCT 38.9 35.0 - 47.0 %    MCV 82.2 80.0 - 99.0 FL    MCH 26.8 26.0 - 34.0 PG    MCHC 32.6 30.0 - 36.5 g/dL    RDW 15.1 (H) 11.5 - 14.5 %    PLATELET 015 785 - 034 K/uL    MPV 10.7 8.9 - 12.9 FL    NRBC 0.0 0  WBC    ABSOLUTE NRBC 0.00 0.00 - 0.01 K/uL    NEUTROPHILS 71 32 - 75 %    LYMPHOCYTES 19 12 - 49 %    MONOCYTES 7 5 - 13 %    EOSINOPHILS 2 0 - 7 %    BASOPHILS 1 0 - 1 %    IMMATURE GRANULOCYTES 0 0.0 - 0.5 %    ABS. NEUTROPHILS 4.2 1.8 - 8.0 K/UL    ABS. LYMPHOCYTES 1.1 0.8 - 3.5 K/UL    ABS. MONOCYTES 0.4 0.0 - 1.0 K/UL    ABS. EOSINOPHILS 0.1 0.0 - 0.4 K/UL    ABS. BASOPHILS 0.0 0.0 - 0.1 K/UL    ABS. IMM. GRANS. 0.0 0.00 - 0.04 K/UL    DF AUTOMATED     METABOLIC PANEL, COMPREHENSIVE    Collection Time: 01/23/20  7:49 PM   Result Value Ref Range    Sodium 140 136 - 145 mmol/L    Potassium 3.7 3.5 - 5.1 mmol/L    Chloride 108 97 - 108 mmol/L    CO2 24 21 - 32 mmol/L    Anion gap 8 5 - 15 mmol/L    Glucose 89 65 - 100 mg/dL    BUN 8 6 - 20 MG/DL    Creatinine 0.90 0.55 - 1.02 MG/DL    BUN/Creatinine ratio 9 (L) 12 - 20      GFR est AA >60 >60 ml/min/1.73m2    GFR est non-AA >60 >60 ml/min/1.73m2    Calcium 8.5 8.5 - 10.1 MG/DL    Bilirubin, total 0.3 0.2 - 1.0 MG/DL    ALT (SGPT) 31 12 - 78 U/L    AST (SGOT) 29 15 - 37 U/L    Alk.  phosphatase 108 45 - 117 U/L    Protein, total 7.5 6.4 - 8.2 g/dL    Albumin 3.9 3.5 - 5.0 g/dL    Globulin 3.6 2.0 - 4.0 g/dL    A-G Ratio 1.1 1.1 - 2.2     LIPASE    Collection Time: 01/23/20  7:49 PM   Result Value Ref Range    Lipase 175 73 - 393 U/L   HCG URINE, QL. - POC    Collection Time: 01/23/20  8:17 PM   Result Value Ref Range    Pregnancy test,urine (POC) NEGATIVE  NEG     URINALYSIS W/MICROSCOPIC    Collection Time: 01/23/20  8:37 PM   Result Value Ref Range    Color YELLOW/STRAW      Appearance CLEAR CLEAR      Specific gravity 1.022 1.003 - 1.030      pH (UA) 6.5 5.0 - 8.0      Protein TRACE (A) NEG mg/dL    Glucose NEGATIVE  NEG mg/dL    Ketone NEGATIVE  NEG mg/dL    Bilirubin NEGATIVE  NEG      Blood NEGATIVE  NEG      Urobilinogen 1.0 0.2 - 1.0 EU/dL    Nitrites NEGATIVE  NEG      Leukocyte Esterase NEGATIVE  NEG      WBC 0-4 0 - 4 /hpf    RBC 5-10 0 - 5 /hpf    Epithelial cells MODERATE (A) FEW /lpf    Bacteria 1+ (A) NEG /hpf    Mucus TRACE (A) NEG /lpf    Hyaline cast 0-2 0 - 5 /lpf       Ct Abd Pelv W Cont    Result Date: 1/23/2020  EXAM: CT ABD PELV W CONT INDICATION: Epigastric abdominal pain , nausea, weakness, lightheadedness COMPARISON: None CONTRAST: 100 mL of Isovue-370. TECHNIQUE: Following the uneventful intravenous administration of contrast, thin axial images were obtained through the abdomen and pelvis. Coronal and sagittal reconstructions were generated. Oral contrast was not administered. CT dose reduction was achieved through use of a standardized protocol tailored for this examination and automatic exposure control for dose modulation. FINDINGS: LUNG BASES: Clear. INCIDENTALLY IMAGED HEART AND MEDIASTINUM: Unremarkable. LIVER: No mass or biliary dilatation. GALLBLADDER: Unremarkable. SPLEEN: No mass. PANCREAS: No mass or ductal dilatation. ADRENALS: Unremarkable. KIDNEYS: No mass or hydronephrosis. 3 mm left upper renal pole calculus (coronal image 73). STOMACH: There is borderline thickening of the distal gastric antrum (axial image 31 and coronal image 46); however, the stomach is underdistended. . SMALL BOWEL: No dilatation or wall thickening. COLON: No dilatation or wall thickening. APPENDIX: Unremarkable. PERITONEUM: No ascites or pneumoperitoneum. RETROPERITONEUM: No lymphadenopathy or aortic aneurysm.  REPRODUCTIVE ORGANS: Anteverted uterus URINARY BLADDER: No mass or calculus. BONES: No destructive bone lesion. ADDITIONAL COMMENTS: N/A     IMPRESSION: Early gastritis cannot be excluded. Consider upper GI if this is a consideration.  Nonobstructing left upper renal pole calculus

## 2020-01-24 NOTE — ED NOTES
Patient given discharge instructions and one prescription per provider, patient verbalized understanding and ambulatory from ED to home with daughter.

## 2020-01-24 NOTE — ED TRIAGE NOTES
Pt c/o of lightheadedness, nausea, weakness,fevers, and  epigastric abd pain. Seen at [facility] Wednesday and dx'd with bronchitis.  Reports worsening symptoms

## 2020-01-24 NOTE — ED PROVIDER NOTES
Pt was seen at [facility] ED on Tuesday, 01/21/20 and was diagnosed with acute bronchitis. Pt states her symptoms worsened yesterday, so she went to [facility] ED. Pt c/o lightheadedness with accompanying abdominal pain, generalized weakness, and nausea. Pt's last BM was a couple of days ago. Hx of pituitary adenoma. Pt denies urinary urgency/frequency. Denies chest pain or shortness of breath. Questionable umbilical hernia. I have evaluated the patient as the Provider in Triage. I have reviewed Her vital signs and the triage nurse assessment. I have talked with the patient and any available family and advised that I am the provider in triage and have ordered the appropriate study to initiate their work up based on the clinical presentation during my assessment. I have advised that the patient will be accommodated in the Main ED as soon as possible. I have also requested to contact the triage nurse or myself immediately if the patient experiences any changes in their condition during this brief waiting period. Note written by David Ramirez, as dictated by Ninfa Wyatt DNP 7:26 PM    26 y.o. female with past medical history significant for PCOS, hypothyroid, depression, headaches, and pituitary adenoma who presents from home via family member with chief complaint of cough. Patient states she was seen at [facility] ED 2 days ago, and was dx with acute bronchitis, however her symptoms have persisted, and worsened yesterday. Patient complains of a cough, fever, chills, body aches, sore throat, and abdominal pain for the past 5 days. Patient adds she has a loss of appitite, however she states she is still able to urinate. Patient denies any nausea, or vomiting. Patient also denies taking any daily medications, or chance of pregnancy. There are no other acute medical concerns at this time.     Social hx: Never Smoker, + EtOH use, No illicit drug use  PCP: Juni Ji MD    Note written by David Goldberg, as dictated by Eliseo Huffman MD 9:28 PM      The history is provided by the patient. No  was used.         Past Medical History:   Diagnosis Date    Abnormal Pap smear     Anemia     Gastrointestinal disorder     Ulcers    Headache     Herniated disc     Hx gestational diabetes     HX OTHER MEDICAL     trich treated at beginning of pregnancy    HX OTHER MEDICAL     pituitary tumor-benign     Miscarriage     Molar pregnancy     Pap smear for cervical cancer screening 19 neg HPV POS- rp pap one year    PCOS (polycystic ovarian syndrome)     Pelvic pain in female     Pituitary adenoma ([address]     Pituitary tumor     Psychiatric problem     depression never on medication    Thyroid activity decreased     hypothyroid awaiting appnt for endo       Past Surgical History:   Procedure Laterality Date    HX  SECTION      HX COLPOSCOPY  11/3/10    HX DILATION AND CURETTAGE      HX LIPOMA RESECTION           Family History:   Problem Relation Age of Onset    Diabetes Father     Headache Mother        Social History     Socioeconomic History    Marital status: SINGLE     Spouse name: Not on file    Number of children: Not on file    Years of education: Not on file    Highest education level: Not on file   Occupational History    Not on file   Social Needs    Financial resource strain: Not on file    Food insecurity:     Worry: Not on file     Inability: Not on file    Transportation needs:     Medical: Not on file     Non-medical: Not on file   Tobacco Use    Smoking status: Never Smoker    Smokeless tobacco: Never Used   Substance and Sexual Activity    Alcohol use: Yes     Comment: social    Drug use: No    Sexual activity: Not Currently     Birth control/protection: Injection   Lifestyle    Physical activity:     Days per week: Not on file     Minutes per session: Not on file    Stress: Not on file   Relationships    Social connections:     Talks on phone: Not on file     Gets together: Not on file     Attends [employer] service: Not on file     Active member of club or organization: Not on file     Attends meetings of clubs or organizations: Not on file     Relationship status: Not on file    Intimate partner violence:     Fear of current or ex partner: Not on file     Emotionally abused: Not on file     Physically abused: Not on file     Forced sexual activity: Not on file   Other Topics Concern    Not on file   Social History Narrative    Not on file         ALLERGIES: Tomato    Review of Systems   Constitutional: Positive for appetite change, chills and fever. HENT: Positive for sore throat. Respiratory: Positive for cough. Gastrointestinal: Positive for abdominal pain. Negative for nausea and vomiting. All other systems reviewed and are negative. There were no vitals filed for this visit.          Physical Exam   Physical Examination: General appearance - alert, well appearing, and in no distress, oriented to person, place, and time and normal appearing weight  Eyes - pupils equal and reactive, extraocular eye movements intact  HEENT-pharynx normal  Neck - supple, no significant adenopathy, no nuchal rigidity or meningismus  Chest - clear to auscultation, no wheezes, rales or rhonchi, symmetric air entry  Heart - normal rate, regular rhythm, normal S1, S2, no murmurs, rubs, clicks or gallops  Abdomen - soft, mild mid abdominal tenderness, no rebound/guarding/peritoneal signs, nondistended, no masses or organomegaly  Back exam - full range of motion, no tenderness, palpable spasm or pain on motion  Neurological - alert, oriented, normal speech, no focal findings or movement disorder noted  Musculoskeletal - no joint tenderness, deformity or swelling  Extremities - peripheral pulses normal, no pedal edema, no clubbing or cyanosis  Skin - normal coloration and turgor, no rashes, no suspicious skin lesions noted  MDM  Number of Diagnoses or Management Options  Gastritis without bleeding, unspecified chronicity, unspecified gastritis type: Influenza B:   Kidney stone:      Amount and/or Complexity of Data Reviewed  Clinical lab tests: ordered and reviewed  Tests in the radiology section of CPT®: ordered and reviewed  Independent visualization of images, tracings, or specimens: yes    Patient Progress  Patient progress: improved         Procedures    Pt nontoxic. +flu B. Out of window for tamiflu with symptoms onset 5 days ago. Will d/c with pcp f/u as needed. Supportive care. Pepcid for presumed gastritis.

## 2020-02-25 ENCOUNTER — OFFICE VISIT (OUTPATIENT)
Dept: FAMILY MEDICINE CLINIC | Age: 30
End: 2020-02-25

## 2020-02-25 VITALS
OXYGEN SATURATION: 98 % | TEMPERATURE: 98.6 F | HEIGHT: 66 IN | HEART RATE: 69 BPM | WEIGHT: 223 LBS | DIASTOLIC BLOOD PRESSURE: 73 MMHG | SYSTOLIC BLOOD PRESSURE: 138 MMHG | BODY MASS INDEX: 35.84 KG/M2 | RESPIRATION RATE: 18 BRPM

## 2020-02-25 DIAGNOSIS — N20.0 KIDNEY STONE: ICD-10-CM

## 2020-02-25 DIAGNOSIS — R09.81 NASAL CONGESTION: ICD-10-CM

## 2020-02-25 DIAGNOSIS — J06.9 URI WITH COUGH AND CONGESTION: Primary | ICD-10-CM

## 2020-02-25 DIAGNOSIS — J02.9 SORE THROAT: ICD-10-CM

## 2020-02-25 DIAGNOSIS — R05.9 COUGH: ICD-10-CM

## 2020-02-25 LAB — S PYO AG THROAT QL: NEGATIVE

## 2020-02-25 RX ORDER — CEFDINIR 300 MG/1
300 CAPSULE ORAL 2 TIMES DAILY
Qty: 20 CAP | Refills: 0 | Status: SHIPPED | OUTPATIENT
Start: 2020-02-25 | End: 2020-03-06

## 2020-02-25 NOTE — PATIENT INSTRUCTIONS
Cough: Care Instructions  Your Care Instructions    A cough is your body's response to something that bothers your throat or airways. Many things can cause a cough. You might cough because of a cold or the flu, bronchitis, or asthma. Smoking, postnasal drip, allergies, and stomach acid that backs up into your throat also can cause coughs. A cough is a symptom, not a disease. Most coughs stop when the cause, such as a cold, goes away. You can take a few steps at home to cough less and feel better. Follow-up care is a key part of your treatment and safety. Be sure to make and go to all appointments, and call your doctor if you are having problems. It's also a good idea to know your test results and keep a list of the medicines you take. How can you care for yourself at home? · Drink lots of water and other fluids. This helps thin the mucus and soothes a dry or sore throat. Honey or lemon juice in hot water or tea may ease a dry cough. · Take cough medicine as directed by your doctor. · Prop up your head on pillows to help you breathe and ease a dry cough. · Try cough drops to soothe a dry or sore throat. Cough drops don't stop a cough. Medicine-flavored cough drops are no better than candy-flavored drops or hard candy. · Do not smoke. Avoid secondhand smoke. If you need help quitting, talk to your doctor about stop-smoking programs and medicines. These can increase your chances of quitting for good. When should you call for help? Call 911 anytime you think you may need emergency care.  For example, call if:    · You have severe trouble breathing.    Call your doctor now or seek immediate medical care if:    · You cough up blood.     · You have new or worse trouble breathing.     · You have a new or higher fever.     · You have a new rash.    Watch closely for changes in your health, and be sure to contact your doctor if:    · You cough more deeply or more often, especially if you notice more mucus or a change in the color of your mucus.     · You have new symptoms, such as a sore throat, an earache, or sinus pain.     · You do not get better as expected. Where can you learn more? Go to http://mitul-leena.info/. Enter D279 in the search box to learn more about \"Cough: Care Instructions. \"  Current as of: June 9, 2019  Content Version: 12.2  © 2897-9850 [employer]. Care instructions adapted under license by "[employer]" (which disclaims liability or warranty for this information). If you have questions about a medical condition or this instruction, always ask your healthcare professional. [address] any warranty or liability for your use of this information.

## 2020-02-25 NOTE — PROGRESS NOTES
Identified pt with two pt identifiers(name and ). Chief Complaint   Patient presents with    Cough    Fatigue     reports she is just getting over pneumonia, flu B she was dx with 2 wks    Generalized Body Aches    Nasal Congestion    Sore Throat        Health Maintenance Due   Topic    Influenza Age 5 to Adult        Wt Readings from Last 3 Encounters:   20 223 lb (101.2 kg)   20 231 lb (104.8 kg)   20 231 lb (104.8 kg)     Temp Readings from Last 3 Encounters:   20 98.6 °F (37 °C) (Oral)   20 100.3 °F (37.9 °C)   20 98.6 °F (37 °C)     BP Readings from Last 3 Encounters:   20 138/73   20 (!) 147/95   20 146/71     Pulse Readings from Last 3 Encounters:   20 69   20 (!) 108   20 97         Learning Assessment:  :     Learning Assessment 2016   PRIMARY LEARNER Patient   PRIMARY LANGUAGE ENGLISH   LEARNER PREFERENCE PRIMARY READING   ANSWERED BY pt   RELATIONSHIP SELF       Depression Screening:  :     3 most recent PHQ Screens 2019   Little interest or pleasure in doing things Not at all   Feeling down, depressed, irritable, or hopeless Not at all   Total Score PHQ 2 0       Fall Risk Assessment:  :     No flowsheet data found. Abuse Screening:  :     Abuse Screening Questionnaire 2020   Do you ever feel afraid of your partner? N   Are you in a relationship with someone who physically or mentally threatens you? N   Is it safe for you to go home? Y       Coordination of Care Questionnaire:  :     1) Have you been to an emergency room, urgent care clinic since your last visit? Yes ,was seen by 2 different ERs 2 wks ago  Hospitalized since your last visit? no             2) Have you seen or consulted any other health care providers outside of [address] [place] [address] since your last visit? no  (Include any pap smears or colon screenings in this section.)    3) Do you have an Advance Directive on file?  no  Are you interested in receiving information about Advance Directives? no    Reviewed record in preparation for visit and have obtained necessary documentation. Medication reconciliation up to date and corrected with patient at this time. Order for POC Rapid Strep Testing placed per [facility] verbal order.

## 2020-02-25 NOTE — PROGRESS NOTES
HISTORY OF PRESENT ILLNESS  Marti Mixon is a 31 y.o. female. HPI  Pt presents with \"cold symptoms and back pain\"    Pt was diagnosed with flu and pneumonia at [facility] on 1/22. She took all the antibiotics as prescribed, but is unsure what antibiotics she took. She states that she did feel like her symptoms resolved, but they seemed to have all returned. Nasal congestion  Body aches  Cough    In addition, at the ER she was told that she has a stable kidney stone. She states that she has never had a kidney stone in the past.  She is having lower right sided back pain, and she is wondering if this is related? Review of Systems   Constitutional: Negative for fever. HENT: Positive for congestion and sinus pain. Respiratory: Positive for cough. Gastrointestinal: Negative for diarrhea and vomiting. Genitourinary: Positive for flank pain. Physical Exam  Constitutional:       Appearance: Normal appearance. HENT:      Head: Normocephalic and atraumatic. Right Ear: Tympanic membrane normal.      Left Ear: Tympanic membrane normal.      Nose: Congestion present. Neck:      Musculoskeletal: Normal range of motion and neck supple. Cardiovascular:      Rate and Rhythm: Normal rate and regular rhythm. Heart sounds: Normal heart sounds. Pulmonary:      Effort: Pulmonary effort is normal.      Breath sounds: Rhonchi present. Neurological:      Mental Status: She is alert. Psychiatric:         Mood and Affect: Mood normal.         Behavior: Behavior normal.         ASSESSMENT and PLAN    ICD-10-CM ICD-9-CM    1. URI with cough and congestion J06.9 465.9 cefdinir (OMNICEF) 300 mg capsule   2. Cough R05 786.2 AMB POC RAPID STREP TEST   3. Sore throat J02.9 462 AMB POC RAPID STREP TEST   4.  Nasal congestion R09.81 478.19 AMB POC RAPID STREP TEST   5. Kidney stone N20.0 592.0 REFERRAL TO UROLOGY     Educated about taking medication as prescribed, with food  Should stay well hydrated and treat fever as needed  Should call urology for an appointment ASAP    Pt informed to return to office with worsening of symptoms, or PRN with any questions or concerns. Pt verbalizes understanding of plan of care and denies further questions or concerns at this time.

## 2020-02-26 ENCOUNTER — TELEPHONE (OUTPATIENT)
Dept: FAMILY MEDICINE CLINIC | Age: 30
End: 2020-02-26

## 2020-02-26 NOTE — TELEPHONE ENCOUNTER
Pt called stating she was seen yesterday and was up all night vomiting last night. She is asking for an out of work note for the remainder of this week. Pt states \"I feel like I have the flu. \"    Best contact: [phone]

## 2020-03-23 ENCOUNTER — CLINICAL SUPPORT (OUTPATIENT)
Dept: OBGYN CLINIC | Age: 30
End: 2020-03-23

## 2020-03-23 DIAGNOSIS — Z30.42 ENCOUNTER FOR DEPO-PROVERA CONTRACEPTION: Primary | ICD-10-CM

## 2020-03-23 RX ORDER — MEDROXYPROGESTERONE ACETATE 150 MG/ML
150 INJECTION, SUSPENSION INTRAMUSCULAR ONCE
Qty: 1 ML | Refills: 0 | Status: SHIPPED | COMMUNITY
Start: 2020-03-23 | End: 2020-03-23

## 2020-04-17 ENCOUNTER — VIRTUAL VISIT (OUTPATIENT)
Dept: FAMILY MEDICINE CLINIC | Age: 30
End: 2020-04-17

## 2020-04-17 DIAGNOSIS — G47.00 INSOMNIA, UNSPECIFIED TYPE: ICD-10-CM

## 2020-04-17 DIAGNOSIS — R11.0 NAUSEA: ICD-10-CM

## 2020-04-17 DIAGNOSIS — G43.719 INTRACTABLE CHRONIC MIGRAINE WITHOUT AURA AND WITHOUT STATUS MIGRAINOSUS: Primary | ICD-10-CM

## 2020-04-17 RX ORDER — HYDROXYZINE PAMOATE 100 MG/1
100 CAPSULE ORAL
Qty: 30 CAP | Refills: 0 | Status: SHIPPED | OUTPATIENT
Start: 2020-04-17 | End: 2020-05-08 | Stop reason: SDUPTHER

## 2020-04-17 RX ORDER — BUTALBITAL, ACETAMINOPHEN, CAFFEINE AND CODEINE PHOSPHATE 50; 325; 40; 30 MG/1; MG/1; MG/1; MG/1
1 CAPSULE ORAL
Qty: 12 CAP | Refills: 0 | Status: SHIPPED | OUTPATIENT
Start: 2020-04-17 | End: 2020-04-20

## 2020-04-17 RX ORDER — ONDANSETRON 4 MG/1
4 TABLET, ORALLY DISINTEGRATING ORAL
Qty: 12 TAB | Refills: 0 | Status: SHIPPED | OUTPATIENT
Start: 2020-04-17 | End: 2020-09-06

## 2020-04-17 NOTE — PROGRESS NOTES
HISTORY OF PRESENT ILLNESS  Marti Campbell is a 31 y.o. female. HPI  Pt presents with \"ears hurting\"  Visit was conducted via [email or web address], [employer]. me  Pt was located at home, provider was located at [facility]  Visit lasted 4 minutes  Consent: Teodora Fitzgerald, who was seen by synchronous (real-time) audio-video technology, and/or her healthcare decision maker, is aware that this patient-initiated, Telehealth encounter on 4/17/2020 is a billable service, with coverage as determined by her insurance carrier. She is aware that she may receive a bill and has provided verbal consent to proceed: Yes. Pt states that she has hx of migraines, and has had one for the last 5 days that will not go away. Her pain is frontal, as well as in back of the head. She has been on her phone more with work, and is wondering if staring at her screen has been making the headache worse. She has some nausea, no vomiting. No dizziness, some blurred vision if she is staring at her screen for too long. She has tried Tylenol with no relief. She used to be on Imitrex, but this made her nauseated. She also notes insomnia. She used to be on trazodone, but states that this did not help her insomnia. She has been trying Melatonin, with no relief. Review of Systems   Constitutional: Negative for fever. Neurological: Positive for headaches. Negative for dizziness. Physical Exam  Constitutional:       Appearance: Normal appearance. Neurological:      Mental Status: She is alert and oriented to person, place, and time. Psychiatric:         Mood and Affect: Mood normal.         Behavior: Behavior normal.         ASSESSMENT and PLAN    ICD-10-CM ICD-9-CM    1. Intractable chronic migraine without aura and without status migrainosus G43.719 346.71 codeine-butalbital-acetaminophen-caffeine (FIORICET WITH CODEINE) -22-30 mg capsule      ondansetron (ZOFRAN ODT) 4 mg disintegrating tablet   2.  Insomnia, unspecified type G47.00 780.52 hydrOXYzine pamoate (VISTARIL) 100 mg capsule   3. Nausea R11.0 787.02 ondansetron (ZOFRAN ODT) 4 mg disintegrating tablet     Educated about taking medication as prescribed  Should go to ER with ANY worsening of symptoms, BE FAST symptoms, etc.  Educated about notifying office should symptoms continue. Pt informed to return to office with worsening of symptoms, or PRN with any questions or concerns. Pt verbalizes understanding of plan of care and denies further questions or concerns at this time.

## 2020-04-21 RX ORDER — HYDROXYZINE PAMOATE 50 MG/1
100 CAPSULE ORAL
Qty: 180 CAP | Refills: 1 | Status: SHIPPED | OUTPATIENT
Start: 2020-04-21 | End: 2020-05-21

## 2020-05-07 ENCOUNTER — HOSPITAL ENCOUNTER (EMERGENCY)
Age: 30
Discharge: HOME | End: 2020-05-07
Attending: EMERGENCY MEDICINE
Payer: MEDICAID

## 2020-05-07 ENCOUNTER — VIRTUAL VISIT (OUTPATIENT)
Dept: FAMILY MEDICINE CLINIC | Age: 30
End: 2020-05-07

## 2020-05-07 ENCOUNTER — APPOINTMENT (OUTPATIENT)
Dept: GENERAL RADIOLOGY | Age: 30
End: 2020-05-07
Attending: PHYSICIAN ASSISTANT
Payer: MEDICAID

## 2020-05-07 VITALS
SYSTOLIC BLOOD PRESSURE: 136 MMHG | BODY MASS INDEX: 33.75 KG/M2 | HEIGHT: 66 IN | DIASTOLIC BLOOD PRESSURE: 61 MMHG | RESPIRATION RATE: 16 BRPM | WEIGHT: 210 LBS | OXYGEN SATURATION: 96 % | TEMPERATURE: 98.6 F | HEART RATE: 69 BPM

## 2020-05-07 VITALS — DIASTOLIC BLOOD PRESSURE: 99 MMHG | SYSTOLIC BLOOD PRESSURE: 173 MMHG

## 2020-05-07 DIAGNOSIS — R03.0 ELEVATED BLOOD PRESSURE READING: ICD-10-CM

## 2020-05-07 DIAGNOSIS — R07.9 CHEST PAIN, UNSPECIFIED TYPE: Primary | ICD-10-CM

## 2020-05-07 LAB
ALBUMIN SERPL-MCNC: 3.7 G/DL (ref 3.5–5)
ALBUMIN/GLOB SERPL: 1 {RATIO} (ref 1.1–2.2)
ALP SERPL-CCNC: 115 U/L (ref 45–117)
ALT SERPL-CCNC: 27 U/L (ref 12–78)
ANION GAP SERPL CALC-SCNC: 6 MMOL/L (ref 5–15)
AST SERPL-CCNC: 19 U/L (ref 15–37)
BASOPHILS # BLD: 0 K/UL (ref 0–0.1)
BASOPHILS NFR BLD: 1 % (ref 0–1)
BILIRUB SERPL-MCNC: 0.2 MG/DL (ref 0.2–1)
BUN SERPL-MCNC: 9 MG/DL (ref 6–20)
BUN/CREAT SERPL: 10 (ref 12–20)
CALCIUM SERPL-MCNC: 9.1 MG/DL (ref 8.5–10.1)
CHLORIDE SERPL-SCNC: 109 MMOL/L (ref 97–108)
CO2 SERPL-SCNC: 24 MMOL/L (ref 21–32)
COMMENT, HOLDF: NORMAL
CREAT SERPL-MCNC: 0.88 MG/DL (ref 0.55–1.02)
D DIMER PPP FEU-MCNC: 0.26 MG/L FEU (ref 0–0.65)
DIFFERENTIAL METHOD BLD: ABNORMAL
EOSINOPHIL # BLD: 0.2 K/UL (ref 0–0.4)
EOSINOPHIL NFR BLD: 2 % (ref 0–7)
ERYTHROCYTE [DISTWIDTH] IN BLOOD BY AUTOMATED COUNT: 14.7 % (ref 11.5–14.5)
GLOBULIN SER CALC-MCNC: 3.7 G/DL (ref 2–4)
GLUCOSE SERPL-MCNC: 89 MG/DL (ref 65–100)
HCT VFR BLD AUTO: 38.4 % (ref 35–47)
HGB BLD-MCNC: 12.7 G/DL (ref 11.5–16)
IMM GRANULOCYTES # BLD AUTO: 0 K/UL (ref 0–0.04)
IMM GRANULOCYTES NFR BLD AUTO: 0 % (ref 0–0.5)
LIPASE SERPL-CCNC: 167 U/L (ref 73–393)
LYMPHOCYTES # BLD: 2.8 K/UL (ref 0.8–3.5)
LYMPHOCYTES NFR BLD: 35 % (ref 12–49)
MCH RBC QN AUTO: 27.2 PG (ref 26–34)
MCHC RBC AUTO-ENTMCNC: 33.1 G/DL (ref 30–36.5)
MCV RBC AUTO: 82.2 FL (ref 80–99)
MONOCYTES # BLD: 0.4 K/UL (ref 0–1)
MONOCYTES NFR BLD: 5 % (ref 5–13)
NEUTS SEG # BLD: 4.5 K/UL (ref 1.8–8)
NEUTS SEG NFR BLD: 57 % (ref 32–75)
NRBC # BLD: 0 K/UL (ref 0–0.01)
NRBC BLD-RTO: 0 PER 100 WBC
PLATELET # BLD AUTO: 244 K/UL (ref 150–400)
PMV BLD AUTO: 10.7 FL (ref 8.9–12.9)
POTASSIUM SERPL-SCNC: 3.8 MMOL/L (ref 3.5–5.1)
PROT SERPL-MCNC: 7.4 G/DL (ref 6.4–8.2)
RBC # BLD AUTO: 4.67 M/UL (ref 3.8–5.2)
SAMPLES BEING HELD,HOLD: NORMAL
SODIUM SERPL-SCNC: 139 MMOL/L (ref 136–145)
TROPONIN I SERPL-MCNC: <0.05 NG/ML
WBC # BLD AUTO: 7.9 K/UL (ref 3.6–11)

## 2020-05-07 PROCEDURE — 85025 COMPLETE CBC W/AUTO DIFF WBC: CPT

## 2020-05-07 PROCEDURE — 84484 ASSAY OF TROPONIN QUANT: CPT

## 2020-05-07 PROCEDURE — 96374 THER/PROPH/DIAG INJ IV PUSH: CPT

## 2020-05-07 PROCEDURE — 80053 COMPREHEN METABOLIC PANEL: CPT

## 2020-05-07 PROCEDURE — 74011000250 HC RX REV CODE- 250: Performed by: PHYSICIAN ASSISTANT

## 2020-05-07 PROCEDURE — 71046 X-RAY EXAM CHEST 2 VIEWS: CPT

## 2020-05-07 PROCEDURE — 74011250636 HC RX REV CODE- 250/636: Performed by: PHYSICIAN ASSISTANT

## 2020-05-07 PROCEDURE — 85379 FIBRIN DEGRADATION QUANT: CPT

## 2020-05-07 PROCEDURE — 93005 ELECTROCARDIOGRAM TRACING: CPT

## 2020-05-07 PROCEDURE — 83690 ASSAY OF LIPASE: CPT

## 2020-05-07 PROCEDURE — 74011250637 HC RX REV CODE- 250/637: Performed by: PHYSICIAN ASSISTANT

## 2020-05-07 PROCEDURE — 99285 EMERGENCY DEPT VISIT HI MDM: CPT

## 2020-05-07 RX ORDER — KETOROLAC TROMETHAMINE 30 MG/ML
30 INJECTION, SOLUTION INTRAMUSCULAR; INTRAVENOUS
Status: COMPLETED | OUTPATIENT
Start: 2020-05-07 | End: 2020-05-07

## 2020-05-07 RX ORDER — FAMOTIDINE 20 MG/1
20 TABLET, FILM COATED ORAL 2 TIMES DAILY
Qty: 20 TAB | Refills: 0 | Status: SHIPPED | OUTPATIENT
Start: 2020-05-07 | End: 2020-05-17

## 2020-05-07 RX ADMIN — KETOROLAC TROMETHAMINE 30 MG: 30 INJECTION, SOLUTION INTRAMUSCULAR at 11:55

## 2020-05-07 RX ADMIN — LIDOCAINE HYDROCHLORIDE 40 ML: 20 SOLUTION ORAL; TOPICAL at 10:55

## 2020-05-07 NOTE — ED NOTES
The patient was discharged home by  in stable condition. The patient is alert and oriented, in no respiratory distress and discharge vital signs obtained. The patient's diagnosis, condition and treatment were explained. The patient expressed understanding. Escripe prescriptions done. No work/school note given. A discharge plan has been developed. A  was not involved in the process. Aftercare instructions were given. Pt ambulatory out of the ED.

## 2020-05-07 NOTE — DISCHARGE INSTRUCTIONS

## 2020-05-07 NOTE — PROGRESS NOTES
CC:  Chief Complaint   Patient presents with    Chest Pain    Headache    Elevated Blood Pressure     Marti SHAHID Cheryl Maciel is a 31 y.o. female who was seen by synchronous (real-time) audio-video technology on 5/7/2020. Consent: Jovanny Salmeron, who was seen by synchronous (real-time) audio-video technology, and/or her healthcare decision maker, is aware that this patient-initiated, Telehealth encounter on 5/7/2020 is a billable service, with coverage as determined by her insurance carrier. She is aware that she may receive a bill and has provided verbal consent to proceed: Yes. Assessment & Plan:   30F who was seen by NP last on 4/17/2020  for recurrence of migraine headaches. She was given prescription for Fioricet and Zofran, but reports that it did not help her symptoms and today, has chest pain with worsening headaches. She also checked her BP during our conversation and found to be very elevated. She was directed to be seen in the ER. CP is non-reproducible. She denies SOB or dizziness. She does have a h/o pituitary adenoma in the past. Would like her to be evaluated. She has no respiratory symptoms or URI symptoms. Diagnoses and all orders for this visit:    1. Chest pain, unspecified type   Non-reproducible. 2. Elevated blood pressure reading    /99    I spent at least 16 minutes on this visit with this established patient. ([id number])    Subjective:   Jovanny Salmeron is a 31 y.o. female who was seen for Chest Pain; Headache; and Elevated Blood Pressure    As above, seen on 4/17/2020 for onset of migraine headaches. Now with worsening headache and chest pain. Denies SOB, dizziness. No URI symptoms. Pain is like pressure sitting on her mid chest. She denies h/o hypertension in the past and is on no blood pressure medications. Prior to Admission medications    Medication Sig Start Date End Date Taking?  Authorizing Provider   hydrOXYzine pamoate (VISTARIL) 50 mg capsule Take 2 Caps by mouth nightly as needed for Sleep. 4/21/20   Nikki Egan NP   hydrOXYzine pamoate (VISTARIL) 100 mg capsule Take 1 Cap by mouth nightly as needed for Sleep. 4/17/20   Nikki Egan, NP   ondansetron (ZOFRAN ODT) 4 mg disintegrating tablet Take 1 Tab by mouth every eight (8) hours as needed for Nausea or Vomiting. 4/17/20   Seagregorion Jignesh, NP   hydrOXYzine pamoate (VISTARIL) 100 mg capsule Take 1 Cap by mouth nightly as needed for Sleep. 4/17/20   Nikki Egan, KRISTAN   ibuprofen (MOTRIN) 600 mg tablet Take 1 Tab by mouth every six (6) hours as needed for Pain. 1/22/20   Isaiah Ramírez MD   albuterol (PROVENTIL HFA, VENTOLIN HFA, PROAIR HFA) 90 mcg/actuation inhaler Take 2 Puffs by inhalation every four (4) hours as needed for Wheezing. 1/21/20   Graylin Epley, MD   inhalational spacing device (E-Z SPACER) 1 Each by Does Not Apply route as needed for Wheezing. 1/21/20   Graylin Epley, MD   tiZANidine (ZANAFLEX) 2 mg tablet 1-2 tablet, 1-2 hours before bedtime. 10/21/19   Jackie Bhatti MD   medroxyPROGESTERone (DEPO-PROVERA) 150 mg/mL injection 150 mg by IntraMUSCular route once.     Other, MD Nikky     Allergies   Allergen Reactions    Tomato Angioedema       Patient Active Problem List   Diagnosis Code    Intractable chronic migraine without aura and without status migrainosus G43.719    Pituitary adenoma ([facility]) D35.2    Right hand weakness R29.898    Numbness of right hand R20.0    Chronic tension-type headache, intractable G44.221    Severe obesity ([facility]) E66.01    Elevated prolactin level ([facility]) E22.9     Allergies   Allergen Reactions    Tomato Angioedema     Past Medical History:   Diagnosis Date    Abnormal Pap smear     Anemia     Gastrointestinal disorder     Ulcers    Headache     Herniated disc     Hx gestational diabetes     HX OTHER MEDICAL     trich treated at beginning of pregnancy    HX OTHER MEDICAL     pituitary tumor-benign     Miscarriage     Molar pregnancy     Pap smear for cervical cancer screening 19 neg HPV POS- rp pap one year    PCOS (polycystic ovarian syndrome)     Pelvic pain in female     Pituitary adenoma ([address]     Pituitary tumor     Psychiatric problem     depression never on medication    Thyroid activity decreased     hypothyroid awaiting appnt for endo     Past Surgical History:   Procedure Laterality Date    HX  SECTION      HX COLPOSCOPY  11/3/10    HX DILATION AND CURETTAGE      HX LIPOMA RESECTION       Family History   Problem Relation Age of Onset    Diabetes Father     Headache Mother      Social History     Tobacco Use    Smoking status: Never Smoker    Smokeless tobacco: Never Used   Substance Use Topics    Alcohol use: Yes     Comment: social       Review of Systems   Respiratory: Negative for shortness of breath. Cardiovascular: Positive for chest pain and claudication. Negative for palpitations. Neurological: Negative for dizziness. All other systems reviewed and are negative. Objective:   Vital Signs: (As obtained by patient/caregiver at home)  Visit Vitals  BP (!) 173/99      General: alert, cooperative, no distress   Mental  status: normal mood, behavior, speech, dress, motor activity, and thought processes, able to follow commands   HENT: NCAT   Neck: no visualized mass   Resp: no respiratory distress   Neuro: no gross deficits   Skin: no discoloration or lesions of concern on visible areas   Psychiatric: normal affect, consistent with stated mood, no evidence of hallucinations     We discussed the expected course, resolution and complications of the diagnosis(es) in detail. Medication risks, benefits, costs, interactions, and alternatives were discussed as indicated. I advised her to contact the office if her condition worsens, changes or fails to improve as anticipated. She expressed understanding with the diagnosis(es) and plan.      Cristal Negrete is a 31 y.o. female who was evaluated by a video visit encounter for concerns as above. Patient identification was verified prior to start of the visit. A caregiver was present when appropriate. Due to this being a TeleHealth encounter (During HSU-86 public health emergency), evaluation of the following organ systems was limited: Vitals/Constitutional/EENT/Resp/CV/GI//MS/Neuro/Skin/Heme-Lymph-Imm. Pursuant to the emergency declaration under the [address] [place] [address], [facility] waiver authority and the [employer] Act, this Virtual  Visit was conducted, with patient's (and/or legal guardian's) consent, to reduce the patient's risk of exposure to COVID-19 and provide necessary medical care. Services were provided through a video synchronous discussion virtually to substitute for in-person clinic visit. Patient and provider were located at their individual homes. Follow-up and Dispositions    · Return if symptoms worsen or fail to improve.          Tristan Beauchamp MD

## 2020-05-07 NOTE — ED PROVIDER NOTES
32 y/o female with pmhx of GI ulcers, PE and DVT, anemia, and pituitary adenoma presents via referral from her PCP for chest pain and elevated blood pressure. Chest pain has been \"burning\" constant pain x 1 week, gradually worsening, and is 9/10 upon arrival. Notes some associated chest tightness. Pain is worsened when leaning forward. Exertion does not affect the pain. Pt denies light-headedness, nausea, vomiting, shortness of breath, abdominal pain, dysuria, diarrhea, melena, bright blood in stool. Denies recent fever or other illness.          Past Medical History:   Diagnosis Date    Abnormal Pap smear     Anemia     Gastrointestinal disorder     Ulcers    Headache     Herniated disc     Hx gestational diabetes     HX OTHER MEDICAL     trich treated at beginning of pregnancy    HX OTHER MEDICAL     pituitary tumor-benign     Miscarriage     Molar pregnancy     Pap smear for cervical cancer screening 19 neg HPV POS- rp pap one year    PCOS (polycystic ovarian syndrome)     Pelvic pain in female     Pituitary adenoma ([facility] [address]     Pituitary tumor     Psychiatric problem     depression never on medication    Thyroid activity decreased     hypothyroid awaiting appnt for endo       Past Surgical History:   Procedure Laterality Date    HX  SECTION      HX COLPOSCOPY  11/3/10    HX DILATION AND CURETTAGE      HX LIPOMA RESECTION           Family History:   Problem Relation Age of Onset    Diabetes Father     Headache Mother        Social History     Socioeconomic History    Marital status: SINGLE     Spouse name: Not on file    Number of children: Not on file    Years of education: Not on file    Highest education level: Not on file   Occupational History    Not on file   Social Needs    Financial resource strain: Not on file    Food insecurity     Worry: Not on file     Inability: Not on file    Transportation needs     Medical: Not on file     Non-medical: Not on file Tobacco Use    Smoking status: Never Smoker    Smokeless tobacco: Never Used   Substance and Sexual Activity    Alcohol use: Yes     Comment: social    Drug use: No    Sexual activity: Not Currently     Birth control/protection: Injection   Lifestyle    Physical activity     Days per week: Not on file     Minutes per session: Not on file    Stress: Not on file   Relationships    Social connections     Talks on phone: Not on file     Gets together: Not on file     Attends Sikh service: Not on file     Active member of club or organization: Not on file     Attends meetings of clubs or organizations: Not on file     Relationship status: Not on file    Intimate partner violence     Fear of current or ex partner: Not on file     Emotionally abused: Not on file     Physically abused: Not on file     Forced sexual activity: Not on file   Other Topics Concern    Not on file   Social History Narrative    Not on file         ALLERGIES: Tomato    Review of Systems   Constitutional: Negative for fever. HENT: Negative for congestion and sore throat. Respiratory: Positive for chest tightness. Negative for cough and shortness of breath. Cardiovascular: Positive for chest pain. Negative for palpitations and leg swelling. Gastrointestinal: Negative for blood in stool, diarrhea, nausea and vomiting. Genitourinary: Negative for dysuria, flank pain and urgency. Musculoskeletal: Negative for back pain and myalgias. Skin: Negative for rash. Neurological: Negative for dizziness, weakness and light-headedness. Vitals:    05/07/20 1019   BP: 143/82   Pulse: 78   Resp: 16   Temp: 98.6 °F (37 °C)   SpO2: 99%   Weight: 95.3 kg (210 lb)   Height: 5' 6\" (1.676 m)            Physical Exam  Vitals signs and nursing note reviewed. Constitutional:       General: She is not in acute distress. Appearance: She is not ill-appearing. HENT:      Head: Normocephalic. Nose: No rhinorrhea. Mouth/Throat:      Mouth: Mucous membranes are moist.      Pharynx: Oropharynx is clear. No posterior oropharyngeal erythema. Eyes:      Pupils: Pupils are equal, round, and reactive to light. Neck:      Musculoskeletal: Normal range of motion. Cardiovascular:      Rate and Rhythm: Normal rate and regular rhythm. Heart sounds: Normal heart sounds. Pulmonary:      Effort: Pulmonary effort is normal. No tachypnea or accessory muscle usage. Breath sounds: Normal breath sounds. No wheezing. Chest:      Chest wall: No tenderness or crepitus. Abdominal:      General: Bowel sounds are normal. There is no distension. Palpations: Abdomen is soft. Tenderness: There is no abdominal tenderness. Musculoskeletal:      Right lower leg: No edema. Left lower leg: No edema. Skin:     General: Skin is warm. Capillary Refill: Capillary refill takes less than 2 seconds. Findings: No erythema or rash. Neurological:      Mental Status: She is alert. Psychiatric:         Mood and Affect: Mood normal.         Behavior: Behavior normal.        Labs Reviewed   CBC WITH AUTOMATED DIFF - Abnormal; Notable for the following components:       Result Value    RDW 14.7 (*)     All other components within normal limits   METABOLIC PANEL, COMPREHENSIVE - Abnormal; Notable for the following components:    Chloride 109 (*)     BUN/Creatinine ratio 10 (*)     A-G Ratio 1.0 (*)     All other components within normal limits   SAMPLES BEING HELD   TROPONIN I   D DIMER   LIPASE   SAMPLE TO BLOOD BANK     XR CHEST PA LAT   Final Result   IMPRESSION: No acute cardiopulmonary process.            Medications   mylanta/viscous lidocaine (GI COCKTAIL) (40 mL Oral Given 5/7/20 1055)     //  EKG  Sinus arrhythmia, rate of 68bpm, normal axis, no ST elevation or depression indicative of acute ischemia  //    MDM  Number of Diagnoses or Management Options  Chest pain, unspecified type:      Amount and/or Complexity of Data Reviewed  Clinical lab tests: reviewed and ordered  Tests in the radiology section of CPT®: reviewed and ordered    30 y/o female with pmhx of GI ulcers, PE and DVT, anemia, and pituitary adenoma presents via referral from her PCP for chest pain and elevated blood pressure. EKG non-concerning for acute ischemia or pericarditis, Chest xray unremarkable, troponin negative    Low Ddimer of 0.26 with low pre-test probability according to Wells Criteria greatly reduces negative predictive value for PE/DVT    CBC and CMP with no signs of acute infection,anemia, or electrolyte abnormality    GI cocktail given to evaluate for GI source of pain. Pt states GI cocktail had no effect on her pain. Provided toradol and discussed appropriate follow-up with GI and PCP. Discussed indications to return to ED such as increased chest pain, shortness of breath, coffee ground or bright red vomit. Discussed case with attending physician, Dr. Gabriel Belcher.          Procedures    Marcy Sánchez PA-C  5/7/2020

## 2020-05-08 ENCOUNTER — VIRTUAL VISIT (OUTPATIENT)
Dept: FAMILY MEDICINE CLINIC | Age: 30
End: 2020-05-08

## 2020-05-08 ENCOUNTER — PATIENT OUTREACH (OUTPATIENT)
Dept: INTERNAL MEDICINE CLINIC | Age: 30
End: 2020-05-08

## 2020-05-08 VITALS — WEIGHT: 225 LBS | HEIGHT: 66 IN | BODY MASS INDEX: 36.16 KG/M2

## 2020-05-08 DIAGNOSIS — R07.9 CHEST PAIN, UNSPECIFIED TYPE: ICD-10-CM

## 2020-05-08 DIAGNOSIS — K29.70 GASTRITIS, PRESENCE OF BLEEDING UNSPECIFIED, UNSPECIFIED CHRONICITY, UNSPECIFIED GASTRITIS TYPE: ICD-10-CM

## 2020-05-08 DIAGNOSIS — K21.9 GASTROESOPHAGEAL REFLUX DISEASE, ESOPHAGITIS PRESENCE NOT SPECIFIED: Primary | ICD-10-CM

## 2020-05-08 DIAGNOSIS — G43.909 MIGRAINE WITHOUT STATUS MIGRAINOSUS, NOT INTRACTABLE, UNSPECIFIED MIGRAINE TYPE: ICD-10-CM

## 2020-05-08 RX ORDER — OMEPRAZOLE 20 MG/1
20 TABLET, DELAYED RELEASE ORAL DAILY
Qty: 30 TAB | Refills: 3 | Status: SHIPPED | OUTPATIENT
Start: 2020-05-08 | End: 2020-07-29

## 2020-05-08 NOTE — PROGRESS NOTES
Patient contacted regarding recent discharge and COVID-19 risk   Care Transition Nurse/ Ambulatory Care Manager contacted the patient by telephone to perform post discharge assessment. Verified name and  with patient as identifiers. Patient has following risk factors of: no known risk factors. CTN/ACM reviewed discharge instructions, medical action plan and red flags related to discharge diagnosis. Reviewed and educated them on any new and changed medications related to discharge diagnosis. Patients symptoms have not improved. She has continued CP and now some stomach discomfort as well. She reports yesterday after visiting the [facility] ED she experienced some menstrual like bleeding which is not normal for her. The vaginal bleeding has now tapered off. She reports having a normal BM this morning and feels she may have been constipated. She describes feeling like she has gas that will not pass. Her stomach feels distended and uncomfortable. She agrees to call and follow up with her PCP today. If symptoms worsen she will return to the ED. Connection was poor at times and call was dropped during initial conversation. Unable to provide COVID-19 information during call. Sent information to patient via [employer]. Plan for follow-up call in 5-7 days based on severity of symptoms and risk factors.

## 2020-05-08 NOTE — PROGRESS NOTES
Identified pt with two pt identifiers(name and ). Chief Complaint   Patient presents with   [facility] Follow Up     2020 ED from Dr Kristine Carlos for CP    Bloated    Abdominal Pain     she is very full,     Chest Pain     she said pressure   Dr Lucio Aguirreign referral from ED     There are no preventive care reminders to display for this patient. Wt Readings from Last 3 Encounters:   20 225 lb (102.1 kg)   20 210 lb (95.3 kg)   20 223 lb (101.2 kg)     Temp Readings from Last 3 Encounters:   20 98.6 °F (37 °C)   20 98.6 °F (37 °C) (Oral)   20 100.3 °F (37.9 °C)     BP Readings from Last 3 Encounters:   20 136/61   20 (!) 173/99   20 138/73     Pulse Readings from Last 3 Encounters:   20 69   20 69   20 (!) 108         Learning Assessment:  :     Learning Assessment 2016   PRIMARY LEARNER Patient   PRIMARY LANGUAGE ENGLISH   LEARNER PREFERENCE PRIMARY READING   ANSWERED BY pt   RELATIONSHIP SELF       Depression Screening:  :     3 most recent PHQ Screens 2020   Little interest or pleasure in doing things Not at all   Feeling down, depressed, irritable, or hopeless Not at all   Total Score PHQ 2 0       Fall Risk Assessment:  :     No flowsheet data found. Abuse Screening:  :     Abuse Screening Questionnaire 2020   Do you ever feel afraid of your partner? N N   Are you in a relationship with someone who physically or mentally threatens you? N N   Is it safe for you to go home? Y Y       Coordination of Care Questionnaire:  :     1) Have you been to an emergency room, urgent care clinic since your last visit? yes 20 CP  Hospitalized since your last visit? no             2) Have you seen or consulted any other health care providers outside of [address] [place] [address] since your last visit? no  (Include any pap smears or colon screenings in this section.)    3) Do you have an Advance Directive on file? no  Are you interested in receiving information about Advance Directives? no    Reviewed record in preparation for visit and have obtained necessary documentation. Medication reconciliation up to date and corrected with patient at this time.

## 2020-05-08 NOTE — PROGRESS NOTES
Betty Montilla is a 31 y.o. female who was seen by synchronous (real-time) audio-video technology on 5/8/2020. Consent: Betty Montilla, who was seen by synchronous (real-time) audio-video technology, and/or her healthcare decision maker, is aware that this patient-initiated, Telehealth encounter on 5/8/2020 is a billable service, with coverage as determined by her insurance carrier. She is aware that she may receive a bill and has provided verbal consent to proceed: Yes. Assessment & Plan:   Diagnoses and all orders for this visit:    1. Gastroesophageal reflux disease, esophagitis presence not specified  -     omeprazole (PriLOSEC OTC) 20 mg tablet; Take 1 Tab by mouth daily. Indications: gastroesophageal reflux disease  -     REFERRAL TO GASTROENTEROLOGY    2. Chest pain, unspecified type  -     omeprazole (PriLOSEC OTC) 20 mg tablet; Take 1 Tab by mouth daily. Indications: gastroesophageal reflux disease    3. Gastritis, presence of bleeding unspecified, unspecified chronicity, unspecified gastritis type  -     omeprazole (PriLOSEC OTC) 20 mg tablet; Take 1 Tab by mouth daily. Indications: gastroesophageal reflux disease  -     REFERRAL TO GASTROENTEROLOGY    4. Migraine without status migrainosus, not intractable, unspecified migraine type      Order PPI. Discussed food to avoid for GERD and gastritis, include stop taking NSAID's. FU with GI for EGD. I spent at least 16 minutes on this visit with this established patient. [phone]  Subjective:   Betty Montilla is a 31 y.o. female who was seen for Hospital Follow Up (5/7/2020 ED from Dr Verona Hills for CP); Bloated; Abdominal Pain (she is very full, ); and Chest Pain (she said pressure)      Prior to Admission medications    Medication Sig Start Date End Date Taking? Authorizing Provider   omeprazole (PriLOSEC OTC) 20 mg tablet Take 1 Tab by mouth daily.  Indications: gastroesophageal reflux disease 7/9/35  Yes Charmaine Felton MD   famotidine (Pepcid) 20 mg tablet Take 1 Tab by mouth two (2) times a day for 10 days. 5/7/20 5/17/20 Yes Joy Jenkins PA-C   hydrOXYzine pamoate (VISTARIL) 50 mg capsule Take 2 Caps by mouth nightly as needed for Sleep. 4/21/20  Yes Lesley Starkey NP   inhalational spacing device (E-Z SPACER) 1 Each by Does Not Apply route as needed for Wheezing. 1/21/20  Yes Sweta Cuevas MD   medroxyPROGESTERone (DEPO-PROVERA) 150 mg/mL injection 150 mg by IntraMUSCular route once. Yes Nikky Gonzales MD   ondansetron (ZOFRAN ODT) 4 mg disintegrating tablet Take 1 Tab by mouth every eight (8) hours as needed for Nausea or Vomiting. 4/17/20   Kalyan Cosby NP   albuterol (PROVENTIL HFA, VENTOLIN HFA, PROAIR HFA) 90 mcg/actuation inhaler Take 2 Puffs by inhalation every four (4) hours as needed for Wheezing. 1/21/20   Basilio Hernández Mai, MD   tiZANidine (ZANAFLEX) 2 mg tablet 1-2 tablet, 1-2 hours before bedtime. 10/21/19   Zbigniew Hernandez MD     Allergies   Allergen Reactions    Tomato Angioedema       Patient Active Problem List    Diagnosis Date Noted    Elevated prolactin level 04/01/2019    Severe obesity ([facility] [address] 02/13/2019    Right hand weakness 11/16/2016    Numbness of right hand 11/16/2016    Chronic tension-type headache, intractable 11/16/2016    Intractable chronic migraine without aura and without status migrainosus 09/22/2016    Pituitary adenoma ([facility] [address] 09/22/2016     Current Outpatient Medications   Medication Sig Dispense Refill    omeprazole (PriLOSEC OTC) 20 mg tablet Take 1 Tab by mouth daily. Indications: gastroesophageal reflux disease 30 Tab 3    famotidine (Pepcid) 20 mg tablet Take 1 Tab by mouth two (2) times a day for 10 days. 20 Tab 0    hydrOXYzine pamoate (VISTARIL) 50 mg capsule Take 2 Caps by mouth nightly as needed for Sleep. 180 Cap 1    inhalational spacing device (E-Z SPACER) 1 Each by Does Not Apply route as needed for Wheezing.  1 Device 0    medroxyPROGESTERone (DEPO-PROVERA) 150 mg/mL injection 150 mg by IntraMUSCular route once.  ondansetron (ZOFRAN ODT) 4 mg disintegrating tablet Take 1 Tab by mouth every eight (8) hours as needed for Nausea or Vomiting. 12 Tab 0    albuterol (PROVENTIL HFA, VENTOLIN HFA, PROAIR HFA) 90 mcg/actuation inhaler Take 2 Puffs by inhalation every four (4) hours as needed for Wheezing. 1 Inhaler 0    tiZANidine (ZANAFLEX) 2 mg tablet 1-2 tablet, 1-2 hours before bedtime. 30 Tab 1     Allergies   Allergen Reactions    Tomato Angioedema     Past Medical History:   Diagnosis Date    Abnormal Pap smear     Anemia     Gastrointestinal disorder     Ulcers    Headache     Herniated disc     Hx gestational diabetes     HX OTHER MEDICAL     trich treated at beginning of pregnancy    HX OTHER MEDICAL     pituitary tumor-benign     Miscarriage     Molar pregnancy     Pap smear for cervical cancer screening 19 neg HPV POS- rp pap one year    PCOS (polycystic ovarian syndrome)     Pelvic pain in female     Pituitary adenoma ([facility] [address]     Pituitary tumor     Psychiatric problem     depression never on medication    Thyroid activity decreased     hypothyroid awaiting appnt for endo     Past Surgical History:   Procedure Laterality Date    HX  SECTION      HX COLPOSCOPY  11/3/10    HX DILATION AND CURETTAGE      HX LIPOMA RESECTION         ROS  FU on ER visit yesterday for burning retrosternal CP and elevated BP. KAY negative for ischemic heart disease. Prescribed Pepcid and referral to GI. Pt has not called yet for appt. GI cocktail given in ER did not help pain. Feels bloated. No vomiting. CT scan from 2020 showed possible gastritis then. Pt admits to taking Ibuprofen frequently for headaches.   Has tried TUMS in past.      Objective:     Visit Vitals  Ht 5' 6\" (1.676 m)   Wt 225 lb (102.1 kg)   LMP 2020   BMI 36.32 kg/m²      General: alert, cooperative, no distress   Mental  status: normal mood, behavior, speech, dress, motor activity, and thought processes, able to follow commands   HENT: NCAT   Neck: no visualized mass   Resp: no respiratory distress   Neuro: no gross deficits   Skin: no discoloration or lesions of concern on visible areas   Psychiatric: normal affect, consistent with stated mood, no evidence of hallucinations     Additional exam findings: We discussed the expected course, resolution and complications of the diagnosis(es) in detail. Medication risks, benefits, costs, interactions, and alternatives were discussed as indicated. I advised her to contact the office if her condition worsens, changes or fails to improve as anticipated. She expressed understanding with the diagnosis(es) and plan. Tony Mcdonald is a 31 y.o. female who was evaluated by a video visit encounter for concerns as above. Patient identification was verified prior to start of the visit. A caregiver was present when appropriate. Due to this being a TeleHealth encounter (During XFD-79 public health emergency), evaluation of the following organ systems was limited: Vitals/Constitutional/EENT/Resp/CV/GI//MS/Neuro/Skin/Heme-Lymph-Imm. Pursuant to the emergency declaration under the [facility], [facility] waiver authority and the [employer] Act, this Virtual  Visit was conducted, with patient's (and/or legal guardian's) consent, to reduce the patient's risk of exposure to COVID-19 and provide necessary medical care. Services were provided through a video synchronous discussion virtually to substitute for in-person clinic visit. This visit was completed virtually using [employer]. Patient was instructed to follow up as needed. POS Patient home. Provider was at the office.           MD Lizett Rivera MD

## 2020-05-08 NOTE — PATIENT INSTRUCTIONS
Gastritis: Care Instructions Your Care Instructions Gastritis is a sore and upset stomach. It happens when something irritates the stomach lining. Many things can cause it. These include an infection such as the flu or something you ate or drank. Medicines or a sore on the lining of the stomach (ulcer) also can cause it. Your belly may bloat and ache. You may belch, vomit, and feel sick to your stomach. You should be able to relieve the problem by taking medicine. And it may help to change your diet. If gastritis lasts, your doctor may prescribe medicine. Follow-up care is a key part of your treatment and safety. Be sure to make and go to all appointments, and call your doctor if you are having problems. It's also a good idea to know your test results and keep a list of the medicines you take. How can you care for yourself at home? · If your doctor prescribed antibiotics, take them as directed. Do not stop taking them just because you feel better. You need to take the full course of antibiotics. · Be safe with medicines. If your doctor prescribed medicine to decrease stomach acid, take it as directed. Call your doctor if you think you are having a problem with your medicine. · Do not take any other medicine, including over-the-counter pain relievers, without talking to your doctor first. 
· If your doctor recommends over-the-counter medicine to reduce stomach acid, such as Pepcid AC, Prilosec, Tagamet HB, or Zantac 75, follow the directions on the label. · Drink plenty of fluids (enough so that your urine is light yellow or clear like water) to prevent dehydration. Choose water and other caffeine-free clear liquids. If you have kidney, heart, or liver disease and have to limit fluids, talk with your doctor before you increase the amount of fluids you drink. · Limit how much alcohol you drink. · Avoid coffee, tea, cola drinks, chocolate, and other foods with caffeine. They increase stomach acid. When should you call for help? Call 911 anytime you think you may need emergency care. For example, call if: 
  · You vomit blood or what looks like coffee grounds.  
  · You pass maroon or very bloody stools.  
 Call your doctor now or seek immediate medical care if: 
  · You start breathing fast and have not produced urine in the last 8 hours.  
  · You cannot keep fluids down.  
 Watch closely for changes in your health, and be sure to contact your doctor if: 
  · You do not get better as expected. Where can you learn more? Go to http://mitul-leena.info/ Enter [id number] in the search box to learn more about \"Gastritis: Care Instructions. \" Current as of: August 11, 2019Content Version: 12.4 © 8323-3745 [employer]. Care instructions adapted under license by [employer] (which disclaims liability or warranty for this information). If you have questions about a medical condition or this instruction, always ask your healthcare professional. [address] any warranty or liability for your use of this information.

## 2020-05-13 LAB
ATRIAL RATE: 68 BPM
CALCULATED P AXIS, ECG09: 69 DEGREES
CALCULATED R AXIS, ECG10: 57 DEGREES
CALCULATED T AXIS, ECG11: 3 DEGREES
DIAGNOSIS, 93000: NORMAL
P-R INTERVAL, ECG05: 118 MS
Q-T INTERVAL, ECG07: 382 MS
QRS DURATION, ECG06: 80 MS
QTC CALCULATION (BEZET), ECG08: 406 MS
VENTRICULAR RATE, ECG03: 68 BPM

## 2020-05-19 ENCOUNTER — TELEPHONE (OUTPATIENT)
Dept: FAMILY MEDICINE CLINIC | Age: 30
End: 2020-05-19

## 2020-05-19 NOTE — TELEPHONE ENCOUNTER
Patient ended up going to the ER and they have set her up to have endoscopy done on 5/28 thinking that she may have reflux. Patient feels that is not what it is. She is in a lot of pain in stomach and right after she eats she normally will throw up. She is asking what can she do for the pain in the meantime.   Please call at [phone]

## 2020-05-21 ENCOUNTER — OFFICE VISIT (OUTPATIENT)
Dept: PRIMARY CARE CLINIC | Age: 30
End: 2020-05-21

## 2020-05-21 DIAGNOSIS — Z01.818 PRE-OP EXAM: Primary | ICD-10-CM

## 2020-05-21 NOTE — TELEPHONE ENCOUNTER
She is having Endoscopy on 5/28/2020 by Jennifer Ruiz MD. She has been trying everything. She will do what you and Dr Mateusz Wang say. She had to get COVID test prior to Endoscopy.

## 2020-05-21 NOTE — TELEPHONE ENCOUNTER
Call pt. Advise she can try taking Omeprazole BID instead of one daily. Follow low acid diet, avoid spicy food, caffeine, NSAID's. Nothing else can be taken for pain other than Tylenol.

## 2020-05-21 NOTE — PERIOP NOTES
[address]  Endoscopy Preprocedure Instructions      1. On the day of your surgery, please report to registration located on the 2nd floor of the  [facility]. yes    2. You must have a responsible adult to drive you to the hospital, stay at the hospital during your procedure and drive you home. If they leave your procedure will not be started (no exceptions). yes    3. Do not have anything to eat or drink (including water, gum, mints, coffee, and juice) after midnight. This does not apply to the medications you were instructed to take by your physician. yesIf you are currently taking Plavix, Coumadin, Aspirin, or other blood-thinning agents, contact your physician for special instructions. yes,    4. If you are having a procedure that requires bowel prep: We recommend that you have only clear liquids the day before your procedure and begin your bowel prep by 5:00 pm.  You may continue to drink clear liquids until midnight. If for any reason you are not able to complete your prep please notify your physician immediately. yes    5. Have a list of all current medications, including vitamins, herbal supplements and any other over the counter medications. yes    6. If you wear glasses, contacts, dentures and/or hearing aids, they may be removed prior to procedure, please bring a case to store them in. yes    7. You should understand that if you do not follow these instructions your procedure may be cancelled. If your physical condition changes (I.e. fever, cold or flu) please contact your doctor as soon as possible. 8. It is important that you be on time.   If for any reason you are unable to keep your appointment please call )- the day of or your physicians office prior to your scheduled procedure

## 2020-05-21 NOTE — PROGRESS NOTES
Patient was seen at [facility] thru flu clinic. Please seen scanned form for additional visit documentation.        s- pre procedure covid testing for endoscopy with Dr. Mariel Vera on 2/28/20  Patient denies known exposure to covid 19 or any current sx    o  Visit Vitals  LMP 05/07/2020     98.7, 97, 83,18  Alert and oriented  Calm and cooperative  Skin color normal  No increased wob  Conversant w/o sob    A/p- pre procedural covid testing    Pt understands will be contacted if positive

## 2020-05-22 ENCOUNTER — PATIENT OUTREACH (OUTPATIENT)
Dept: INTERNAL MEDICINE CLINIC | Age: 30
End: 2020-05-22

## 2020-05-22 NOTE — PROGRESS NOTES
Patient resolved from Transition of Care episode on 5/22/2020  Patient/family has been provided the following resources and education related to COVID-19:                         Signs, symptoms and red flags related to COVID-19            CDC exposure and quarantine guidelines            Conduit exposure contact - [phone]            Contact for their local Department of Health                 Patient currently reports that the following symptoms have improved:  no COVID like symptoms. Patient continues to have GI issues and has EGD scheduled next week. She is encouraged to contact her GYN as well d/t unusual breakthrough bleeding. No further outreach scheduled with this CTN/ACM. Episode of Care resolved. Patient has this CTN/ACM contact information if future needs arise.

## 2020-05-24 LAB — SARS-COV-2, NAA: NOT DETECTED

## 2020-05-27 ENCOUNTER — ANESTHESIA EVENT (OUTPATIENT)
Dept: ENDOSCOPY | Age: 30
End: 2020-05-27
Payer: MEDICAID

## 2020-05-28 ENCOUNTER — ANESTHESIA (OUTPATIENT)
Dept: ENDOSCOPY | Age: 30
End: 2020-05-28
Payer: MEDICAID

## 2020-05-28 ENCOUNTER — HOSPITAL ENCOUNTER (OUTPATIENT)
Age: 30
Setting detail: OUTPATIENT SURGERY
Discharge: HOME | End: 2020-05-28
Attending: INTERNAL MEDICINE | Admitting: INTERNAL MEDICINE
Payer: MEDICAID

## 2020-05-28 VITALS
BODY MASS INDEX: 36 KG/M2 | TEMPERATURE: 98 F | WEIGHT: 223.99 LBS | SYSTOLIC BLOOD PRESSURE: 109 MMHG | RESPIRATION RATE: 15 BRPM | HEIGHT: 66 IN | DIASTOLIC BLOOD PRESSURE: 58 MMHG | OXYGEN SATURATION: 100 % | HEART RATE: 60 BPM

## 2020-05-28 LAB — HCG UR QL: NEGATIVE

## 2020-05-28 PROCEDURE — 74011250636 HC RX REV CODE- 250/636: Performed by: NURSE ANESTHETIST, CERTIFIED REGISTERED

## 2020-05-28 PROCEDURE — 76040000019: Performed by: INTERNAL MEDICINE

## 2020-05-28 PROCEDURE — 81025 URINE PREGNANCY TEST: CPT

## 2020-05-28 PROCEDURE — 74011250636 HC RX REV CODE- 250/636: Performed by: INTERNAL MEDICINE

## 2020-05-28 PROCEDURE — 74011000250 HC RX REV CODE- 250: Performed by: NURSE ANESTHETIST, CERTIFIED REGISTERED

## 2020-05-28 PROCEDURE — 77030021593 HC FCPS BIOP ENDOSC BSC -A: Performed by: INTERNAL MEDICINE

## 2020-05-28 PROCEDURE — 76060000031 HC ANESTHESIA FIRST 0.5 HR: Performed by: INTERNAL MEDICINE

## 2020-05-28 PROCEDURE — 88305 TISSUE EXAM BY PATHOLOGIST: CPT

## 2020-05-28 RX ORDER — NALOXONE HYDROCHLORIDE 0.4 MG/ML
0.4 INJECTION, SOLUTION INTRAMUSCULAR; INTRAVENOUS; SUBCUTANEOUS
Status: DISCONTINUED | OUTPATIENT
Start: 2020-05-28 | End: 2020-05-28 | Stop reason: HOSPADM

## 2020-05-28 RX ORDER — EPINEPHRINE 0.1 MG/ML
1 INJECTION INTRACARDIAC; INTRAVENOUS
Status: DISCONTINUED | OUTPATIENT
Start: 2020-05-28 | End: 2020-05-28 | Stop reason: HOSPADM

## 2020-05-28 RX ORDER — DEXTROMETHORPHAN/PSEUDOEPHED 2.5-7.5/.8
1.2 DROPS ORAL
Status: DISCONTINUED | OUTPATIENT
Start: 2020-05-28 | End: 2020-05-28 | Stop reason: HOSPADM

## 2020-05-28 RX ORDER — MIDAZOLAM HYDROCHLORIDE 1 MG/ML
.25-5 INJECTION, SOLUTION INTRAMUSCULAR; INTRAVENOUS
Status: DISCONTINUED | OUTPATIENT
Start: 2020-05-28 | End: 2020-05-28 | Stop reason: HOSPADM

## 2020-05-28 RX ORDER — SODIUM CHLORIDE 9 MG/ML
50 INJECTION, SOLUTION INTRAVENOUS CONTINUOUS
Status: DISCONTINUED | OUTPATIENT
Start: 2020-05-28 | End: 2020-05-28 | Stop reason: HOSPADM

## 2020-05-28 RX ORDER — LIDOCAINE HYDROCHLORIDE 20 MG/ML
INJECTION, SOLUTION EPIDURAL; INFILTRATION; INTRACAUDAL; PERINEURAL AS NEEDED
Status: DISCONTINUED | OUTPATIENT
Start: 2020-05-28 | End: 2020-05-28 | Stop reason: HOSPADM

## 2020-05-28 RX ORDER — ATROPINE SULFATE 0.1 MG/ML
0.4 INJECTION INTRAVENOUS
Status: DISCONTINUED | OUTPATIENT
Start: 2020-05-28 | End: 2020-05-28 | Stop reason: HOSPADM

## 2020-05-28 RX ORDER — FLUMAZENIL 0.1 MG/ML
0.2 INJECTION INTRAVENOUS
Status: DISCONTINUED | OUTPATIENT
Start: 2020-05-28 | End: 2020-05-28 | Stop reason: HOSPADM

## 2020-05-28 RX ORDER — PROPOFOL 10 MG/ML
INJECTION, EMULSION INTRAVENOUS AS NEEDED
Status: DISCONTINUED | OUTPATIENT
Start: 2020-05-28 | End: 2020-05-28 | Stop reason: HOSPADM

## 2020-05-28 RX ADMIN — PROPOFOL 130 MG: 10 INJECTION, EMULSION INTRAVENOUS at 10:05

## 2020-05-28 RX ADMIN — PROPOFOL 20 MG: 10 INJECTION, EMULSION INTRAVENOUS at 10:09

## 2020-05-28 RX ADMIN — PROPOFOL 40 MG: 10 INJECTION, EMULSION INTRAVENOUS at 10:07

## 2020-05-28 RX ADMIN — SODIUM CHLORIDE 50 ML/HR: 900 INJECTION, SOLUTION INTRAVENOUS at 09:08

## 2020-05-28 RX ADMIN — PROPOFOL 30 MG: 10 INJECTION, EMULSION INTRAVENOUS at 10:08

## 2020-05-28 RX ADMIN — LIDOCAINE HYDROCHLORIDE 100 MG: 20 INJECTION, SOLUTION INTRAVENOUS at 10:01

## 2020-05-28 NOTE — H&P
Shayy Pollard M.D.  [phone]            History and Physical       NAME:  Jovanny Salmeron   :   1990   MRN:   [id number]       Referring Physician:  Cassidy Castellanos MD      Consult Date: 2020 10:47 PM    Chief Complaint:  Epigastric pain    History of Present Illness: The patient is a 31year old female who presents with a complaint of Chest pain. The patient presents due to reoccurrence of symptoms (Pt presents for follow up ER visit for chest pain.  She reports having a negative cardiac work up while in the hospital.). The onset of the chest pain has been gradual and has been occurring in a persistent pattern for 2 weeks. The chest pain is described as a burning and dull ache. There is a history of use of NSAIDs (Had been taking BC powders frequenly for migraines. ). The symptoms have been associated with use of NSAIDs, while the symptoms have not been associated with dysphagia or vomiting. Previous diagnostic tests have included endoscopy (Reports having an endoscopy while hospitalized in .). PMH:  Past Medical History:   Diagnosis Date    Abnormal Pap smear     Anemia     Gastrointestinal disorder     Ulcers    Headache     Herniated disc     Hx gestational diabetes     HX OTHER MEDICAL     trich treated at beginning of pregnancy    HX OTHER MEDICAL     pituitary tumor-benign     Miscarriage     Molar pregnancy     Pap smear for cervical cancer screening 19 neg HPV POS- rp pap one year    PCOS (polycystic ovarian syndrome)     Pelvic pain in female     Pituitary adenoma ([facility] [address]     Pituitary tumor     Psychiatric problem     depression never on medication    Thyroid activity decreased     hypothyroid awaiting appnt for endo       PSH:  Past Surgical History:   Procedure Laterality Date    HX  SECTION      HX COLPOSCOPY  11/3/10    HX DILATION AND CURETTAGE      HX LIPOMA RESECTION         Allergies:   Allergies Allergen Reactions    Tomato Angioedema       Home Medications:  Cannot display prior to admission medications because the patient has not been admitted in this contact. Hospital Medications:  No current facility-administered medications for this encounter. Current Outpatient Medications   Medication Sig    omeprazole (PriLOSEC OTC) 20 mg tablet Take 1 Tab by mouth daily. Indications: gastroesophageal reflux disease    ondansetron (ZOFRAN ODT) 4 mg disintegrating tablet Take 1 Tab by mouth every eight (8) hours as needed for Nausea or Vomiting.  albuterol (PROVENTIL HFA, VENTOLIN HFA, PROAIR HFA) 90 mcg/actuation inhaler Take 2 Puffs by inhalation every four (4) hours as needed for Wheezing.  medroxyPROGESTERone (DEPO-PROVERA) 150 mg/mL injection 150 mg by IntraMUSCular route once.  inhalational spacing device (E-Z SPACER) 1 Each by Does Not Apply route as needed for Wheezing. Social History:  Social History     Tobacco Use    Smoking status: Never Smoker    Smokeless tobacco: Never Used   Substance Use Topics    Alcohol use: Yes     Comment: social       Family History:  Family History   Problem Relation Age of Onset    Diabetes Father     Headache Mother              Review of Systems:      Constitutional: negative fever, negative chills, negative weight loss  Eyes:   negative visual changes  ENT:   negative sore throat, tongue or lip swelling  Respiratory:  negative cough, negative dyspnea  Cards:  negative for chest pain, palpitations, lower extremity edema  GI:   See HPI  :  negative for frequency, dysuria  Integument:  negative for rash and pruritus  Heme:  negative for easy bruising and gum/nose bleeding  Musculoskel: negative for myalgias,  back pain and muscle weakness  Neuro: negative for headaches, dizziness, vertigo  Psych:  negative for feelings of anxiety, depression       Objective:   No data found. No intake/output data recorded.   No intake/output data recorded. EXAM:     NEURO-a&o   HEENT-wnl   LUNGS-clear    COR-regular rate and rhythym     ABD-soft , no tenderness, no rebound, good bs     EXT-no edema     Data Review     No results for input(s): WBC, HGB, HCT, PLT, HGBEXT, HCTEXT, PLTEXT in the last 72 hours. No results for input(s): NA, K, CL, CO2, BUN, CREA, GLU, PHOS, CA in the last 72 hours. No results for input(s): AP, TBIL, TP, ALB, GLOB, GGT, AML, LPSE in the last 72 hours. No lab exists for component: SGOT, GPT, AMYP, HLPSE  No results for input(s): INR, PTP, APTT, INREXT in the last 72 hours. Patient Active Problem List   Diagnosis Code    Intractable chronic migraine without aura and without status migrainosus G43.719    Pituitary adenoma ([facility]) D35.2    Right hand weakness R29.898    Numbness of right hand R20.0    Chronic tension-type headache, intractable G44.221    Severe obesity ([facility]) E66.01    Elevated prolactin level R79.89      Assessment:   · Epigastric pain   Plan:   · EGD today.      Signed By: Kelly Hyde MD     5/27/2020  10:47 PM

## 2020-05-28 NOTE — PROCEDURES
Evangelina Wheeler M.D.  [phone]           2020                EGD Operative Report  Camilo Franco  :  1990  [address] [place] [address] Record Number:  [id number]      Indication: epigastric pain     : Randall Wylie MD    Assistant -- None  Implants -- None    Referring Provider:  Argenis Barry MD      Anesthesia/Sedation:  MAC anesthesia Propofol    Airway assessment: No airway problems anticipated    Pre-Procedural Exam:      Airway: clear, no airway problems anticipated  Heart: RRR, without gallops or rubs  Lungs: clear bilaterally without wheezes, crackles, or rhonchi  Abdomen: soft, nontender, nondistended, bowel sounds present  Mental Status: awake, alert and oriented to person, place and time       Procedure Details     After infomed consent was obtained for the procedure, with all risks and benefits of procedure explained the patient was taken to the endoscopy suite and placed in the left lateral decubitus position. Following sequential administration of sedation as per above, the endoscope was inserted into the mouth and advanced under direct vision to second portion of the duodenum. A careful inspection was made as the gastroscope was withdrawn, including a retroflexed view of the proximal stomach; findings and interventions are described below. Findings:   Esophagus:normal  Stomach: normal   Duodenum/jejunum: normal    Therapies:  biopsy of stomach - r/o H.pylori  biopsy of duodenal - /o celiac disease    Specimens: as above           Complications:   None; patient tolerated the procedure well. EBL:  None.            Impression:    Normal EGD     Recommendations:    -Await pathology.  -Follow as scheduled    Randall Wylie MD

## 2020-05-28 NOTE — ANESTHESIA POSTPROCEDURE EVALUATION
Procedure(s):  ESOPHAGOGASTRODUODENOSCOPY (EGD)  ESOPHAGOGASTRODUODENAL (EGD) BIOPSY. MAC    Anesthesia Post Evaluation      Multimodal analgesia: multimodal analgesia used between 6 hours prior to anesthesia start to PACU discharge  Patient location during evaluation: bedside  Patient participation: complete - patient participated  Level of consciousness: awake  Pain management: adequate  Airway patency: patent  Anesthetic complications: no  Cardiovascular status: acceptable  Respiratory status: acceptable  Hydration status: acceptable        INITIAL Post-op Vital signs:   Vitals Value Taken Time   /58 5/28/2020 10:40 AM   Temp 36.7 °C (98 °F) 5/28/2020 10:23 AM   Pulse 56 5/28/2020 10:44 AM   Resp 19 5/28/2020 10:44 AM   SpO2 100 % 5/28/2020 10:42 AM   Vitals shown include unvalidated device data.

## 2020-05-28 NOTE — PERIOP NOTES
0445    TRANSFER - IN REPORT:    Verbal report received from [facility], rn on Marti Steinberg Hem  being received from endo#2 for ordered procedure      Report consisted of patients Situation, Background, Assessment and   Recommendations(SBAR). Information from the following report(s) SBAR was reviewed with the receiving nurse. Opportunity for questions and clarification was provided. Assessment completed upon patients arrival to unit and care assumed. 1005  Anesthesia staff at patient's bedside administering anesthesia and monitoring patients vital signs throughout procedure. See anesthesia note. Report from [employer], CRNA. 1010  Endoscope was pre-cleaned at bedside immediately following procedure by Coleen Gregorio RN, endo tech. 1019  Patient tolerated procedure without problems. Abdomen soft and patient arousable and voices no complaints Report received from CRNA, see anesthesia note. Patient transported to endoscopy recovery area. Report given to Marimar Choudhury RN.

## 2020-05-28 NOTE — ROUTINE PROCESS
Jerilyn Harding 1990 
[id number] Situation: 
Verbal report received from: Dania Chance RN Procedure: Procedure(s): ESOPHAGOGASTRODUODENOSCOPY (EGD) ESOPHAGOGASTRODUODENAL (EGD) BIOPSY Background: 
 
Preoperative diagnosis: EPIGASTRIC PAIN Postoperative diagnosis: Normal 
 
:  Dr. Santos Harrington Assistant(s): Endoscopy Technician-1: Tati Oh Endoscopy RN-1: Julissa Lagunas RN Specimens:  
ID Type Source Tests Collected by Time Destination 1 : duodenal biopsy Preservative Duodenum  Blanca Singh MD 5/28/2020 1011 Pathology 2 : gastric biopsy Preservative Gastric  Blanca Singh MD 5/28/2020 1011 Pathology H. Pylori  no Assessment: 
Intra-procedure medications Anesthesia gave intra-procedure sedation and medications, see anesthesia flow sheet yes Intravenous fluids: NS@ [facility] Vital signs stable Abdominal assessment: round and soft Recommendation: 
Discharge patient per MD order. Family or Friend Permission to share finding with family or friend yes Endoscopy discharge instructions have been reviewed and given to patient. The patient verbalized understanding and acceptance of instructions.

## 2020-05-28 NOTE — ANESTHESIA PREPROCEDURE EVALUATION
Anesthetic History   No history of anesthetic complications            Review of Systems / Medical History  Patient summary reviewed, nursing notes reviewed and pertinent labs reviewed    Pulmonary        Sleep apnea  Undiagnosed apnea         Neuro/Psych         Headaches     Cardiovascular                  Exercise tolerance: >4 METS     GI/Hepatic/Renal     GERD           Endo/Other      Hypothyroidism (no follow-up in years)  Morbid obesity and anemia     Other Findings   Comments: H/o pituitary tumor-benign, last MRI January and unchanged           Physical Exam    Airway  Mallampati: II  TM Distance: 4 - 6 cm  Neck ROM: normal range of motion   Mouth opening: Normal     Cardiovascular    Rhythm: regular  Rate: normal         Dental  No notable dental hx       Pulmonary  Breath sounds clear to auscultation               Abdominal  GI exam deferred       Other Findings            Anesthetic Plan    ASA: 2  Anesthesia type: MAC          Induction: Intravenous  Anesthetic plan and risks discussed with: Patient

## 2020-05-28 NOTE — DISCHARGE INSTRUCTIONS
Karen Ortega M.D.  [phone]           2020  [facility]  :  1990  [facility] Medical Record Number:  [id number]        ENDOSCOPY FINDINGS:   Your endoscopy showed normal exam.      EGD DISCHARGE INSTRUCTIONS    DISCOMFORT:  Sore throat- throat lozenges or warm salt water gargle  redness at IV site- apply warm compress to area; if redness or soreness persist- contact your physician  Gaseous discomfort- walking, belching will help relieve any discomfort  You may not operate a vehicle for 12 hours  You may not engage in an occupation involving machinery or appliances for rest of today  You may not drink alcoholic beverages for at least 12 hours  Avoid making any critical decisions for at least 24 hour    DIET:   You may resume your regular diet. ACTIVITY  Spend the remainder of the day resting -  avoid any strenuous activity. Avoid driving or operating machinery. CALL M.D. ANY SIGN OF   Increasing pain, nausea, vomiting  Abdominal distension (swelling)  New increased bleeding (oral or rectal)  Fever (chills)  Pain in chest area  Bloody discharge from nose or mouth  Shortness of breath    Follow-up Instructions:   Call Dr. Jas Marino for any questions or problems. Telephone # [phone]  If biopsies were obtained, the results will be available  in  5 to 7 days. We will call you to notify you of these results. Continue same medications. Follow up in the office.

## 2020-06-08 ENCOUNTER — TELEPHONE (OUTPATIENT)
Dept: OBGYN CLINIC | Age: 30
End: 2020-06-08

## 2020-06-08 RX ORDER — MEDROXYPROGESTERONE ACETATE 150 MG/ML
150 INJECTION, SUSPENSION INTRAMUSCULAR ONCE
Qty: 1 SYRINGE | Refills: 0 | Status: SHIPPED | OUTPATIENT
Start: 2020-06-08 | End: 2020-06-08

## 2020-06-10 ENCOUNTER — HOSPITAL ENCOUNTER (OUTPATIENT)
Dept: GENERAL RADIOLOGY | Age: 30
Discharge: HOME | End: 2020-06-10
Attending: NURSE PRACTITIONER
Payer: MEDICAID

## 2020-06-10 DIAGNOSIS — K59.00 CONSTIPATION: ICD-10-CM

## 2020-06-10 PROCEDURE — 74018 RADEX ABDOMEN 1 VIEW: CPT

## 2020-06-24 ENCOUNTER — TELEPHONE (OUTPATIENT)
Dept: OBGYN CLINIC | Age: 30
End: 2020-06-24

## 2020-06-24 RX ORDER — MEDROXYPROGESTERONE ACETATE 150 MG/ML
150 INJECTION, SUSPENSION INTRAMUSCULAR ONCE
Qty: 1 ML | Refills: 0 | Status: SHIPPED | OUTPATIENT
Start: 2020-06-24 | End: 2020-06-24

## 2020-06-24 NOTE — TELEPHONE ENCOUNTER
Patient called this morning. 31 yr old  last seen 07/10/2019. Patient calling for Depo Rx to be sent over to pharmacy for her appt tomorrow. Please advise    Pharmacy verified. Thank you!

## 2020-06-24 NOTE — TELEPHONE ENCOUNTER
Called patient this morning. Informed her Rx for depo was sent to pharmacy. Patient verbalized understanding. Thank you!

## 2020-06-25 ENCOUNTER — OFFICE VISIT (OUTPATIENT)
Dept: OBGYN CLINIC | Age: 30
End: 2020-06-25

## 2020-06-25 VITALS
BODY MASS INDEX: 36.16 KG/M2 | SYSTOLIC BLOOD PRESSURE: 125 MMHG | HEIGHT: 66 IN | DIASTOLIC BLOOD PRESSURE: 65 MMHG | WEIGHT: 225 LBS

## 2020-06-25 DIAGNOSIS — Z11.3 SCREENING EXAMINATION FOR SEXUALLY TRANSMITTED DISEASE: ICD-10-CM

## 2020-06-25 DIAGNOSIS — Z01.419 WELL FEMALE EXAM WITH ROUTINE GYNECOLOGICAL EXAM: Primary | ICD-10-CM

## 2020-06-25 DIAGNOSIS — Z30.42 ENCOUNTER FOR DEPO-PROVERA CONTRACEPTION: ICD-10-CM

## 2020-06-25 RX ORDER — MEDROXYPROGESTERONE ACETATE 150 MG/ML
150 INJECTION, SUSPENSION INTRAMUSCULAR ONCE
Qty: 1 ML | Refills: 3 | Status: SHIPPED | OUTPATIENT
Start: 2020-06-25 | End: 2020-06-25

## 2020-06-25 NOTE — PROGRESS NOTES
Annual exam ages 21-44    [address] [place] [address] is a ,  31 y.o. female   No LMP recorded. (Menstrual status: Chemically Induced). She presents for her annual checkup. She is having no significant problems. Menstrual status:    Her periods are absent due to depo. Contraception:    The current method of family planning is DMPA. Sexual history:    She  reports previously being sexually active. She reports using the following method of birth control/protection: Injection. Medical conditions:    Since her last annual GYN exam about one year ago, she has not the following changes in her health history: none. Surgical history confirmed with patient. has a past surgical history that includes hx colposcopy (11/3/10); hx lipoma resection; hx dilation and curettage; and hx  section (). Pap and Mammogram History:    Her most recent Pap smear was abnormal, obtained 1 year(s) ago.     The patient has never had a mammogram.    Breast Cancer History/Substance Abuse: negative    Past Medical History:   Diagnosis Date    Abnormal Pap smear     Anemia     Gastrointestinal disorder     Ulcers    Headache     Herniated disc     Hx gestational diabetes     HX OTHER MEDICAL     trich treated at beginning of pregnancy    HX OTHER MEDICAL     pituitary tumor-benign     Miscarriage     Molar pregnancy     Pap smear for cervical cancer screening 19 neg HPV POS- rp pap one year    PCOS (polycystic ovarian syndrome)     Pelvic pain in female     Pituitary adenoma ([facility] [address]     Pituitary tumor     Psychiatric problem     depression never on medication    Thyroid activity decreased     hypothyroid awaiting appnt for endo     Past Surgical History:   Procedure Laterality Date    HX  SECTION      HX COLPOSCOPY  11/3/10    HX DILATION AND CURETTAGE      HX LIPOMA RESECTION         Current Outpatient Medications   Medication Sig Dispense Refill    medroxyPROGESTERone (DEPO-PROVERA) 150 mg/mL injection 150 mg by IntraMUSCular route once.  omeprazole (PriLOSEC OTC) 20 mg tablet Take 1 Tab by mouth daily. Indications: gastroesophageal reflux disease 30 Tab 3    ondansetron (ZOFRAN ODT) 4 mg disintegrating tablet Take 1 Tab by mouth every eight (8) hours as needed for Nausea or Vomiting. 12 Tab 0    albuterol (PROVENTIL HFA, VENTOLIN HFA, PROAIR HFA) 90 mcg/actuation inhaler Take 2 Puffs by inhalation every four (4) hours as needed for Wheezing. (Patient taking differently: Take 2 Puffs by inhalation every four (4) hours as needed for Wheezing. Indications: pneumonia) 1 Inhaler 0    inhalational spacing device (E-Z SPACER) 1 Each by Does Not Apply route as needed for Wheezing. (Patient taking differently: 1 Each by Does Not Apply route as needed for Wheezing. Indications: pneumonia) 1 Device 0     Allergies: Tomato     Tobacco History:  reports that she has never smoked. She has never used smokeless tobacco.  Alcohol Abuse:  reports current alcohol use. Drug Abuse:  reports no history of drug use.     Family Medical/Cancer History:   Family History   Problem Relation Age of Onset    Diabetes Father     Headache Mother         Review of Systems - History obtained from the patient  Constitutional: negative for weight loss, fever, night sweats  HEENT: negative for hearing loss, earache, congestion, snoring, sorethroat  CV: negative for chest pain, palpitations, edema  Resp: negative for cough, shortness of breath, wheezing  GI: negative for change in bowel habits, abdominal pain, black or bloody stools  : negative for frequency, dysuria, hematuria, vaginal discharge  MSK: negative for back pain, joint pain, muscle pain  Breast: negative for breast lumps, nipple discharge, galactorrhea  Skin :negative for itching, rash, hives  Neuro: negative for dizziness, headache, confusion, weakness  Psych: negative for anxiety, depression, change in mood  Heme/lymph: negative for bleeding, bruising, pallor    Physical Exam    Visit Vitals  /65   Ht 5' 6\" (1.676 m)   Wt 225 lb (102.1 kg)   BMI 36.32 kg/m²       Constitutional  · Appearance: well-nourished, well developed, alert, in no acute distress    HENT  · Head and Face: appears normal    Neck  · Inspection/Palpation: normal appearance, no masses or tenderness  · Lymph Nodes: no lymphadenopathy present  · Thyroid: gland size normal, nontender, no nodules or masses present on palpation    Chest  · Respiratory Effort: breathing unlabored  · Auscultation: normal breath sounds    Cardiovascular  · Heart:  · Auscultation: regular rate and rhythm without murmur    Breasts  · Inspection of Breasts: breasts symmetrical, no skin changes, no discharge present, nipple appearance normal, no skin retraction present  · Palpation of Breasts and Axillae: no masses present on palpation, no breast tenderness  · Axillary Lymph Nodes: no lymphadenopathy present    Gastrointestinal  · Abdominal Examination: abdomen non-tender to palpation, normal bowel sounds, no masses present  · Liver and spleen: no hepatomegaly present, spleen not palpable  · Hernias: no hernias identified    Genitourinary  · External Genitalia: normal appearance for age, no discharge present, no tenderness present, no inflammatory lesions present, no masses present, no atrophy present  · Vagina: normal vaginal vault without central or paravaginal defects, no discharge present, no inflammatory lesions present, no masses present  · Bladder: non-tender to palpation  · Urethra: appears normal  · Cervix: normal   · Uterus: normal size, shape and consistency  · Adnexa: no adnexal tenderness present, no adnexal masses present  · Perineum: perineum within normal limits, no evidence of trauma, no rashes or skin lesions present  · Anus: anus within normal limits, no hemorrhoids present  · Inguinal Lymph Nodes: no lymphadenopathy present    Skin  · General Inspection: no rash, no lesions identified    Neurologic/Psychiatric  · Mental Status:  · Orientation: grossly oriented to person, place and time  · Mood and Affect: mood normal, affect appropriate    . Assessment:  Routine gynecologic examination  Her current medical status is satisfactory with no evidence of significant gynecologic issues.     Plan:  Counseled re: diet, exercise, healthy lifestyle  Return for yearly wellness visits  Gardisil counseling provided

## 2020-06-25 NOTE — PROGRESS NOTES
Depo 150mg administered intramuscularly in the right glute per Dr. Dontae Hopkins verbal order  with verbal consent received from patient and two patient identifiers used. No UPT, within dates. Patient tolerated well with no reactions observed for ten minutes post injection. Next injection dates were written down for the patient and the patient was encouraged to schedule next injection at checkout. Patient verbalized understanding.   Lot # [id number]   Exp- 12/2021

## 2020-07-02 LAB
C TRACH RRNA CVX QL NAA+PROBE: NEGATIVE
CYTOLOGIST CVX/VAG CYTO: ABNORMAL
CYTOLOGY CVX/VAG DOC CYTO: ABNORMAL
CYTOLOGY CVX/VAG DOC THIN PREP: ABNORMAL
CYTOLOGY HISTORY:: ABNORMAL
DX ICD CODE: ABNORMAL
DX ICD CODE: ABNORMAL
HPV I/H RISK 1 DNA CVX QL PROBE+SIG AMP: ABNORMAL
HPV I/H RISK 4 DNA CVX QL PROBE+SIG AMP: NEGATIVE
Lab: ABNORMAL
N GONORRHOEA RRNA CVX QL NAA+PROBE: NEGATIVE
OTHER STN SPEC: ABNORMAL
PATHOLOGIST CVX/VAG CYTO: ABNORMAL
STAT OF ADQ CVX/VAG CYTO-IMP: ABNORMAL
T VAGINALIS RRNA SPEC QL NAA+PROBE: NEGATIVE

## 2020-07-29 DIAGNOSIS — R07.9 CHEST PAIN, UNSPECIFIED TYPE: ICD-10-CM

## 2020-07-29 DIAGNOSIS — K29.70 GASTRITIS, PRESENCE OF BLEEDING UNSPECIFIED, UNSPECIFIED CHRONICITY, UNSPECIFIED GASTRITIS TYPE: ICD-10-CM

## 2020-07-29 DIAGNOSIS — K21.9 GASTROESOPHAGEAL REFLUX DISEASE, ESOPHAGITIS PRESENCE NOT SPECIFIED: ICD-10-CM

## 2020-07-29 RX ORDER — OMEPRAZOLE 20 MG/1
CAPSULE, DELAYED RELEASE ORAL
Qty: 90 CAP | Refills: 1 | Status: SHIPPED | OUTPATIENT
Start: 2020-07-29 | End: 2020-07-31 | Stop reason: CLARIF

## 2020-07-31 ENCOUNTER — TELEPHONE (OUTPATIENT)
Dept: FAMILY MEDICINE CLINIC | Age: 30
End: 2020-07-31

## 2020-07-31 DIAGNOSIS — K21.9 GASTROESOPHAGEAL REFLUX DISEASE, ESOPHAGITIS PRESENCE NOT SPECIFIED: Primary | ICD-10-CM

## 2020-07-31 RX ORDER — OMEPRAZOLE 20 MG/1
20 TABLET, DELAYED RELEASE ORAL DAILY
Qty: 30 TAB | Refills: 5 | Status: SHIPPED | OUTPATIENT
Start: 2020-07-31 | End: 2022-02-07

## 2020-07-31 NOTE — TELEPHONE ENCOUNTER
Fax from [facility] that insurance does not cover omeprazole 20 mg cap. Will switch to Prilosec OTC 20 mg tab.

## 2020-08-12 ENCOUNTER — HOSPITAL ENCOUNTER (OUTPATIENT)
Dept: GENERAL RADIOLOGY | Age: 30
Discharge: HOME | End: 2020-08-12
Attending: INTERNAL MEDICINE
Payer: MEDICAID

## 2020-08-12 ENCOUNTER — VIRTUAL VISIT (OUTPATIENT)
Dept: FAMILY MEDICINE CLINIC | Age: 30
End: 2020-08-12
Payer: MEDICAID

## 2020-08-12 DIAGNOSIS — M54.42 ACUTE LEFT-SIDED LOW BACK PAIN WITH LEFT-SIDED SCIATICA: ICD-10-CM

## 2020-08-12 DIAGNOSIS — M54.42 ACUTE LEFT-SIDED LOW BACK PAIN WITH LEFT-SIDED SCIATICA: Primary | ICD-10-CM

## 2020-08-12 PROCEDURE — 72100 X-RAY EXAM L-S SPINE 2/3 VWS: CPT

## 2020-08-12 PROCEDURE — 99213 OFFICE O/P EST LOW 20 MIN: CPT | Performed by: INTERNAL MEDICINE

## 2020-08-12 RX ORDER — METHYLPREDNISOLONE 4 MG/1
TABLET ORAL
Qty: 1 DOSE PACK | Refills: 0 | Status: SHIPPED | OUTPATIENT
Start: 2020-08-12 | End: 2020-09-06

## 2020-08-12 RX ORDER — MELOXICAM 15 MG/1
15 TABLET ORAL DAILY
Qty: 15 TAB | Refills: 0 | Status: SHIPPED | OUTPATIENT
Start: 2020-08-12 | End: 2020-08-27

## 2020-08-12 NOTE — PROGRESS NOTES
Chief Complaint   Patient presents with   [address]     started to have lower left back pain that started 2-weeks ago. She denies any recent injury. Though her job requires her to lift heavy boxes. Lawanda Mejía is a 31 y.o. female who was seen by synchronous (real-time) audio-video technology on 8/12/2020 for LOW BACK PAIN (started to have lower left back pain that started 2-weeks ago. She denies any recent injury. Though her job requires her to lift heavy boxes. )        Assessment & Plan:   Diagnoses and all orders for this visit:    1. Acute left-sided low back pain with left-sided sciatica  Comments: Worsening lower back pain on left side specifically. Orders:  -     XR SPINE LUMB 2 OR 3 V; Future  -     methylPREDNISolone (MEDROL DOSEPACK) 4 mg tablet; Take as directed. -     meloxicam (MOBIC) 15 mg tablet; Take 1 Tab by mouth daily for 15 days. Acute Back Pain Care  1. Salon Pas 4% Lidocaine patches. Apply directly to the area of discomfort. Leave on for 12 hours. Off for 12 hours. 2. Tylenol 650 mgs 3 x daily as needed for pain. 3. Warm compress or heating pad. Avoid high temperatures and getting burned. Monitor closely. 4. Muscle Relaxant as prescribed   5. Consider PT if not getting better and/or xray. 6. Gentle stretching exercises as per the after visit summary. 7. Return as needed. I spent at least 7 minutes on this visit with this established patient. Subjective:     30F with recurrent lower back pain. However, feels like it is worse than previous presentations. She denies any injury to her back. She does a lot of heavy lifting. She has been using a heating pad and taking Motrin for the pain and Tizanidine. Not helping much. She denies any bladder or urinary symptoms. She also has some sciatica and numbness on the L-side. Prior to Admission medications    Medication Sig Start Date End Date Taking?  Authorizing Provider   omeprazole (PriLOSEC OTC) 20 mg tablet Take 1 Tab by mouth daily. 3/76/28   Yariel Bradford MD   ondansetron (ZOFRAN ODT) 4 mg disintegrating tablet Take 1 Tab by mouth every eight (8) hours as needed for Nausea or Vomiting. 20   Anastacia Starkey, NP   albuterol (PROVENTIL HFA, VENTOLIN HFA, PROAIR HFA) 90 mcg/actuation inhaler Take 2 Puffs by inhalation every four (4) hours as needed for Wheezing. Patient taking differently: Take 2 Puffs by inhalation every four (4) hours as needed for Wheezing. Indications: pneumonia 20   Milly Bunch MD   inhalational spacing device (E-Z SPACER) 1 Each by Does Not Apply route as needed for Wheezing. Patient taking differently: 1 Each by Does Not Apply route as needed for Wheezing.  Indications: pneumonia 20   Milly Bunch MD     Patient Active Problem List   Diagnosis Code    Intractable chronic migraine without aura and without status migrainosus G43.719    Pituitary adenoma ([address] D35.2    Right hand weakness R29.898    Numbness of right hand R20.0    Chronic tension-type headache, intractable G44.221    Severe obesity (HCC) E66.01    Elevated prolactin level R79.89     Past Medical History:   Diagnosis Date    Abnormal Pap smear     Anemia     Gastrointestinal disorder     Ulcers    Headache     Herniated disc     Hx gestational diabetes     HX OTHER MEDICAL     trich treated at beginning of pregnancy    HX OTHER MEDICAL     pituitary tumor-benign     Miscarriage     Molar pregnancy     Pap smear for cervical cancer screening 19 neg HPV POS- rp pap one year    PCOS (polycystic ovarian syndrome)     Pelvic pain in female     Pituitary adenoma ([address]     Pituitary tumor     Psychiatric problem     depression never on medication    Thyroid activity decreased     hypothyroid awaiting appnt for endo     Past Surgical History:   Procedure Laterality Date    HX  SECTION      HX COLPOSCOPY  11/3/10    HX DILATION AND CURETTAGE      HX LIPOMA RESECTION       Family History   Problem Relation Age of Onset    Diabetes Father     Headache Mother      Social History     Tobacco Use    Smoking status: Never Smoker    Smokeless tobacco: Never Used   Substance Use Topics    Alcohol use: Yes     Comment: social       Review of Systems   Constitutional: Negative. Musculoskeletal: Positive for back pain (lower left side with sciatica) and myalgias. Negative for falls, joint pain and neck pain. All other systems reviewed and are negative. Objective:   No flowsheet data found. General: alert, cooperative, no distress   Mental  status: normal mood, behavior, speech, dress, motor activity, and thought processes, able to follow commands   HENT: NCAT   Neck: no visualized mass   Resp: no respiratory distress   Neuro: no gross deficits   Skin: no discoloration or lesions of concern on visible areas   Psychiatric: normal affect, consistent with stated mood, no evidence of hallucinations       We discussed the expected course, resolution and complications of the diagnosis(es) in detail. Medication risks, benefits, costs, interactions, and alternatives were discussed as indicated. I advised her to contact the office if her condition worsens, changes or fails to improve as anticipated. She expressed understanding with the diagnosis(es) and plan. Valentin James, who was evaluated through a patient-initiated, synchronous (real-time) audio-video encounter, and/or her healthcare decision maker, is aware that it is a billable service, with coverage as determined by her insurance carrier. She provided verbal consent to proceed: Yes, and patient identification was verified. It was conducted pursuant to the emergency declaration under the [facility], [address] [place] [address] and the [employer] Act. A caregiver was present when appropriate.  Ability to conduct physical exam was limited. I was in the office. The patient was in her car. Follow-up and Dispositions    · Return if symptoms worsen or fail to improve.          Javed Robert MD

## 2020-08-12 NOTE — PROGRESS NOTES
Released on mychart with recommendation of seeing a orthopedist if therapy prescribed does not work.

## 2020-09-06 ENCOUNTER — HOSPITAL ENCOUNTER (EMERGENCY)
Age: 30
Discharge: HOME | End: 2020-09-06
Attending: EMERGENCY MEDICINE
Payer: MEDICAID

## 2020-09-06 ENCOUNTER — APPOINTMENT (OUTPATIENT)
Dept: GENERAL RADIOLOGY | Age: 30
End: 2020-09-06
Attending: EMERGENCY MEDICINE
Payer: MEDICAID

## 2020-09-06 VITALS
BODY MASS INDEX: 36.16 KG/M2 | TEMPERATURE: 97.1 F | SYSTOLIC BLOOD PRESSURE: 115 MMHG | WEIGHT: 225 LBS | RESPIRATION RATE: 16 BRPM | DIASTOLIC BLOOD PRESSURE: 59 MMHG | HEART RATE: 93 BPM | OXYGEN SATURATION: 99 % | HEIGHT: 66 IN

## 2020-09-06 DIAGNOSIS — M25.532 LEFT WRIST PAIN: Primary | ICD-10-CM

## 2020-09-06 PROCEDURE — 74011250637 HC RX REV CODE- 250/637: Performed by: EMERGENCY MEDICINE

## 2020-09-06 PROCEDURE — 99283 EMERGENCY DEPT VISIT LOW MDM: CPT

## 2020-09-06 PROCEDURE — 73110 X-RAY EXAM OF WRIST: CPT

## 2020-09-06 RX ORDER — IBUPROFEN 600 MG/1
600 TABLET ORAL
Qty: 20 TAB | Refills: 0 | Status: SHIPPED | OUTPATIENT
Start: 2020-09-06 | End: 2022-02-07

## 2020-09-06 RX ORDER — IBUPROFEN 800 MG/1
800 TABLET ORAL
Status: COMPLETED | OUTPATIENT
Start: 2020-09-06 | End: 2020-09-06

## 2020-09-06 RX ADMIN — IBUPROFEN 800 MG: 800 TABLET ORAL at 21:08

## 2020-09-07 NOTE — ED PROVIDER NOTES
Betsy Grover is a 30 yo F with with left wrist pain. She states that she first noticed the pain about a month ago with stiffness in her wrist and occasional numbness in her fingers when she has been working and then [employer] when she was lifting her laundry basket she felt a pop and now has increasing pain and tingling radiating from her wrist up her forearm and to her thumb. She has not taken anything for pain. She works as a CNA and part time as a  at [employer]             Past Medical History:   Diagnosis Date    Abnormal Pap smear     Anemia     Gastrointestinal disorder     Ulcers    Headache     Herniated disc     Hx gestational diabetes     HX OTHER MEDICAL     trich treated at beginning of pregnancy    HX OTHER MEDICAL     pituitary tumor-benign     Miscarriage     Molar pregnancy     Pap smear for cervical cancer screening 19 neg HPV POS- rp pap one year    PCOS (polycystic ovarian syndrome)     Pelvic pain in female     [facility] [address]     Pituitary tumor     Psychiatric problem     depression never on medication    Thyroid activity decreased     hypothyroid awaiting appnt for endo       Past Surgical History:   Procedure Laterality Date    HX  SECTION      HX COLPOSCOPY  11/3/10    HX DILATION AND CURETTAGE      HX LIPOMA RESECTION           Family History:   Problem Relation Age of Onset    Diabetes Father     Headache Mother        Social History     Socioeconomic History    Marital status: SINGLE     Spouse name: Not on file    Number of children: Not on file    Years of education: Not on file    Highest education level: Not on file   Occupational History    Not on file   Social Needs    Financial resource strain: Not on file    Food insecurity     Worry: Not on file     Inability: Not on file    Transportation needs     Medical: Not on file     Non-medical: Not on file   Tobacco Use    Smoking status: Never Smoker    Smokeless tobacco: Never Used   Substance and Sexual Activity    Alcohol use: Yes     Comment: social    Drug use: No    Sexual activity: Not Currently     Birth control/protection: Injection   Lifestyle    Physical activity     Days per week: Not on file     Minutes per session: Not on file    Stress: Not on file   Relationships    Social connections     Talks on phone: Not on file     Gets together: Not on file     Attends Rastafari service: Not on file     Active member of club or organization: Not on file     Attends meetings of clubs or organizations: Not on file     Relationship status: Not on file    Intimate partner violence     Fear of current or ex partner: Not on file     Emotionally abused: Not on file     Physically abused: Not on file     Forced sexual activity: Not on file   Other Topics Concern    Not on file   Social History Narrative    Not on file         ALLERGIES: Tomato    Review of Systems   Constitutional: Negative for fever. HENT: Negative for sore throat. Eyes: Negative for visual disturbance. Respiratory: Negative for cough. Cardiovascular: Negative for chest pain. Gastrointestinal: Negative for abdominal pain. Genitourinary: Negative for dysuria. Musculoskeletal: Negative for back pain. Left wrist pain   Skin: Negative for rash. Neurological: Negative for headaches. Vitals:    09/06/20 2048   BP: 126/78   Pulse: 93   Resp: 16   Temp: 97.1 °F (36.2 °C)   SpO2: 99%   Weight: 102.1 kg (225 lb)   Height: 5' 6\" (1.676 m)            Physical Exam  Vitals signs and nursing note reviewed. Constitutional:       General: She is not in acute distress. Appearance: She is well-developed. HENT:      Head: Normocephalic and atraumatic. Eyes:      Conjunctiva/sclera: Conjunctivae normal.   Neck:      Musculoskeletal: Normal range of motion. Trachea: Phonation normal.   Cardiovascular:      Rate and Rhythm: Normal rate.    Pulmonary:      Effort: Pulmonary effort is normal. No respiratory distress. Abdominal:      General: There is no distension. Musculoskeletal:      Left wrist: She exhibits decreased range of motion, tenderness and swelling. She exhibits no deformity. Left forearm: She exhibits tenderness. She exhibits no deformity. Left hand: She exhibits normal range of motion, normal capillary refill and no deformity. Normal sensation noted. Skin:     General: Skin is warm and dry. Neurological:      Mental Status: She is alert. She is not disoriented. Motor: No abnormal muscle tone. MDM       9:31 PM  XR left wrist normal.  Patient given ibuprofen for pain. Velcro volar wrist brace applied.   Patient provided referral for ortho-hand  Procedures

## 2020-09-07 NOTE — ED NOTES
Splint placed; Patient has received discharge paperwork and instructions. Pt verbalized understanding and has no further questions or concerns at this time.

## 2020-09-07 NOTE — ED TRIAGE NOTES
Pt reports pain to left wrist x1 month; denies any trauma/injuries/past surgery to left arm or wrist.

## 2020-09-10 ENCOUNTER — CLINICAL SUPPORT (OUTPATIENT)
Dept: OBGYN CLINIC | Age: 30
End: 2020-09-10
Payer: MEDICAID

## 2020-09-10 DIAGNOSIS — Z30.42 ENCOUNTER FOR DEPO-PROVERA CONTRACEPTION: Primary | ICD-10-CM

## 2020-09-10 PROCEDURE — 96372 THER/PROPH/DIAG INJ SC/IM: CPT | Performed by: OBSTETRICS & GYNECOLOGY

## 2020-09-10 NOTE — PROGRESS NOTES
Depo 150mg administered intramuscularly in the right glute per Dr. Ruth Monday verbal order  with verbal consent received from patient and two patient identifiers used. No UPT, within dates. Patient tolerated well with no reactions observed for ten minutes post injection. Next injection dates were written down for the patient and the patient was encouraged to schedule next injection at checkout. Patient verbalized understanding.   Lot # [id number]   Exp- 08/01/2022

## 2020-11-22 ENCOUNTER — HOSPITAL ENCOUNTER (EMERGENCY)
Age: 30
Discharge: HOME | End: 2020-11-22
Attending: EMERGENCY MEDICINE
Payer: MEDICAID

## 2020-11-22 VITALS
HEIGHT: 66 IN | SYSTOLIC BLOOD PRESSURE: 111 MMHG | BODY MASS INDEX: 35.36 KG/M2 | HEART RATE: 87 BPM | TEMPERATURE: 98.6 F | WEIGHT: 220 LBS | DIASTOLIC BLOOD PRESSURE: 69 MMHG | OXYGEN SATURATION: 99 % | RESPIRATION RATE: 18 BRPM

## 2020-11-22 DIAGNOSIS — R11.2 NAUSEA AND VOMITING, INTRACTABILITY OF VOMITING NOT SPECIFIED, UNSPECIFIED VOMITING TYPE: ICD-10-CM

## 2020-11-22 DIAGNOSIS — M79.10 MYALGIA: ICD-10-CM

## 2020-11-22 DIAGNOSIS — R51.9 NONINTRACTABLE HEADACHE, UNSPECIFIED CHRONICITY PATTERN, UNSPECIFIED HEADACHE TYPE: Primary | ICD-10-CM

## 2020-11-22 LAB
ALBUMIN SERPL-MCNC: 4.4 G/DL (ref 3.5–5)
ALBUMIN/GLOB SERPL: 1.2 {RATIO} (ref 1.1–2.2)
ALP SERPL-CCNC: 121 U/L (ref 45–117)
ALT SERPL-CCNC: 43 U/L (ref 12–78)
ANION GAP SERPL CALC-SCNC: 10 MMOL/L (ref 5–15)
APPEARANCE UR: CLEAR
AST SERPL-CCNC: 36 U/L (ref 15–37)
BACTERIA URNS QL MICRO: NEGATIVE /HPF
BASOPHILS # BLD: 0 K/UL (ref 0–0.1)
BASOPHILS NFR BLD: 0 % (ref 0–1)
BILIRUB SERPL-MCNC: 0.4 MG/DL (ref 0.2–1)
BILIRUB UR QL: NEGATIVE
BUN SERPL-MCNC: 13 MG/DL (ref 6–20)
BUN/CREAT SERPL: 14 (ref 12–20)
CALCIUM SERPL-MCNC: 9.2 MG/DL (ref 8.5–10.1)
CHLORIDE SERPL-SCNC: 104 MMOL/L (ref 97–108)
CO2 SERPL-SCNC: 24 MMOL/L (ref 21–32)
COLOR UR: ABNORMAL
COMMENT, HOLDF: NORMAL
CREAT SERPL-MCNC: 0.94 MG/DL (ref 0.55–1.02)
DIFFERENTIAL METHOD BLD: ABNORMAL
EOSINOPHIL # BLD: 0 K/UL (ref 0–0.4)
EOSINOPHIL NFR BLD: 1 % (ref 0–7)
EPITH CASTS URNS QL MICRO: ABNORMAL /LPF
ERYTHROCYTE [DISTWIDTH] IN BLOOD BY AUTOMATED COUNT: 14.4 % (ref 11.5–14.5)
GLOBULIN SER CALC-MCNC: 3.6 G/DL (ref 2–4)
GLUCOSE SERPL-MCNC: 88 MG/DL (ref 65–100)
GLUCOSE UR STRIP.AUTO-MCNC: NEGATIVE MG/DL
HCG UR QL: NEGATIVE
HCT VFR BLD AUTO: 40.1 % (ref 35–47)
HGB BLD-MCNC: 13 G/DL (ref 11.5–16)
HGB UR QL STRIP: NEGATIVE
IMM GRANULOCYTES # BLD AUTO: 0 K/UL (ref 0–0.04)
IMM GRANULOCYTES NFR BLD AUTO: 0 % (ref 0–0.5)
KETONES UR QL STRIP.AUTO: ABNORMAL MG/DL
LEUKOCYTE ESTERASE UR QL STRIP.AUTO: NEGATIVE
LYMPHOCYTES # BLD: 1 K/UL (ref 0.8–3.5)
LYMPHOCYTES NFR BLD: 16 % (ref 12–49)
MCH RBC QN AUTO: 26.5 PG (ref 26–34)
MCHC RBC AUTO-ENTMCNC: 32.4 G/DL (ref 30–36.5)
MCV RBC AUTO: 81.7 FL (ref 80–99)
MONOCYTES # BLD: 0.2 K/UL (ref 0–1)
MONOCYTES NFR BLD: 3 % (ref 5–13)
NEUTS SEG # BLD: 5 K/UL (ref 1.8–8)
NEUTS SEG NFR BLD: 80 % (ref 32–75)
NITRITE UR QL STRIP.AUTO: NEGATIVE
NRBC # BLD: 0 K/UL (ref 0–0.01)
NRBC BLD-RTO: 0 PER 100 WBC
PH UR STRIP: 6.5 [PH] (ref 5–8)
PLATELET # BLD AUTO: 231 K/UL (ref 150–400)
PMV BLD AUTO: 10.6 FL (ref 8.9–12.9)
POTASSIUM SERPL-SCNC: 3.9 MMOL/L (ref 3.5–5.1)
PROT SERPL-MCNC: 8 G/DL (ref 6.4–8.2)
PROT UR STRIP-MCNC: NEGATIVE MG/DL
RBC # BLD AUTO: 4.91 M/UL (ref 3.8–5.2)
RBC #/AREA URNS HPF: ABNORMAL /HPF (ref 0–5)
SAMPLES BEING HELD,HOLD: NORMAL
SODIUM SERPL-SCNC: 138 MMOL/L (ref 136–145)
SP GR UR REFRACTOMETRY: 1.01 (ref 1–1.03)
UR CULT HOLD, URHOLD: NORMAL
UROBILINOGEN UR QL STRIP.AUTO: 0.2 EU/DL (ref 0.2–1)
WBC # BLD AUTO: 6.3 K/UL (ref 3.6–11)
WBC URNS QL MICRO: ABNORMAL /HPF (ref 0–4)

## 2020-11-22 PROCEDURE — 80053 COMPREHEN METABOLIC PANEL: CPT

## 2020-11-22 PROCEDURE — 96375 TX/PRO/DX INJ NEW DRUG ADDON: CPT

## 2020-11-22 PROCEDURE — 96361 HYDRATE IV INFUSION ADD-ON: CPT

## 2020-11-22 PROCEDURE — 96374 THER/PROPH/DIAG INJ IV PUSH: CPT

## 2020-11-22 PROCEDURE — 99284 EMERGENCY DEPT VISIT MOD MDM: CPT

## 2020-11-22 PROCEDURE — 74011250636 HC RX REV CODE- 250/636: Performed by: EMERGENCY MEDICINE

## 2020-11-22 PROCEDURE — 36415 COLL VENOUS BLD VENIPUNCTURE: CPT

## 2020-11-22 PROCEDURE — 81025 URINE PREGNANCY TEST: CPT

## 2020-11-22 PROCEDURE — 81001 URINALYSIS AUTO W/SCOPE: CPT

## 2020-11-22 PROCEDURE — 85025 COMPLETE CBC W/AUTO DIFF WBC: CPT

## 2020-11-22 RX ORDER — PROCHLORPERAZINE EDISYLATE 5 MG/ML
5 INJECTION INTRAMUSCULAR; INTRAVENOUS
Status: COMPLETED | OUTPATIENT
Start: 2020-11-22 | End: 2020-11-22

## 2020-11-22 RX ORDER — ONDANSETRON 4 MG/1
4 TABLET, ORALLY DISINTEGRATING ORAL
Qty: 20 TAB | Refills: 0 | Status: SHIPPED | OUTPATIENT
Start: 2020-11-22 | End: 2022-02-07

## 2020-11-22 RX ORDER — SODIUM CHLORIDE 0.9 % (FLUSH) 0.9 %
5-40 SYRINGE (ML) INJECTION EVERY 8 HOURS
Status: DISCONTINUED | OUTPATIENT
Start: 2020-11-22 | End: 2020-11-23 | Stop reason: HOSPADM

## 2020-11-22 RX ORDER — SODIUM CHLORIDE 0.9 % (FLUSH) 0.9 %
5-40 SYRINGE (ML) INJECTION AS NEEDED
Status: DISCONTINUED | OUTPATIENT
Start: 2020-11-22 | End: 2020-11-23 | Stop reason: HOSPADM

## 2020-11-22 RX ORDER — BUTALBITAL, ACETAMINOPHEN AND CAFFEINE 300; 40; 50 MG/1; MG/1; MG/1
1 CAPSULE ORAL
Qty: 20 CAP | Refills: 0 | Status: SHIPPED | OUTPATIENT
Start: 2020-11-22 | End: 2022-02-07

## 2020-11-22 RX ORDER — DEXAMETHASONE SODIUM PHOSPHATE 10 MG/ML
10 INJECTION INTRAMUSCULAR; INTRAVENOUS
Status: COMPLETED | OUTPATIENT
Start: 2020-11-22 | End: 2020-11-22

## 2020-11-22 RX ORDER — KETOROLAC TROMETHAMINE 30 MG/ML
30 INJECTION, SOLUTION INTRAMUSCULAR; INTRAVENOUS
Status: COMPLETED | OUTPATIENT
Start: 2020-11-22 | End: 2020-11-22

## 2020-11-22 RX ORDER — DIPHENHYDRAMINE HYDROCHLORIDE 50 MG/ML
25 INJECTION, SOLUTION INTRAMUSCULAR; INTRAVENOUS
Status: COMPLETED | OUTPATIENT
Start: 2020-11-22 | End: 2020-11-22

## 2020-11-22 RX ADMIN — PROCHLORPERAZINE EDISYLATE 5 MG: 5 INJECTION INTRAMUSCULAR; INTRAVENOUS at 23:15

## 2020-11-22 RX ADMIN — KETOROLAC TROMETHAMINE 30 MG: 30 INJECTION, SOLUTION INTRAMUSCULAR; INTRAVENOUS at 23:15

## 2020-11-22 RX ADMIN — DIPHENHYDRAMINE HYDROCHLORIDE 25 MG: 50 INJECTION, SOLUTION INTRAMUSCULAR; INTRAVENOUS at 23:15

## 2020-11-22 RX ADMIN — SODIUM CHLORIDE 1000 ML: 900 INJECTION, SOLUTION INTRAVENOUS at 23:13

## 2020-11-22 RX ADMIN — DEXAMETHASONE SODIUM PHOSPHATE 10 MG: 10 INJECTION, SOLUTION INTRAMUSCULAR; INTRAVENOUS at 23:15

## 2020-11-23 NOTE — ED PROVIDER NOTES
This is a 21-year-old female comes emergency room with chief complaint of headache. Patient states that she been feeling bad for the past past week. Patient states that she has had a nonproductive cough, nausea, vomiting, diarrhea. Patient states that she has had 2 test which both were negative for Covid. Patient also states she has some chest soreness from coughing. Patient does not take anything today. Patient denies any fever or chills. However, the patient has not taken her temperature today. The history is provided by the patient. No  was used. Headache    This is a new problem. The current episode started more than 2 days ago. The problem occurs every few hours. The problem has been gradually worsening. The pain is located in the generalized region. The quality of the pain is described as throbbing and dull. The pain is at a severity of 10/10. The pain is severe. Associated symptoms include nausea and vomiting. Pertinent negatives include no fever, no palpitations, no shortness of breath, no weakness and no dizziness. She has tried nothing for the symptoms.         Past Medical History:   Diagnosis Date    Abnormal Pap smear     Anemia     Gastrointestinal disorder     Ulcers    Headache     Herniated disc     Hx gestational diabetes     HX OTHER MEDICAL     trich treated at beginning of pregnancy    HX OTHER MEDICAL     pituitary tumor-benign     Miscarriage     Molar pregnancy     Pap smear for cervical cancer screening 19 neg HPV POS- rp pap one year    PCOS (polycystic ovarian syndrome)     Pelvic pain in female     Pituitary adenoma ([facility] [address]     Pituitary tumor     Psychiatric problem     depression never on medication    Thyroid activity decreased     hypothyroid awaiting appnt for endo       Past Surgical History:   Procedure Laterality Date    HX  SECTION      HX COLPOSCOPY  11/3/10    HX DILATION AND CURETTAGE      HX LIPOMA RESECTION Family History:   Problem Relation Age of Onset    Diabetes Father     Headache Mother        Social History     Socioeconomic History    Marital status: SINGLE     Spouse name: Not on file    Number of children: Not on file    Years of education: Not on file    Highest education level: Not on file   Occupational History    Not on file   Social Needs    Financial resource strain: Not on file    Food insecurity     Worry: Not on file     Inability: Not on file    Transportation needs     Medical: Not on file     Non-medical: Not on file   Tobacco Use    Smoking status: Never Smoker    Smokeless tobacco: Never Used   Substance and Sexual Activity    Alcohol use: Yes     Comment: social    Drug use: No    Sexual activity: Not Currently     Birth control/protection: Injection   Lifestyle    Physical activity     Days per week: Not on file     Minutes per session: Not on file    Stress: Not on file   Relationships    Social connections     Talks on phone: Not on file     Gets together: Not on file     Attends Yazidi service: Not on file     Active member of club or organization: Not on file     Attends meetings of clubs or organizations: Not on file     Relationship status: Not on file    Intimate partner violence     Fear of current or ex partner: Not on file     Emotionally abused: Not on file     Physically abused: Not on file     Forced sexual activity: Not on file   Other Topics Concern    Not on file   Social History Narrative    Not on file     ALLERGIES: Tomato    Review of Systems   Constitutional: Negative for appetite change, chills, fever and unexpected weight change. HENT: Negative for ear pain, hearing loss, rhinorrhea and trouble swallowing. Eyes: Negative for pain and visual disturbance. Respiratory: Positive for cough. Negative for chest tightness and shortness of breath. Cardiovascular: Negative for chest pain and palpitations.    Gastrointestinal: Positive for nausea and vomiting. Negative for abdominal distention, abdominal pain and blood in stool. Genitourinary: Negative for dysuria, hematuria and urgency. Musculoskeletal: Positive for myalgias. Negative for back pain. Skin: Negative for rash. Neurological: Positive for headaches. Negative for dizziness, syncope, weakness and numbness. Psychiatric/Behavioral: Negative for confusion and suicidal ideas. All other systems reviewed and are negative. Vitals:    11/22/20 2255 11/22/20 2316   BP: (!) 141/89 122/72   Pulse: 83 78   Resp: 18    Temp: 98.6 °F (37 °C)    SpO2: 99% 99%   Weight: 99.8 kg (220 lb)    Height: 5' 6\" (1.676 m)             Physical Exam  Vitals signs and nursing note reviewed. Constitutional:       General: She is not in acute distress. Appearance: Normal appearance. She is well-developed. She is not diaphoretic. HENT:      Head: Normocephalic and atraumatic. Right Ear: External ear normal.      Left Ear: External ear normal.   Eyes:      General: No scleral icterus. Right eye: No discharge. Left eye: No discharge. Extraocular Movements: Extraocular movements intact. Conjunctiva/sclera: Conjunctivae normal.      Pupils: Pupils are equal, round, and reactive to light. Neck:      Musculoskeletal: Normal range of motion and neck supple. Vascular: No JVD. Trachea: No tracheal deviation. Cardiovascular:      Rate and Rhythm: Normal rate and regular rhythm. Heart sounds: Normal heart sounds. No murmur. No friction rub. No gallop. Pulmonary:      Effort: Pulmonary effort is normal. No respiratory distress. Breath sounds: Normal breath sounds. No stridor. No decreased breath sounds, wheezing, rhonchi or rales. Chest:      Chest wall: No tenderness. Abdominal:      General: Bowel sounds are normal. There is no distension. Palpations: Abdomen is soft. Tenderness: There is no abdominal tenderness.  There is no guarding or rebound. Musculoskeletal: Normal range of motion. General: No tenderness. Right lower leg: No edema. Left lower leg: No edema. Skin:     General: Skin is warm and dry. Capillary Refill: Capillary refill takes less than 2 seconds. Coloration: Skin is not pale. Findings: No erythema or rash. Neurological:      General: No focal deficit present. Mental Status: She is alert and oriented to person, place, and time. GCS: GCS eye subscore is 4. GCS verbal subscore is 5. GCS motor subscore is 6. Cranial Nerves: No cranial nerve deficit. Sensory: No sensory deficit. Motor: No weakness or abnormal muscle tone. Coordination: Coordination normal.      Deep Tendon Reflexes: Reflexes are normal and symmetric. Reflexes normal.   Psychiatric:         Mood and Affect: Mood normal.         Behavior: Behavior normal.         Thought Content: Thought content normal.         Judgment: Judgment normal.          MDM  Number of Diagnoses or Management Options     Amount and/or Complexity of Data Reviewed  Clinical lab tests: ordered and reviewed    Risk of Complications, Morbidity, and/or Mortality  Presenting problems: moderate  Diagnostic procedures: low  Management options: moderate    Patient Progress  Patient progress: stable         Procedures    Chief Complaint   Patient presents with    Headache    Nausea       The patient's presenting problems have been discussed, and they are in agreement with the care plan formulated and outlined with them. I have encouraged them to ask questions as they arise throughout their visit.     MEDICATIONS GIVEN:  Medications   sodium chloride 0.9 % bolus infusion 1,000 mL (1,000 mL IntraVENous New Bag 11/22/20 2405)   sodium chloride (NS) flush 5-40 mL (has no administration in time range)   sodium chloride (NS) flush 5-40 mL (has no administration in time range)   prochlorperazine (COMPAZINE) injection 5 mg (5 mg IntraVENous Given 11/22/20 2315)   dexamethasone (PF) (DECADRON) 10 mg/mL injection 10 mg (10 mg IntraVENous Given 11/22/20 2315)   diphenhydrAMINE (BENADRYL) injection 25 mg (25 mg IntraVENous Given 11/22/20 2315)   ketorolac (TORADOL) injection 30 mg (30 mg IntraVENous Given 11/22/20 2315)       LABS REVIEWED:  Recent Results (from the past 24 hour(s))   SAMPLES BEING HELD    Collection Time: 11/22/20 10:57 PM   Result Value Ref Range    SAMPLES BEING HELD 1 PST 1 LAV     COMMENT        Add-on orders for these samples will be processed based on acceptable specimen integrity and analyte stability, which may vary by analyte. CBC WITH AUTOMATED DIFF    Collection Time: 11/22/20 10:57 PM   Result Value Ref Range    WBC 6.3 3.6 - 11.0 K/uL    RBC 4.91 3.80 - 5.20 M/uL    HGB 13.0 11.5 - 16.0 g/dL    HCT 40.1 35.0 - 47.0 %    MCV 81.7 80.0 - 99.0 FL    MCH 26.5 26.0 - 34.0 PG    MCHC 32.4 30.0 - 36.5 g/dL    RDW 14.4 11.5 - 14.5 %    PLATELET 836 637 - 559 K/uL    MPV 10.6 8.9 - 12.9 FL    NRBC 0.0 0.0  WBC    ABSOLUTE NRBC 0.00 0.00 - 0.01 K/uL    NEUTROPHILS 80 (H) 32 - 75 %    LYMPHOCYTES 16 12 - 49 %    MONOCYTES 3 (L) 5 - 13 %    EOSINOPHILS 1 0 - 7 %    BASOPHILS 0 0 - 1 %    IMMATURE GRANULOCYTES 0 0 - 0.5 %    ABS. NEUTROPHILS 5.0 1.8 - 8.0 K/UL    ABS. LYMPHOCYTES 1.0 0.8 - 3.5 K/UL    ABS. MONOCYTES 0.2 0.0 - 1.0 K/UL    ABS. EOSINOPHILS 0.0 0.0 - 0.4 K/UL    ABS. BASOPHILS 0.0 0.0 - 0.1 K/UL    ABS. IMM.  GRANS. 0.0 0.00 - 0.04 K/UL    DF AUTOMATED     METABOLIC PANEL, COMPREHENSIVE    Collection Time: 11/22/20 10:57 PM   Result Value Ref Range    Sodium 138 136 - 145 mmol/L    Potassium 3.9 3.5 - 5.1 mmol/L    Chloride 104 97 - 108 mmol/L    CO2 24 21 - 32 mmol/L    Anion gap 10 5 - 15 mmol/L    Glucose 88 65 - 100 mg/dL    BUN 13 6 - 20 MG/DL    Creatinine 0.94 0.55 - 1.02 MG/DL    BUN/Creatinine ratio 14 12 - 20      GFR est AA >60 >60 ml/min/1.73m2    GFR est non-AA >60 >60 ml/min/1.73m2    Calcium 9.2 8.5 - 10.1 MG/DL    Bilirubin, total 0.4 0.2 - 1.0 MG/DL    ALT (SGPT) 43 12 - 78 U/L    AST (SGOT) 36 15 - 37 U/L    Alk. phosphatase 121 (H) 45 - 117 U/L    Protein, total 8.0 6.4 - 8.2 g/dL    Albumin 4.4 3.5 - 5.0 g/dL    Globulin 3.6 2.0 - 4.0 g/dL    A-G Ratio 1.2 1.1 - 2.2     HCG URINE, QL. - POC    Collection Time: 11/22/20 11:14 PM   Result Value Ref Range    Pregnancy test,urine (POC) Negative NEG     URINALYSIS W/MICROSCOPIC    Collection Time: 11/22/20 11:20 PM   Result Value Ref Range    Color YELLOW/STRAW      Appearance CLEAR CLEAR      Specific gravity 1.015 1.003 - 1.030      pH (UA) 6.5 5.0 - 8.0      Protein Negative NEG mg/dL    Glucose Negative NEG mg/dL    Ketone TRACE (A) NEG mg/dL    Bilirubin Negative NEG      Blood Negative NEG      Urobilinogen 0.2 0.2 - 1.0 EU/dL    Nitrites Negative NEG      Leukocyte Esterase Negative NEG      WBC 0-4 0 - 4 /hpf    RBC 0-5 0 - 5 /hpf    Epithelial cells MODERATE (A) FEW /lpf    Bacteria Negative NEG /hpf   URINE CULTURE HOLD SAMPLE    Collection Time: 11/22/20 11:20 PM    Specimen: Serum; Urine   Result Value Ref Range    Urine culture hold        Urine on hold in Microbiology dept for 2 days. If unpreserved urine is submitted, it cannot be used for addtional testing after 24 hours, recollection will be required. VITAL SIGNS:  Patient Vitals for the past 24 hrs:   Temp Pulse Resp BP SpO2   11/22/20 2316  78  122/72 99 %   11/22/20 2255 98.6 °F (37 °C) 83 18 (!) 141/89 99 %       RADIOLOGY RESULTS:  The following have been ordered and reviewed:  No results found. PROGRESS NOTES:  Discussed results and plan with patient. Patient feeling better. Patient will be discharged home with PCP follow up. Patient instructed to return to the emergency room for any worsening symptoms or any other concerns. DIAGNOSIS:    1. Nonintractable headache, unspecified chronicity pattern, unspecified headache type    2.  Nausea and vomiting, intractability of vomiting not specified, unspecified vomiting type    3. Myalgia        PLAN:  Follow-up Information     Follow up With Specialties Details Why Contact Info    Pietro Rosas MD Pediatric Medicine, Family Medicine Schedule an appointment as soon as possible for a visit  [address] [place] [address] Road      [address] [place] [address] DEPT Emergency Medicine  If symptoms worsen [facility] [address] [id number]  [phone]        Current Discharge Medication List      START taking these medications    Details   butalbital-acetaminophen-caff (Fioricet) -40 mg per capsule Take 1 Cap by mouth every four (4) hours as needed for Headache. Qty: 20 Cap, Refills: 0      ondansetron (ZOFRAN ODT) 4 mg disintegrating tablet Take 1 Tab by mouth every eight (8) hours as needed for Nausea. Qty: 20 Tab, Refills: 0         CONTINUE these medications which have NOT CHANGED    Details   ibuprofen (MOTRIN) 600 mg tablet Take 1 Tab by mouth every eight (8) hours as needed for Pain. Qty: 20 Tab, Refills: 0      omeprazole (PriLOSEC OTC) 20 mg tablet Take 1 Tab by mouth daily. Qty: 30 Tab, Refills: 5    Associated Diagnoses: Gastroesophageal reflux disease, esophagitis presence not specified             ED COURSE: The patient's hospital course has been uncomplicated. Please note that this dictation was completed with [employer], the computer voice recognition software. Quite often unanticipated grammatical, syntax, homophones, and other interpretive errors are inadvertently transcribed by the computer software. Please disregard these errors. Please excuse any errors that have escaped final proofreading.

## 2020-11-23 NOTE — ED NOTES
Purposeful rounding completed. Ongoing plan of care discussed and pts concerns/questions addressed. Pt informed of time factors with lab/imaging study results. Pt resting on the stretcher in a position of comfort. Call bell within reach; will continue to monitor.

## 2020-11-23 NOTE — ED TRIAGE NOTES
Pt reports feeling unwell with headache and chest ache for the past week. Reports having rapid and PCR COVID-19 test that were both negative. Other symptoms include cough, nausea, vomiting, diarrhea. Has not taken anything for symptoms today. Unsure if febrile.

## 2020-11-23 NOTE — ED NOTES
The patient was discharged home by emergency department physician. A discharge plan has been developed. A  was not involved in the process. Patient given paper copy of discharge instructions with 2 paper prescriptions and 0 electronic prescriptions. Patient verbalized understanding of discharge instructions and prescriptions. Patient given a current medication reconciliation list with discharge instruction. No work/school note given. Patient alert and oriented and in no acute distress at discharge. Patient ambulated out of ED with steady gait, no assistance needed.

## 2020-11-23 NOTE — DISCHARGE INSTRUCTIONS
We hope that we have addressed all of your medical concerns. The examination and treatment you received in the Emergency Department were for an emergent problem and were not intended as complete care. It is important that you follow up with your healthcare provider(s) for ongoing care. If your symptoms worsen or do not improve as expected, and you are unable to reach your usual health care provider(s), you should return to the Emergency Department. Today's healthcare is undergoing tremendous change, and patient satisfaction surveys are one of the many tools to assess the quality of medical care. You may receive a survey from the [place] [employer] regarding your experience in the Emergency Department. I hope that your experience has been completely positive, particularly the medical care that I provided. As such, please participate in the survey; anything less than excellent does not meet my expectations or intentions. [facility] and [address] [place] [address] participate in nationally recognized quality of care measures. If your blood pressure is greater than 120/80, as reported below, we urge that you seek medical care to address the potential of high blood pressure, commonly known as hypertension. Hypertension can be hereditary or can be caused by certain medical conditions, pain, stress, or \"white coat syndrome. \"       Please make an appointment with your health care provider(s) for follow up of your Emergency Department visit. VITALS:   Patient Vitals for the past 8 hrs:   Temp Pulse Resp BP SpO2   11/22/20 2316 -- 78 -- 122/72 99 %   11/22/20 2255 98.6 °F (37 °C) 83 18 (!) 141/89 99 %          Thank you for allowing us to provide you with medical care today. We realize that you have many choices for your emergency care needs. Please choose us in the future for any continued health care needs. Jodi Haile, [address]: [phone]            Recent Results (from the past 24 hour(s))   SAMPLES BEING HELD    Collection Time: 11/22/20 10:57 PM   Result Value Ref Range    SAMPLES BEING HELD 1 PST 1 LAV     COMMENT        Add-on orders for these samples will be processed based on acceptable specimen integrity and analyte stability, which may vary by analyte. CBC WITH AUTOMATED DIFF    Collection Time: 11/22/20 10:57 PM   Result Value Ref Range    WBC 6.3 3.6 - 11.0 K/uL    RBC 4.91 3.80 - 5.20 M/uL    HGB 13.0 11.5 - 16.0 g/dL    HCT 40.1 35.0 - 47.0 %    MCV 81.7 80.0 - 99.0 FL    MCH 26.5 26.0 - 34.0 PG    MCHC 32.4 30.0 - 36.5 g/dL    RDW 14.4 11.5 - 14.5 %    PLATELET 316 127 - 629 K/uL    MPV 10.6 8.9 - 12.9 FL    NRBC 0.0 0.0  WBC    ABSOLUTE NRBC 0.00 0.00 - 0.01 K/uL    NEUTROPHILS 80 (H) 32 - 75 %    LYMPHOCYTES 16 12 - 49 %    MONOCYTES 3 (L) 5 - 13 %    EOSINOPHILS 1 0 - 7 %    BASOPHILS 0 0 - 1 %    IMMATURE GRANULOCYTES 0 0 - 0.5 %    ABS. NEUTROPHILS 5.0 1.8 - 8.0 K/UL    ABS. LYMPHOCYTES 1.0 0.8 - 3.5 K/UL    ABS. MONOCYTES 0.2 0.0 - 1.0 K/UL    ABS. EOSINOPHILS 0.0 0.0 - 0.4 K/UL    ABS. BASOPHILS 0.0 0.0 - 0.1 K/UL    ABS. IMM. GRANS. 0.0 0.00 - 0.04 K/UL    DF AUTOMATED     METABOLIC PANEL, COMPREHENSIVE    Collection Time: 11/22/20 10:57 PM   Result Value Ref Range    Sodium 138 136 - 145 mmol/L    Potassium 3.9 3.5 - 5.1 mmol/L    Chloride 104 97 - 108 mmol/L    CO2 24 21 - 32 mmol/L    Anion gap 10 5 - 15 mmol/L    Glucose 88 65 - 100 mg/dL    BUN 13 6 - 20 MG/DL    Creatinine 0.94 0.55 - 1.02 MG/DL    BUN/Creatinine ratio 14 12 - 20      GFR est AA >60 >60 ml/min/1.73m2    GFR est non-AA >60 >60 ml/min/1.73m2    Calcium 9.2 8.5 - 10.1 MG/DL    Bilirubin, total 0.4 0.2 - 1.0 MG/DL    ALT (SGPT) 43 12 - 78 U/L    AST (SGOT) 36 15 - 37 U/L    Alk.  phosphatase 121 (H) 45 - 117 U/L    Protein, total 8.0 6.4 - 8.2 g/dL    Albumin 4.4 3.5 - 5.0 g/dL Globulin 3.6 2.0 - 4.0 g/dL    A-G Ratio 1.2 1.1 - 2.2     HCG URINE, QL. - POC    Collection Time: 11/22/20 11:14 PM   Result Value Ref Range    Pregnancy test,urine (POC) Negative NEG     URINALYSIS W/MICROSCOPIC    Collection Time: 11/22/20 11:20 PM   Result Value Ref Range    Color YELLOW/STRAW      Appearance CLEAR CLEAR      Specific gravity 1.015 1.003 - 1.030      pH (UA) 6.5 5.0 - 8.0      Protein Negative NEG mg/dL    Glucose Negative NEG mg/dL    Ketone TRACE (A) NEG mg/dL    Bilirubin Negative NEG      Blood Negative NEG      Urobilinogen 0.2 0.2 - 1.0 EU/dL    Nitrites Negative NEG      Leukocyte Esterase Negative NEG      WBC 0-4 0 - 4 /hpf    RBC 0-5 0 - 5 /hpf    Epithelial cells MODERATE (A) FEW /lpf    Bacteria Negative NEG /hpf   URINE CULTURE HOLD SAMPLE    Collection Time: 11/22/20 11:20 PM    Specimen: Serum; Urine   Result Value Ref Range    Urine culture hold        Urine on hold in Microbiology dept for 2 days. If unpreserved urine is submitted, it cannot be used for addtional testing after 24 hours, recollection will be required. No results found. Patient Education        Nausea and Vomiting: Care Instructions  Your Care Instructions     When you are nauseated, you may feel weak and sweaty and notice a lot of saliva in your mouth. Nausea often leads to vomiting. Most of the time you do not need to worry about nausea and vomiting, but they can be signs of other illnesses. Two common causes of nausea and vomiting are stomach flu and food poisoning. Nausea and vomiting from viral stomach flu will usually start to improve within 24 hours. Nausea and vomiting from food poisoning may last from 12 to 48 hours. The doctor has checked you carefully, but problems can develop later. If you notice any problems or new symptoms, get medical treatment right away. Follow-up care is a key part of your treatment and safety.  Be sure to make and go to all appointments, and call your doctor if you are having problems. It's also a good idea to know your test results and keep a list of the medicines you take. How can you care for yourself at home? · To prevent dehydration, drink plenty of fluids, enough so that your urine is light yellow or clear like water. Choose water and other caffeine-free clear liquids until you feel better. If you have kidney, heart, or liver disease and have to limit fluids, talk with your doctor before you increase the amount of fluids you drink. · Rest in bed until you feel better. · When you are able to eat, try clear soups, mild foods, and liquids until all symptoms are gone for 12 to 48 hours. Other good choices include dry toast, crackers, cooked cereal, and gelatin dessert, such as Jell-O. When should you call for help? Call 911 anytime you think you may need emergency care. For example, call if:    · You passed out (lost consciousness). Call your doctor now or seek immediate medical care if:    · You have symptoms of dehydration, such as:  ? Dry eyes and a dry mouth. ? Passing only a little dark urine. ? Feeling thirstier than usual.     · You have new or worsening belly pain.     · You have a new or higher fever.     · You vomit blood or what looks like coffee grounds. Watch closely for changes in your health, and be sure to contact your doctor if:    · You have ongoing nausea and vomiting.     · Your vomiting is getting worse.     · Your vomiting lasts longer than 2 days.     · You are not getting better as expected. Where can you learn more? Go to http://www.[employer].com/  Enter H591 in the search box to learn more about \"Nausea and Vomiting: Care Instructions. \"  Current as of: June 26, 2019               Content Version: 12.6  © 2348-3260 [employer], [employer]. Care instructions adapted under license by [employer] (which disclaims liability or warranty for this information).  If you have questions about a medical condition or this instruction, always ask your healthcare professional. [place] [address] any warranty or liability for your use of this information.

## 2020-11-30 ENCOUNTER — CLINICAL SUPPORT (OUTPATIENT)
Dept: OBGYN CLINIC | Age: 30
End: 2020-11-30
Payer: MEDICAID

## 2020-11-30 DIAGNOSIS — Z30.42 ENCOUNTER FOR DEPO-PROVERA CONTRACEPTION: Primary | ICD-10-CM

## 2020-11-30 PROCEDURE — 96372 THER/PROPH/DIAG INJ SC/IM: CPT

## 2020-11-30 RX ORDER — MEDROXYPROGESTERONE ACETATE 150 MG/ML
150 INJECTION, SUSPENSION INTRAMUSCULAR ONCE
Status: COMPLETED | OUTPATIENT
Start: 2020-11-30 | End: 2020-11-30

## 2020-11-30 RX ADMIN — MEDROXYPROGESTERONE ACETATE 150 MG: 150 INJECTION, SUSPENSION INTRAMUSCULAR at 11:37

## 2020-11-30 NOTE — PROGRESS NOTES
Subjective:      Marti Hinton is here for her depoprovera injection. Patient wishes to continue depoprovera treatment for contraception. Side effects of treatment to date: none. Standing order is on patient's medication list.      Objective: There were no vitals taken for this visit. Assessment/Plan:     Stable, doing well on Depoprovera, appropriate to continue. Depoprovera 150 mg IM given. She tolerated the injection well, see Immunization activity for details.     Jonathan Arora

## 2021-02-09 ENCOUNTER — TELEPHONE (OUTPATIENT)
Dept: OBGYN CLINIC | Age: 31
End: 2021-02-09

## 2021-02-09 NOTE — TELEPHONE ENCOUNTER
Patient has Depo on Friday and verifying she has a refill    Last refill sent to [facility] 1 injection with 3 refills on 6/25/20  She is up to date with annual (6/25/20). She will call to verify she has a refill and call us if a problem.

## 2021-02-10 ENCOUNTER — APPOINTMENT (OUTPATIENT)
Dept: GENERAL RADIOLOGY | Age: 31
End: 2021-02-10
Attending: EMERGENCY MEDICINE
Payer: MEDICAID

## 2021-02-10 ENCOUNTER — OFFICE VISIT (OUTPATIENT)
Dept: FAMILY MEDICINE CLINIC | Age: 31
End: 2021-02-10
Payer: MEDICAID

## 2021-02-10 ENCOUNTER — HOSPITAL ENCOUNTER (OUTPATIENT)
Dept: MRI IMAGING | Age: 31
Setting detail: OBSERVATION
Discharge: HOME | End: 2021-02-10
Attending: STUDENT IN AN ORGANIZED HEALTH CARE EDUCATION/TRAINING PROGRAM
Payer: MEDICAID

## 2021-02-10 ENCOUNTER — HOSPITAL ENCOUNTER (OUTPATIENT)
Age: 31
Setting detail: OBSERVATION
Discharge: HOME | End: 2021-02-11
Attending: EMERGENCY MEDICINE | Admitting: FAMILY MEDICINE
Payer: MEDICAID

## 2021-02-10 ENCOUNTER — APPOINTMENT (OUTPATIENT)
Dept: CT IMAGING | Age: 31
End: 2021-02-10
Attending: EMERGENCY MEDICINE
Payer: MEDICAID

## 2021-02-10 VITALS
HEART RATE: 99 BPM | WEIGHT: 215 LBS | BODY MASS INDEX: 34.55 KG/M2 | TEMPERATURE: 98.4 F | DIASTOLIC BLOOD PRESSURE: 84 MMHG | SYSTOLIC BLOOD PRESSURE: 158 MMHG | OXYGEN SATURATION: 99 % | RESPIRATION RATE: 22 BRPM | HEIGHT: 66 IN

## 2021-02-10 DIAGNOSIS — R06.02 SOB (SHORTNESS OF BREATH): ICD-10-CM

## 2021-02-10 DIAGNOSIS — R94.31 EKG ABNORMALITIES: ICD-10-CM

## 2021-02-10 DIAGNOSIS — R53.1 LEFT-SIDED WEAKNESS: ICD-10-CM

## 2021-02-10 DIAGNOSIS — R51.9 LEFT-SIDED HEADACHE: Primary | ICD-10-CM

## 2021-02-10 DIAGNOSIS — R42 DIZZINESS: ICD-10-CM

## 2021-02-10 DIAGNOSIS — R53.1 WEAKNESS OF LEFT SIDE OF BODY: ICD-10-CM

## 2021-02-10 DIAGNOSIS — R00.2 PALPITATIONS: Primary | ICD-10-CM

## 2021-02-10 DIAGNOSIS — R20.0 LEFT SIDED NUMBNESS: ICD-10-CM

## 2021-02-10 DIAGNOSIS — R11.0 NAUSEA: ICD-10-CM

## 2021-02-10 DIAGNOSIS — G43.109 COMPLICATED MIGRAINE: ICD-10-CM

## 2021-02-10 PROBLEM — G43.909 MIGRAINE HEADACHE: Status: ACTIVE | Noted: 2021-02-10

## 2021-02-10 LAB
ALBUMIN SERPL-MCNC: 3.6 G/DL (ref 3.5–5)
ALBUMIN/GLOB SERPL: 1.1 {RATIO} (ref 1.1–2.2)
ALP SERPL-CCNC: 109 U/L (ref 45–117)
ALT SERPL-CCNC: 27 U/L (ref 12–78)
ANION GAP SERPL CALC-SCNC: 7 MMOL/L (ref 5–15)
AST SERPL-CCNC: 36 U/L (ref 15–37)
BASOPHILS # BLD: 0 K/UL (ref 0–0.1)
BASOPHILS NFR BLD: 0 % (ref 0–1)
BILIRUB DIRECT SERPL-MCNC: <0.1 MG/DL (ref 0–0.2)
BILIRUB SERPL-MCNC: 0.3 MG/DL (ref 0.2–1)
BUN SERPL-MCNC: 9 MG/DL (ref 6–20)
BUN/CREAT SERPL: 11 (ref 12–20)
CALCIUM SERPL-MCNC: 8.4 MG/DL (ref 8.5–10.1)
CHLORIDE SERPL-SCNC: 108 MMOL/L (ref 97–108)
CO2 SERPL-SCNC: 24 MMOL/L (ref 21–32)
COMMENT, HOLDF: NORMAL
CREAT SERPL-MCNC: 0.81 MG/DL (ref 0.55–1.02)
DIFFERENTIAL METHOD BLD: ABNORMAL
EOSINOPHIL # BLD: 0.1 K/UL (ref 0–0.4)
EOSINOPHIL NFR BLD: 1 % (ref 0–7)
ERYTHROCYTE [DISTWIDTH] IN BLOOD BY AUTOMATED COUNT: 14.4 % (ref 11.5–14.5)
GLOBULIN SER CALC-MCNC: 3.4 G/DL (ref 2–4)
GLUCOSE SERPL-MCNC: 76 MG/DL (ref 65–100)
HCG UR QL: NEGATIVE
HCT VFR BLD AUTO: 36.8 % (ref 35–47)
HGB BLD-MCNC: 12 G/DL (ref 11.5–16)
IMM GRANULOCYTES # BLD AUTO: 0 K/UL (ref 0–0.04)
IMM GRANULOCYTES NFR BLD AUTO: 0 % (ref 0–0.5)
INR PPP: 1.1 (ref 0.9–1.1)
LYMPHOCYTES # BLD: 2.2 K/UL (ref 0.8–3.5)
LYMPHOCYTES NFR BLD: 23 % (ref 12–49)
MAGNESIUM SERPL-MCNC: 2.1 MG/DL (ref 1.6–2.4)
MCH RBC QN AUTO: 26.8 PG (ref 26–34)
MCHC RBC AUTO-ENTMCNC: 32.6 G/DL (ref 30–36.5)
MCV RBC AUTO: 82.3 FL (ref 80–99)
MONOCYTES # BLD: 0.4 K/UL (ref 0–1)
MONOCYTES NFR BLD: 4 % (ref 5–13)
NEUTS SEG # BLD: 7 K/UL (ref 1.8–8)
NEUTS SEG NFR BLD: 72 % (ref 32–75)
NRBC # BLD: 0 K/UL (ref 0–0.01)
NRBC BLD-RTO: 0 PER 100 WBC
PLATELET # BLD AUTO: 251 K/UL (ref 150–400)
PMV BLD AUTO: 10.6 FL (ref 8.9–12.9)
POTASSIUM SERPL-SCNC: 4.6 MMOL/L (ref 3.5–5.1)
PROT SERPL-MCNC: 7 G/DL (ref 6.4–8.2)
PROTHROMBIN TIME: 11.3 SEC (ref 9–11.1)
RBC # BLD AUTO: 4.47 M/UL (ref 3.8–5.2)
SAMPLES BEING HELD,HOLD: NORMAL
SODIUM SERPL-SCNC: 139 MMOL/L (ref 136–145)
TROPONIN I SERPL-MCNC: <0.05 NG/ML
WBC # BLD AUTO: 9.9 K/UL (ref 3.6–11)

## 2021-02-10 PROCEDURE — 70553 MRI BRAIN STEM W/O & W/DYE: CPT

## 2021-02-10 PROCEDURE — 83735 ASSAY OF MAGNESIUM: CPT

## 2021-02-10 PROCEDURE — 36415 COLL VENOUS BLD VENIPUNCTURE: CPT

## 2021-02-10 PROCEDURE — 85025 COMPLETE CBC W/AUTO DIFF WBC: CPT

## 2021-02-10 PROCEDURE — 74011250637 HC RX REV CODE- 250/637: Performed by: EMERGENCY MEDICINE

## 2021-02-10 PROCEDURE — 93000 ELECTROCARDIOGRAM COMPLETE: CPT | Performed by: INTERNAL MEDICINE

## 2021-02-10 PROCEDURE — 94762 N-INVAS EAR/PLS OXIMTRY CONT: CPT

## 2021-02-10 PROCEDURE — 74011000636 HC RX REV CODE- 636: Performed by: RADIOLOGY

## 2021-02-10 PROCEDURE — 70450 CT HEAD/BRAIN W/O DYE: CPT

## 2021-02-10 PROCEDURE — 74011250637 HC RX REV CODE- 250/637: Performed by: STUDENT IN AN ORGANIZED HEALTH CARE EDUCATION/TRAINING PROGRAM

## 2021-02-10 PROCEDURE — 74011250636 HC RX REV CODE- 250/636: Performed by: EMERGENCY MEDICINE

## 2021-02-10 PROCEDURE — 71046 X-RAY EXAM CHEST 2 VIEWS: CPT

## 2021-02-10 PROCEDURE — 93005 ELECTROCARDIOGRAM TRACING: CPT

## 2021-02-10 PROCEDURE — 80048 BASIC METABOLIC PNL TOTAL CA: CPT

## 2021-02-10 PROCEDURE — 96375 TX/PRO/DX INJ NEW DRUG ADDON: CPT

## 2021-02-10 PROCEDURE — 84484 ASSAY OF TROPONIN QUANT: CPT

## 2021-02-10 PROCEDURE — 99285 EMERGENCY DEPT VISIT HI MDM: CPT

## 2021-02-10 PROCEDURE — 99218 HC RM OBSERVATION: CPT

## 2021-02-10 PROCEDURE — 99058 OFFICE EMERGENCY CARE: CPT | Performed by: INTERNAL MEDICINE

## 2021-02-10 PROCEDURE — 96374 THER/PROPH/DIAG INJ IV PUSH: CPT

## 2021-02-10 PROCEDURE — 85610 PROTHROMBIN TIME: CPT

## 2021-02-10 PROCEDURE — 74011250636 HC RX REV CODE- 250/636: Performed by: RADIOLOGY

## 2021-02-10 PROCEDURE — A9575 INJ GADOTERATE MEGLUMI 0.1ML: HCPCS | Performed by: RADIOLOGY

## 2021-02-10 PROCEDURE — 80076 HEPATIC FUNCTION PANEL: CPT

## 2021-02-10 PROCEDURE — 81025 URINE PREGNANCY TEST: CPT

## 2021-02-10 PROCEDURE — 70498 CT ANGIOGRAPHY NECK: CPT

## 2021-02-10 PROCEDURE — 74011250636 HC RX REV CODE- 250/636: Performed by: STUDENT IN AN ORGANIZED HEALTH CARE EDUCATION/TRAINING PROGRAM

## 2021-02-10 RX ORDER — GADOTERATE MEGLUMINE 376.9 MG/ML
20 INJECTION INTRAVENOUS
Status: COMPLETED | OUTPATIENT
Start: 2021-02-10 | End: 2021-02-10

## 2021-02-10 RX ORDER — GUAIFENESIN 100 MG/5ML
81 LIQUID (ML) ORAL DAILY
Status: DISCONTINUED | OUTPATIENT
Start: 2021-02-10 | End: 2021-02-11 | Stop reason: HOSPADM

## 2021-02-10 RX ORDER — ONDANSETRON 2 MG/ML
4 INJECTION INTRAMUSCULAR; INTRAVENOUS
Status: DISCONTINUED | OUTPATIENT
Start: 2021-02-10 | End: 2021-02-11 | Stop reason: HOSPADM

## 2021-02-10 RX ORDER — PROCHLORPERAZINE EDISYLATE 5 MG/ML
10 INJECTION INTRAMUSCULAR; INTRAVENOUS
Status: COMPLETED | OUTPATIENT
Start: 2021-02-10 | End: 2021-02-10

## 2021-02-10 RX ORDER — ACETAMINOPHEN 650 MG/1
650 SUPPOSITORY RECTAL
Status: DISCONTINUED | OUTPATIENT
Start: 2021-02-10 | End: 2021-02-10

## 2021-02-10 RX ORDER — ENOXAPARIN SODIUM 100 MG/ML
40 INJECTION SUBCUTANEOUS EVERY 24 HOURS
Status: DISCONTINUED | OUTPATIENT
Start: 2021-02-10 | End: 2021-02-11 | Stop reason: HOSPADM

## 2021-02-10 RX ORDER — MEDROXYPROGESTERONE ACETATE 150 MG/ML
INJECTION, SUSPENSION INTRAMUSCULAR
COMMUNITY
Start: 2020-11-27 | End: 2021-08-09

## 2021-02-10 RX ORDER — DEXAMETHASONE SODIUM PHOSPHATE 10 MG/ML
10 INJECTION INTRAMUSCULAR; INTRAVENOUS
Status: COMPLETED | OUTPATIENT
Start: 2021-02-10 | End: 2021-02-10

## 2021-02-10 RX ORDER — ACETAMINOPHEN 500 MG
1000 TABLET ORAL ONCE
Status: COMPLETED | OUTPATIENT
Start: 2021-02-10 | End: 2021-02-10

## 2021-02-10 RX ORDER — DEXTROMETHORPHAN HYDROBROMIDE, GUAIFENESIN 5; 100 MG/5ML; MG/5ML
1300 LIQUID ORAL EVERY 8 HOURS
COMMUNITY
End: 2022-02-07

## 2021-02-10 RX ORDER — SODIUM CHLORIDE 9 MG/ML
125 INJECTION, SOLUTION INTRAVENOUS CONTINUOUS
Status: DISCONTINUED | OUTPATIENT
Start: 2021-02-10 | End: 2021-02-11

## 2021-02-10 RX ORDER — KETOROLAC TROMETHAMINE 30 MG/ML
30 INJECTION, SOLUTION INTRAMUSCULAR; INTRAVENOUS
Status: DISCONTINUED | OUTPATIENT
Start: 2021-02-10 | End: 2021-02-10

## 2021-02-10 RX ORDER — LORAZEPAM 2 MG/ML
2 INJECTION INTRAMUSCULAR
Status: COMPLETED | OUTPATIENT
Start: 2021-02-10 | End: 2021-02-10

## 2021-02-10 RX ORDER — PANTOPRAZOLE SODIUM 40 MG/1
40 TABLET, DELAYED RELEASE ORAL
Status: DISCONTINUED | OUTPATIENT
Start: 2021-02-11 | End: 2021-02-11 | Stop reason: HOSPADM

## 2021-02-10 RX ORDER — DIPHENHYDRAMINE HYDROCHLORIDE 50 MG/ML
50 INJECTION, SOLUTION INTRAMUSCULAR; INTRAVENOUS
Status: COMPLETED | OUTPATIENT
Start: 2021-02-10 | End: 2021-02-10

## 2021-02-10 RX ORDER — ACETAMINOPHEN 325 MG/1
650 TABLET ORAL
Status: DISCONTINUED | OUTPATIENT
Start: 2021-02-10 | End: 2021-02-11 | Stop reason: HOSPADM

## 2021-02-10 RX ADMIN — SODIUM CHLORIDE 1000 ML: 9 INJECTION, SOLUTION INTRAVENOUS at 15:56

## 2021-02-10 RX ADMIN — SODIUM CHLORIDE 125 ML/HR: 9 INJECTION, SOLUTION INTRAVENOUS at 22:30

## 2021-02-10 RX ADMIN — DIPHENHYDRAMINE HYDROCHLORIDE 50 MG: 50 INJECTION, SOLUTION INTRAMUSCULAR; INTRAVENOUS at 15:56

## 2021-02-10 RX ADMIN — GADOTERATE MEGLUMINE 20 ML: 376.9 INJECTION INTRAVENOUS at 19:42

## 2021-02-10 RX ADMIN — PROCHLORPERAZINE EDISYLATE 10 MG: 5 INJECTION INTRAMUSCULAR; INTRAVENOUS at 15:56

## 2021-02-10 RX ADMIN — LORAZEPAM 2 MG: 2 INJECTION INTRAMUSCULAR; INTRAVENOUS at 16:22

## 2021-02-10 RX ADMIN — ASPIRIN 81 MG: 81 TABLET, CHEWABLE ORAL at 23:13

## 2021-02-10 RX ADMIN — DEXAMETHASONE SODIUM PHOSPHATE 10 MG: 10 INJECTION, SOLUTION INTRAMUSCULAR; INTRAVENOUS at 15:56

## 2021-02-10 RX ADMIN — IOPAMIDOL 100 ML: 755 INJECTION, SOLUTION INTRAVENOUS at 15:04

## 2021-02-10 RX ADMIN — ACETAMINOPHEN 1000 MG: 500 TABLET ORAL at 16:18

## 2021-02-10 NOTE — ED TRIAGE NOTES
Pt arrives via EMS with complaints of a headache and left sided twitching and numbness since 4am this morning. Pt also having substernal CP and palpitations. Pt saw her PCP today and told her to come to the ED. Pt has hx of migraines.

## 2021-02-10 NOTE — LETTER
[address] 
[facility] [address] [place]queline [place] [address] 
[phone] Work/School Note Date: 2/10/2021 To Whom It May concern: 
 
Marti ZEHRA Miguel A Carey was seen and treated in the hospital by the following provider(s): 
Attending Provider: Patricia Chris MD. Please excuse her from work on 2/10, 2/11, and 2/12. Sincerely, Lalita Beth MD

## 2021-02-10 NOTE — PROGRESS NOTES
Chief Complaint   Patient presents with    Headache      lt temple- woke up with it    Numbness     lt face and mouth radiates down lt arm to elbow- woke up with it    Irregular Heart Beat     feels like skipping a beat started when woke up       Subjective:   Marti Leonard is a 27 y.o. female who presents with h/o PE, DVT, Migraine headache, Pituitary adenoma and elevated prolactin levels presents today with onset of irregular heart rate, feels like it is skipping a beat, numbness on her face that radiates down to her left arm at the elbow. She denies decreased strength. She also has headache and throbbing at the L-temple. I note that she had similar symptoms in May 2020 that prompted a visit to the ER. There, EKG was stable/NSR, D-dimer was negative, Troponin's were flat. Today's presentation is similar. However, the new finding is the numbness on her face that radiates down to her left arm. Her BP is elevated in the office today. She is also complaining of dizziness and nausea that recently started. Her symptoms started early this morning. Ddx included complex migraine vs TIA/CVA, ACS     Patient Active Problem List    Diagnosis Date Noted    Elevated prolactin level 04/01/2019    Severe obesity ([facility] [address] 02/13/2019    Right hand weakness 11/16/2016    Numbness of right hand 11/16/2016    Chronic tension-type headache, intractable 11/16/2016    Intractable chronic migraine without aura and without status migrainosus 09/22/2016    Pituitary adenoma ([facility] [address] 09/22/2016         Current Outpatient Medications   Medication Sig Dispense Refill    butalbital-acetaminophen-caff (Fioricet) -40 mg per capsule Take 1 Cap by mouth every four (4) hours as needed for Headache. 20 Cap 0    ondansetron (ZOFRAN ODT) 4 mg disintegrating tablet Take 1 Tab by mouth every eight (8) hours as needed for Nausea.  20 Tab 0    ibuprofen (MOTRIN) 600 mg tablet Take 1 Tab by mouth every eight (8) hours as needed for Pain. 20 Tab 0    omeprazole (PriLOSEC OTC) 20 mg tablet Take 1 Tab by mouth daily. 30 Tab 5         Allergies   Allergen Reactions    Tomato Angioedema         Past Medical History:   Diagnosis Date    Abnormal Pap smear     Anemia     Gastrointestinal disorder     Ulcers    Headache     Herniated disc     Hx gestational diabetes     HX OTHER MEDICAL     trich treated at beginning of pregnancy    HX OTHER MEDICAL     pituitary tumor-benign     Miscarriage     Molar pregnancy     Pap smear for cervical cancer screening 19 neg HPV POS- rp pap one year    PCOS (polycystic ovarian syndrome)     Pelvic pain in female     Pituitary adenoma ([facility] [address]     Pituitary tumor     Psychiatric problem     depression never on medication    Thyroid activity decreased     hypothyroid awaiting appnt for endo         Past Surgical History:   Procedure Laterality Date    HX  SECTION      HX COLPOSCOPY  11/3/10    HX DILATION AND CURETTAGE      HX LIPOMA RESECTION           Family History   Problem Relation Age of Onset    Diabetes Father     Headache Mother          Social History     Tobacco Use    Smoking status: Never Smoker    Smokeless tobacco: Never Used   Substance Use Topics    Alcohol use: Yes     Comment: social          Review of Systems  Pertinent items are noted in HPI.        Objective:   BP (!) 158/84 (BP 1 Location: Right arm, BP Patient Position: Sitting, BP Cuff Size: Adult)   Pulse 99   Temp 98.4 °F (36.9 °C) (Oral)   Resp 22   Ht 5' 6\" (1.676 m)   Wt 215 lb (97.5 kg)   SpO2 99%   BMI 34.70 kg/m²     General: alert, cooperative, no distress, fatigued appearing   Mental  status: normal mood, behavior, speech, dress, motor activity, and thought processes, able to follow commands   HENT: NCAT   Neck: no visualized mass   Resp: no respiratory distress   Neuro: no gross deficits   Skin: no discoloration or lesions of concern on visible areas   Psychiatric: normal affect, consistent with stated mood, no evidence of hallucinations       EKG: WNL, but negative precordial T-waves. Assessment/ Plan:   Patient with multiple concerning symptoms for possible TIA/CVA, complex migraine headache and possible early ACS. She is directed to the ER. I note that her sister is here with her and will take her via automobile. The patients symptoms have been present since 4 am and have not progressed, except for the Nausea and dizziness. We converted transport to EMS. Patient reported having increased chest discomfort moving to her back and feeling very unwell. Attached to oxygen and will send emergently to nearest ER. Diagnoses and all orders for this visit:    1. Palpitations  -     AMB POC EKG ROUTINE W/ 12 LEADS, INTER & REP  -     REFERRAL TO CARDIOLOGY    2. EKG abnormalities  -     REFERRAL TO CARDIOLOGY    3. SOB (shortness of breath)    4. Weakness of left side of body    5. Dizziness    6. Nausea    I have discussed the diagnosis with the patient and the intended treatment plan as seen in the above orders. The patient has received an after-visit summary and questions were answered concerning future plans. Asked to return should symptoms worsen or not improve with treatment. Any pending labs and studies will be relayed to patient when they become available. Pt verbalizes understanding of plan of care and denies further questions or concerns at this time. Sheyla Estevez MD  There are no Patient Instructions on file for this visit.

## 2021-02-10 NOTE — PROGRESS NOTES
Identified pt with two pt identifiers(name and ). Chief Complaint   Patient presents with    Headache      lt temple- woke up with it    Numbness     lt face and mouth radiates down lt arm to elbow- woke up with it    Irregular Heart Beat     feels like skipping a beat started when woke up    Dizziness     all day        Health Maintenance Due   Topic    COVID-19 Vaccine (1 of 2)    Flu Vaccine (1)       Wt Readings from Last 3 Encounters:   02/10/21 215 lb (97.5 kg)   20 220 lb (99.8 kg)   20 225 lb (102.1 kg)     Temp Readings from Last 3 Encounters:   02/10/21 98.4 °F (36.9 °C) (Oral)   20 98.6 °F (37 °C)   20 97.1 °F (36.2 °C)     BP Readings from Last 3 Encounters:   02/10/21 (!) 158/84   20 111/69   20 115/59     Pulse Readings from Last 3 Encounters:   02/10/21 99   20 87   20 93         Learning Assessment:  :     Learning Assessment 2016   PRIMARY LEARNER Patient   PRIMARY LANGUAGE ENGLISH   LEARNER PREFERENCE PRIMARY READING   ANSWERED BY pt   RELATIONSHIP SELF       Depression Screening:  :     3 most recent PHQ Screens 2020   Little interest or pleasure in doing things Not at all   Feeling down, depressed, irritable, or hopeless Not at all   Total Score PHQ 2 0       Fall Risk Assessment:  :     No flowsheet data found. Abuse Screening:  :     Abuse Screening Questionnaire 2020   Do you ever feel afraid of your partner? N N   Are you in a relationship with someone who physically or mentally threatens you? N N   Is it safe for you to go home? Y Y       Coordination of Care Questionnaire:  :     1) Have you been to an emergency room, urgent care clinic since your last visit?  yes [facility] 2020 and 2020  Hospitalized since your last visit? no             2) Have you seen or consulted any other health care providers outside of [address] [place] [address] since your last visit? no  (Include any pap smears or colon screenings in this section.)    3) Do you have an Advance Directive on file? no  Are you interested in receiving information about Advance Directives? no    Reviewed record in preparation for visit and have obtained necessary documentation.

## 2021-02-10 NOTE — ED PROVIDER NOTES
The history is provided by the patient. Headache   This is a new problem. The current episode started 6 to 12 hours ago. The problem occurs constantly. The problem has not changed since onset. The headache is aggravated by nothing (woke her up from sleep). The pain is located in the left unilateral region. The quality of the pain is described as sharp. The pain is at a severity of 10/10. Associated symptoms include chest pressure, palpitations (feels a fluttering in her chest) and weakness (left arm with clumsiness to the lef thand). Pertinent negatives include no fever, no syncope, no shortness of breath, no dizziness, no visual change, no nausea and no vomiting. She has tried nothing for the symptoms. The treatment provided no relief.         Past Medical History:   Diagnosis Date    Abnormal Pap smear     Anemia     Gastrointestinal disorder     Ulcers    Headache     Herniated disc     Hx gestational diabetes     HX OTHER MEDICAL     trich treated at beginning of pregnancy    HX OTHER MEDICAL     pituitary tumor-benign     Miscarriage     Molar pregnancy     Pap smear for cervical cancer screening 19 neg HPV POS- rp pap one year    PCOS (polycystic ovarian syndrome)     Pelvic pain in female     Pit[facility] adenoma ([facility] [address]     Pituitary tumor     Psychiatric problem     depression never on medication    Thyroid activity decreased     hypothyroid awaiting appnt for endo       Past Surgical History:   Procedure Laterality Date    HX  SECTION      HX COLPOSCOPY  11/3/10    HX DILATION AND CURETTAGE      HX LIPOMA RESECTION           Family History:   Problem Relation Age of Onset    Diabetes Father     Headache Mother        Social History     Socioeconomic History    Marital status: SINGLE     Spouse name: Not on file    Number of children: Not on file    Years of education: Not on file    Highest education level: Not on file   Occupational History    Not on file   Social Needs    Financial resource strain: Not on file    Food insecurity     Worry: Not on file     Inability: Not on file    Transportation needs     Medical: Not on file     Non-medical: Not on file   Tobacco Use    Smoking status: Never Smoker    Smokeless tobacco: Never Used   Substance and Sexual Activity    Alcohol use: Yes     Comment: social    Drug use: No    Sexual activity: Not Currently     Birth control/protection: Injection   Lifestyle    Physical activity     Days per week: Not on file     Minutes per session: Not on file    Stress: Not on file   Relationships    Social connections     Talks on phone: Not on file     Gets together: Not on file     Attends Confucianist service: Not on file     Active member of club or organization: Not on file     Attends meetings of clubs or organizations: Not on file     Relationship status: Not on file    Intimate partner violence     Fear of current or ex partner: Not on file     Emotionally abused: Not on file     Physically abused: Not on file     Forced sexual activity: Not on file   Other Topics Concern    Not on file   Social History Narrative    Not on file         ALLERGIES: Tomato    Review of Systems   Constitutional: Negative for chills and fever. Respiratory: Negative for shortness of breath. Cardiovascular: Positive for chest pain and palpitations (feels a fluttering in her chest). Negative for syncope. Gastrointestinal: Negative for abdominal pain, constipation, diarrhea, nausea and vomiting. Neurological: Positive for weakness (left arm with clumsiness to the lef thand), numbness and headaches. Negative for dizziness and light-headedness. All other systems reviewed and are negative. Vitals:    02/10/21 1434   Pulse: 79   Resp: 20   Temp: 98 °F (36.7 °C)   SpO2: 100%   Weight: 97.5 kg (214 lb 15.2 oz)   Height: 5' 6\" (1.676 m)            Physical Exam  Vitals signs and nursing note reviewed.    Constitutional:       Appearance: She is well-developed. HENT:      Head: Normocephalic and atraumatic. Eyes:      Pupils: Pupils are equal, round, and reactive to light. Neck:      Musculoskeletal: Normal range of motion and neck supple. Cardiovascular:      Rate and Rhythm: Normal rate and regular rhythm. Pulmonary:      Effort: Pulmonary effort is normal.   Abdominal:      General: There is no distension. Palpations: Abdomen is soft. Tenderness: There is no abdominal tenderness. Skin:     General: Skin is warm and dry. Capillary Refill: Capillary refill takes less than 2 seconds. Neurological:      Mental Status: She is alert and oriented to person, place, and time. GCS: GCS eye subscore is 4. GCS verbal subscore is 5. GCS motor subscore is 6. Cranial Nerves: Cranial nerve deficit and facial asymmetry (left lower facial droop) present. Sensory: Sensory deficit (left arm and face) present. Motor: Weakness (left arm) present. Gait: Gait is intact. Psychiatric:         Attention and Perception: Attention and perception normal.         Mood and Affect: Mood normal.         Behavior: Behavior normal.          MDM  Number of Diagnoses or Management Options  Left sided numbness  Left-sided headache  Left-sided weakness  Diagnosis management comments: The patient presented to the emergency department with a headache. The history, exam, diagnostic testing (if any) and the patient's current condition do not suggest meningitis, sepsis, subarachnoid hemorrhage, intracranial bleeding, encephalitis. DDX includes stroke, TIA, complex migraine, temporal arteritis. Procedures    EKG performed at 6:36 PM  Normal sinus rhythm, 69 bpm, normal axis, normal intervals, no ST changes, no ectopy  EKG interpreted by Abdifatah Romero MD     Patient is being admitted to the hospital.  The results of their tests and reasons for their admission have been discussed with them and/or available family.   They convey agreement and understanding for the need to be admitted and for their admission diagnosis. Consultation will be made now with the inpatient physician for hospitalization. Perfect Serve Consult for Admission  4:26 PM    ED Room Number: ER09/09  Patient Name and age:  Vidal Ohara 27 y.o.  female  Working Diagnosis:   1. Left-sided headache    2. Left-sided weakness    3.  Left sided numbness      COVID-19 Suspicion:  no  Sepsis present:  no  Reassessment needed: no  Code Status:  Full Code  Readmission: no  Isolation Requirements:  no  Recommended Level of Care:  telemetry  Department:[facility] ED - [phone]  Other:  CT & CTA unremarkable

## 2021-02-10 NOTE — H&P
[facility]   Admission H&P    Date of admission: 2/10/2021    Patient name: Jamin Ford  MRN: [id number]  YOB: 1990  Age: 27 y.o. Primary care provider:  Rosario Pacheco MD     Source of Information: patient, medical records    Chief complaint:  headache    History of Present Illness  Darene Lundborg Wes Daubs is a 27 y.o. female with a PMH of migraines, h/o DVT/PE, pituitary adenoma who presents to the ED complaining of left temporal headache. Patient states that she woke up to a throbbing left sided headache associated with left-sided facial and upper extremity numbness, left upper extremity weakness, and palpitations. Reports that current episode is unlike her previous chronic migraines. Headache is more severe than usual and the weakness/sensory loss is new. Patient unable to provide thorough history during exam due to recent administration of ativan and migraine cocktail. Patient denies any vision changes, photophobia, phonophobia, aura, nausea, vomiting, abdominal pain, dysuria. Previously had been followed by Dr. Blair Guan for intractable chronic migraine. Patient stopped seeing neuro due to no relief for migraines despite trial of various therapies. COVID Questions:   Experiencing any of the following symptoms: fever, chills, cough, SOB, diarrhea, URI symptoms. NO  Any Sick contacts fever, cough, diarrhea, SOB, URI symptoms. NO  Traveled out of state or out of country. NO    In the ED:   Vitals: 98F, HR 79, P 126/66, RR 20, 100% RA  Labs: CBC/CMP/Mg unremarkable. Trop neg x1, UPT neg  Imaging:   - CXR: normal chest radiograph  - CT head no acute intracranial hemorrhage, mass, or infarct  - CTA head/neck no acute vascular abnormality, no large vessel occlusion  EKG: EKG at PCP's office w/ NSR with Twave inversion in lead V1-V3, HR 69, QTc 417. Treatment: Tylenol 1000 mg, decadron 10 mg, benadryl 50 mg, ativan 2 mg, compazine 10 mg, NS 1L bolus. Review of Systems  Review of Systems   Constitutional: Negative for chills, fever and malaise/fatigue. Eyes: Negative for photophobia. Respiratory: Negative for cough and shortness of breath. Cardiovascular: Positive for palpitations. Negative for chest pain. Gastrointestinal: Negative for abdominal pain, nausea and vomiting. Genitourinary: Negative for dysuria and hematuria. Neurological: Positive for sensory change, focal weakness and headaches. Negative for dizziness, speech change and loss of consciousness. Home Medications   Prior to Admission medications    Medication Sig Start Date End Date Taking? Authorizing Provider   medroxyPROGESTERone (DEPO-PROVERA) 150 mg/mL injection INJECT 1ML BY INTRAMUSCULAR ROUTE ONCE FOR 1 DOSE 11/27/20   Provider, Historical   acetaminophen (Tylenol Arthritis Pain) 650 mg TbER Take 1,300 mg by mouth every eight (8) hours. Provider, Historical   butalbital-acetaminophen-caff (Fioricet) -40 mg per capsule Take 1 Cap by mouth every four (4) hours as needed for Headache. 11/22/20   Sahil Spice, DO   ondansetron (ZOFRAN ODT) 4 mg disintegrating tablet Take 1 Tab by mouth every eight (8) hours as needed for Nausea. 11/22/20   Baxter Spice, DO   ibuprofen (MOTRIN) 600 mg tablet Take 1 Tab by mouth every eight (8) hours as needed for Pain. 9/6/20   Michelle Polanco MD   omeprazole (PriLOSEC OTC) 20 mg tablet Take 1 Tab by mouth daily.  8/77/28   Velma Hernandez MD       Allergies   Allergies   Allergen Reactions    Tomato Angioedema       Past Medical History:   Diagnosis Date    Abnormal Pap smear     Anemia     Gastrointestinal disorder     Ulcers    Headache     Herniated disc     Hx gestational diabetes     HX OTHER MEDICAL     trich treated at beginning of pregnancy    HX OTHER MEDICAL     pituitary tumor-benign     Miscarriage     Molar pregnancy     Pap smear for cervical cancer screening 2/13/19 neg HPV POS- rp pap one year   • PCOS (polycystic ovarian syndrome)    • Pelvic pain in female    • Pituitary adenoma (HCC)    • Pituitary tumor    • Psychiatric problem     depression never on medication   • Thyroid activity decreased     hypothyroid awaiting appnt for endo       Past Surgical History:   Procedure Laterality Date   • HX  SECTION     • HX COLPOSCOPY  11/3/10   • HX DILATION AND CURETTAGE     • HX LIPOMA RESECTION         Family History   Problem Relation Age of Onset   • Diabetes Father    • Headache Mother        Social History   Patient resides  X  Independently      With family care      Assisted living      SNF      Ambulates  X  Independently      With cane       Assisted walker      Alcohol history   X  None     Social     Chronic     Smoking history  X  None     Former smoker     Current smoker     Social History     Tobacco Use   Smoking Status Never Smoker   Smokeless Tobacco Never Used       Drug history  X  None     Former drug user     Current drug user     Code status  X  Full code     DNR/DNI     Partial    Code status discussed with the patient/caregivers.    Physical Exam  Visit Vitals  /70   Pulse 86   Temp 98 °F (36.7 °C)   Resp 18   Ht 5' 6\" (1.676 m)   Wt 214 lb 15.2 oz (97.5 kg)   SpO2 98%   BMI 34.69 kg/m²        Physical Exam  Constitutional:       General: She is not in acute distress.     Appearance: Normal appearance. She is obese. She is not ill-appearing.      Comments: Lethargic due to recent ativan/migraine cocktail administration.    HENT:      Head: Normocephalic and atraumatic.      Nose: Nose normal.      Mouth/Throat:      Mouth: Mucous membranes are moist.      Pharynx: Oropharynx is clear.   Eyes:      Pupils: Pupils are equal, round, and reactive to light.   Neck:      Musculoskeletal: Normal range of motion.   Cardiovascular:      Rate and Rhythm: Normal rate and regular rhythm.      Pulses: Normal pulses.      Heart sounds: Normal heart sounds.   Pulmonary:  Effort: Pulmonary effort is normal. No respiratory distress. Breath sounds: Normal breath sounds. No wheezing, rhonchi or rales. Abdominal:      General: Bowel sounds are normal. There is no distension. Palpations: Abdomen is soft. Tenderness: There is no abdominal tenderness. Musculoskeletal: Normal range of motion. General: No swelling or tenderness. Skin:     General: Skin is warm and dry. Capillary Refill: Capillary refill takes less than 2 seconds. Neurological:      Mental Status: She is oriented to person, place, and time. Cranial Nerves: No cranial nerve deficit. Sensory: No sensory deficit. Motor: Weakness (Strength 4/5 LUE. ) present. Laboratory Data  Recent Results (from the past 24 hour(s))   HCG URINE, QL. - POC    Collection Time: 02/10/21  3:24 PM   Result Value Ref Range    Pregnancy test,urine (POC) Negative NEG     CBC WITH AUTOMATED DIFF    Collection Time: 02/10/21  3:28 PM   Result Value Ref Range    WBC 9.9 3.6 - 11.0 K/uL    RBC 4.47 3.80 - 5.20 M/uL    HGB 12.0 11.5 - 16.0 g/dL    HCT 36.8 35.0 - 47.0 %    MCV 82.3 80.0 - 99.0 FL    MCH 26.8 26.0 - 34.0 PG    MCHC 32.6 30.0 - 36.5 g/dL    RDW 14.4 11.5 - 14.5 %    PLATELET 141 610 - 774 K/uL    MPV 10.6 8.9 - 12.9 FL    NRBC 0.0 0  WBC    ABSOLUTE NRBC 0.00 0.00 - 0.01 K/uL    NEUTROPHILS 72 32 - 75 %    LYMPHOCYTES 23 12 - 49 %    MONOCYTES 4 (L) 5 - 13 %    EOSINOPHILS 1 0 - 7 %    BASOPHILS 0 0 - 1 %    IMMATURE GRANULOCYTES 0 0.0 - 0.5 %    ABS. NEUTROPHILS 7.0 1.8 - 8.0 K/UL    ABS. LYMPHOCYTES 2.2 0.8 - 3.5 K/UL    ABS. MONOCYTES 0.4 0.0 - 1.0 K/UL    ABS. EOSINOPHILS 0.1 0.0 - 0.4 K/UL    ABS. BASOPHILS 0.0 0.0 - 0.1 K/UL    ABS. IMM.  GRANS. 0.0 0.00 - 0.04 K/UL    DF AUTOMATED     METABOLIC PANEL, BASIC    Collection Time: 02/10/21  3:28 PM   Result Value Ref Range    Sodium 139 136 - 145 mmol/L    Potassium 4.6 3.5 - 5.1 mmol/L    Chloride 108 97 - 108 mmol/L CO2 24 21 - 32 mmol/L    Anion gap 7 5 - 15 mmol/L    Glucose 76 65 - 100 mg/dL    BUN 9 6 - 20 MG/DL    Creatinine 0.81 0.55 - 1.02 MG/DL    BUN/Creatinine ratio 11 (L) 12 - 20      GFR est AA >60 >60 ml/min/1.73m2    GFR est non-AA >60 >60 ml/min/1.73m2    Calcium 8.4 (L) 8.5 - 10.1 MG/DL   MAGNESIUM    Collection Time: 02/10/21  3:28 PM   Result Value Ref Range    Magnesium 2.1 1.6 - 2.4 mg/dL   TROPONIN I    Collection Time: 02/10/21  3:28 PM   Result Value Ref Range    Troponin-I, Qt. <0.05 <0.05 ng/mL   SAMPLES BEING HELD    Collection Time: 02/10/21  3:28 PM   Result Value Ref Range    SAMPLES BEING HELD UR..RED. SST     COMMENT        Add-on orders for these samples will be processed based on acceptable specimen integrity and analyte stability, which may vary by analyte. HEPATIC FUNCTION PANEL    Collection Time: 02/10/21  3:28 PM   Result Value Ref Range    Protein, total 7.0 6.4 - 8.2 g/dL    Albumin 3.6 3.5 - 5.0 g/dL    Globulin 3.4 2.0 - 4.0 g/dL    A-G Ratio 1.1 1.1 - 2.2      Bilirubin, total 0.3 0.2 - 1.0 MG/DL    Bilirubin, direct <0.1 0.0 - 0.2 MG/DL    Alk. phosphatase 109 45 - 117 U/L    AST (SGOT) 36 15 - 37 U/L    ALT (SGPT) 27 12 - 78 U/L   PROTHROMBIN TIME + INR    Collection Time: 02/10/21  5:34 PM   Result Value Ref Range    INR 1.1 0.9 - 1.1      Prothrombin time 11.3 (H) 9.0 - 11.1 sec       Imaging  Xr Chest Pa Lat    Result Date: 2/10/2021  1. Normal chest radiograph    Ct Head Wo Cont    Result Date: 2/10/2021  No acute intracranial hemorrhage, mass or infarct. Cta Head Neck W Cont    Result Date: 2/10/2021  No acute vascular abnormality. No large vessel occlusion. Assessment and Plan     Koki Chaudhry is a 27 y.o. female with a PMH of migraines, h/o DVT/PE, pituitary adenoma who is admitted for headache w/ left sided weakness. Migraine with Left UE weakness: possible hemiplegic migraine vs TIA/CCVA. History of chronic migraines previously followed by Dr. Krystal Ibarra. CT head, CTA head/neck unrevealing. S/p migraine cocktail and ativan 2 mg. - Admit to telemetry  - Vitals per unit routine  - Neuro check q4h   - ASA Daily  - MRI brain  - Echocardiogram pending  - TSH, Lipid panel, HgbA1C, UDS pending  - CBC,CMP, Mg, Phos daily  - NPO until bedside swallow passed  - PT/OT/CM consulted  - Neuro consulted, appreciate recs    Pituitary microadenoma: diagnosed at the age of 15. Last surveillance MRI (1/6/20) w/ stable 6 mm microadenoma.   - Repeat MRI brain    Palpitations: EKG at PCP's office w/ NSR with Twave inversion in lead V1-V3, HR 69, QTc 417. Twave inversions not on most recent EKG, but present on EKG's in 2013.  - Monitor on telemetry  - TSH ordered    GERD: stable. - Protonix for home omeprazole      Obesity: Body mass index is 34.69 kg/m². - Encourage lifestyle modification and further follow up with PCP outpatient. FEN/GI: NPO until dysphagia screen. NS at 125 mL/hr. Activity: Ambulate with assistance. DVT prophylaxis: Lovenox  GI prophylaxis:  protonix  Disposition: Plan to d/c to Home. PT/OT consulted.   POC: [facility] [phone]    CODE STATUS:  Full Code        Patient discussed with Dr. Petar Devries (Family Medicine Attending)       Mehdi Collazo 7 Problems  Date Reviewed: 2/10/2021          Codes Class Noted POA    Left-sided weakness ICD-10-CM: R53.1  ICD-9-CM: 728.87  2/10/2021 Unknown        * (Principal) Migraine headache ICD-10-CM: M80.539  ICD-9-CM: 346.90  2/10/2021 Unknown        Severe obesity ([facility] [address] ICD-10-CM: E66.01  ICD-9-CM: 278.01  2/13/2019 Yes        Pit[facility] ([facility] ICD-10-CM: D35.2  ICD-9-CM: 227.3  9/22/2016 Yes

## 2021-02-11 ENCOUNTER — APPOINTMENT (OUTPATIENT)
Dept: NON INVASIVE DIAGNOSTICS | Age: 31
End: 2021-02-11
Attending: STUDENT IN AN ORGANIZED HEALTH CARE EDUCATION/TRAINING PROGRAM
Payer: MEDICAID

## 2021-02-11 ENCOUNTER — TELEPHONE (OUTPATIENT)
Dept: FAMILY MEDICINE CLINIC | Age: 31
End: 2021-02-11

## 2021-02-11 VITALS
HEART RATE: 85 BPM | OXYGEN SATURATION: 99 % | RESPIRATION RATE: 16 BRPM | SYSTOLIC BLOOD PRESSURE: 119 MMHG | BODY MASS INDEX: 34.55 KG/M2 | WEIGHT: 214.95 LBS | HEIGHT: 66 IN | DIASTOLIC BLOOD PRESSURE: 55 MMHG | TEMPERATURE: 98.4 F

## 2021-02-11 DIAGNOSIS — E78.5 HYPERLIPIDEMIA, UNSPECIFIED HYPERLIPIDEMIA TYPE: ICD-10-CM

## 2021-02-11 DIAGNOSIS — R73.03 PREDIABETES: ICD-10-CM

## 2021-02-11 DIAGNOSIS — G43.719 INTRACTABLE CHRONIC MIGRAINE WITHOUT AURA AND WITHOUT STATUS MIGRAINOSUS: ICD-10-CM

## 2021-02-11 DIAGNOSIS — G44.221 CHRONIC TENSION-TYPE HEADACHE, INTRACTABLE: ICD-10-CM

## 2021-02-11 DIAGNOSIS — R53.1 LEFT-SIDED WEAKNESS: ICD-10-CM

## 2021-02-11 LAB
ANION GAP SERPL CALC-SCNC: 8 MMOL/L (ref 5–15)
ATRIAL RATE: 69 BPM
BUN SERPL-MCNC: 10 MG/DL (ref 6–20)
BUN/CREAT SERPL: 13 (ref 12–20)
CALCIUM SERPL-MCNC: 8.6 MG/DL (ref 8.5–10.1)
CALCULATED P AXIS, ECG09: 21 DEGREES
CALCULATED R AXIS, ECG10: 55 DEGREES
CALCULATED T AXIS, ECG11: 16 DEGREES
CHLORIDE SERPL-SCNC: 112 MMOL/L (ref 97–108)
CHOLEST SERPL-MCNC: 210 MG/DL
CO2 SERPL-SCNC: 21 MMOL/L (ref 21–32)
COMMENT, HOLDF: NORMAL
CREAT SERPL-MCNC: 0.79 MG/DL (ref 0.55–1.02)
DIAGNOSIS, 93000: NORMAL
ECHO AO ASC DIAM: 2.14 CM
ECHO AO ROOT DIAM: 2.59 CM
ECHO AV PEAK GRADIENT: 14.66 MMHG
ECHO AV PEAK VELOCITY: 191.47 CM/S
ECHO IVC PROX: 0.98 CM
ECHO LA AREA 4C: 13.18 CM2
ECHO LA MAJOR AXIS: 3.21 CM
ECHO LA MINOR AXIS: 1.56 CM
ECHO LA VOL 2C: 29.06 ML (ref 22–52)
ECHO LA VOL 4C: 28.04 ML (ref 22–52)
ECHO LA VOL BP: 37.03 ML (ref 22–52)
ECHO LA VOL/BSA BIPLANE: 17.96 ML/M2 (ref 16–28)
ECHO LA VOLUME INDEX A2C: 14.09 ML/M2 (ref 16–28)
ECHO LA VOLUME INDEX A4C: 13.6 ML/M2 (ref 16–28)
ECHO LV E' LATERAL VELOCITY: 11.81 CENTIMETER/SECOND
ECHO LV E' SEPTAL VELOCITY: 13.17 CENTIMETER/SECOND
ECHO LV INTERNAL DIMENSION DIASTOLIC: 5.25 CM (ref 3.9–5.3)
ECHO LV INTERNAL DIMENSION SYSTOLIC: 3 CM
ECHO LV IVSD: 0.65 CM (ref 0.6–0.9)
ECHO LV MASS 2D: 110.8 G (ref 67–162)
ECHO LV MASS INDEX 2D: 53.7 G/M2 (ref 43–95)
ECHO LV POSTERIOR WALL DIASTOLIC: 0.62 CM (ref 0.6–0.9)
ECHO LVOT DIAM: 1.83 CM
ECHO MV A VELOCITY: 68.06 CENTIMETER/SECOND
ECHO MV AREA PHT: 4.51 CM2
ECHO MV E DECELERATION TIME (DT): 168.32 MS
ECHO MV E VELOCITY: 74.95 CENTIMETER/SECOND
ECHO MV PRESSURE HALF TIME (PHT): 48.81 MS
ECHO PV MAX VELOCITY: 127.61 CM/S
ECHO PV PEAK INSTANTANEOUS GRADIENT SYSTOLIC: 6.51 MMHG
ECHO RV INTERNAL DIMENSION: 3.43 CM
ECHO RV TAPSE: 2.88 CM (ref 1.5–2)
ERYTHROCYTE [DISTWIDTH] IN BLOOD BY AUTOMATED COUNT: 14.2 % (ref 11.5–14.5)
EST. AVERAGE GLUCOSE BLD GHB EST-MCNC: 117 MG/DL
GLUCOSE SERPL-MCNC: 134 MG/DL (ref 65–100)
HBA1C MFR BLD: 5.7 % (ref 4–5.6)
HCT VFR BLD AUTO: 37.1 % (ref 35–47)
HDLC SERPL-MCNC: 56 MG/DL
HDLC SERPL: 3.8 {RATIO} (ref 0–5)
HGB BLD-MCNC: 12.1 G/DL (ref 11.5–16)
LA VOL DISK BP: 33.37 ML (ref 22–52)
LDLC SERPL CALC-MCNC: 148 MG/DL (ref 0–100)
LIPID PROFILE,FLP: ABNORMAL
MAGNESIUM SERPL-MCNC: 2.1 MG/DL (ref 1.6–2.4)
MCH RBC QN AUTO: 26.4 PG (ref 26–34)
MCHC RBC AUTO-ENTMCNC: 32.6 G/DL (ref 30–36.5)
MCV RBC AUTO: 80.8 FL (ref 80–99)
NRBC # BLD: 0 K/UL (ref 0–0.01)
NRBC BLD-RTO: 0 PER 100 WBC
P-R INTERVAL, ECG05: 144 MS
PHOSPHATE SERPL-MCNC: 2.6 MG/DL (ref 2.6–4.7)
PLATELET # BLD AUTO: 248 K/UL (ref 150–400)
PMV BLD AUTO: 10.4 FL (ref 8.9–12.9)
POTASSIUM SERPL-SCNC: 4 MMOL/L (ref 3.5–5.1)
Q-T INTERVAL, ECG07: 386 MS
QRS DURATION, ECG06: 86 MS
QTC CALCULATION (BEZET), ECG08: 413 MS
RBC # BLD AUTO: 4.59 M/UL (ref 3.8–5.2)
SAMPLES BEING HELD,HOLD: NORMAL
SODIUM SERPL-SCNC: 141 MMOL/L (ref 136–145)
TRIGL SERPL-MCNC: 30 MG/DL (ref ?–150)
TSH SERPL DL<=0.05 MIU/L-ACNC: 0.72 UIU/ML (ref 0.36–3.74)
VENTRICULAR RATE, ECG03: 69 BPM
VLDLC SERPL CALC-MCNC: 6 MG/DL
WBC # BLD AUTO: 8.9 K/UL (ref 3.6–11)

## 2021-02-11 PROCEDURE — 85027 COMPLETE CBC AUTOMATED: CPT

## 2021-02-11 PROCEDURE — 99235 HOSP IP/OBS SAME DATE MOD 70: CPT | Performed by: FAMILY MEDICINE

## 2021-02-11 PROCEDURE — 97112 NEUROMUSCULAR REEDUCATION: CPT | Performed by: OCCUPATIONAL THERAPIST

## 2021-02-11 PROCEDURE — 99205 OFFICE O/P NEW HI 60 MIN: CPT | Performed by: PSYCHIATRY & NEUROLOGY

## 2021-02-11 PROCEDURE — 96375 TX/PRO/DX INJ NEW DRUG ADDON: CPT

## 2021-02-11 PROCEDURE — 96372 THER/PROPH/DIAG INJ SC/IM: CPT

## 2021-02-11 PROCEDURE — 80048 BASIC METABOLIC PNL TOTAL CA: CPT

## 2021-02-11 PROCEDURE — 36415 COLL VENOUS BLD VENIPUNCTURE: CPT

## 2021-02-11 PROCEDURE — 83735 ASSAY OF MAGNESIUM: CPT

## 2021-02-11 PROCEDURE — 80061 LIPID PANEL: CPT

## 2021-02-11 PROCEDURE — 84100 ASSAY OF PHOSPHORUS: CPT

## 2021-02-11 PROCEDURE — 74011250637 HC RX REV CODE- 250/637: Performed by: STUDENT IN AN ORGANIZED HEALTH CARE EDUCATION/TRAINING PROGRAM

## 2021-02-11 PROCEDURE — 97165 OT EVAL LOW COMPLEX 30 MIN: CPT | Performed by: OCCUPATIONAL THERAPIST

## 2021-02-11 PROCEDURE — 99218 HC RM OBSERVATION: CPT

## 2021-02-11 PROCEDURE — 74011636637 HC RX REV CODE- 636/637: Performed by: PSYCHIATRY & NEUROLOGY

## 2021-02-11 PROCEDURE — 83036 HEMOGLOBIN GLYCOSYLATED A1C: CPT

## 2021-02-11 PROCEDURE — 84443 ASSAY THYROID STIM HORMONE: CPT

## 2021-02-11 RX ORDER — SUMATRIPTAN 6 MG/.5ML
6 INJECTION, SOLUTION SUBCUTANEOUS
Status: COMPLETED | OUTPATIENT
Start: 2021-02-11 | End: 2021-02-11

## 2021-02-11 RX ORDER — AMITRIPTYLINE HYDROCHLORIDE 25 MG/1
25 TABLET, FILM COATED ORAL
Qty: 30 TAB | Refills: 0 | Status: SHIPPED
Start: 2021-02-11 | End: 2021-02-11 | Stop reason: SDUPTHER

## 2021-02-11 RX ORDER — AMITRIPTYLINE HYDROCHLORIDE 25 MG/1
25 TABLET, FILM COATED ORAL
Qty: 30 TAB | Refills: 0 | Status: SHIPPED | OUTPATIENT
Start: 2021-02-11 | End: 2021-06-28

## 2021-02-11 RX ORDER — METHYLPREDNISOLONE 4 MG/1
4 TABLET ORAL
Qty: 1 DOSE PACK | Refills: 0 | Status: SHIPPED
Start: 2021-02-11 | End: 2021-02-11

## 2021-02-11 RX ORDER — PREDNISONE 5 MG/1
TABLET ORAL
Qty: 21 TAB | Refills: 0 | Status: SHIPPED | OUTPATIENT
Start: 2021-02-11 | End: 2021-06-28

## 2021-02-11 RX ADMIN — PANTOPRAZOLE SODIUM 40 MG: 40 TABLET, DELAYED RELEASE ORAL at 06:37

## 2021-02-11 RX ADMIN — SUMATRIPTAN 6 MG: 6 INJECTION, SOLUTION SUBCUTANEOUS at 14:32

## 2021-02-11 NOTE — DISCHARGE SUMMARY
[address] [facility] [address] [place] [address]   Office [phone]  Fax [phone]       Discharge / Transfer / Off-Service Note     Name: Antonia Ontiveros MRN: [id number]  Sex: Female   YOB: 1990  Age: 27 y.o. PCP: Pieter Rodriguez MD     Date of admission: 2/10/2021  Date of discharge/transfer: 2021    Attending physician at admission: Dr. Ulysses Cower    Attending physician at discharge/transfer: Dr. Ulysses Cower    Resident physician at discharge/transfer: Yobani Valencia MD, PGY1     Consultants during hospitalization  Neruology     Admission diagnoses   Left-sided weakness [R53.1]    Recommended follow-up after discharge  1. PCP   2. Neurology    Things to follow up on with PCP:  Migraine w/ left UE weakness  PreDM  HLD  Obesity     Medication Changes:  1. New Medications: Amytriptiline 25mg qHS, Prednisone 5mg dose pack  2. Modified Medications: None  3. Discontinued Medications: None    History of Present Illness  Per admitting provider,     Antonia Ontiveros is a 27 y.o. female with a PMH of migraines, h/o DVT/PE, pituitary adenoma who presents to the ED complaining of left temporal headache. Patient states that she woke up to a throbbing left sided headache associated with left-sided facial and upper extremity numbness, left upper extremity weakness, and palpitations. Reports that current episode is unlike her previous chronic migraines. Headache is more severe than usual and the weakness/sensory loss is new. Patient unable to provide thorough history during exam due to recent administration of ativan and migraine cocktail. Patient denies any vision changes, photophobia, phonophobia, aura, nausea, vomiting, abdominal pain, dysuria.      Previously had been followed by Dr. Steve Che for intractable chronic migraine.  Patient stopped seeing neuro due to no relief for migraines despite trial of various therapies.         HOSPITAL COURSE       Migraine with Left UE weakness: RESOLVED. Likely 2/2 complicated migraine. History of chronic migraines previously followed by Dr. Livia Camargo. Stroke work up was negative: CT head/CTA head&neck/MRI brain unremarkable and Echo LVEF 65-70% w/no shunt. Graylon Wil TSH wnl. Treated with migraine cocktail, ativan 2 mg, and sumatriptan. Neurology following during admission.   - Start Amytriptiline 25mg qHS  - Start Prednisone 5mg dose pack  - PCP: consider outpatient EMG if continued weakness   - F/u Neurology     PreDM: A1c 5.7  - PCP: Continue counseling lifestyle modifications     HLD: TChol 210, , HDL 56, TAG 30.   - PCP: Continue counseling lifestyle modifications     Pituitary microadenoma: diagnosed at the age or 15. Last surveillance MRI (1/6/20) w/ stable 6 mm microadenoma.      Palpitations: RESOLVED. EKG at PCP's office w/ NSR with Twave inversion in lead V1, which was consistent with previous EKG on 5/2020. HR 69, QTc 417.      GERD: Stable. - Continue home omeprazole      Obesity: Body mass index is 34.69 kg/m². - PCP: Continue counseling lifestyle modifications        Physical exam at discharge:    Visit Vitals  BP (!) 119/55 (BP 1 Location: Left upper arm, BP Patient Position: At rest)   Pulse 85   Temp 98.4 °F (36.9 °C)   Resp 16   Ht 5' 6\" (1.676 m)   Wt 214 lb 15.2 oz (97.5 kg)   SpO2 99%   BMI 34.69 kg/m²         General Oriented to person, place, and time and well-developed. Appears well-nourished, no distress. Not diaphoretic. HENT Head Normocephalic and atraumatic. Eyes Conjunctivae are normal, no discharge. No scleral icterus. Cardio Normal rate, regular rhythm. Exam reveals no gallop and no friction rub. No murmur heard. No chest wall tenderness. Pulmonary Effort normal and breath sounds normal. No respiratory distress. No wheezes, no rales. Abdominal Soft. Bowel sounds normal. No distension. No tenderness.  Deferred. Extremities No edema of lower extremities. No tenderness. Distal pulses intact. Neurological Alert and oriented to person, place, and time. 5/5 strength in all extremities. Sensation equal in extremities. Dermatology Skin is warm and dry. No rash noted. No erythema or pallor. Psychiatric Affect and judgment normal.        Condition at discharge: Stable. Labs  Recent Labs     02/11/21  0343 02/10/21  1528   WBC 8.9 9.9   HGB 12.1 12.0   HCT 37.1 36.8    251     Recent Labs     02/11/21  0343 02/10/21  1528    139   K 4.0 4.6   * 108   CO2 21 24   BUN 10 9   CREA 0.79 0.81   * 76   CA 8.6 8.4*   MG 2.1 2.1   PHOS 2.6  --      Recent Labs     02/10/21  1528   ALT 27      TBILI 0.3   TP 7.0   ALB 3.6   GLOB 3.4     Recent Labs     02/10/21  1734 02/10/21  1528   TROIQ  --  <0.05   INR 1.1  --    PTP 11.3*  --      Cultures  · None    Procedures / Diagnostic Studies  · None    Imaging  Results from Hospital Encounter encounter on 02/10/21   XR CHEST PA LAT    Narrative Exam:  2 view chest    Indication: Chest pain. COMPARISON: 5/7/2020. PA and lateral views demonstrate normal heart size. The patient is on a cardiac  monitor There is no acute process in the lung fields. The osseous structures are  unremarkable. Impression 1. Normal chest radiograph                   Results from Hospital Encounter encounter on 02/10/21   CTA HEAD NECK W CONT    Narrative *PRELIMINARY REPORT*    No focal process. No dissection Preliminary report was provided by Dr. Benjiman Phoenix,  the on-call radiologist, at [phone] hours    Final report to follow. *END PRELIMINARY REPORT*        EXAM: CTA HEAD NECK W CONT    INDICATION: headache, facial numbness, chest pain    COMPARISON: CT the head from earlier same day. MRI of the brain dated January 19, 2020. CONTRAST: 100 mL of Isovue-370. TECHNIQUE:  Unenhanced  images were obtained to localize the volume for  acquisition.   Multislice helical axial CT angiography was performed from the  aortic arch to the top of the head during uneventful rapid bolus intravenous  contrast administration. Coronal and sagittal reformations and 3D post  processing was performed. CT dose reduction was achieved through use of a  standardized protocol tailored for this examination and automatic exposure  control for dose modulation. FINDINGS:    DELAYED ENHANCEMENT HEAD CT  No intra or extra-axial mass or collection. Gray-white differentiation is  well-preserved. Ventricles are normal in size and configuration. The basal  cisterns are patent. Dural venous sinuses are patent. No abnormal parenchymal or meningeal  enhancement. Mastoid air cells and paranasal sinuses are clear. CTA NECK  There is conventional three vessel arch anatomy. The bilateral subclavian,  common carotid, and internal carotid arteries are patent with no flow-limiting  stenosis. % of right carotid artery stenosis: 0  % of left carotid artery stenosis: 0    NASCET method was utilized for calculating stenosis. The vertebral arteries are codominant and patent. The cervical soft tissues are  unremarkable. .    CTA HEAD  Widely patent bilateral intracranial internal carotid arteries, without  hemodynamically significant stenosis. The bilateral middle and anterior cerebral  arteries are patent. The anterior communicating artery is diminutive. Bilateral V4 segments are patent. The basilar artery is patent. Bilateral fetal  origin of the posterior cerebral arteries. Bilateral P1 segments are  hypoplastic. No intracranial aneurysm. No intracranial hemodynamically significant stenosis. Impression No acute vascular abnormality. No large vessel occlusion. EXAM:  MRI BRAIN W WO CONT  INDICATION: Left temporal headache of acute onset. Associated upper extremity  numbness and weakness. . Stroke rule out  COMPARISON:  1/19/2020. CONTRAST: 20 ml Dotarem.   TECHNIQUE:    Multiplanar multisequence acquisition without and with contrast of the brain. FINDINGS:  The ventricles are normal in size and position. There is no acute infarct,  hemorrhage, extra-axial fluid collection, or mass effect. There is no cerebellar  tonsillar herniation. Expected arterial flow-voids are present. No evidence of  abnormal enhancement. The paranasal sinuses, mastoid air cells, and middle ears are clear. The orbital  contents are within normal limits. No significant osseous or scalp lesions are  identified.   IMPRESSION  Normal study      Chronic diagnoses   Problem List as of 2/11/2021 Date Reviewed: 2/10/2021          Codes Class Noted - Resolved    HLD (hyperlipidemia) ICD-10-CM: E78.5  ICD-9-CM: 272.4  2/11/2021 - Present        Prediabetes ICD-10-CM: R73.03  ICD-9-CM: 790.29  2/11/2021 - Present        Left-sided weakness ICD-10-CM: R53.1  ICD-9-CM: 728.87  2/10/2021 - Present        * (Principal) Migraine headache ICD-10-CM: O92.350  ICD-9-CM: 346.90  2/10/2021 - Present        Elevated prolactin level ICD-10-CM: R79.89  ICD-9-CM: 790.99  4/1/2019 - Present        Severe obesity ([address] ICD-10-CM: E66.01  ICD-9-CM: 278.01  2/13/2019 - Present        Right hand weakness ICD-10-CM: R29.898  ICD-9-CM: 728.87  11/16/2016 - Present        Numbness of right hand ICD-10-CM: R20.0  ICD-9-CM: 782.0  11/16/2016 - Present        Chronic tension-type headache, intractable ICD-10-CM: S01.890  ICD-9-CM: 339.12  11/16/2016 - Present        Intractable chronic migraine without aura and without status migrainosus ICD-10-CM: L86.129  ICD-9-CM: 346.71  9/22/2016 - Present        Pituitary adenoma ([address] ICD-10-CM: D35.2  ICD-9-CM: 227.3  9/22/2016 - Present        RESOLVED: Peripheral vascular disease ([address] ICD-10-CM: I73.9  ICD-9-CM: 443.9  4/1/2019 - 4/1/2019        RESOLVED: Headache, chronic daily ICD-10-CM: R51.9  ICD-9-CM: 784.0  9/22/2016 - 11/16/2016        RESOLVED: Medication overuse headache ICD-10-CM: G44.40  ICD-9-CM: 339.3  9/22/2016 - 11/16/2016        RESOLVED: Pregnancy, twins ICD-10-CM: O30.009  ICD-9-CM: 651.00, V91.00  11/25/2013 - 4/8/2014    Overview Addendum 2/10/2014 12:14 PM by Joey Pimentel MD     H/o pituitary tumor  H/o GDM, abn 1 hour this pregn, refused 3 hour and/or endocrine consult  Primary C/S 2/24/14 @9:30am                     Discharge/Transfer Medications  Discharge Medication List as of 2/11/2021  1:30 PM      START taking these medications    Details   predniSONE (STERAPRED) 5 mg dose pack See administration instruction per 5mg dose pack, Normal, Disp-21 Tab, R-0         CONTINUE these medications which have CHANGED    Details   amitriptyline (ELAVIL) 25 mg tablet Take 1 Tab by mouth nightly., Normal, Disp-30 Tab, R-0         CONTINUE these medications which have NOT CHANGED    Details   medroxyPROGESTERone (DEPO-PROVERA) 150 mg/mL injection INJECT 1ML BY INTRAMUSCULAR ROUTE ONCE FOR 1 DOSE, Historical Med      acetaminophen (Tylenol Arthritis Pain) 650 mg TbER Take 1,300 mg by mouth every eight (8) hours. , Historical Med      butalbital-acetaminophen-caff (Fioricet) -40 mg per capsule Take 1 Cap by mouth every four (4) hours as needed for Headache., Print, Disp-20 Cap,R-0      ondansetron (ZOFRAN ODT) 4 mg disintegrating tablet Take 1 Tab by mouth every eight (8) hours as needed for Nausea. , Print, Disp-20 Tab,R-0      ibuprofen (MOTRIN) 600 mg tablet Take 1 Tab by mouth every eight (8) hours as needed for Pain., Normal, Disp-20 Tab,R-0      omeprazole (PriLOSEC OTC) 20 mg tablet Take 1 Tab by mouth daily. , Normal, Disp-30 Tab,R-5              Diet:  Regular diet, with decreased carbohydrates and fat    Activity:  As tolerated    Disposition: Home or Self Care    Discharge instructions to patient/family  Please seek medical attention for any new or worsening symptoms particularly fever, chest pain, shortness of breath, abdominal pain, nausea, vomiting    Follow up plans/appointments  Follow-up Information     Follow up With Specialties Details Why Contact Info    Tucker Livingston MD Pediatric Medicine, Family Medicine On 2/16/2021 In-person appointment at 3:30pm. [address]  [address] [facility] On license of [facility]      Dani Luu MD Neurology, Pain Management In 3 weeks Call to schedule follow up visit regarding migraines [address] [facility] [address] [address] [facility]             Yolande Closs, MD  Family Medicine Resident       For Billing    Chief Complaint   Patient presents with    Headache    Chest Pain       Hospital Problems  Date Reviewed: 2/10/2021          Codes Class Noted POA    HLD (hyperlipidemia) ICD-10-CM: E78.5  ICD-9-CM: 272.4  2/11/2021 Unknown        Prediabetes ICD-10-CM: R73.03  ICD-9-CM: 790.29  2/11/2021 Unknown        Left-sided weakness ICD-10-CM: R53.1  ICD-9-CM: 728.87  2/10/2021 Unknown        * (Principal) Migraine headache ICD-10-CM: K96.554  ICD-9-CM: 346.90  2/10/2021 Unknown        Severe obesity ([address] ICD-10-CM: E66.01  ICD-9-CM: 278.01  2/13/2019 Yes        Pituitary adenoma ([facility] [address] ICD-10-CM: D35.2  ICD-9-CM: 227.3  9/22/2016 Yes

## 2021-02-11 NOTE — PROGRESS NOTES
[address] [facility] [address] [place] [address]   Office [phone]  Fax [phone]          Assessment and Bao Vásquez is a 27 y.o. female with a PMH of migraines, h/o DVT/PE, pituitary adenoma who is admitted for headache w/ left sided weakness. 24 Hour Events: NAEON    Migraine with Left UE weakness: history of chronic migraines previously followed by Dr. Mary Díaz. CT head, CTA head/neck unrevealing. MRI negative. TSH wnl. . S/p migraine cocktail and ativan 2 mg.   - Neuro check q4h   - Continue ASA Daily  - CBC,CMP, Mg, Phos daily  - PT/OT/CM consulted  - Neuro consulted, appreciate recs     PreDM: A1c 5.7  -Counseling lifestyle modifications and further follow up OP    HLD:   Lab Results   Component Value Date/Time    Cholesterol, total 210 (H) 02/11/2021 03:43 AM    HDL Cholesterol 56 02/11/2021 03:43 AM    LDL, calculated 148 (H) 02/11/2021 03:43 AM    VLDL, calculated 6 02/11/2021 03:43 AM    Triglyceride 30 02/11/2021 03:43 AM    CHOL/HDL Ratio 3.8 02/11/2021 03:43 AM   -Counseling lifestyle modifications and further follow up OP      Pituitary microadenoma: diagnosed at the age or 15. Last surveillance MRI (1/6/20) w/ stable 6 mm microadenoma.      Palpitations: RESOLVED. EKG at PCP's office w/ NSR with Twave inversion in lead V1, which is consistent with previous EKG on 5/2020. HR 69, QTc 417.   - Monitor on telemetry     GERD: stable. - Protonix for home omeprazole      Obesity: Body mass index is 34.69 kg/m². - Encourage lifestyle modification and further follow up with PCP outpatient.      FEN/GI: Regular. Activity: Ambulate with assistance. DVT prophylaxis: Lovenox + SCDs  GI prophylaxis:  protonix  Disposition: Plan to d/c to Home. PT/OT consulted. POC: [facility] Mother [phone]     CODE STATUS:  Full Code       Kevin Monreal MD  Family Medicine Resident         Subjective / Objective     Subjective: Pt was seen and examined at bedside.  Afebrile and hemodynamically stable. Concerns overnight include: none. Reports left arm weakness is improved. Denies chest pain, palpitations, SOB, HA, abdominal pain, numbness/tingling     Objective:  Temp (24hrs), Av.1 °F (36.7 °C), Min:98 °F (36.7 °C), Max:98.4 °F (36.9 °C)     Visit Vitals  BP (!) 111/58 (BP 1 Location: Left upper arm, BP Patient Position: At rest)   Pulse 67   Temp 98.1 °F (36.7 °C)   Resp 16   Ht 5' 6\" (1.676 m)   Wt 214 lb 15.2 oz (97.5 kg)   SpO2 98%   BMI 34.69 kg/m²     Physical Exam:  General: No acute distress. Alert. Cooperative. Dressing covering previous port site. Respiratory: CTAB. No w/r/r/c.   Cardiovascular: Normal S1,S2. No m/r/g.   GI: Nondistended.+ bowel sounds. Nontender. No rebound tenderness or guarding. Extremities: No LE edema. Skin: Warm, dry. No rashes. Neuro: Alert and oriented. Weakness 4/5 strength LUE.     Respiratory:   O2 Device: Room air     Inpatient Medications  Current Facility-Administered Medications   Medication Dose Route Frequency    acetaminophen (TYLENOL) tablet 650 mg  650 mg Oral Q4H PRN    ondansetron (ZOFRAN) injection 4 mg  4 mg IntraVENous Q6H PRN    aspirin chewable tablet 81 mg  81 mg Oral DAILY    enoxaparin (LOVENOX) injection 40 mg  40 mg SubCUTAneous Q24H    pantoprazole (PROTONIX) tablet 40 mg  40 mg Oral ACB         Allergies  Allergies   Allergen Reactions    Tomato Angioedema         CBC:  Recent Labs     21  0343 02/10/21  1528   WBC 8.9 9.9   HGB 12.1 12.0   HCT 37.1 36.8    826       Metabolic Panel:  Recent Labs     21  0343 02/10/21  1734 02/10/21  1528     --  139   K 4.0  --  4.6   *  --  108   CO2 21  --  24   BUN 10  --  9   CREA 0.79  --  0.81   *  --  76   CA 8.6  --  8.4*   MG 2.1  --  2.1   PHOS 2.6  --   --    ALB  --   --  3.6   ALT  --   --  27   INR  --  1.1  --              Imaging/Diagnostic Studies:  Results from Hospital Encounter encounter on 02/10/21   XR CHEST PA LAT    Narrative Exam: 2 view chest    Indication: Chest pain. COMPARISON: 5/7/2020. PA and lateral views demonstrate normal heart size. The patient is on a cardiac  monitor There is no acute process in the lung fields. The osseous structures are  unremarkable. Impression 1. Normal chest radiograph                   Results from Hospital Encounter encounter on 02/10/21   CTA HEAD NECK W CONT    Narrative *PRELIMINARY REPORT*    No focal process. No dissection Preliminary report was provided by Dr. Satinder Ray,  the on-call radiologist, at [phone] hours    Final report to follow. *END PRELIMINARY REPORT*        EXAM: CTA HEAD NECK W CONT    INDICATION: headache, facial numbness, chest pain    COMPARISON: CT the head from earlier same day. MRI of the brain dated January 19, 2020. CONTRAST: 100 mL of Isovue-370. TECHNIQUE:  Unenhanced  images were obtained to localize the volume for  acquisition. Multislice helical axial CT angiography was performed from the  aortic arch to the top of the head during uneventful rapid bolus intravenous  contrast administration. Coronal and sagittal reformations and 3D post  processing was performed. CT dose reduction was achieved through use of a  standardized protocol tailored for this examination and automatic exposure  control for dose modulation. FINDINGS:    DELAYED ENHANCEMENT HEAD CT  No intra or extra-axial mass or collection. Gray-white differentiation is  well-preserved. Ventricles are normal in size and configuration. The basal  cisterns are patent. Dural venous sinuses are patent. No abnormal parenchymal or meningeal  enhancement. Mastoid air cells and paranasal sinuses are clear. CTA NECK  There is conventional three vessel arch anatomy. The bilateral subclavian,  common carotid, and internal carotid arteries are patent with no flow-limiting  stenosis.     % of right carotid artery stenosis: 0  % of left carotid artery stenosis: 0    NASCET method was utilized for calculating stenosis. The vertebral arteries are codominant and patent. The cervical soft tissues are  unremarkable. .    CTA HEAD  Widely patent bilateral intracranial internal carotid arteries, without  hemodynamically significant stenosis. The bilateral middle and anterior cerebral  arteries are patent. The anterior communicating artery is diminutive. Bilateral V4 segments are patent. The basilar artery is patent. Bilateral fetal  origin of the posterior cerebral arteries. Bilateral P1 segments are  hypoplastic. No intracranial aneurysm. No intracranial hemodynamically significant stenosis. Impression No acute vascular abnormality. No large vessel occlusion. EXAM:  MRI BRAIN W WO CONT  INDICATION: Left temporal headache of acute onset. Associated upper extremity  numbness and weakness. . Stroke rule out  COMPARISON:  1/19/2020. CONTRAST: 20 ml Dotarem. TECHNIQUE:    Multiplanar multisequence acquisition without and with contrast of the brain. FINDINGS:  The ventricles are normal in size and position. There is no acute infarct,  hemorrhage, extra-axial fluid collection, or mass effect. There is no cerebellar  tonsillar herniation. Expected arterial flow-voids are present. No evidence of  abnormal enhancement. The paranasal sinuses, mastoid air cells, and middle ears are clear. The orbital  contents are within normal limits. No significant osseous or scalp lesions are  identified.   IMPRESSION  Normal study       For Billing    Chief Complaint   Patient presents with    Headache    Chest Pain       Hospital Problems  Date Reviewed: 2/10/2021          Codes Class Noted POA    Left-sided weakness ICD-10-CM: R53.1  ICD-9-CM: 728.87  2/10/2021 Unknown        * (Principal) Migraine headache ICD-10-CM: S19.307  ICD-9-CM: 346.90  2/10/2021 Unknown        Severe obesity ([facility] [address] ICD-10-CM: E66.01  ICD-9-CM: 278.01  2/13/2019 Yes        Pituitary adenoma ([facility] [address] ICD-10-CM: D35.2  ICD-9-CM: 227.3  9/22/2016 Yes

## 2021-02-11 NOTE — PROGRESS NOTES
Bedside shift change report given to Oli Shelton RN (oncoming nurse) by Jamilah James RN (offgoing nurse). Report included the following information SBAR, Kardex and MAR.

## 2021-02-11 NOTE — PROGRESS NOTES
11:33 AM   02/11/21     Reason for Admission:  Left sided weakness/numbness and headache                     RUR Score:   OBS                  Plan for utilizing home health:  None anticipated         PCP: First and Last name:  Kaz Gimenez  Are you a current patient: Yes/No: Yes   Approximate date of last visit: 2/10/2021   Can you participate in a virtual visit with your PCP: Yes                     Current Advanced Directive/Advance Care Plan: Full                          Transition of Care Plan:    The patient  Lives with her children in a one story home with no steps to enter . The patient works full time as a . The patient was independent prior to this admission with her ADL's and was able to drive herself. The patient has prescription coverage and [facility]  [phone]. The patient Mother Samir Guardado is her emergency Contact [phone] and is supportive and willing to transport . The patient does not own and DME and has never used [facility] services. Transition of care Plan   1. The patient will follow up with PCP and specialities   2. The patient Mother will transport upon discharge   3. CM will monitor for discharge needs    Care Management Interventions  PCP Verified by CM: Terry Trujillo)  Last Visit to PCP: 02/10/21  Mode of Transport at Discharge: Self  Transition of Care Consult (CM Consult): Discharge Planning  Discharge Durable Medical Equipment: No  Physical Therapy Consult: Yes  Occupational Therapy Consult: Yes  Speech Therapy Consult: Yes  Current Support Network: Own Home  Confirm Follow Up Transport: Family     Verito Urias RN [facility]

## 2021-02-11 NOTE — PROGRESS NOTES
Physical Therapy  Spoke with patient, patient has been up ad reginald in room. Symptoms has resolved. She currently has no mobility deficits. We will complete the order. Thank you.   Kendra Mendoza PT,DPT,NCS

## 2021-02-11 NOTE — ROUTINE PROCESS
Patient passed the STAND and is on regular diet without documented concern for swallowing issues. No speech issues documented in chart either. Dx: migraines. SLP evaluation deferred at this time.

## 2021-02-11 NOTE — ED NOTES
Verbal shift change report given to [address] [facility] (oncoming nurse) by Luz Ladd RN (offgoing nurse). Report included the following information SBAR, Kardex, ED Summary, Intake/Output, MAR and Recent Results.

## 2021-02-11 NOTE — PROGRESS NOTES
Discharged home. States that she understood discharge instructions and follow up care. Sister came to drive patient home. Volunteer transferred patient out w/o difficulty.

## 2021-02-11 NOTE — CONSULTS
[facility]   Inpatient Neurology Consult     Name:   Flor Valdez      :   1990     MRN:    [id number]    Admission Date:  2/10/2021  Consult Date:  21    HISTORY OF PRESENT ILLNESS:     This is a 27 y.o. female with PMHx Migraine, Pituitary adenoma. Pt is admitted for evaluation of left sided numbness and headache. Neurology is asked to evaluate. Pt previously followed by me in Neurology Clinic for migraine. Last Visit was in . Reviewed notes, at initial visit, she reported trying/ failing propranolol as a headache preventive. She was started on Topamax and then at [facility] in , she continued to report frequent headaches despite being on Topamax so I advised her to increase dose to 50 mg AM/ 100 mg PM.  Pt reports she did that, had SEFx from higher dose, so stopped TPM altogether. Didn't follow up after that. Reports she's had increased headaches over past 6 months. She was at work yesterday, having migraine then developed tingling/ paresthesia in left arm and left face and felt like left lip was droopy. Says coworkers noted that left lower face looked droopy. Denies any weakness in left arm or leg, was able to stand/ ambulate, use arms. MRI Brain is (-) for stroke; normal MRI. She reports mild residual headache 5/ 10. Complete Review of Systems: reviewed on admission H&P    Allergies:    Allergies   Allergen Reactions    Tomato Angioedema       PMHx:   Past Medical History:   Diagnosis Date    Abnormal Pap smear     Anemia     Gastrointestinal disorder     Ulcers    Headache     Herniated disc     Hx gestational diabetes     HX OTHER MEDICAL     trich treated at beginning of pregnancy    HX OTHER MEDICAL     pituitary tumor-benign     Miscarriage     Molar pregnancy     Pap smear for cervical cancer screening 19 neg HPV POS- rp pap one year    PCOS (polycystic ovarian syndrome)     Pelvic pain in female     Pituitary adenoma ([facility] [address]     Pituitary tumor     Psychiatric problem     depression never on medication    Thyroid activity decreased     hypothyroid awaiting appnt for endo       PSHx:  has a past surgical history that includes hx colposcopy (11/3/10); hx lipoma resection; hx dilation and curettage; and hx  section (). SocHx:  reports that she has never smoked. She has never used smokeless tobacco. She reports current alcohol use. She reports that she does not use drugs. FHx: family history includes Diabetes in her father; Headache in her mother.      Outpatient Meds:  Current Outpatient Medications   Medication Instructions    acetaminophen (TYLENOL ARTHRITIS PAIN) 1,300 mg, Oral, EVERY 8 HOURS    butalbital-acetaminophen-caff (Fioricet) -40 mg per capsule 1 Cap, Oral, EVERY 4 HOURS AS NEEDED    ibuprofen (MOTRIN) 600 mg, Oral, EVERY 8 HOURS AS NEEDED    medroxyPROGESTERone (DEPO-PROVERA) 150 mg/mL injection INJECT 1ML BY INTRAMUSCULAR ROUTE ONCE FOR 1 DOSE    omeprazole (PRILOSEC OTC) 20 mg, Oral, DAILY    ondansetron (ZOFRAN ODT) 4 mg, Oral, EVERY 8 HOURS AS NEEDED       Inpatient Meds:   Current Facility-Administered Medications   Medication Dose Route Frequency Provider Last Rate Last Admin    SUMAtriptan (IMITREX) injection 6 mg  6 mg SubCUTAneous NOW Sonia Calles MD        acetaminophen (TYLENOL) tablet 650 mg  650 mg Oral Q4H PRN Nancylee Range, DO        ondansetron [facility]) injection 4 mg  4 mg IntraVENous Q6H PRN Nancylee Range, DO        aspirin chewable tablet 81 mg  81 mg Oral DAILY Nancylee Range, DO   81 mg at 02/10/21 2313    enoxaparin (LOVENOX) injection 40 mg  40 mg SubCUTAneous Q24H Nancylee Range, DO        pantoprazole (PROTONIX) tablet 40 mg  40 mg Oral ACB Feliz ARECHIGA, DO   40 mg at 21 1705       PHYSICAL EXAM    Patient Vitals for the past 4 hrs:   Temp Pulse Resp BP SpO2   21 0830    (!) 110/59    21 0818 98.7 °F (37.1 °C) 76 16 (!) 110/59 98 % General: Head: normocephalic, atraumatic. Eyes: conjunctiva clear. Neck: supple. Lungs: not examined. Heart: not examined Extremities: no edema. Skin: No rashes    Neurologic Exam    Mental status: awake     Orientation: alert x 3    Speech: normal    Cranial Nerves:   CN 1: not tested. CN 2: PERRL, Visual Fields normal bilaterally. Funduscopic exam: not performed. CN 3/ CN 4/ CN 6: EOMI, No BENJAMÍN, No ptosis. CN 5: mild reduced LT on left side face; normal no right. CN 7: symmetric. CN 8: normal hearing. CN 9/ CN 10: not examined. CN 11: shrug is symmetric.  CN 12: tongue protrudes midline    Cerebellar:  Resting tremor: no.  Postural tremor: no.     Motor:   Left upper extremity:  5/ 5  Left lower extremity:  5/ 5  Right upper extremity:  5/ 5  Right lower extremity:  5/ 5      Sensory: intact LT, temp in all exts    DTRs: 2+   Plantar response: not tested    Gait: not tested   Romberg: not tested    Labs/ Radiology   I reviewed the following labs and radiology studies;     Recent Results (from the past 12 hour(s))   LIPID PANEL    Collection Time: 02/11/21  3:43 AM   Result Value Ref Range    LIPID PROFILE          Cholesterol, total 210 (H) <200 MG/DL    Triglyceride 30 <150 MG/DL    HDL Cholesterol 56 MG/DL    LDL, calculated 148 (H) 0 - 100 MG/DL    VLDL, calculated 6 MG/DL    CHOL/HDL Ratio 3.8 0.0 - 5.0     HEMOGLOBIN A1C WITH EAG    Collection Time: 02/11/21  3:43 AM   Result Value Ref Range    Hemoglobin A1c 5.7 (H) 4.0 - 5.6 %    Est. average glucose 117 mg/dL   CBC W/O DIFF    Collection Time: 02/11/21  3:43 AM   Result Value Ref Range    WBC 8.9 3.6 - 11.0 K/uL    RBC 4.59 3.80 - 5.20 M/uL    HGB 12.1 11.5 - 16.0 g/dL    HCT 37.1 35.0 - 47.0 %    MCV 80.8 80.0 - 99.0 FL    MCH 26.4 26.0 - 34.0 PG    MCHC 32.6 30.0 - 36.5 g/dL    RDW 14.2 11.5 - 14.5 %    PLATELET 618 866 - 933 K/uL    MPV 10.4 8.9 - 12.9 FL    NRBC 0.0 0  WBC    ABSOLUTE NRBC 0.00 0.00 - 0.01 K/uL   TSH 3RD GENERATION Collection Time: 02/11/21  3:43 AM   Result Value Ref Range    TSH 0.72 0.36 - 8.67 uIU/mL   METABOLIC PANEL, BASIC    Collection Time: 02/11/21  3:43 AM   Result Value Ref Range    Sodium 141 136 - 145 mmol/L    Potassium 4.0 3.5 - 5.1 mmol/L    Chloride 112 (H) 97 - 108 mmol/L    CO2 21 21 - 32 mmol/L    Anion gap 8 5 - 15 mmol/L    Glucose 134 (H) 65 - 100 mg/dL    BUN 10 6 - 20 MG/DL    Creatinine 0.79 0.55 - 1.02 MG/DL    BUN/Creatinine ratio 13 12 - 20      GFR est AA >60 >60 ml/min/1.73m2    GFR est non-AA >60 >60 ml/min/1.73m2    Calcium 8.6 8.5 - 10.1 MG/DL   MAGNESIUM    Collection Time: 02/11/21  3:43 AM   Result Value Ref Range    Magnesium 2.1 1.6 - 2.4 mg/dL   PHOSPHORUS    Collection Time: 02/11/21  3:43 AM   Result Value Ref Range    Phosphorus 2.6 2.6 - 4.7 MG/DL   SAMPLES BEING HELD    Collection Time: 02/11/21  3:43 AM   Result Value Ref Range    SAMPLES BEING HELD PST     COMMENT        Add-on orders for these samples will be processed based on acceptable specimen integrity and analyte stability, which may vary by analyte.    ECHO ADULT COMPLETE    Collection Time: 02/11/21  9:06 AM   Result Value Ref Range    IVSd 0.65 0.6 - 0.9 cm    LVIDd 5.25 3.9 - 5.3 cm    LVIDs 3.00 cm    LVOT d 1.83 cm    LVPWd 0.62 0.6 - 0.9 cm    RVIDd 3.43 cm    Left Atrium Major Axis 3.21 cm    LA Volume 37.03 22 - 52 mL    LA Area 4C 13.18 cm2    LA Vol 2C 29.06 22 - 52 mL    LA Vol 4C 28.04 22 - 52 mL    LA Volume DISK BP 33.37 22 - 52 mL    AoV PG 14.66 mmHg    Aortic Valve Systolic Peak Velocity 955.62 cm/s    MV A Young 68.06 centimeter/second    Mitral Valve E Wave Deceleration Time 168.32 ms    MV E Young 74.95 centimeter/second    LV E' Lateral Velocity 11.81 centimeter/second    LV E' Septal Velocity 13.17 centimeter/second    Mitral Valve Pressure Half-time 48.81 ms    MVA (PHT) 4.51 cm2    Pulmonic Valve Systolic Peak Instantaneous Gradient 6.51 mmHg    Pulmonic Valve Max Velocity 127.61 cm/s    Tapse 2.88 (A) 1.5 - 2.0 cm    AO ASC D 2.14 cm    Ao Root D 2.59 cm    IVC proximal 0.98 cm    LV Mass .8 67 - 162 g    LV Mass AL Index 53.7 43 - 95 g/m2    Left Atrium Minor Axis 1.56 cm    LA Vol Index 17.96 16 - 28 ml/m2    LA Vol Index 14.09 16 - 28 ml/m2    LA Vol Index 13.60 16 - 28 ml/m2         Assessment/ Plan        ICD-10-CM ICD-9-CM    1. Left-sided headache  R51.9 784.0    2. Left-sided weakness  R53.1 728.87    3. Left sided numbness  R20.0 782.0        Agree with FP Service, this is Complicated Migraine  Due to residual HA, will give pt Sumatriptan 6 mg SQ x 1 now to treat residual HA. Discussed Starting Amitriptyline 25 mg QHS to help reduce HA frequency and also help pt sleep (she reports insomnia). She is open to trying this. Please give pt Rx for this at Discharge. Also, pt will need Rx Prednisone Pack at discharge, to also help break ongoing headache. Discussed with FP team, ok to dc home today    F/u with me in clinic in 3 weeks    Thank you for asking the Neurology Service to evaluate Aubrey Guo.       Signed By: Ansley Perez MD     February 11, 2021

## 2021-02-11 NOTE — DISCHARGE INSTRUCTIONS
HOME DISCHARGE INSTRUCTIONS    Jamin Ford / [id number] : 1990    Admission date: 2/10/2021 Discharge date: 2021 7:51 PM     Please bring this form with you to show your care provider at your follow-up appointment. Primary care provider:  Rosario Pacheco MD    Discharging provider:  Jackie Champagne MD  - Family Medicine Resident  Sawyer Amin MD - Family Medicine Attending      You have been admitted to the hospital with the following diagnoses:    ACUTE DIAGNOSES:  Left-sided weakness [R53.1]   Complex migraine (type of headache)    . . . . . . . . . . . . . . . . . . . . . . . . . . . . . . . . . . . . . . . . . . . . . . . . . . . . . . . . . . . . . . . . . . . . . . . .   You are well enough to be discharged from the hospital. However, because you were inpatient in a hospital, you are at greater risk of having been exposed to the coronavirus. PLEASE stay inside and self-quarantine for 14 days to prevent further spread of the coronavirus. . . . . . . . . . . . . . . . . . . . . . . . . . . . . . . . . . . . . . . . . . . . . . . . . . . . . . . . . . . . . . . . . . . . . . . . .     You were hospitalized for stroke-like symptoms. Your head imaging did not show any new changes. You were given medication to help stop your headache and to help you sleep. Neurology saw you during your hospital stay and recommended a medication (amytriptiline) to help control headaches. Your Hemoglobin A1c came back in the pre-diabetic range. Please discuss with your PCP diet and exercises options to help control your blood sugar. Your cholesterol was elevated, please avoid fried foods. Please follow up with your PCP after discharge to prevent future hospitalizations. . . . . . . . . . . . . . . . . . . . . . . . . . . . . . . . . . . . . . . . . . . . . . . . . . . . . . . . . . . . . . . . . . . . . . . . Suraj Confer      FOLLOW-UP CARE RECOMMENDATIONS:     Appointments  Follow-up Information     Follow up With Specialties Details Why Contact Info    Heriberto Najjar, MD Pediatric Medicine, Family Medicine On 2/16/2021 In-person appointment at 3:30pm. [address]  [address] [place] [address]      Seven Brandon MD Neurology, Pain Management In 3 weeks Call to schedule follow up visit regarding migraines [address] [facility]  [address] [facility]  [phone]               Follow-up tests needed: None    Pending test results: None  At the time of your discharge the following test results are still pending: none. Please make sure you review these results with your outpatient follow-up provider(s). DIET/what to eat:  Regular Diet, avoid carbs     ACTIVITY:  Activity as tolerated    Wound care: None    Equipment needed:  None    Specific symptoms to watch for: chest pain, shortness of breath, fever, chills, nausea, vomiting, diarrhea, change in mentation, falling, weakness, bleeding. What to do if new or unexpected symptoms occur? If you experience any of the above symptoms (or should other concerns or questions arise after discharge) please call your primary care physician. Return to the emergency room if you cannot get hold of your doctor. · It is very important that you keep your follow-up appointment(s). · Please bring discharge papers, medication list (and/or medication bottles) to your follow-up appointments for review by your outpatient provider(s). · Please check the list of medications and be sure it includes every medication (even non-prescription medications) that your provider wants you to take. · It is important that you take the medication exactly as they are prescribed. · Keep your medication in the bottles provided by the pharmacist and keep a list of the medication names, dosages, and times to be taken in your wallet. · Do not take other medications without consulting your doctor.    · If you have any questions about your medications or other instructions, please talk to your nurse or care provider before you leave the hospital.     Information obtained by:     I understand that if any problems occur once I am at home I am to contact my physician. These instructions were explained to me and I had the opportunity to ask questions. I understand and acknowledge receipt of the instructions indicated above. Physician's or R.N.'s Signature                                                                  Date/Time                                                                                                                                              Patient or Representative Signature                                                          Date/Time      Patient Education        Migraine Headache: Care Instructions  Your Care Instructions     Migraines are painful, throbbing headaches that often start on one side of the head. They may cause nausea and vomiting and make you sensitive to light, sound, or smell. Without treatment, migraines can last from 4 hours to a few days. Medicines can help prevent migraines or stop them after they have started. Your doctor can help you find which ones work best for you. Follow-up care is a key part of your treatment and safety. Be sure to make and go to all appointments, and call your doctor if you are having problems. It's also a good idea to know your test results and keep a list of the medicines you take. How can you care for yourself at home? · Do not drive if you have taken a prescription pain medicine. · Rest in a quiet, dark room until your headache is gone. Close your eyes, and try to relax or go to sleep. Don't watch TV or read. · Put a cold, moist cloth or cold pack on the painful area for 10 to 20 minutes at a time.  Put a thin cloth between the cold pack and your skin. · Use a warm, moist towel or a heating pad set on low to relax tight shoulder and neck muscles. · Have someone gently massage your neck and shoulders. · Take your medicines exactly as prescribed. Call your doctor if you think you are having a problem with your medicine. You will get more details on the specific medicines your doctor prescribes. · Don't take medicine for headache pain too often. Talk to your doctor if you are taking medicine more than 2 days a week to stop a headache. Taking too much pain medicine can lead to more headaches. These are called medicine-overuse headaches. To prevent migraines  · Keep a headache diary so you can figure out what triggers your headaches. Avoiding triggers may help you prevent headaches. Record when each headache began, how long it lasted, and what the pain was like. Write down any other symptoms you had with the headache, such as nausea, flashing lights or dark spots, or sensitivity to bright light or loud noise. Note if the headache occurred near your period. List anything that might have triggered the headache. Triggers may include certain foods (chocolate, cheese, wine) or odors, smoke, bright light, stress, or lack of sleep. · If your doctor has prescribed medicine for your migraines, take it as directed. You may have medicine that you take only when you get a migraine and medicine that you take all the time to help prevent migraines. ? If your doctor has prescribed medicine for when you get a headache, take it at the first sign of a migraine, unless your doctor has given you other instructions. ? If your doctor has prescribed medicine to prevent migraines, take it exactly as prescribed. Call your doctor if you think you are having a problem with your medicine. · Find healthy ways to deal with stress. Migraines are most common during or right after stressful times.  Try finding ways to reduce stress like practicing mindfulness or deep breathing exercises. · Get plenty of sleep and exercise. But be careful to not push yourself too hard during exercise. It may trigger a headache. · Eat meals on a regular schedule. Avoid foods and drinks that often trigger migraines. These include chocolate, alcohol (especially red wine and port), aspartame, monosodium glutamate (MSG), and some additives found in foods (such as hot dogs, werner, cold cuts, aged cheeses, and pickled foods). · Limit caffeine. Don't drink too much coffee, tea, or soda. But don't quit caffeine suddenly. That can also give you migraines. · Do not smoke or allow others to smoke around you. If you need help quitting, talk to your doctor about stop-smoking programs and medicines. These can increase your chances of quitting for good. · If you are taking birth control pills or hormone therapy, talk to your doctor about whether they are triggering your migraines. When should you call for help? Call 911 anytime you think you may need emergency care. For example, call if:    · You have signs of a stroke. These may include:  ? Sudden numbness, paralysis, or weakness in your face, arm, or leg, especially on only one side of your body. ? Sudden vision changes. ? Sudden trouble speaking. ? Sudden confusion or trouble understanding simple statements. ? Sudden problems with walking or balance. ? A sudden, severe headache that is different from past headaches. Call your doctor now or seek immediate medical care if:    · You have new or worse nausea and vomiting.     · You have a new or higher fever.     · Your headache gets much worse. Watch closely for changes in your health, and be sure to contact your doctor if:    · You are not getting better after 2 days (48 hours). Where can you learn more? Go to http://www.gray.com/  Enter I362 in the search box to learn more about \"Migraine Headache: Care Instructions. \"  Current as of: November 20, 2203               IRQMAAU Version: 12.6  © 8145-1511 [employer]. Care instructions adapted under license by [employer] (which disclaims liability or warranty for this information). If you have questions about a medical condition or this instruction, always ask your healthcare professional. [address] any warranty or liability for your use of this information.

## 2021-02-11 NOTE — PROGRESS NOTES
OCCUPATIONAL THERAPY EVALUATION/DISCHARGE  Patient: Aubrey Guo (27 y.o. female)  Date: 2/11/2021  Primary Diagnosis: Left-sided weakness [R53.1]       Precautions:  Fall    ASSESSMENT  Based on the objective data described below, the patient presents with mild impaired sensation and strength L hand and c/o twitching in L face and clouded vision in L eye. Despite these impairments pt is independent with all ADLs and functional mobility with good safety awareness. Educated pt on use of her vision to compensate for L hand impaired sensation and she demonstrated good understanding. CT of head negative for acute process. Pt admitted with migraine. No further skilled OT required at this time. Current Level of Function (ADLs/self-care):  independent with all ADLs and functional mobility    Functional Outcome Measure: The patient scored Total A-D  Total A-D (Motor Function): 66/66 on the Fugl-Smallwood Assessment which is indicative of no impairment in upper extremity functional status. Other factors to consider for discharge: see above     PLAN :  Recommendation for discharge: (in order for the patient to meet his/her long term goals)  No skilled occupational therapy/ follow up rehabilitation needs identified at this time. This discharge recommendation:  Has not yet been discussed the attending provider and/or case management    IF patient discharges home will need the following DME:none       SUBJECTIVE:   Patient stated I feel mostly better, but my head still hurts.     OBJECTIVE DATA SUMMARY:   HISTORY:   Past Medical History:   Diagnosis Date    Abnormal Pap smear     Anemia     Gastrointestinal disorder     Ulcers    Headache     Herniated disc     Hx gestational diabetes     HX OTHER MEDICAL     trich treated at beginning of pregnancy    HX OTHER MEDICAL     pituitary tumor-benign     Miscarriage     Molar pregnancy     Pap smear for cervical cancer screening 2/13/19 neg HPV POS- rp pap one year    PCOS (polycystic ovarian syndrome)     Pelvic pain in female     Pituitary adenoma ([address]     Pituitary tumor     Psychiatric problem     depression never on medication    Thyroid activity decreased     hypothyroid awaiting appnt for endo     Past Surgical History:   Procedure Laterality Date    HX  SECTION      HX COLPOSCOPY  11/3/10    HX DILATION AND CURETTAGE      HX LIPOMA RESECTION         Prior Level of Function/Environment/Context: Independent, drives, works as  at [employer] 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 Expanded or extensive additional review of patient history:     Home Situation  Home Environment: Private residence  # Steps to Enter: 0  One/Two Story Residence: One story  Living Alone: No  Support Systems: Child(ronan), Friends \ neighbors, Family member(s)(children 13y/o and 8 y/o twins)  Patient Expects to be Discharged to[de-identified] Private residence  Current DME Used/Available at Home: None  Tub or Shower Type: Tub/Shower combination    Hand dominance: Right    EXAMINATION OF PERFORMANCE DEFICITS:  Cognitive/Behavioral Status:  Neurologic State: Alert; Appropriate for age  Orientation Level: Oriented X4  Cognition: Appropriate decision making; Appropriate for age attention/concentration; Appropriate safety awareness; Follows commands  Perception: Appears intact  Perseveration: No perseveration noted  Safety/Judgement: Awareness of environment; Fall prevention; Insight into deficits    Hearing: Auditory  Auditory Impairment: None    Vision/Perceptual:    Acuity: Able to read clock/calendar on wall without difficulty; Able to read employee name badge without difficulty(per pt cloudy mist in L eye since yesterday)         Range of Motion:  AROM: Within functional limits  PROM: Within functional limits                      Strength:  Strength: Generally decreased, functional(RUE 5/5 and LUE 4/5) Coordination:  Coordination: Within functional limits  Fine Motor Skills-Upper: Left Impaired;Right Intact    Gross Motor Skills-Upper: Left Intact; Right Intact    Tone & Sensation:  Tone: Normal  Sensation: Impaired(LUE \"pins and needles\" in hand)                      Balance:  Sitting: Intact  Standing: Intact    Functional Mobility and Transfers for ADLs:  Bed Mobility:  Rolling: Independent  Supine to Sit: Independent  Sit to Supine: Independent  Scooting: Independent    Transfers:  Sit to Stand: Independent  Stand to Sit: Independent  Bed to Chair: Independent  Bathroom Mobility: Independent  Toilet Transfer : Independent    ADL Assessment:  Feeding: Independent    Oral Facial Hygiene/Grooming: Independent    Bathing: Independent    Upper Body Dressing: Independent    Lower Body Dressing: Independent    Toileting: Independent    Cognitive Retraining  Safety/Judgement: Awareness of environment; Fall prevention; Insight into deficits    Functional Measure:  Fugl-Smallwood Assessment of Motor Recovery after Stroke: LUE  Reflex Activity  Flexors/Biceps/Fingers: Can be elicited  Extensors/Triceps: Can be elicited  Reflex Subtotal: 4    Volitional Movement Within Synergies  Shoulder Retraction: Full  Shoulder Elevation: Full  Shoulder Abduction (90 degrees): Full  Shoulder External Rotation: Full  Elbow Flexion: Full  Forearm Supination: Full  Shoulder Adduction/Internal Rotation: Full  Elbow Extension: Full  Forearm Pronation: Full  Subtotal: 18    Volitional Movement Mixing Synergies  Hand to Lumbar Spine: Full  Shoulder Flexion (0-90 degrees): Full  Pronation-Supination: Full  Subtotal: 6    Volitional Movement With Little or No Synergy  Shoulder Abduction (0-90 degrees): Full  Shoulder Flexion ( degrees): Full  Pronation/Supination: Full  Subtotal : 6    Normal Reflex Activity  Biceps, Triceps, Finger Flexors:  Full  Subtotal : 2    Upper Extremity Total   Upper Extremity Total: 36    Wrist  Stability at 15 Degree Dorsiflexion: Full  Repeated Dorsiflexion/ Volar Flexion: Full  Stability at 15 Degree Dorsiflexion: Full  Repeated Dorsiflexion/ Volar Flexion: Full  Circumduction: Full  Wrist Total: 10    Hand  Mass Flexion: Full  Mass Extension: Full  Grasp A: Full  Grasp B: Full  Grasp C: Full  Grasp D: Full  Grasp E: Full  Hand Total: 14    Coordination/Speed  Tremor: None  Dysmetria: None  Time: <1s(RUE 5 se and LUE 6 sec)  Coordination/Speed Total : 6    Total A-D  Total A-D (Motor Function): 66/66     This is a reliable/valid measure of arm function after a neurological event. It has established value to characterize functional status and for measuring spontaneous and therapy-induced recovery; tests proximal and distal motor functions. Fugl-Smallwood Assessment  UE scores recorded between five and 30 days post neurologic event can be used to predict UE recovery at six months post neurologic event. Severe = 0-21 points   Moderately Severe = 22-33 points   Moderate = 34-47 points   Mild = 48-66 points  MISAEL Liu, TORO Pardo, & GRADY Gage (1992). Measurement of motor recovery after stroke: Outcome assessment and sample size requirements.  Stroke, 23, pp. 2406-2052.   ------------------------------------------------------------------------------------------------------------------------------------------------------------------  MCID:  Stroke:   Dede Lynn, 2001; n = 171; mean age 79 (5) years; assessed within 16 (12) days of stroke, Acute Stroke)  FMA Motor Scores from Admission to Discharge   10 point increase in FMA Upper Extremity = 1.5 change in discharge FIM   10 point increase in FMA Lower Extremity = 1.9 change in discharge FIM  MDC:   Stroke:   Festus Adames et al, 2008, n = 14, mean age = 59.9 (14.6) years, assessed on average 14 (6.5) months post stroke, Chronic Stroke)   FMA = 5.2 points for the Upper Extremity portion of the assessment     Occupational Therapy Evaluation Charge Determination   History Examination Decision-Making   LOW Complexity : Brief history review  LOW Complexity : 1-3 performance deficits relating to physical, cognitive , or psychosocial skils that result in activity limitations and / or participation restrictions  LOW Complexity : No comorbidities that affect functional and no verbal or physical assistance needed to complete eval tasks       Based on the above components, the patient evaluation is determined to be of the following complexity level: LOW   Pain Ratin/10 HA    Activity Tolerance:   Good    After treatment patient left in no apparent distress:    Supine in bed and Call bell within reach    COMMUNICATION/EDUCATION:   The patients plan of care was discussed with: Registered nurse. Patient was educated regarding her deficit(s) of  impaired sensation and strength L hand and c/o twitching in L face and clouded vision in L eye as this relates to her diagnosis of migraine. She demonstrated Good understanding as evidenced by awareness of situation. Patient and/or family was verbally educated on the BE FAST acronym for signs/symptoms of CVA and TIA. All questions answered with patient indicating good understanding.      Thank you for this referral.  Elana Gagnon OT  Time Calculation: 21 mins

## 2021-02-11 NOTE — MED STUDENT NOTES
[address] Office [phone] Fax [phone] Assessment and Plan Jeff Em is a 27 y.o. female admitted for left-sided temporal migraine with associated LUE numbness, weakness and palpitations. 24 Hour Events: None Migraine w/ LUE weakness 
-The patient's weakness remained 4/5 LUE but the numbness and tingling has resolved 
-Remainder of neuro exam within normal limits 
-CT head, CT head/neck and MRI Brain all found to be normal studies 
-Awaiting neurology recommendations 
-CBC, BMP, Phosphorus and Magnesium within normal limits -ECHO showed EF 65-70% with normal wall movement 
-Medically stable and clear for discharge pending neurology recommendations Pituitary microadenoma 
-Stable from MRI on 2020 
-TSH, 0.72 Palpitations 
-Problem has resolved 
-EKG from this admission was unchanged from previous EKG on 2020 GERD 
-Protonix 40mg qd Obesity 
-Body mass index is 34.69 kg/m². -Encourage lifestyle modification and further follow up with PCP outpatient. Darryl Santana Subjective / Objective Subjective: Pt was seen and examined at bedside. Afebrile and hemodynamically stable. Denies chest pain, SOB, nausea, vomiting, abdominal pain, dizziness. Objective: 
Temp (24hrs), Av.1 °F (36.7 °C), Min:98 °F (36.7 °C), Max:98.4 °F (36.9 °C) Visit Vitals BP (!) 111/58 (BP 1 Location: Left upper arm, BP Patient Position: At rest) Pulse 67 Temp 98.1 °F (36.7 °C) Resp 16 Ht 5' 6\" (1.676 m) Wt 97.5 kg (214 lb 15.2 oz) SpO2 98% BMI 34.69 kg/m² Physical Exam: 
General: No acute distress. Alert. Cooperative. Dressing covering previous port site. Respiratory: CTAB. No w/r/r/c.  
Cardiovascular: Normal S1,S2. No m/r/g. GI: Nondistended.+ bowel sounds. Nontender. No rebound tenderness or guarding. Extremities: No LE edema. Skin: Warm, dry. No rashes. Neuro: Alert and oriented. 5/5 strength b/l upper and lower extremities. Respiratory:   O2 Device: Room air I/O: 
Date 02/10/21 0700 - 02/11/21 7478 02/11/21 0700 - 02/12/21 3900 Shift [id number] 1900-0659 24 Hour Total 9543-0517 7173-0221 24 Hour Total  
INTAKE  
I.V.(mL/kg/hr)  37.5 37.5 Volume (0.9% sodium chloride infusion)  37.5 37.5 Shift Total(mL/kg)  37.5(0.4) 37.5(0.4) OUTPUT Urine(mL/kg/hr) Urine Occurrence(s)  1 x 1 x Shift Total(mL/kg) NET  37.5 37.5 Weight (kg) 97.5 97.5 97.5 97.5 97.5 97.5 Inpatient Medications Current Facility-Administered Medications Medication Dose Route Frequency  acetaminophen (TYLENOL) tablet 650 mg  650 mg Oral Q4H PRN  
 ondansetron (ZOFRAN) injection 4 mg  4 mg IntraVENous Q6H PRN  
 aspirin chewable tablet 81 mg  81 mg Oral DAILY  enoxaparin (LOVENOX) injection 40 mg  40 mg SubCUTAneous Q24H  pantoprazole (PROTONIX) tablet 40 mg  40 mg Oral ACB Allergies Allergies Allergen Reactions  Tomato Angioedema CBC: 
Recent Labs  
  02/11/21 
0343 02/10/21 
1528 WBC 8.9 9.9 HGB 12.1 12.0 HCT 37.1 36.8  251 Metabolic Panel: 
Recent Labs  
  02/11/21 
0343 02/10/21 
1734 02/10/21 
1528   --  139  
K 4.0  --  4.6 *  --  108 CO2 21  --  24 BUN 10  --  9  
CREA 0.79  --  0.81 *  --  76  
CA 8.6  --  8.4* MG 2.1  --  2.1 PHOS 2.6  --   --   
ALB  --   --  3.6 ALT  --   --  27 INR  --  1.1  -- Procedures: None Imaging/Diagnostic Studies: 
[place] [facility] [place] on 02/10/21 XR CHEST PA LAT Narrative Exam:  2 view chest 
 
Indication: Chest pain. COMPARISON: 5/7/2020. PA and lateral views demonstrate normal heart size. The patient is on a cardiac 
monitor There is no acute process in the lung fields. The osseous structures are 
unremarkable. Impression 1. Normal chest radiograph Results from Hospital Encounter encounter on 03/07/18 US PELV NON OBS Narrative Clinical history: Vaginal bleeding Pelvic ultrasound: Poor visualization. No free fluid. Impression IMPRESSION: Limited  Correlation with transvaginal ultrasound will be performed. Transvaginal ultrasound: Realtime sonographic imaging of the pelvis was 
performed transvaginally. The uterus  is normal in appearance. The endometrial 
stripe measures 7 mm. The right ovary measures 2.5 x 3 point cm and the left 
ovary measures 3.1 x 2.8 cm. They are normal in appearance. Cervix is within 
normal limits. No significant free fluid. Samla Dustin IMPRESSION:  
Minimal fluid in the endometrial canal likely related to phase of cycle. Minimal fluid in the cul-de-sac. Otherwise unremarkable exam.. Results from [facility] [place] Encounter encounter on 02/10/21 CTA HEAD NECK W CONT Narrative PRELIMINARY REPORT No focal process. No dissection Preliminary report was provided by Dr. Jovani Gaviria, 
the on-call radiologist, at 1357 hours Final report to follow. END PRELIMINARY REPORT 
 
 
 
EXAM: CTA HEAD NECK W CONT INDICATION: headache, facial numbness, chest pain COMPARISON: CT the head from earlier same day. MRI of the brain dated January 19, 2020. CONTRAST: 100 mL of Isovue-370. TECHNIQUE:  Unenhanced  images were obtained to localize the volume for 
acquisition. Multislice helical axial CT angiography was performed from the 
aortic arch to the top of the head during uneventful rapid bolus intravenous 
contrast administration. Coronal and sagittal reformations and 3D post 
processing was performed. CT dose reduction was achieved through use of a 
standardized protocol tailored for this examination and automatic exposure 
control for dose modulation. FINDINGS: 
 
DELAYED ENHANCEMENT HEAD CT No intra or extra-axial mass or collection. Gray-white differentiation is well-preserved. Ventricles are normal in size and configuration. The basal 
cisterns are patent. Dural venous sinuses are patent. No abnormal parenchymal or meningeal 
enhancement. Mastoid air cells and paranasal sinuses are clear. CTA NECK There is conventional three vessel arch anatomy. The bilateral subclavian, 
common carotid, and internal carotid arteries are patent with no flow-limiting 
stenosis. % of right carotid artery stenosis: 0 
% of left carotid artery stenosis: 0 
 
NASCET method was utilized for calculating stenosis. The vertebral arteries are codominant and patent. The cervical soft tissues are 
unremarkable. . 
 
CTA HEAD Widely patent bilateral intracranial internal carotid arteries, without 
hemodynamically significant stenosis. The bilateral middle and anterior cerebral 
arteries are patent. The anterior communicating artery is diminutive. Bilateral V4 segments are patent. The basilar artery is patent. Bilateral fetal 
origin of the posterior cerebral arteries. Bilateral P1 segments are 
hypoplastic. No intracranial aneurysm. No intracranial hemodynamically significant stenosis. Impression No acute vascular abnormality. No large vessel occlusion. No procedure found. For Billing Chief Complaint Patient presents with  
 Headache  Chest Pain Hospital Problems  Date Reviewed: 2/10/2021 Codes Class Noted POA Left-sided weakness ICD-10-CM: R53.1 ICD-9-CM: 728.87  2/10/2021 Unknown * (Principal) Migraine headache ICD-10-CM: X74.922 ICD-9-CM: 346.90  2/10/2021 Unknown Severe obesity ([facility] [address] ICD-10-CM: E66.01 
ICD-9-CM: 278.01  2/13/2019 Yes Pituitary adenoma ([facility] [address] ICD-10-CM: D35.2 ICD-9-CM: 227.3  9/22/2016 Yes *ATTENTION:  This note has been created by a medical student for educational purposes only. Please do not refer to the content of this note for clinical decision-making, billing, or other purposes. Please see attending physicians note to obtain clinical information on this patient. *

## 2021-02-11 NOTE — PROGRESS NOTES
Physical Therapy  Orders received and chart reviewed. Patient has been up ad reginald in the room. NO further symptoms. She is at her baseline for mobility.    Herbert Blake PT,DPT,NCS

## 2021-02-11 NOTE — ED NOTES
TRANSFER - OUT REPORT:    Verbal report given to Pema(name) on Marti Bell  being transferred to 3rd floor(unit) for routine progression of care       Report consisted of patients Situation, Background, Assessment and   Recommendations(SBAR). Information from the following report(s) SBAR, ED Summary, [facility] and Recent Results was reviewed with the receiving nurse. Lines:   Peripheral IV 02/10/21 Right Antecubital (Active)        Opportunity for questions and clarification was provided.       Patient transported with:   Registered Nurse

## 2021-02-12 ENCOUNTER — OFFICE VISIT (OUTPATIENT)
Dept: OBGYN CLINIC | Age: 31
End: 2021-02-12
Payer: MEDICAID

## 2021-02-12 VITALS
HEART RATE: 66 BPM | BODY MASS INDEX: 34.93 KG/M2 | DIASTOLIC BLOOD PRESSURE: 82 MMHG | SYSTOLIC BLOOD PRESSURE: 127 MMHG | WEIGHT: 216.4 LBS

## 2021-02-12 DIAGNOSIS — Z30.42 ENCOUNTER FOR MANAGEMENT AND INJECTION OF DEPO-PROVERA: Primary | ICD-10-CM

## 2021-02-12 PROCEDURE — 96372 THER/PROPH/DIAG INJ SC/IM: CPT | Performed by: OBSTETRICS & GYNECOLOGY

## 2021-02-12 RX ORDER — MEDROXYPROGESTERONE ACETATE 150 MG/ML
150 INJECTION, SUSPENSION INTRAMUSCULAR ONCE
Status: COMPLETED | OUTPATIENT
Start: 2021-02-12 | End: 2021-02-12

## 2021-02-12 RX ADMIN — MEDROXYPROGESTERONE ACETATE 150 MG: 150 INJECTION, SUSPENSION INTRAMUSCULAR at 15:16

## 2021-02-12 NOTE — PROGRESS NOTES
Last Depo-Provera injection: 11/30/2020  Side Effects if any: none  Serum HCG indicated? n/a  Depo-Provera 150 mg IM given by: Roberto Olivier in gluteus ( right). Next Depo-Provera injection due: 4/30/21-5/14/21    Administered 150mg/mL Depo-Provera per orders of Dr. Candice Patel. Verbal consent received by Ms. Miguel A Carey & injection given. Patient tolerated well, no complications and no side effects. Encouraged her to remain in clinic for 15 minutes and immediately report any adverse reactions. Patient informed of next injection date ranges and encouraged to schedule. She verbalized understanding and expressed intent to comply.       Pt supplied  Lot#: [id number]  EXP: 09/2022  NDC: [address]-7

## 2021-02-21 ENCOUNTER — HOSPITAL ENCOUNTER (EMERGENCY)
Age: 31
Discharge: HOME | End: 2021-02-21
Attending: STUDENT IN AN ORGANIZED HEALTH CARE EDUCATION/TRAINING PROGRAM
Payer: MEDICAID

## 2021-02-21 VITALS
HEART RATE: 71 BPM | WEIGHT: 210 LBS | OXYGEN SATURATION: 99 % | DIASTOLIC BLOOD PRESSURE: 81 MMHG | SYSTOLIC BLOOD PRESSURE: 138 MMHG | BODY MASS INDEX: 33.75 KG/M2 | RESPIRATION RATE: 13 BRPM | HEIGHT: 66 IN | TEMPERATURE: 98.4 F

## 2021-02-21 DIAGNOSIS — R29.898 WEAKNESS OF LEFT LOWER EXTREMITY: ICD-10-CM

## 2021-02-21 DIAGNOSIS — R29.898 UPPER EXTREMITY WEAKNESS: ICD-10-CM

## 2021-02-21 DIAGNOSIS — R29.810 FACIAL WEAKNESS: ICD-10-CM

## 2021-02-21 DIAGNOSIS — Z86.69 HX OF MIGRAINE HEADACHES: Primary | ICD-10-CM

## 2021-02-21 PROCEDURE — 99284 EMERGENCY DEPT VISIT MOD MDM: CPT

## 2021-02-21 RX ORDER — RIZATRIPTAN BENZOATE 10 MG/1
10 TABLET, ORALLY DISINTEGRATING ORAL AS NEEDED
Qty: 3 TAB | Refills: 0 | Status: SHIPPED | OUTPATIENT
Start: 2021-02-21 | End: 2021-06-28

## 2021-02-21 NOTE — DISCHARGE INSTRUCTIONS
Increase amitriptyline to 2 pills at bedtime  Return if symptoms worsen  Follow up with neurology to schedule EEG

## 2021-02-21 NOTE — ED NOTES
On phone a second time with the pharmacist and they will try to reverify the solumedrol; mixture again.

## 2021-02-21 NOTE — ED NOTES
Patient tolerated IV medications well. Has been sleeping intermittently and drinking a soda. No nausea or vomiting; \"My face feels so much better. \" Drooping to left face is almost resolved

## 2021-02-21 NOTE — ED NOTES
Spoke with [facility] pharmacist and they are working on how to mix the medication for our department

## 2021-02-21 NOTE — ED PROVIDER NOTES
The patient is a 69-year-old female prior history of migraine headaches, PCOS, pituitary adenoma, depression presenting to the emergency department today secondary to facial weakness and left-sided arm and leg numbness/weakness. Her symptoms started about 2 weeks ago. She was seen in the emergency department, given a headache cocktail and admitted to the hospital.  She had an essentially negative work-up and was diagnosed with complex migraine and then sent home from the hospital.  Patient states that she continues to take the medications as prescribed (although she has completed the steroid course) and her symptoms seem to be worsening. She has intermittent mild headaches, minimal right now however the symptoms of weakness on the left side persist and seem to be getting worse. She states that she cannot keep water in her mouth or close her eyes. The left side of her face feels numb. She also reports that she has numbness in the left arm and leg with dragging of the left lower extremity. She denies any chest pain, shortness of breath, fever, cough or other complaints at this time. Per chart review patient admitted on 2/10 for similar presentation. Had unremarkable CT, CTA, MRI w/wo. Was seen by neurology. Given sumatriptan then started on prednisone and amitryptaline.             Past Medical History:   Diagnosis Date    Abnormal Pap smear     Anemia     Gastrointestinal disorder     Ulcers    Headache     Herniated disc     Hx gestational diabetes     HX OTHER MEDICAL     trich treated at beginning of pregnancy    HX OTHER MEDICAL     pituitary tumor-benign     Miscarriage     Molar pregnancy     Pap smear for cervical cancer screening 2/13/19 neg HPV POS- rp pap one year    PCOS (polycystic ovarian syndrome)     Pelvic pain in female     [facility]uitary aden[facility] ([facility] [address]     Pituitary tumor     Psychiatric problem     depression never on medication    Thyroid activity decreased     hypothyroid awaiting appnt for endo       Past Surgical History:   Procedure Laterality Date    HX  SECTION  2014    HX COLPOSCOPY  11/3/10    HX DILATION AND CURETTAGE      HX LIPOMA RESECTION           Family History:   Problem Relation Age of Onset    Diabetes Father     Headache Mother        Social History     Socioeconomic History    Marital status: SINGLE     Spouse name: Not on file    Number of children: Not on file    Years of education: Not on file    Highest education level: Not on file   Occupational History    Not on file   Social Needs    Financial resource strain: Not on file    Food insecurity     Worry: Not on file     Inability: Not on file    Transportation needs     Medical: Not on file     Non-medical: Not on file   Tobacco Use    Smoking status: Never Smoker    Smokeless tobacco: Never Used   Substance and Sexual Activity    Alcohol use: Yes     Comment: social    Drug use: No    Sexual activity: Not Currently     Birth control/protection: Injection   Lifestyle    Physical activity     Days per week: Not on file     Minutes per session: Not on file    Stress: Not on file   Relationships    Social connections     Talks on phone: Not on file     Gets together: Not on file     Attends Mosque service: Not on file     Active member of club or organization: Not on file     Attends meetings of clubs or organizations: Not on file     Relationship status: Not on file    Intimate partner violence     Fear of current or ex partner: Not on file     Emotionally abused: Not on file     Physically abused: Not on file     Forced sexual activity: Not on file   Other Topics Concern    Not on file   Social History Narrative    Not on file         ALLERGIES: Tomato    Review of Systems   Constitutional: Negative for chills and fever. HENT: Negative for congestion and rhinorrhea. Eyes: Negative for redness and visual disturbance.    Respiratory: Negative for cough and shortness of breath. Cardiovascular: Negative for chest pain and leg swelling. Gastrointestinal: Negative for abdominal pain, diarrhea, nausea and vomiting. Genitourinary: Negative for dysuria, flank pain, frequency, hematuria and urgency. Musculoskeletal: Negative for arthralgias, back pain, myalgias and neck pain. Skin: Negative for rash and wound. Allergic/Immunologic: Negative for immunocompromised state. Neurological: Positive for facial asymmetry, weakness, numbness and headaches. Negative for dizziness. Vitals:    02/21/21 1114   BP: (!) 159/86   Pulse: 76   Resp: 15   Temp: 98.4 °F (36.9 °C)   SpO2: 99%   Weight: 95.3 kg (210 lb)   Height: 5' 6\" (1.676 m)            Physical Exam  Vitals signs and nursing note reviewed. Constitutional:       General: She is not in acute distress. Appearance: She is well-developed. She is not diaphoretic. HENT:      Head: Normocephalic. Mouth/Throat:      Pharynx: No oropharyngeal exudate. Eyes:      General:         Right eye: No discharge. Left eye: No discharge. Pupils: Pupils are equal, round, and reactive to light. Neck:      Musculoskeletal: Normal range of motion and neck supple. Cardiovascular:      Rate and Rhythm: Normal rate and regular rhythm. Heart sounds: Normal heart sounds. No murmur. No friction rub. No gallop. Pulmonary:      Effort: Pulmonary effort is normal. No respiratory distress. Breath sounds: Normal breath sounds. No stridor. No wheezing or rales. Abdominal:      General: Bowel sounds are normal. There is no distension. Palpations: Abdomen is soft. Tenderness: There is no abdominal tenderness. There is no guarding or rebound. Musculoskeletal: Normal range of motion. General: No deformity. Skin:     General: Skin is warm and dry. Capillary Refill: Capillary refill takes less than 2 seconds. Findings: No rash.    Neurological:      Mental Status: She is alert and oriented to person, place, and time. Comments: There is left-sided facial weakness in the 7th cranial nerve distribution. She is unable to fully close the left eyelid or wrinkle her forehead. Sensation to the left side of her face is diminished compared to the right. She has a left pronator drift with subjective decrease sensation in the left upper extremity. She has diminished plantar and dorsi flexion of the foot with weakness in hip extension and knee flexion compared to the right. Sensation in the left lower extremity subjectively diminished compared to the right. She ambulates with a steady gait. 2+ bilateral patellar reflexes   Psychiatric:         Behavior: Behavior normal.            Course:  I discussed with neurology, Dr. Yun Matson. He reviewed her images. He recommends she follow-up outpatient, possibly get EEG. Recommends Solu-Medrol 500 mg IV once here, increasing amitriptyline and starting rizatriptan 10 mg p.o. ODT. He will talk to her neurologist and arrange follow-up        MDM: Patient is a 76-year-old female presenting today with about 2 weeks of intermittent headaches with left-sided facial and extremity weakness/numbness. Her face almost looks as if she is got a Bell's palsy as she is unable to wrinkle the forehead or close her eye. She does not have a significant headache at this time and has days when her weakness and numbness are not associated with headaches so was concerned that this was not an atypical migraine. She has had an extensive work-up including CT, CTA head and neck, MRI with and without contrast, echo with bubble study without any acute findings. I discussed with neurology who provided the above recommendations and for follow-up. At this time in the emergency department I do not feel that any other imaging or lab work would be of benefit. She is to be discharged home and return precautions provided.             Clinical Impression:     ICD-10-CM ICD-9-CM 1. Hx of migraine headaches  Z86.69 V12.49    2. Facial weakness  R29.810 781.94    3. Upper extremity weakness  R29.898 729.89    4.  Weakness of left lower extremity  R29.898 729.89            Disposition: EDMOND Ward DO

## 2021-02-21 NOTE — ED TRIAGE NOTES
Pt presents to ED with c/o continued left side facial numbness and headache over the past two weeks. Pt was admitted for same complaint on 2/10/2021 at [address] [place] [address]. Pt states the medication she was prescribed are not helping.

## 2021-02-22 ENCOUNTER — TELEPHONE (OUTPATIENT)
Dept: NEUROLOGY | Age: 31
End: 2021-02-22

## 2021-02-22 NOTE — TELEPHONE ENCOUNTER
----- Message from Ansley Perez MD sent at 2/22/2021  8:18 AM EST -----  Regarding: RE: Follow this week  Thanks.     @ chiara Chen' scheduled hospital follow up visit. Please contact her and offer to schedule that soon if possible so we can help her with this persistent headache. Thanks. MA      ----- Message -----  From: Vernon Green MD  Sent: 2/21/2021  11:52 AM EST  To: Ansley Perez MD, James Arango  Subject: Follow this week                                 Patient showed up in the ER again for persistent moderate headache, left side facial issues and left arm paresthesia that has been constant since discharge. No benefit with current medication. Advised ER to give 500 mg of IV solumedrol. Increase dose of Amitriptyline to 50 mg at bedtime. Trial of Rizatriptan 10 mg ODT for abortive therapy.

## 2021-02-23 NOTE — TELEPHONE ENCOUNTER
Attempted to contact patient. Message left on voicemail asking for return call to set up follow up appt.

## 2021-02-24 ENCOUNTER — TELEPHONE (OUTPATIENT)
Dept: NEUROLOGY | Age: 31
End: 2021-02-24

## 2021-02-24 NOTE — TELEPHONE ENCOUNTER
----- Message from Aidee Moctezuma sent at 2/23/2021  4:27 PM EST -----  Regarding: Dr Bandar Pérez   Patient return call    Caller's first and last name and relationship (if not the patient):      Best contact number(s):[phone]      Whose call is being returned:did not check the name      Details to clarify the request:regarding scheduling an EMG test for her any day or time works ,pt gave permission to leave a message with the date and time if she misses the return call back due to her job      Aidee Moctezuma

## 2021-02-24 NOTE — TELEPHONE ENCOUNTER
Date: 2/25/2021 Status: [facility]    Time: 3:00 PM Length: 20 [id number]   Visit Type: FOLLOW UP [[id number]] Copay: $0.00    Provider: MD evette Montenegro sent to patient for FOLLOW UP visit

## 2021-03-05 ENCOUNTER — CLINICAL SUPPORT (OUTPATIENT)
Dept: CARDIOLOGY CLINIC | Age: 31
End: 2021-03-05

## 2021-03-05 ENCOUNTER — OFFICE VISIT (OUTPATIENT)
Dept: CARDIOLOGY CLINIC | Age: 31
End: 2021-03-05
Payer: MEDICAID

## 2021-03-05 VITALS
HEIGHT: 66 IN | DIASTOLIC BLOOD PRESSURE: 88 MMHG | OXYGEN SATURATION: 99 % | SYSTOLIC BLOOD PRESSURE: 130 MMHG | BODY MASS INDEX: 34.72 KG/M2 | WEIGHT: 216 LBS | HEART RATE: 75 BPM

## 2021-03-05 DIAGNOSIS — R00.2 PALPITATION: Primary | ICD-10-CM

## 2021-03-05 PROCEDURE — 93227 XTRNL ECG REC<48 HR R&I: CPT | Performed by: INTERNAL MEDICINE

## 2021-03-05 PROCEDURE — 93225 XTRNL ECG REC<48 HRS REC: CPT | Performed by: INTERNAL MEDICINE

## 2021-03-05 PROCEDURE — 99203 OFFICE O/P NEW LOW 30 MIN: CPT | Performed by: INTERNAL MEDICINE

## 2021-03-05 PROCEDURE — 93000 ELECTROCARDIOGRAM COMPLETE: CPT | Performed by: INTERNAL MEDICINE

## 2021-03-05 NOTE — PROGRESS NOTES
All orders entered per VO of Dr. Nunez Ee:      24 Hr Holter- Palpitations    Biotel # [id number] placed on patient per VO of Dr Enrique Ugarte. Dx: alpatation    24 hr monitor placed at 1515. All instructions and Diary given to patient. Pt expressed understanding. Opportunities for questions, clarifications, and concerns provided.

## 2021-03-05 NOTE — PROGRESS NOTES
Reason for Consult: Palpitations. Referring: Joseph Mccrary MD    HPI: Timoteo Nair is a 27 y.o. female with past medical history significant for PCOS, pituitary tumor, headaches, herniated disc who was admitted to the hospital on February 11 with symptoms of twitching and perioral numbness. She was evaluated by the neurology and everything turned out normal at that time. An EKG performed at the PCP office that day when she had these symptoms demonstrated T wave inversions in the anterior leads. She also felt palpitations at that time and she continues to feel palpitations since then. Symptoms of palpitations described as having feeling of skipped beats are not being aware of her heartbeat which lasts for few minutes and resolves on its own. No symptoms of chest pain, lightheadedness or dizziness. No previous cardiac history. Does not smoke tobacco.  Occasionally drinks alcohol. Does not drink heavy caffeine. EKG from our office today demonstrated normal sinus rhythm with normal axis with normal intervals with normal ST segment. EKG from February 10, 2021 was personally reviewed. EKG demonstrated normal sinus rhythm with precordial T wave V1 and V2 were negative. Normal axis. Normal intervals. Plan:    1. Palpitations: Symptoms are suggestive of likely ectopic beats. Will check 24-hour Holter monitor. Echo was normal.     2.  Abnormal EKG: Currently her T wave inversion is resolved. She has no symptoms of chest pain. Likely previous T wave inversion seen on February 10 office visit with PCP was likely due to neurological event she was experiencing. Does not seem to be cardiac in origin. Continue to monitor. 3.  Chronic follow-up of the test results. ATTENTION:   This medical record was transcribed using an electronic medical records/speech recognition system. Although proofread, it may and can contain electronic, spelling and other errors.   Corrections may be executed at a later time. Please feel free to contact us for any clarifications as needed.       Past Medical History:   Diagnosis Date    Abnormal Pap smear     Anemia     Gastrointestinal disorder     Ulcers    Headache     Herniated disc     Hx gestational diabetes     HX OTHER MEDICAL     trich treated at beginning of pregnancy    HX OTHER MEDICAL     pituitary tumor-benign     Miscarriage     Molar pregnancy     Pap smear for cervical cancer screening 19 neg HPV POS- rp pap one year    PCOS (polycystic ovarian syndrome)     Pelvic pain in female     [facility] ([facility])     Pituitary tumor     Psychiatric problem     depression never on medication    Thyroid activity decreased     hypothyroid awaiting appnt for endo            Past Surgical History:   Procedure Laterality Date    HX  SECTION      HX COLPOSCOPY  11/3/10    HX DILATION AND CURETTAGE      HX LIPOMA RESECTION               Family History   Problem Relation Age of Onset    Diabetes Father     Headache Mother            Social History     Socioeconomic History    Marital status: SINGLE     Spouse name: Not on file    Number of children: Not on file    Years of education: Not on file    Highest education level: Not on file   Occupational History    Not on file   Social Needs    Financial resource strain: Not on file    Food insecurity     Worry: Not on file     Inability: Not on file    Transportation needs     Medical: Not on file     Non-medical: Not on file   Tobacco Use    Smoking status: Never Smoker    Smokeless tobacco: Never Used   Substance and Sexual Activity    Alcohol use: Yes     Comment: social    Drug use: No    Sexual activity: Not Currently     Birth control/protection: Injection   Lifestyle    Physical activity     Days per week: Not on file     Minutes per session: Not on file    Stress: Not on file   Relationships    Social connections     Talks on phone: Not on file     Gets together: Not on file     Attends Buddhist service: Not on file     Active member of club or organization: Not on file     Attends meetings of clubs or organizations: Not on file     Relationship status: Not on file    Intimate partner violence     Fear of current or ex partner: Not on file     Emotionally abused: Not on file     Physically abused: Not on file     Forced sexual activity: Not on file   Other Topics Concern    Not on file   Social History Narrative    Not on file         Allergies   Allergen Reactions    Tomato Angioedema            Current Outpatient Medications   Medication Sig Dispense Refill    medroxyPROGESTERone (DEPO-PROVERA) 150 mg/mL injection INJECT 1ML BY INTRAMUSCULAR ROUTE ONCE FOR 1 DOSE      rizatriptan (MAXALT-MLT) 10 mg disintegrating tablet Take 1 Tab by mouth as needed for Migraine (take once per day as needed). 3 Tab 0    amitriptyline (ELAVIL) 25 mg tablet Take 1 Tab by mouth nightly. 30 Tab 0    predniSONE (STERAPRED) 5 mg dose pack See administration instruction per 5mg dose pack 21 Tab 0    acetaminophen (Tylenol Arthritis Pain) 650 mg TbER Take 1,300 mg by mouth every eight (8) hours.  butalbital-acetaminophen-caff (Fioricet) -40 mg per capsule Take 1 Cap by mouth every four (4) hours as needed for Headache. 20 Cap 0    ondansetron (ZOFRAN ODT) 4 mg disintegrating tablet Take 1 Tab by mouth every eight (8) hours as needed for Nausea. 20 Tab 0    ibuprofen (MOTRIN) 600 mg tablet Take 1 Tab by mouth every eight (8) hours as needed for Pain. 20 Tab 0    omeprazole (PriLOSEC OTC) 20 mg tablet Take 1 Tab by mouth daily. 30 Tab 5        ROS:  12 point review of systems was performed.  All negative except for HPI     Physical Exam:  Visit Vitals  /88 (BP 1 Location: Left upper arm, BP Patient Position: Sitting, BP Cuff Size: Adult)   Pulse 75   Ht 5' 6\" (1.676 m)   Wt 216 lb (98 kg)   SpO2 99%   BMI 34.86 kg/m²       Gen:  Well-developed, well-nourished, in no acute distress  HEENT:  Pink conjunctivae, PERRL, hearing intact to voice, moist mucous membranes  Neck:  Supple, without masses, thyroid non-tender  Resp:  No accessory muscle use, clear breath sounds without wheezes rales or rhonchi  Card:  No murmurs, normal S1, S2 without thrills, bruits or peripheral edema  Abd:  Soft, non-tender, non-distended, normoactive bowel sounds are present, no palpable organomegaly and no detectable hernias  Lymph:  No cervical or inguinal adenopathy  Musc:  No cyanosis or clubbing  Skin:  No rashes or ulcers, skin turgor is good  Neuro:  Cranial nerves are grossly intact, no focal motor weakness, follows commands appropriately  Psych:  Good insight, oriented to person, place and time, alert     Labs:     Lab Results   Component Value Date/Time    WBC 8.9 02/11/2021 03:43 AM    HGB 12.1 02/11/2021 03:43 AM    Hemoglobin (POC) 8.2 (L) 12/10/2013 01:20 AM    HCT 37.1 02/11/2021 03:43 AM    Hematocrit (POC) 41 01/17/2019 11:33 AM    PLATELET 943 38/94/7944 03:43 AM    MCV 80.8 02/11/2021 03:43 AM    Hgb, External 8.8 12/30/2013    Hct, External 27.4 12/30/2013     Lab Results   Component Value Date/Time    Hemoglobin A1c 5.7 (H) 02/11/2021 03:43 AM    Hemoglobin A1c 5.7 (H) 04/01/2019 09:44 AM    Hemoglobin A1c 5.8 (H) 03/03/2017 04:12 PM    Glucose 134 (H) 02/11/2021 03:43 AM    Glucose (POC) 95 01/17/2019 11:33 AM    Glucose (POC) 100 11/02/2018 10:49 PM    LDL, calculated 148 (H) 02/11/2021 03:43 AM    Creatinine (POC) 0.8 01/17/2019 11:33 AM    Creatinine 0.79 02/11/2021 03:43 AM      Lab Results   Component Value Date/Time    Cholesterol, total 210 (H) 02/11/2021 03:43 AM    HDL Cholesterol 56 02/11/2021 03:43 AM    LDL, calculated 148 (H) 02/11/2021 03:43 AM    Triglyceride 30 02/11/2021 03:43 AM    CHOL/HDL Ratio 3.8 02/11/2021 03:43 AM     Lab Results   Component Value Date/Time    ALT (SGPT) 27 02/10/2021 03:28 PM    Alk.  phosphatase 109 02/10/2021 03:28 PM    Bilirubin, direct <0.1 02/10/2021 03:28 PM    Bilirubin, total 0.3 02/10/2021 03:28 PM    Albumin 3.6 02/10/2021 03:28 PM    Protein, total 7.0 02/10/2021 03:28 PM    INR 1.1 02/10/2021 05:34 PM    Prothrombin time 11.3 (H) 02/10/2021 05:34 PM    PLATELET 658 00/27/0946 03:43 AM     Lab Results   Component Value Date/Time    INR 1.1 02/10/2021 05:34 PM    INR 1.0 05/05/2014 07:40 PM    Prothrombin time 11.3 (H) 02/10/2021 05:34 PM    Prothrombin time 10.0 05/05/2014 07:40 PM      Lab Results   Component Value Date/Time    GFR est non-AA >60 02/11/2021 03:43 AM    GFRNA, POC >60 01/17/2019 11:33 AM    GFR est AA >60 02/11/2021 03:43 AM    GFRAA, POC >60 01/17/2019 11:33 AM    Creatinine 0.79 02/11/2021 03:43 AM    Creatinine (POC) 0.8 01/17/2019 11:33 AM    BUN 10 02/11/2021 03:43 AM    BUN (POC) 15 01/17/2019 11:33 AM    Sodium 141 02/11/2021 03:43 AM    Sodium (POC) 138 01/17/2019 11:33 AM    Potassium 4.0 02/11/2021 03:43 AM    Potassium (POC) 4.9 01/17/2019 11:33 AM    Chloride 112 (H) 02/11/2021 03:43 AM    Chloride (POC) 106 01/17/2019 11:33 AM    CO2 21 02/11/2021 03:43 AM    Magnesium 2.1 02/11/2021 03:43 AM    Phosphorus 2.6 02/11/2021 03:43 AM     No results found for: PSA, [facility], [id number], [id number]  Lab Results   Component Value Date/Time    TSH 0.72 02/11/2021 03:43 AM    T3 Uptake 23 (L) 02/13/2019 01:38 PM    T4, Free 0.98 03/03/2017 04:12 PM    T4, Total 6.8 02/13/2019 01:38 PM      Lab Results   Component Value Date/Time    Glucose 134 (H) 02/11/2021 03:43 AM    Glucose (POC) 95 01/17/2019 11:33 AM    Glucose (POC) 100 11/02/2018 10:49 PM      Lab Results   Component Value Date/Time     (H) 05/05/2014 07:40 PM    CK - MB <0.5 (L) 05/05/2014 07:40 PM    CK-MB Index CANNOT BE CALCULATED 05/05/2014 07:40 PM    Troponin-I, Qt. <0.05 02/10/2021 03:28 PM      No results found for: BNP, BNPP, BNPPPOC, XBNPT, BNPNT   Lab Results   Component Value Date/Time    Sodium 141 02/11/2021 03:43 AM    Potassium 4.0 02/11/2021 03:43 AM    Chloride 112 (H) 02/11/2021 03:43 AM    CO2 21 02/11/2021 03:43 AM    Anion gap 8 02/11/2021 03:43 AM    Glucose 134 (H) 02/11/2021 03:43 AM    BUN 10 02/11/2021 03:43 AM    Creatinine 0.79 02/11/2021 03:43 AM    BUN/Creatinine ratio 13 02/11/2021 03:43 AM    GFR est AA >60 02/11/2021 03:43 AM    GFR est non-AA >60 02/11/2021 03:43 AM    Calcium 8.6 02/11/2021 03:43 AM      Lab Results   Component Value Date/Time    Sodium 141 02/11/2021 03:43 AM    Potassium 4.0 02/11/2021 03:43 AM    Chloride 112 (H) 02/11/2021 03:43 AM    CO2 21 02/11/2021 03:43 AM    Anion gap 8 02/11/2021 03:43 AM    Glucose 134 (H) 02/11/2021 03:43 AM    BUN 10 02/11/2021 03:43 AM    Creatinine 0.79 02/11/2021 03:43 AM    BUN/Creatinine ratio 13 02/11/2021 03:43 AM    GFR est AA >60 02/11/2021 03:43 AM    GFR est non-AA >60 02/11/2021 03:43 AM    Calcium 8.6 02/11/2021 03:43 AM    Bilirubin, total 0.3 02/10/2021 03:28 PM    ALT (SGPT) 27 02/10/2021 03:28 PM    Alk. phosphatase 109 02/10/2021 03:28 PM    Protein, total 7.0 02/10/2021 03:28 PM    Albumin 3.6 02/10/2021 03:28 PM    Globulin 3.4 02/10/2021 03:28 PM    A-G Ratio 1.1 02/10/2021 03:28 PM      Lab Results   Component Value Date/Time    Hemoglobin A1c 5.7 (H) 02/11/2021 03:43 AM         No results for input(s): CPK, CKMB, TROIQ in the last 72 hours.     No lab exists for component: CKQMB, CPKMB        Problem List:     Problem List  Date Reviewed: 2/10/2021          Codes Class Noted    HLD (hyperlipidemia) ICD-10-CM: E78.5  ICD-9-CM: 272.4  2/11/2021        Prediabetes ICD-10-CM: R73.03  ICD-9-CM: 790.29  2/11/2021        Left-sided weakness ICD-10-CM: R53.1  ICD-9-CM: 728.87  2/10/2021        Migraine headache ICD-10-CM: O90.787  ICD-9-CM: 346.90  2/10/2021        Elevated prolactin level ICD-10-CM: R79.89  ICD-9-CM: 790.99  4/1/2019        Severe obesity ([address] ICD-10-CM: E66.01  ICD-9-CM: 278.01  2/13/2019        Right hand weakness ICD-10-CM: R29.898  ICD-9-CM: 728.87  11/16/2016        Numbness of right hand ICD-10-CM: R20.0  ICD-9-CM: 782.0  11/16/2016        Chronic tension-type headache, intractable ICD-10-CM: E72.623  ICD-9-CM: 339.12  11/16/2016        Intractable chronic migraine without aura and without status migrainosus ICD-10-CM: Q12.434  ICD-9-CM: 346.71  9/22/2016        P[facility] adenoma ([facility] ICD-10-CM: D35.2  ICD-9-CM: 227.3  9/22/2016                Domenico Rodrigues MD, [facility]

## 2021-03-05 NOTE — PROGRESS NOTES
Sweta Crawley is a 27 y.o. female    Visit Vitals  /88 (BP 1 Location: Left upper arm, BP Patient Position: Sitting, BP Cuff Size: Adult)   Pulse 75   Ht 5' 6\" (1.676 m)   Wt 216 lb (98 kg)   SpO2 99%   BMI 34.86 kg/m²       Chief Complaint   Patient presents with    Palpitations    Shortness of Breath       Chest pain NO  SOB NO  Dizziness NO  Swelling NO  Recent hospital visit NO  Refills NO

## 2021-03-16 ENCOUNTER — DOCUMENTATION ONLY (OUTPATIENT)
Dept: CARDIOLOGY CLINIC | Age: 31
End: 2021-03-16

## 2021-03-16 NOTE — PROGRESS NOTES
Pls notify>>    Rare ectopy. If she has no recurrent symptoms then we can observe otherwise half tablet of Toprol 25mg po daily would be fine. [employer] holter results:     Findings  Xenia Service was in Sinus. The average heart rate, excluding ectopy, was 84 BPM with a minimum of 50 BPM at 07:02 D2 and a maximum of 161 BPM at 22:07 D1. Heart beats, including ectopy, totaled [id number] beats. There were no VENTRICULAR ectopics found. SUPRAVENTRICULAR ECTOPICS totaled 1 ,with 1 single and 0 paired beats. Diary was submitted,symptoms/activities noted. No triggered event noted.

## 2021-03-16 NOTE — PROGRESS NOTES
[employer] holter results:    Findings  Caldwell Buerger was in Sinus. The average heart rate, excluding ectopy, was 84 BPM with a minimum of 50 BPM at 07:02 D2 and a maximum of 161 BPM at 22:07 D1. Heart beats, including ectopy, totaled [id number] beats. There were no VENTRICULAR ectopics found. SUPRAVENTRICULAR ECTOPICS totaled 1 ,with 1 single and 0 paired beats. Diary was submitted,symptoms/activities noted. No triggered event noted.     results sent to scanning

## 2021-03-17 ENCOUNTER — TELEPHONE (OUTPATIENT)
Dept: CARDIOLOGY CLINIC | Age: 31
End: 2021-03-17

## 2021-03-17 RX ORDER — METOPROLOL SUCCINATE 25 MG/1
12.5 TABLET, EXTENDED RELEASE ORAL DAILY
Qty: 45 TAB | Refills: 3 | Status: SHIPPED | OUTPATIENT
Start: 2021-03-17 | End: 2022-02-07

## 2021-03-17 NOTE — TELEPHONE ENCOUNTER
Per Dr. Jose Sierra, \"Rare ectopy. If she has no recurrent symptoms then we can observe otherwise half tablet of Toprol 25mg po daily would be fine. \"    Spoke with pt, I identified with name and birth date. I informed the pt of Dr. Steffi Rader recommendations. I inquired if she was having symptoms. The pt reports that she does have \" Fluttering\" feeling some times. I gave her Toprol instructions, and verified her pharmacy. Pt expressed understanding. Pt has no further questions or concerns at this time. Per VO of Dr. Zarate Quest: 3/5/2021    Future Appointments   Date Time Provider Sonali Fischer   5/13/2021  3:00 PM Valentino Borer, MD [facility]       Requested Prescriptions     Pending Prescriptions Disp Refills    metoprolol succinate (TOPROL-XL) 25 mg XL tablet 45 Tab 3     Sig: Take 0.5 Tabs by mouth daily.

## 2021-05-18 ENCOUNTER — CLINICAL SUPPORT (OUTPATIENT)
Dept: OBGYN CLINIC | Age: 31
End: 2021-05-18
Payer: MEDICAID

## 2021-05-18 DIAGNOSIS — Z30.42 ENCOUNTER FOR DEPO-PROVERA CONTRACEPTION: Primary | ICD-10-CM

## 2021-05-18 PROCEDURE — 96372 THER/PROPH/DIAG INJ SC/IM: CPT | Performed by: OBSTETRICS & GYNECOLOGY

## 2021-05-18 RX ORDER — MEDROXYPROGESTERONE ACETATE 150 MG/ML
150 INJECTION, SUSPENSION INTRAMUSCULAR ONCE
Status: COMPLETED | OUTPATIENT
Start: 2021-05-18 | End: 2021-05-18

## 2021-05-18 RX ADMIN — MEDROXYPROGESTERONE ACETATE 150 MG: 150 INJECTION, SUSPENSION INTRAMUSCULAR at 14:58

## 2021-05-18 NOTE — PROGRESS NOTES
Subjective:      Marti ZEHRA Islas Devon is here for her depoprovera injection. Patient wishes to continue depoprovera treatment for contraception. Side effects of treatment to date: none. Standing order is on patient's medication list.    UPT negative in the office today. Next injection due 8/3/21-8/17/21    Objective: There were no vitals taken for this visit. Assessment/Plan:     Stable, doing well on Depoprovera, appropriate to continue. Depoprovera 150 mg IM given. She tolerated the injection well, see Immunization activity for details.     Naeem Romo

## 2021-06-22 ENCOUNTER — HOSPITAL ENCOUNTER (EMERGENCY)
Age: 31
Discharge: HOME | End: 2021-06-22
Attending: STUDENT IN AN ORGANIZED HEALTH CARE EDUCATION/TRAINING PROGRAM
Payer: MEDICAID

## 2021-06-22 ENCOUNTER — APPOINTMENT (OUTPATIENT)
Dept: CT IMAGING | Age: 31
End: 2021-06-22
Attending: STUDENT IN AN ORGANIZED HEALTH CARE EDUCATION/TRAINING PROGRAM
Payer: MEDICAID

## 2021-06-22 VITALS
SYSTOLIC BLOOD PRESSURE: 128 MMHG | DIASTOLIC BLOOD PRESSURE: 73 MMHG | WEIGHT: 228.18 LBS | RESPIRATION RATE: 20 BRPM | HEART RATE: 73 BPM | OXYGEN SATURATION: 99 % | BODY MASS INDEX: 36.83 KG/M2 | TEMPERATURE: 99 F

## 2021-06-22 DIAGNOSIS — R07.89 CHEST TIGHTNESS: ICD-10-CM

## 2021-06-22 DIAGNOSIS — R06.02 SOB (SHORTNESS OF BREATH): Primary | ICD-10-CM

## 2021-06-22 DIAGNOSIS — J18.9 COMMUNITY ACQUIRED PNEUMONIA OF RIGHT LUNG, UNSPECIFIED PART OF LUNG: ICD-10-CM

## 2021-06-22 LAB
ALBUMIN SERPL-MCNC: 3.6 G/DL (ref 3.5–5)
ALBUMIN/GLOB SERPL: 1 {RATIO} (ref 1.1–2.2)
ALP SERPL-CCNC: 112 U/L (ref 45–117)
ALT SERPL-CCNC: 28 U/L (ref 12–78)
ANION GAP SERPL CALC-SCNC: 10 MMOL/L (ref 5–15)
AST SERPL-CCNC: 15 U/L (ref 15–37)
ATRIAL RATE: 66 BPM
BASOPHILS # BLD: 0 K/UL (ref 0–0.1)
BASOPHILS NFR BLD: 0 % (ref 0–1)
BILIRUB SERPL-MCNC: 0.3 MG/DL (ref 0.2–1)
BNP SERPL-MCNC: 36 PG/ML (ref 0–125)
BUN SERPL-MCNC: 9 MG/DL (ref 6–20)
BUN/CREAT SERPL: 10 (ref 12–20)
CALCIUM SERPL-MCNC: 8.6 MG/DL (ref 8.5–10.1)
CALCULATED P AXIS, ECG09: 43 DEGREES
CALCULATED R AXIS, ECG10: 50 DEGREES
CALCULATED T AXIS, ECG11: 3 DEGREES
CHLORIDE SERPL-SCNC: 106 MMOL/L (ref 97–108)
CO2 SERPL-SCNC: 24 MMOL/L (ref 21–32)
CREAT SERPL-MCNC: 0.9 MG/DL (ref 0.55–1.02)
DIAGNOSIS, 93000: NORMAL
DIFFERENTIAL METHOD BLD: ABNORMAL
EOSINOPHIL # BLD: 0.2 K/UL (ref 0–0.4)
EOSINOPHIL NFR BLD: 2 % (ref 0–7)
ERYTHROCYTE [DISTWIDTH] IN BLOOD BY AUTOMATED COUNT: 14.3 % (ref 11.5–14.5)
GLOBULIN SER CALC-MCNC: 3.5 G/DL (ref 2–4)
GLUCOSE SERPL-MCNC: 84 MG/DL (ref 65–100)
HCT VFR BLD AUTO: 37.6 % (ref 35–47)
HGB BLD-MCNC: 12 G/DL (ref 11.5–16)
IMM GRANULOCYTES # BLD AUTO: 0 K/UL (ref 0–0.04)
IMM GRANULOCYTES NFR BLD AUTO: 0 % (ref 0–0.5)
LYMPHOCYTES # BLD: 3.4 K/UL (ref 0.8–3.5)
LYMPHOCYTES NFR BLD: 36 % (ref 12–49)
MCH RBC QN AUTO: 26.1 PG (ref 26–34)
MCHC RBC AUTO-ENTMCNC: 31.9 G/DL (ref 30–36.5)
MCV RBC AUTO: 81.9 FL (ref 80–99)
MONOCYTES # BLD: 0.4 K/UL (ref 0–1)
MONOCYTES NFR BLD: 4 % (ref 5–13)
NEUTS SEG # BLD: 5.4 K/UL (ref 1.8–8)
NEUTS SEG NFR BLD: 58 % (ref 32–75)
NRBC # BLD: 0 K/UL (ref 0–0.01)
NRBC BLD-RTO: 0 PER 100 WBC
P-R INTERVAL, ECG05: 136 MS
PLATELET # BLD AUTO: 237 K/UL (ref 150–400)
PMV BLD AUTO: 10.3 FL (ref 8.9–12.9)
POTASSIUM SERPL-SCNC: 3.4 MMOL/L (ref 3.5–5.1)
PROT SERPL-MCNC: 7.1 G/DL (ref 6.4–8.2)
Q-T INTERVAL, ECG07: 384 MS
QRS DURATION, ECG06: 86 MS
QTC CALCULATION (BEZET), ECG08: 402 MS
RBC # BLD AUTO: 4.59 M/UL (ref 3.8–5.2)
SODIUM SERPL-SCNC: 140 MMOL/L (ref 136–145)
TROPONIN I SERPL-MCNC: <0.05 NG/ML
VENTRICULAR RATE, ECG03: 66 BPM
WBC # BLD AUTO: 9.4 K/UL (ref 3.6–11)

## 2021-06-22 PROCEDURE — 99285 EMERGENCY DEPT VISIT HI MDM: CPT

## 2021-06-22 PROCEDURE — 83880 ASSAY OF NATRIURETIC PEPTIDE: CPT

## 2021-06-22 PROCEDURE — 85025 COMPLETE CBC W/AUTO DIFF WBC: CPT

## 2021-06-22 PROCEDURE — 84484 ASSAY OF TROPONIN QUANT: CPT

## 2021-06-22 PROCEDURE — 93005 ELECTROCARDIOGRAM TRACING: CPT

## 2021-06-22 PROCEDURE — 36415 COLL VENOUS BLD VENIPUNCTURE: CPT

## 2021-06-22 PROCEDURE — 74011000636 HC RX REV CODE- 636: Performed by: STUDENT IN AN ORGANIZED HEALTH CARE EDUCATION/TRAINING PROGRAM

## 2021-06-22 PROCEDURE — 80053 COMPREHEN METABOLIC PANEL: CPT

## 2021-06-22 PROCEDURE — 71275 CT ANGIOGRAPHY CHEST: CPT

## 2021-06-22 RX ORDER — AZITHROMYCIN 250 MG/1
TABLET, FILM COATED ORAL
Qty: 6 TABLET | Refills: 0 | Status: SHIPPED | OUTPATIENT
Start: 2021-06-22 | End: 2021-06-27

## 2021-06-22 RX ORDER — ALBUTEROL SULFATE 90 UG/1
1 AEROSOL, METERED RESPIRATORY (INHALATION)
Qty: 1 INHALER | Refills: 0 | Status: SHIPPED | OUTPATIENT
Start: 2021-06-22 | End: 2022-02-07

## 2021-06-22 RX ADMIN — IOPAMIDOL 100 ML: 755 INJECTION, SOLUTION INTRAVENOUS at 15:29

## 2021-06-22 NOTE — ED PROVIDER NOTES
Patient is a 25-year-old female prior history of PE, complex migraines presenting today secondary to chest tightness and shortness of breath. Symptoms ongoing for the past 3 days. Started somewhat suddenly and getting progressively worse. Today got to the point where she could not work. Is worse with exertion.   Has had PE in the past           Past Medical History:   Diagnosis Date    Abnormal Pap smear     Anemia     Gastrointestinal disorder     Ulcers    Headache     Herniated disc     Hx gestational diabetes     HX OTHER MEDICAL     trich treated at beginning of pregnancy    HX OTHER MEDICAL     pituitary tumor-benign     Miscarriage     Molar pregnancy     Pap smear for cervical cancer screening 19 neg HPV POS- rp pap one year    PCOS (polycystic ovarian syndrome)     Pelvic pain in female     Pituitary adenoma ([facility] [address]     Pituitary tumor     Psychiatric problem     depression never on medication    Thyroid activity decreased     hypothyroid awaiting appnt for endo       Past Surgical History:   Procedure Laterality Date    HX  SECTION      HX COLPOSCOPY  11/3/10    HX DILATION AND CURETTAGE      HX LIPOMA RESECTION           Family History:   Problem Relation Age of Onset    Diabetes Father     Headache Mother        Social History     Socioeconomic History    Marital status: SINGLE     Spouse name: Not on file    Number of children: Not on file    Years of education: Not on file    Highest education level: Not on file   Occupational History    Not on file   Tobacco Use    Smoking status: Never Smoker    Smokeless tobacco: Never Used   Substance and Sexual Activity    Alcohol use: Yes     Comment: social    Drug use: No    Sexual activity: Not Currently     Birth control/protection: Injection   Other Topics Concern    Not on file   Social History Narrative    Not on file     Social Determinants of Health     Financial Resource Strain:     Difficulty of Paying Living Expenses:    Food Insecurity:     Worried About Running Out of Food in the Last Year:     [address] in the Last Year:    Transportation Needs:     Lack of Transportation (Medical):  Lack of Transportation (Non-Medical):    Physical Activity:     Days of Exercise per Week:     Minutes of Exercise per Session:    Stress:     Feeling of Stress :    Social Connections:     Frequency of Communication with Friends and Family:     Frequency of Social Gatherings with Friends and Family:     Attends Faith Services:     Active Member of Clubs or Organizations:     Attends Club or Organization Meetings:     Marital Status:    Intimate Partner Violence:     Fear of Current or Ex-Partner:     Emotionally Abused:     Physically Abused:     Sexually Abused: ALLERGIES: Tomato    Review of Systems   Constitutional: Negative for chills and fever. HENT: Negative for congestion and rhinorrhea. Eyes: Negative for redness and visual disturbance. Respiratory: Positive for chest tightness and shortness of breath. Negative for cough. Cardiovascular: Negative for leg swelling. Gastrointestinal: Negative for abdominal pain, diarrhea, nausea and vomiting. Genitourinary: Negative for dysuria, flank pain, frequency, hematuria and urgency. Musculoskeletal: Negative for arthralgias, back pain, myalgias and neck pain. Skin: Negative for rash and wound. Allergic/Immunologic: Negative for immunocompromised state. Neurological: Negative for dizziness and headaches. Vitals:    06/22/21 1335   BP: 134/83   Pulse: 76   Resp: 14   Temp: 99 °F (37.2 °C)   SpO2: 99%   Weight: 103.5 kg (228 lb 2.8 oz)            Physical Exam  Vitals and nursing note reviewed. Constitutional:       General: She is not in acute distress. Appearance: She is well-developed. She is not diaphoretic. HENT:      Head: Normocephalic. Mouth/Throat:      Pharynx: No oropharyngeal exudate. Eyes:      General:         Right eye: No discharge. Left eye: No discharge. Pupils: Pupils are equal, round, and reactive to light. Cardiovascular:      Rate and Rhythm: Normal rate and regular rhythm. Heart sounds: Normal heart sounds. No murmur heard. No friction rub. No gallop. Pulmonary:      Effort: Pulmonary effort is normal. No respiratory distress. Breath sounds: Normal breath sounds. No stridor. No wheezing or rales. Abdominal:      General: Bowel sounds are normal. There is no distension. Palpations: Abdomen is soft. Tenderness: There is no abdominal tenderness. There is no guarding or rebound. Musculoskeletal:         General: No deformity. Normal range of motion. Cervical back: Normal range of motion and neck supple. Skin:     General: Skin is warm and dry. Capillary Refill: Capillary refill takes less than 2 seconds. Findings: No rash. Neurological:      Mental Status: She is alert and oriented to person, place, and time. Psychiatric:         Behavior: Behavior normal.              EKG Interpretation:   ED Physician interpretation  Normal sinus rhythm, rate of 66, normal axis, normal intervals, no ST elevations or depressions, no arrhythmia, nonspecific ST-T wave changes      Course:  Patient's course was delayed due to difficulty with IV access. I ultimately placed a 20-gauge left AC IV under ultrasound guidance which was used for her CT scan    Lab work shows a normal BNP, no leukocytosis or anemia, acceptable renal function electrolytes and negative troponin    CTA was ordered due to her history of PE and was negative although did show area of nonspecific peripheral opacification which could indicate infection versus contusion versus scarring        MDM:  Overall well-appearing 76-year-old female here with progressive shortness of breath and chest tightness for the past 2 days. Her lungs are clear. Vital signs stable.   No acute distress. Does have history of DVT/PE therefore CT of the chest was ordered and negative for PE but showed questionable opacification the right lower lobe. We will treat as pneumonia since she is now symptomatic. Azithromycin and inhaler provided. Strict return precautions provided and she was discharged home. Clinical Impression:     ICD-10-CM ICD-9-CM    1. SOB (shortness of breath)  R06.02 786.05    2. Chest tightness  R07.89 786.59    3.  Community acquired pneumonia of right lung, unspecified part of lung  J18.9 486            Disposition: EDMOND Ward DO

## 2021-06-22 NOTE — ED NOTES
Pt returned from CT. IV infiltrated during CT scan and CT tech unable to obtain additional access. Dr Mcnamara Come notified.

## 2021-06-22 NOTE — ED NOTES
The patient was discharged home by [facility], RN in stable condition. The patient is alert and oriented, in no respiratory distress and discharge vital signs obtained. The patient's diagnosis, condition and treatment were explained. The patient expressed understanding. Prescriptions escribed. No work/school note given. A discharge plan has been developed. A  was not involved in the process. Aftercare instructions were given. Pt ambulatory out of the ED.

## 2021-06-22 NOTE — ED TRIAGE NOTES
Pt ambulatory to treatment room without difficulty. Pt reports that on Saturday she began having chest discomfort with difficulty breathing.

## 2021-06-22 NOTE — LETTER
[address] EMERGENCY DEPT  [address] [facility]  [place] [id number]  [phone]    Work/School Note    Date: 6/22/2021    To Whom It May concern:    Mikael Scott was seen and treated today in the emergency room by the following provider(s):  Attending Provider: Lashae Villalobos DO. Mikael Scott may return to work on Monday, June 28, 2021.     Sincerely,          Reyes Colon RN

## 2021-06-28 ENCOUNTER — HOSPITAL ENCOUNTER (EMERGENCY)
Age: 31
Discharge: HOME | End: 2021-06-28
Attending: EMERGENCY MEDICINE
Payer: MEDICAID

## 2021-06-28 ENCOUNTER — APPOINTMENT (OUTPATIENT)
Dept: GENERAL RADIOLOGY | Age: 31
End: 2021-06-28
Attending: EMERGENCY MEDICINE
Payer: MEDICAID

## 2021-06-28 VITALS
OXYGEN SATURATION: 99 % | WEIGHT: 220 LBS | BODY MASS INDEX: 35.36 KG/M2 | DIASTOLIC BLOOD PRESSURE: 68 MMHG | HEART RATE: 73 BPM | RESPIRATION RATE: 18 BRPM | SYSTOLIC BLOOD PRESSURE: 136 MMHG | HEIGHT: 66 IN | TEMPERATURE: 98.5 F

## 2021-06-28 DIAGNOSIS — R07.89 CHEST WALL PAIN: ICD-10-CM

## 2021-06-28 DIAGNOSIS — R05.9 COUGH: Primary | ICD-10-CM

## 2021-06-28 LAB
ALBUMIN SERPL-MCNC: 3.5 G/DL (ref 3.5–5)
ALBUMIN/GLOB SERPL: 1 {RATIO} (ref 1.1–2.2)
ALP SERPL-CCNC: 106 U/L (ref 45–117)
ALT SERPL-CCNC: 28 U/L (ref 12–78)
ANION GAP SERPL CALC-SCNC: 13 MMOL/L (ref 5–15)
AST SERPL-CCNC: 22 U/L (ref 15–37)
ATRIAL RATE: 73 BPM
BASOPHILS # BLD: 0 K/UL (ref 0–0.1)
BASOPHILS NFR BLD: 1 % (ref 0–1)
BILIRUB SERPL-MCNC: 0.4 MG/DL (ref 0.2–1)
BUN SERPL-MCNC: 10 MG/DL (ref 6–20)
BUN/CREAT SERPL: 11 (ref 12–20)
CALCIUM SERPL-MCNC: 8.6 MG/DL (ref 8.5–10.1)
CALCULATED P AXIS, ECG09: 34 DEGREES
CALCULATED R AXIS, ECG10: 60 DEGREES
CALCULATED T AXIS, ECG11: 16 DEGREES
CHLORIDE SERPL-SCNC: 108 MMOL/L (ref 97–108)
CO2 SERPL-SCNC: 23 MMOL/L (ref 21–32)
CREAT SERPL-MCNC: 0.93 MG/DL (ref 0.55–1.02)
DIAGNOSIS, 93000: NORMAL
DIFFERENTIAL METHOD BLD: ABNORMAL
EOSINOPHIL # BLD: 0.1 K/UL (ref 0–0.4)
EOSINOPHIL NFR BLD: 2 % (ref 0–7)
ERYTHROCYTE [DISTWIDTH] IN BLOOD BY AUTOMATED COUNT: 14.3 % (ref 11.5–14.5)
GLOBULIN SER CALC-MCNC: 3.4 G/DL (ref 2–4)
GLUCOSE SERPL-MCNC: 99 MG/DL (ref 65–100)
HCT VFR BLD AUTO: 37 % (ref 35–47)
HGB BLD-MCNC: 12.2 G/DL (ref 11.5–16)
IMM GRANULOCYTES # BLD AUTO: 0 K/UL (ref 0–0.04)
IMM GRANULOCYTES NFR BLD AUTO: 1 % (ref 0–0.5)
LYMPHOCYTES # BLD: 2.1 K/UL (ref 0.8–3.5)
LYMPHOCYTES NFR BLD: 31 % (ref 12–49)
MCH RBC QN AUTO: 26.7 PG (ref 26–34)
MCHC RBC AUTO-ENTMCNC: 33 G/DL (ref 30–36.5)
MCV RBC AUTO: 81 FL (ref 80–99)
MONOCYTES # BLD: 0.3 K/UL (ref 0–1)
MONOCYTES NFR BLD: 5 % (ref 5–13)
NEUTS SEG # BLD: 4 K/UL (ref 1.8–8)
NEUTS SEG NFR BLD: 61 % (ref 32–75)
NRBC # BLD: 0 K/UL (ref 0–0.01)
NRBC BLD-RTO: 0 PER 100 WBC
P-R INTERVAL, ECG05: 160 MS
PLATELET # BLD AUTO: 221 K/UL (ref 150–400)
PMV BLD AUTO: 11.4 FL (ref 8.9–12.9)
POTASSIUM SERPL-SCNC: 3.9 MMOL/L (ref 3.5–5.1)
PROT SERPL-MCNC: 6.9 G/DL (ref 6.4–8.2)
Q-T INTERVAL, ECG07: 388 MS
QRS DURATION, ECG06: 86 MS
QTC CALCULATION (BEZET), ECG08: 427 MS
RBC # BLD AUTO: 4.57 M/UL (ref 3.8–5.2)
SODIUM SERPL-SCNC: 144 MMOL/L (ref 136–145)
TROPONIN I SERPL-MCNC: <0.05 NG/ML
VENTRICULAR RATE, ECG03: 73 BPM
WBC # BLD AUTO: 6.7 K/UL (ref 3.6–11)

## 2021-06-28 PROCEDURE — 80053 COMPREHEN METABOLIC PANEL: CPT

## 2021-06-28 PROCEDURE — 99284 EMERGENCY DEPT VISIT MOD MDM: CPT

## 2021-06-28 PROCEDURE — 85025 COMPLETE CBC W/AUTO DIFF WBC: CPT

## 2021-06-28 PROCEDURE — 71046 X-RAY EXAM CHEST 2 VIEWS: CPT

## 2021-06-28 PROCEDURE — 74011250636 HC RX REV CODE- 250/636: Performed by: EMERGENCY MEDICINE

## 2021-06-28 PROCEDURE — 36415 COLL VENOUS BLD VENIPUNCTURE: CPT

## 2021-06-28 PROCEDURE — 96374 THER/PROPH/DIAG INJ IV PUSH: CPT

## 2021-06-28 PROCEDURE — 93005 ELECTROCARDIOGRAM TRACING: CPT

## 2021-06-28 PROCEDURE — 84484 ASSAY OF TROPONIN QUANT: CPT

## 2021-06-28 RX ORDER — BENZONATATE 100 MG/1
100 CAPSULE ORAL
Status: DISCONTINUED | OUTPATIENT
Start: 2021-06-28 | End: 2021-06-28 | Stop reason: HOSPADM

## 2021-06-28 RX ORDER — KETOROLAC TROMETHAMINE 30 MG/ML
30 INJECTION, SOLUTION INTRAMUSCULAR; INTRAVENOUS
Status: COMPLETED | OUTPATIENT
Start: 2021-06-28 | End: 2021-06-28

## 2021-06-28 RX ORDER — BENZONATATE 100 MG/1
100 CAPSULE ORAL
Qty: 30 CAPSULE | Refills: 0 | Status: SHIPPED | OUTPATIENT
Start: 2021-06-28 | End: 2021-07-05

## 2021-06-28 RX ADMIN — KETOROLAC TROMETHAMINE 30 MG: 30 INJECTION, SOLUTION INTRAMUSCULAR at 11:01

## 2021-06-28 NOTE — ED NOTES
Patient requested work note. Work note given and patient discharged with follow up care per Dr. Marylene Marseille instructions. One prescription given. Patient ambulatory out of ED.

## 2021-06-28 NOTE — ED TRIAGE NOTES
Pt here with cough. States was here on last Tuesday and diagnosed with pneumonia. Did not have cough initially and now dose.   Pt also has cramping in legs

## 2021-06-28 NOTE — Clinical Note
[address] EMERGENCY DEPT  [address] [facility]  [place] [address]  [phone]    Work/School Note    Date: 6/28/2021    To Whom It May concern:      Marti ZEHRA Moisés Gerri was seen and treated today in the emergency room by the following provider(s):  Attending Provider: Sagar Larsen DO. Aline Lin is excused from work/school on 06/28/21. She is clear to return to work/school on 06/29/21.         Sincerely,          Christoph Staples DO

## 2021-06-28 NOTE — ED PROVIDER NOTES
Date of Service:  2021    Patient:  Piyush Cheng    Chief Complaint:  Cough and Chest Pain       HPI:  Piyush Cheng is a 27 y.o.  female who presents for evaluation of cough and chest discomfort. Patient states that she was recently treated for pneumonia. Today she woke up and had a cough which was not present before. She also describes some intermittent cramping in her right lower extremity since this morning. This is a achy discomfort that seems to be charley horse type discomfort. Patient states that when she takes a deep breath she has some generalized chest wall discomfort. No shortness of breath. No specific chest pain otherwise. No nausea vomiting diarrhea headaches fevers chills. Patient denies chance of pregnancy.   She does not use alcohol tobacco or drugs           Past Medical History:   Diagnosis Date    Abnormal Pap smear     Anemia     Gastrointestinal disorder     Ulcers    Headache     Herniated disc     Hx gestational diabetes     HX OTHER MEDICAL     trich treated at beginning of pregnancy    HX OTHER MEDICAL     pituitary tumor-benign     Miscarriage     Molar pregnancy     Pap smear for cervical cancer screening 19 neg HPV POS- rp pap one year    PCOS (polycystic ovarian syndrome)     Pelvic pain in female     Pituitary adenoma ([facility] [address]     Pituitary tumor     Psychiatric problem     depression never on medication    Thyroid activity decreased     hypothyroid awaiting appnt for endo       Past Surgical History:   Procedure Laterality Date    HX  SECTION      HX COLPOSCOPY  11/3/10    HX DILATION AND CURETTAGE      HX LIPOMA RESECTION           Family History:   Problem Relation Age of Onset    Diabetes Father     Headache Mother        Social History     Socioeconomic History    Marital status: SINGLE     Spouse name: Not on file    Number of children: Not on file    Years of education: Not on file    Highest education level: Not on file   Occupational History    Not on file   Tobacco Use    Smoking status: Never Smoker    Smokeless tobacco: Never Used   Vaping Use    Vaping Use: Never used   Substance and Sexual Activity    Alcohol use: Not Currently     Comment: social    Drug use: No    Sexual activity: Not Currently     Birth control/protection: Injection   Other Topics Concern    Not on file   Social History Narrative    Not on file     Social Determinants of Health     Financial Resource Strain:     Difficulty of Paying Living Expenses:    Food Insecurity:     Worried About Running Out of Food in the Last Year:     Ran Out of Food in the Last Year:    Transportation Needs:     Lack of Transportation (Medical):  Lack of Transportation (Non-Medical):    Physical Activity:     Days of Exercise per Week:     Minutes of Exercise per Session:    Stress:     Feeling of Stress :    Social Connections:     Frequency of Communication with Friends and Family:     Frequency of Social Gatherings with Friends and Family:     Attends [employer] Services:     Active Member of Clubs or Organizations:     Attends Club or Organization Meetings:     Marital Status:    Intimate Partner Violence:     Fear of Current or Ex-Partner:     Emotionally Abused:     Physically Abused:     Sexually Abused: ALLERGIES: Tomato    Review of Systems   Constitutional: Negative for fever. HENT: Negative for hearing loss. Eyes: Negative for visual disturbance. Respiratory: Positive for cough. Negative for shortness of breath. Cardiovascular: Positive for chest pain. Negative for palpitations and leg swelling. Gastrointestinal: Negative for abdominal pain. Genitourinary: Negative for flank pain. Musculoskeletal: Negative for back pain. Skin: Negative for rash. Neurological: Negative for dizziness and light-headedness. Psychiatric/Behavioral: Negative for confusion.        Vitals:    06/28/21 1018   BP: 136/68   Pulse: 73   Resp: 18   Temp: 98.5 °F (36.9 °C)   SpO2: 99%   Weight: 99.8 kg (220 lb)   Height: 5' 6\" (1.676 m)            Physical Exam  Vitals and nursing note reviewed. Constitutional:       General: She is not in acute distress. Appearance: She is well-developed. HENT:      Head: Normocephalic and atraumatic. Eyes:      General: No scleral icterus. Neck:      Vascular: No JVD. Cardiovascular:      Rate and Rhythm: Normal rate and regular rhythm. Heart sounds: Normal heart sounds. No murmur heard. No gallop. Pulmonary:      Effort: Pulmonary effort is normal. No tachypnea, accessory muscle usage or respiratory distress. Breath sounds: Normal breath sounds. No stridor. Chest:      Chest wall: No mass or tenderness. Abdominal:      General: There is no distension. Palpations: Abdomen is soft. Tenderness: There is no abdominal tenderness. Musculoskeletal:         General: No tenderness or deformity. Normal range of motion. Cervical back: Neck supple. Right lower leg: No tenderness. No edema. Left lower leg: No tenderness. No edema. Skin:     General: Skin is warm and dry. Capillary Refill: Capillary refill takes less than 2 seconds. Findings: No rash. Neurological:      Mental Status: She is alert and oriented to person, place, and time.    Psychiatric:         Mood and Affect: Mood normal.         Behavior: Behavior normal.          [facility]  ED Course as of Jun 28 1236   Mon Jun 28, 2021   1021 EKG 1015  NSR 73  Sinus Arrhythmia  Normal axis and intervals  No STEMI    [GG]      ED Course User Index  [GG] Gwendalyn Perches, DO     VITAL SIGNS:  Patient Vitals for the past 4 hrs:   Temp Pulse Resp BP SpO2   06/28/21 1018 98.5 °F (36.9 °C) 73 18 136/68 99 %         LABS:  Recent Results (from the past 6 hour(s))   EKG, 12 LEAD, INITIAL    Collection Time: 06/28/21 10:15 AM   Result Value Ref Range    Ventricular Rate 73 BPM    Atrial Rate 73 BPM    P-R Interval 160 ms    QRS Duration 86 ms    Q-T Interval 388 ms    QTC Calculation (Bezet) 427 ms    Calculated P Axis 34 degrees    Calculated R Axis 60 degrees    Calculated T Axis 16 degrees    Diagnosis       Normal sinus rhythm with sinus arrhythmia  Nonspecific T wave abnormality  Abnormal ECG  When compared with ECG of 22-JUN-2021 13:31,  No significant change was found     CBC WITH AUTOMATED DIFF    Collection Time: 06/28/21 10:36 AM   Result Value Ref Range    WBC 6.7 3.6 - 11.0 K/uL    RBC 4.57 3.80 - 5.20 M/uL    HGB 12.2 11.5 - 16.0 g/dL    HCT 37.0 35.0 - 47.0 %    MCV 81.0 80.0 - 99.0 FL    MCH 26.7 26.0 - 34.0 PG    MCHC 33.0 30.0 - 36.5 g/dL    RDW 14.3 11.5 - 14.5 %    PLATELET 336 910 - 996 K/uL    MPV 11.4 8.9 - 12.9 FL    NRBC 0.0 0.0  WBC    ABSOLUTE NRBC 0.00 0.00 - 0.01 K/uL    NEUTROPHILS 61 32 - 75 %    LYMPHOCYTES 31 12 - 49 %    MONOCYTES 5 5 - 13 %    EOSINOPHILS 2 0 - 7 %    BASOPHILS 1 0 - 1 %    IMMATURE GRANULOCYTES 1 (H) 0 - 0.5 %    ABS. NEUTROPHILS 4.0 1.8 - 8.0 K/UL    ABS. LYMPHOCYTES 2.1 0.8 - 3.5 K/UL    ABS. MONOCYTES 0.3 0.0 - 1.0 K/UL    ABS. EOSINOPHILS 0.1 0.0 - 0.4 K/UL    ABS. BASOPHILS 0.0 0.0 - 0.1 K/UL    ABS. IMM. GRANS. 0.0 0.00 - 0.04 K/UL    DF AUTOMATED     METABOLIC PANEL, COMPREHENSIVE    Collection Time: 06/28/21 10:36 AM   Result Value Ref Range    Sodium 144 136 - 145 mmol/L    Potassium 3.9 3.5 - 5.1 mmol/L    Chloride 108 97 - 108 mmol/L    CO2 23 21 - 32 mmol/L    Anion gap 13 5 - 15 mmol/L    Glucose 99 65 - 100 mg/dL    BUN 10 6 - 20 MG/DL    Creatinine 0.93 0.55 - 1.02 MG/DL    BUN/Creatinine ratio 11 (L) 12 - 20      GFR est AA >60 >60 ml/min/1.73m2    GFR est non-AA >60 >60 ml/min/1.73m2    Calcium 8.6 8.5 - 10.1 MG/DL    Bilirubin, total 0.4 0.2 - 1.0 MG/DL    ALT (SGPT) 28 12 - 78 U/L    AST (SGOT) 22 15 - 37 U/L    Alk.  phosphatase 106 45 - 117 U/L    Protein, total 6.9 6.4 - 8.2 g/dL    Albumin 3.5 3.5 - 5.0 g/dL    Globulin 3.4 2.0 - 4.0 g/dL    A-G Ratio 1.0 (L) 1.1 - 2.2     TROPONIN I    Collection Time: 06/28/21 10:36 AM   Result Value Ref Range    Troponin-I, Qt. <0.05 <0.05 ng/mL        IMAGING:  XR CHEST PA LAT   Final Result   No acute process. Medications During Visit:  Medications   benzonatate (TESSALON) capsule 100 mg (has no administration in time range)   ketorolac (TORADOL) injection 30 mg (30 mg IntraVENous Given 6/28/21 1101)         DECISION MAKING:  Marti Calderon is a 27 y.o. female who comes in as above. Well-appearing patient had reassuring exam, unremarkable work-up. Will discharge patient home with over-the-counter NSAIDs for pain and cough medicine as below. Follow with PCP and return as needed      IMPRESSION:  1. Cough    2. Chest wall pain        DISPOSITION:  Discharged      Current Discharge Medication List      START taking these medications    Details   benzonatate (Tessalon Perles) 100 mg capsule Take 1 Capsule by mouth three (3) times daily as needed for Cough for up to 7 days. Qty: 30 Capsule, Refills: 0  Start date: 6/28/2021, End date: 7/5/2021              Follow-up Information     Follow up With Specialties Details Why Contact Info    Araceli Baig MD Pediatric Medicine, Family Medicine Schedule an appointment as soon as possible for a visit   [place]   [address] [facility]  [phone]              The patient is asked to follow-up with their primary care provider in the next several days. They are to call tomorrow for an appointment. The patient is asked to return promptly for any increased concerns or worsening of symptoms. They can return to this emergency department or any other emergency department.       Procedures

## 2021-08-09 ENCOUNTER — CLINICAL SUPPORT (OUTPATIENT)
Dept: OBGYN CLINIC | Age: 31
End: 2021-08-09
Payer: MEDICAID

## 2021-08-09 VITALS
HEIGHT: 66 IN | DIASTOLIC BLOOD PRESSURE: 74 MMHG | HEART RATE: 67 BPM | WEIGHT: 228.4 LBS | SYSTOLIC BLOOD PRESSURE: 105 MMHG | BODY MASS INDEX: 36.71 KG/M2

## 2021-08-09 DIAGNOSIS — Z76.89 ENCOUNTER FOR MENSTRUAL REGULATION: ICD-10-CM

## 2021-08-09 DIAGNOSIS — Z30.42 ENCOUNTER FOR MANAGEMENT AND INJECTION OF DEPO-PROVERA: Primary | ICD-10-CM

## 2021-08-09 PROCEDURE — 96372 THER/PROPH/DIAG INJ SC/IM: CPT | Performed by: OBSTETRICS & GYNECOLOGY

## 2021-08-09 RX ORDER — MEDROXYPROGESTERONE ACETATE 150 MG/ML
INJECTION, SUSPENSION INTRAMUSCULAR
Qty: 1 ML | Refills: 3 | Status: SHIPPED | OUTPATIENT
Start: 2021-08-09 | End: 2022-02-07

## 2021-08-09 RX ORDER — MEDROXYPROGESTERONE ACETATE 150 MG/ML
150 INJECTION, SUSPENSION INTRAMUSCULAR ONCE
Status: COMPLETED | OUTPATIENT
Start: 2021-08-09 | End: 2021-08-09

## 2021-08-09 RX ADMIN — MEDROXYPROGESTERONE ACETATE 150 MG: 150 INJECTION, SUSPENSION INTRAMUSCULAR at 10:00

## 2021-08-09 NOTE — PROGRESS NOTES
Last Depo-Provera injection: 5/18/2021   Side Effects if any: none  Serum HCG indicated? n/a  Depo-Provera 150 mg IM given by: Benjamin Zimmer in gluteus ( left). Next Depo-Provera injection due: 10/25/21-11/8/21    Administered 150mg/mL Depo-Provera per orders of Alexi Brown MD . Verbal consent received by Ms. Rafael Bowden & injection given. Patient tolerated well, no complications and no side effects. Encouraged her to remain in clinic for 15 minutes and immediately report any adverse reactions. Patient informed of next injection date ranges and encouraged to schedule. She verbalized understanding and expressed intent to comply.

## 2021-09-21 ENCOUNTER — VIRTUAL VISIT (OUTPATIENT)
Dept: FAMILY MEDICINE CLINIC | Age: 31
End: 2021-09-21
Payer: MEDICAID

## 2021-09-21 DIAGNOSIS — G56.03 BILATERAL CARPAL TUNNEL SYNDROME: Primary | ICD-10-CM

## 2021-09-21 PROCEDURE — 99213 OFFICE O/P EST LOW 20 MIN: CPT | Performed by: INTERNAL MEDICINE

## 2021-09-21 RX ORDER — GABAPENTIN 100 MG/1
100 CAPSULE ORAL
Qty: 14 CAPSULE | Refills: 0 | Status: SHIPPED | OUTPATIENT
Start: 2021-09-21 | End: 2021-10-05

## 2021-09-21 NOTE — PROGRESS NOTES
Chief Complaint   Patient presents with    Wrist Pain     h/o carpal tunnel syndrome. Zain Clarke is a 27 y.o. female who was seen by synchronous (real-time) audio-video technology on 9/21/2021 for No chief complaint on file. Zain Clarke, who was evaluated through a patient-initiated, synchronous (real-time) audio-video encounter, and/or her healthcare decision maker, is aware that it is a billable service, with coverage as determined by her insurance carrier. She provided verbal consent to proceed: Yes, and patient identification was verified. It was conducted pursuant to the emergency declaration under the [facility], [address] [place] [address] and the [employer] and [employer]. A caregiver was present when appropriate. Ability to conduct physical exam was limited. I was in the office. The patient was in her car. Orlando Garrison MD       Assessment & Plan:   Diagnoses and all orders for this visit:    1. Bilateral carpal tunnel syndrome  -     gabapentin (NEURONTIN) 100 mg capsule; Take 1 Capsule by mouth nightly for 14 days. Max Daily Amount: 100 mg.  - Patient has been seen by [facility] for this in the past.   - She cannot be seen at [facility][1] due to exsisting bill.   - Reports that she has been seen by Ashely Wynne and will callback with physician information to give referral. Patient did call back. She will see Dr. Viet Hernandez on 10/7/2021. She did not need a referral.     I spent at least 21 minutes on this visit with this established patient. We discussed the expected course, resolution and complications of the diagnosis(es) in detail. Medication risks, benefits, costs, interactions, and alternatives were discussed as indicated. I advised her to contact the office if her condition worsens, changes or fails to improve as anticipated. She expressed understanding with the diagnosis(es) and plan.      Follow-up and Dispositions    · Return if symptoms worsen or fail to improve. Subjective:   C/o swelling and pain in both wrist getting worse. Previous history of Carpal tunnel syndrome and had been told that some point she would need surgery. Pain is preventing her from grasping things and she is constantly uncomfortable even with wrist splints. Would like referral to ortho. Patient Active Problem List   Diagnosis Code    Intractable chronic migraine without aura and without status migrainosus G43.719    Pituitary adenoma ([facility]) D35.2    Right hand weakness R29.898    Numbness of right hand R20.0    Chronic tension-type headache, intractable G44.221    Severe obesity ([facility]) E66.01    Elevated prolactin level R79.89    Left-sided weakness R53.1    Migraine headache G43.909    HLD (hyperlipidemia) E78.5    Prediabetes R73.03     Current Outpatient Medications   Medication Sig Dispense Refill    gabapentin (NEURONTIN) 100 mg capsule Take 1 Capsule by mouth nightly for 14 days. Max Daily Amount: 100 mg. 14 Capsule 0    medroxyPROGESTERone (DEPO-PROVERA) 150 mg/mL injection INJECT 1ML BY INTRAMUSCULAR ROUTE ONCE FOR 1 DOSE 1 mL 3    albuterol (Ventolin HFA) 90 mcg/actuation inhaler Take 1 Puff by inhalation every six (6) hours as needed for Wheezing. 1 Inhaler 0    metoprolol succinate (TOPROL-XL) 25 mg XL tablet Take 0.5 Tabs by mouth daily. 45 Tab 3    acetaminophen (Tylenol Arthritis Pain) 650 mg TbER Take 1,300 mg by mouth every eight (8) hours.  butalbital-acetaminophen-caff (Fioricet) -40 mg per capsule Take 1 Cap by mouth every four (4) hours as needed for Headache. 20 Cap 0    ondansetron (ZOFRAN ODT) 4 mg disintegrating tablet Take 1 Tab by mouth every eight (8) hours as needed for Nausea. 20 Tab 0    ibuprofen (MOTRIN) 600 mg tablet Take 1 Tab by mouth every eight (8) hours as needed for Pain. 20 Tab 0    omeprazole (PriLOSEC OTC) 20 mg tablet Take 1 Tab by mouth daily.  30 Tab 5 Allergies   Allergen Reactions    Tomato Angioedema     Past Medical History:   Diagnosis Date    Abnormal Pap smear     Anemia     Gastrointestinal disorder     Ulcers    Headache     Herniated disc     Hx gestational diabetes     HX OTHER MEDICAL     trich treated at beginning of pregnancy    HX OTHER MEDICAL     pituitary tumor-benign     Miscarriage     Molar pregnancy     Pap smear for cervical cancer screening 19 neg HPV POS- rp pap one year    PCOS (polycystic ovarian syndrome)     Pelvic pain in female     Pituitary adenoma ([facility] [place] [address]     Pituitary tumor     Psychiatric problem     depression never on medication    Thyroid activity decreased     hypothyroid awaiting appnt for endo     Past Surgical History:   Procedure Laterality Date    HX  SECTION      HX COLPOSCOPY  11/3/10    HX DILATION AND CURETTAGE      HX LIPOMA RESECTION       Family History   Problem Relation Age of Onset    Diabetes Father     Headache Mother      Social History     Tobacco Use    Smoking status: Never Smoker    Smokeless tobacco: Never Used   Substance Use Topics    Alcohol use: Not Currently     Comment: social       Review of Systems   Constitutional: Negative. HENT: Negative. Eyes: Negative. Respiratory: Negative. Cardiovascular: Negative. Gastrointestinal: Negative. Genitourinary: Negative. Musculoskeletal: Positive for joint pain (bilateral wrist) and myalgias. Skin: Negative. Neurological: Negative. Endo/Heme/Allergies: Negative. Psychiatric/Behavioral: Negative. All other systems reviewed and are negative. Objective: There were no vitals taken for this visit.      General: alert, cooperative, no distress   Mental  status: normal mood, behavior, speech, dress, motor activity, and thought processes, able to follow commands   HENT: NCAT   Neck: no visualized mass   Resp: no respiratory distress   Neuro: no gross deficits   Skin: no discoloration or lesions of concern on visible areas   Psychiatric: normal affect, consistent with stated mood, no evidence of hallucinations

## 2021-09-24 RX ORDER — LINACLOTIDE 72 UG/1
72 CAPSULE, GELATIN COATED ORAL DAILY
COMMUNITY
Start: 2021-06-22 | End: 2022-02-07

## 2021-10-22 ENCOUNTER — OFFICE VISIT (OUTPATIENT)
Dept: OBGYN CLINIC | Age: 31
End: 2021-10-22
Payer: MEDICAID

## 2021-10-22 DIAGNOSIS — Z30.42 ENCOUNTER FOR MANAGEMENT AND INJECTION OF DEPO-PROVERA: Primary | ICD-10-CM

## 2021-10-22 PROCEDURE — 96372 THER/PROPH/DIAG INJ SC/IM: CPT | Performed by: OBSTETRICS & GYNECOLOGY

## 2021-10-22 RX ORDER — MEDROXYPROGESTERONE ACETATE 150 MG/ML
150 INJECTION, SUSPENSION INTRAMUSCULAR ONCE
Status: COMPLETED | OUTPATIENT
Start: 2021-10-22 | End: 2021-10-22

## 2021-10-22 RX ADMIN — MEDROXYPROGESTERONE ACETATE 150 MG: 150 INJECTION, SUSPENSION INTRAMUSCULAR at 14:32

## 2021-10-22 NOTE — PROGRESS NOTES
Date last pap: 6/25/2020  Last Depo-Provera: 8/9/2021  Side Effects if any: none  Serum HCG indicated? N/A, within dates  Depo-Provera 150 mg IM given by: Judd Ugarte LPN  Next appointment due 1/7/22-1/21/22    Administered 150mg/mL Depo-Provera per MD order. Verbal consent given by patient. Injection given IM in the right gluteus . Patient tolerated well, no complications, no side effects. Lot # [id number]   Exp: 3/2024    Advised patient dates for next depo injection.  Patient verbalized understanding

## 2021-10-22 NOTE — PATIENT INSTRUCTIONS
Learning About Birth Control: The Shot  What is the shot? The shot is used to prevent pregnancy. You get the shot in your upper arm or rear end (buttocks). The shot gives you a dose of the hormone progestin. The shot is often called by its brand name, Depo-Provera. Progestin prevents pregnancy in these ways: It thickens the mucus in the cervix. This makes it hard for sperm to travel into the uterus. It also thins the lining of the uterus, which makes it harder for a fertilized egg to attach to the uterus. Progestin can sometimes stop the ovaries from releasing an egg each month (ovulation). The shot provides birth control for 3 months at a time. You then need another shot. The shot may cause bone loss. Talk to your doctor about the risks and benefits. How well does it work? In the first year of use:  · When the shot is used exactly as directed, fewer than 1 woman out of 100 has an unplanned pregnancy. · When the shot is not used exactly as directed, 6 women out of 100 have an unplanned pregnancy. Be sure to tell your doctor about any health problems you have or medicines you take. He or she can help you choose the birth control method that is right for you. What are the advantages of the shot? · The shot is one of the most effective methods of birth control. · It's convenient. You need to get a shot only once every 3 months to prevent pregnancy. You don't have to interrupt sex to protect against pregnancy. · It prevents pregnancy for 3 months at a time. You don't have to worry about birth control for this time. · It's safe to use while breastfeeding. · The shot may reduce heavy bleeding and cramping. · The shot doesn't contain estrogen. So you can use it if you don't want to take estrogen or can't take estrogen because you have certain health problems or concerns. What are the disadvantages of the shot?   · The shot doesn't protect against sexually transmitted infections (STIs), such as herpes or HIV/AIDS. If you aren't sure if your sex partner might have an STI, use a condom to protect against disease. · The shot may cause bone loss in some women. Talk to your doctor about the risks and benefits. · The shot is needed every 3 months. Any side effects may last 3 months or longer. ? The shot may cause irregular periods, or you may have spotting between periods. You may also stop getting a period. Some women see having no period as an advantage. ? It may cause mood changes, less interest in sex, or weight gain. · If you want to get pregnant, it may take up to 18 months after you stop getting the shot. This is because the hormones the shot provided have to leave your system, and your body has to readjust.  Where can you learn more? Go to http://www.gray.com/  Enter I370 in the search box to learn more about \"Learning About Birth Control: The Shot. \"  Current as of: June 16, 2021               Content Version: 13.0  © 2006-2021 [employer]. Care instructions adapted under license by [employer] (which disclaims liability or warranty for this information). If you have questions about a medical condition or this instruction, always ask your healthcare professional. [address] any warranty or liability for your use of this information.

## 2022-02-07 ENCOUNTER — APPOINTMENT (OUTPATIENT)
Dept: GENERAL RADIOLOGY | Age: 32
End: 2022-02-07
Attending: FAMILY MEDICINE
Payer: MEDICAID

## 2022-02-07 ENCOUNTER — HOSPITAL ENCOUNTER (EMERGENCY)
Age: 32
Discharge: HOME | End: 2022-02-07
Attending: STUDENT IN AN ORGANIZED HEALTH CARE EDUCATION/TRAINING PROGRAM
Payer: MEDICAID

## 2022-02-07 VITALS
RESPIRATION RATE: 16 BRPM | HEART RATE: 90 BPM | OXYGEN SATURATION: 100 % | TEMPERATURE: 98.8 F | WEIGHT: 231.92 LBS | DIASTOLIC BLOOD PRESSURE: 83 MMHG | BODY MASS INDEX: 37.43 KG/M2 | SYSTOLIC BLOOD PRESSURE: 139 MMHG

## 2022-02-07 DIAGNOSIS — S96.912A STRAIN OF LEFT ANKLE, INITIAL ENCOUNTER: Primary | ICD-10-CM

## 2022-02-07 PROCEDURE — 96372 THER/PROPH/DIAG INJ SC/IM: CPT

## 2022-02-07 PROCEDURE — 99283 EMERGENCY DEPT VISIT LOW MDM: CPT

## 2022-02-07 PROCEDURE — 73630 X-RAY EXAM OF FOOT: CPT

## 2022-02-07 PROCEDURE — 74011250636 HC RX REV CODE- 250/636: Performed by: FAMILY MEDICINE

## 2022-02-07 PROCEDURE — 74011250637 HC RX REV CODE- 250/637: Performed by: FAMILY MEDICINE

## 2022-02-07 PROCEDURE — 73610 X-RAY EXAM OF ANKLE: CPT

## 2022-02-07 RX ORDER — ACETAMINOPHEN 325 MG/1
975 TABLET ORAL
Status: COMPLETED | OUTPATIENT
Start: 2022-02-07 | End: 2022-02-07

## 2022-02-07 RX ORDER — KETOROLAC TROMETHAMINE 30 MG/ML
30 INJECTION, SOLUTION INTRAMUSCULAR; INTRAVENOUS
Status: COMPLETED | OUTPATIENT
Start: 2022-02-07 | End: 2022-02-07

## 2022-02-07 RX ADMIN — KETOROLAC TROMETHAMINE 30 MG: 30 INJECTION, SOLUTION INTRAMUSCULAR at 15:19

## 2022-02-07 RX ADMIN — ACETAMINOPHEN 975 MG: 325 TABLET ORAL at 15:18

## 2022-02-07 NOTE — ED PROVIDER NOTES
Patient is a 76-year-old female with past medical history of PCOS, depression, hypothyroidism who presents for evaluation of left ankle and foot pain status post injury. She reports that approximately 1 hour ago, she was jumping out of a truck and slipped on the ground in the rain. She did not actually fall to the ground, but felt that she landed hard on her foot. She immediately had pain to the area. She reports that she has previously injured this ankle before, but denies having a fracture before. She reports that the pain is shooting up her leg and is burning in nature. She reports that she was able to walk on it, but walking on it does worsen the pain. She did not take anything over-the-counter for the pain prior to coming in today.                Past Medical History:   Diagnosis Date    Abnormal Pap smear     Anemia     Gastrointestinal disorder     Ulcers    Headache     Herniated disc     Hx gestational diabetes     HX OTHER MEDICAL     trich treated at beginning of pregnancy    HX OTHER MEDICAL     pituitary tumor-benign     Miscarriage     Molar pregnancy     Pap smear for cervical cancer screening 19 neg HPV POS- rp pap one year    PCOS (polycystic ovarian syndrome)     Pelvic pain in female     Pituitary adenoma ([facility] [address]     Pituitary tumor     Psychiatric problem     depression never on medication    Thyroid activity decreased     hypothyroid awaiting appnt for endo       Past Surgical History:   Procedure Laterality Date    HX  SECTION      HX COLPOSCOPY  11/3/10    HX DILATION AND CURETTAGE      HX LIPOMA RESECTION           Family History:   Problem Relation Age of Onset    Diabetes Father     Headache Mother        Social History     Socioeconomic History    Marital status: SINGLE     Spouse name: Not on file    Number of children: Not on file    Years of education: Not on file    Highest education level: Not on file   Occupational History    Not on file   Tobacco Use    Smoking status: Never Smoker    Smokeless tobacco: Never Used   Vaping Use    Vaping Use: Never used   Substance and Sexual Activity    Alcohol use: Not Currently     Comment: social    Drug use: No    Sexual activity: Not Currently     Birth control/protection: Injection   Other Topics Concern    Not on file   Social History Narrative    Not on file     Social Determinants of Health     Financial Resource Strain:     Difficulty of Paying Living Expenses: Not on file   Food Insecurity:     Worried About Running Out of Food in the Last Year: Not on file    Abi of Food in the Last Year: Not on file   Transportation Needs:     Lack of Transportation (Medical): Not on file    Lack of Transportation (Non-Medical): Not on file   Physical Activity:     Days of Exercise per Week: Not on file    Minutes of Exercise per Session: Not on file   Stress:     Feeling of Stress : Not on file   Social Connections:     Frequency of Communication with Friends and Family: Not on file    Frequency of Social Gatherings with Friends and Family: Not on file    Attends Sabianism Services: Not on file    Active Member of [address] [place] [address] or Organizations: Not on file    Attends Club or Organization Meetings: Not on file    Marital Status: Not on file   Intimate Partner Violence:     Fear of Current or Ex-Partner: Not on file    Emotionally Abused: Not on file    Physically Abused: Not on file    Sexually Abused: Not on file   Housing Stability:     Unable to Pay for Housing in the Last Year: Not on file    Number of [place] in the Last Year: Not on file    Unstable Housing in the Last Year: Not on file         ALLERGIES: Tomato    Review of Systems   Constitutional: Negative for unexpected weight change. HENT: Negative for congestion. Eyes: Negative for visual disturbance. Respiratory: Negative for cough, chest tightness and shortness of breath.     Cardiovascular: Negative for chest pain and palpitations. Gastrointestinal: Negative for abdominal pain. Endocrine: Negative for polyuria. Genitourinary: Negative for dysuria. Musculoskeletal: Negative for back pain. Skin: Negative for color change and wound. Allergic/Immunologic: Negative for immunocompromised state. Neurological: Negative for dizziness and headaches. Hematological: Negative for adenopathy. Psychiatric/Behavioral: Negative for agitation. Vitals:    02/07/22 1440   BP: 131/65   Pulse: 90   Resp: 16   Temp: 98.8 °F (37.1 °C)   SpO2: 100%   Weight: 105.2 kg (231 lb 14.8 oz)            Physical Exam  Vitals and nursing note reviewed. Constitutional:       Appearance: Normal appearance. She is normal weight. Eyes:      Conjunctiva/sclera: Conjunctivae normal.      Pupils: Pupils are equal, round, and reactive to light. Cardiovascular:      Rate and Rhythm: Normal rate. Pulses: Normal pulses. Pulmonary:      Effort: Pulmonary effort is normal. No respiratory distress. Abdominal:      General: Abdomen is flat. Musculoskeletal:         General: No swelling or deformity. Normal range of motion. Cervical back: Neck supple. Left ankle: No deformity. Tenderness present over the lateral malleolus. Normal pulse. Comments: Pain to palpation over lateral malleolus. No obvious deformity or swelling. Pulses normal.   Skin:     General: Skin is warm. Capillary Refill: Capillary refill takes less than 2 seconds. Neurological:      General: No focal deficit present. Mental Status: She is alert and oriented to person, place, and time. Mental status is at baseline. Psychiatric:         Mood and Affect: Mood normal.         Behavior: Behavior normal.          MDM  Number of Diagnoses or Management Options  Strain of left ankle, initial encounter  Diagnosis management comments: Patient presents with ankle pain status post injury.   Will obtain x-rays of foot and ankle for further evaluation. We will treat patient symptomatically with IM ketorolac and oral acetaminophen and reevaluate discomfort. 1530 -x-rays without any acute findings. Advised patient to take acetaminophen rotating with ibuprofen as needed for pain. Discussed she may also ice the area for 20 minutes at a time and elevate her foot. If her symptoms do not improve in a few days, she should follow-up with Ortho for further imaging and evaluation. Discussed my clinical impression(s), any labs and/or radiology results with the patient. I answered any questions and addressed any concerns. Discussed the importance of following up with their primary care physician and/or specialist(s). Discussed signs or symptoms that would warrant return back to the ER for further evaluation. The patient is agreeable with discharge.        Amount and/or Complexity of Data Reviewed  Tests in the radiology section of CPT®: ordered and reviewed    Patient Progress  Patient progress: stable         Procedures

## 2022-02-07 NOTE — ED NOTES
Patient medicated with ordered PO tylenol and IM toradol. Patient tolerated well. Tarsha Duvall at bedside to update patient regarding results and further care management.

## 2022-02-07 NOTE — DISCHARGE INSTRUCTIONS
Thank you for allowing us to provide you with medical care today. We realize that you have many choices for your emergency care needs. We thank you for choosing [facility]. Please choose us in the future for any continued health care needs. The exam and treatment you received in the emergency department were for an emergent problem and are not intended as complete care. It is fortunate that you follow-up with a doctor. If your symptoms worsen or you do not improve should return to the emergency department. We are available 24 hours a day. Please make an appointment with your health care provider for follow-up of your emergency department visit. Take this sheet with you when you go to your follow-up visit.

## 2022-02-07 NOTE — ED TRIAGE NOTES
Pt reports jumping off the work truck and landing on her left ankle about 2.5 hours pta. Pt has not taken any medication for pain.

## 2022-02-10 ENCOUNTER — OFFICE VISIT (OUTPATIENT)
Dept: ORTHOPEDIC SURGERY | Age: 32
End: 2022-02-10

## 2022-02-10 VITALS — BODY MASS INDEX: 36.64 KG/M2 | WEIGHT: 228 LBS | HEIGHT: 66 IN

## 2022-02-10 DIAGNOSIS — M25.572 ACUTE LEFT ANKLE PAIN: ICD-10-CM

## 2022-02-10 DIAGNOSIS — S93.402A MODERATE LEFT ANKLE SPRAIN, INITIAL ENCOUNTER: Primary | ICD-10-CM

## 2022-02-10 PROCEDURE — L4387 NON-PNEUM WALK BOOT PRE OTS: HCPCS | Performed by: ORTHOPAEDIC SURGERY

## 2022-02-10 PROCEDURE — 99203 OFFICE O/P NEW LOW 30 MIN: CPT | Performed by: ORTHOPAEDIC SURGERY

## 2022-02-10 NOTE — PROGRESS NOTES
Lex Friend (: 1990) is a 34 y.o. female, patient,here for evaluation of the following   Chief Complaint   Patient presents with    Ankle Injury     she jumped down off the back of the truck in the rain and hurt the left ankle and kept on working this happened on 2022         ASSESSMENT/PLAN:  Below is the assessment and plan developed based on review of pertinent history, physical exam, labs, studies, and medications. 1. Moderate left ankle sprain, initial encounter  -     REFERRAL TO DME  -     [place] [facility] PRE [facility]TS  -     REFERRAL TO PHYSICAL THERAPY  2. Acute left ankle pain  -     REFERRAL TO DME  -     [place] [facility] PRE [facility]  -     REFERRAL TO PHYSICAL THERAPY      Patient has a work-related injury, jumped off a truck and twisted the left ankle and had pain to the foot and ankle seen at a local emergency room where x-rays confirm she did not have a fracture so this is a moderate sprain mostly to the ankle and less to the foot. Recommended immobilization to continue in the tall boot which is provided today. She is limited weightbearing to allow this to heal and will be taken off work until next follow-up in 4 weeks, will reassess and confirm on whether she can return to some type of work duties. Next time she returns we will also get x-rays of the left ankle 3 views nonweightbearing and also the left foot 3 views nonweightbearing if she still experiencing pain there. I have also provided referral to physical therapy today to start with gentle range of motion, modalities for pain and swelling. Physical therapy will be progressed after next evaluation. I spent approximately 5 to 10 minutes of time reviewing her records from outside facility which includes x-rays that were obtained and also filling out paperwork for employer and the work status report form which is provided to the patient today.     Return in about 4 weeks (around 3/10/2022) for repeat xrays. Allergies   Allergen Reactions    Tomato Angioedema       No current outpatient medications on file. No current facility-administered medications for this visit.        Past Medical History:   Diagnosis Date    Abnormal Pap smear     Anemia     Gastrointestinal disorder     Ulcers    Headache     Herniated disc     Hx gestational diabetes     HX OTHER MEDICAL     trich treated at beginning of pregnancy    HX OTHER MEDICAL     pituitary tumor-benign     Miscarriage     Molar pregnancy     Pap smear for cervical cancer screening 19 neg HPV POS- rp pap one year    PCOS (polycystic ovarian syndrome)     Pelvic pain in female     Pituitary adenoma ([facility] [address]     Pituitary tumor     Psychiatric problem     depression never on medication    Thyroid activity decreased     hypothyroid awaiting appnt for endo       Past Surgical History:   Procedure Laterality Date    HX  SECTION      HX COLPOSCOPY  11/3/10    HX DILATION AND CURETTAGE      HX LIPOMA RESECTION         Family History   Problem Relation Age of Onset    Diabetes Father     Headache Mother        Social History     Socioeconomic History    Marital status: SINGLE     Spouse name: Not on file    Number of children: Not on file    Years of education: Not on file    Highest education level: Not on file   Occupational History    Not on file   Tobacco Use    Smoking status: Never Smoker    Smokeless tobacco: Never Used   Vaping Use    Vaping Use: Never used   Substance and Sexual Activity    Alcohol use: Not Currently     Comment: social    Drug use: No    Sexual activity: Not Currently     Birth control/protection: Injection   Other Topics Concern    Not on file   Social History Narrative    Not on file     Social Determinants of Health     Financial Resource Strain:     Difficulty of Paying Living Expenses: Not on file   Food Insecurity:     Worried About Running Out of Food in the Last Year: Not on file    [address] in the Last Year: Not on file   Transportation Needs:     Lack of Transportation (Medical): Not on file    Lack of Transportation (Non-Medical): Not on file   Physical Activity:     Days of Exercise per Week: Not on file    Minutes of Exercise per Session: Not on file   Stress:     Feeling of Stress : Not on file   Social Connections:     Frequency of Communication with Friends and Family: Not on file    Frequency of Social Gatherings with Friends and Family: Not on file    Attends Islam Services: Not on file    Active Member of [address] [place] [address] [employer] or Organizations: Not on file    Attends Club or Organization Meetings: Not on file    Marital Status: Not on file   Intimate Partner Violence:     Fear of Current or Ex-Partner: Not on file    Emotionally Abused: Not on file    Physically Abused: Not on file    Sexually Abused: Not on file   Housing Stability:     Unable to Pay for Housing in the Last Year: Not on file    Number of [place] in the Last Year: Not on file    Unstable Housing in the Last Year: Not on file           Vitals:  Ht 5' 6\" (1.676 m)   Wt 228 lb (103.4 kg)   BMI 36.80 kg/m²    Body mass index is 36.8 kg/m². ROS     Positive for: Musculoskeletal (left ankle )    Negative for: Constitutional, Gastrointestinal, Neurological, Skin, Genitourinary, HENT, Endocrine, Cardiovascular, Eyes, Respiratory, Psychiatric, Allergic/Imm, Heme/Lymph    Last edited by Bebeto Pruitt on 2/10/2022  2:53 PM. (History)              SUBJECTIVE/OBJECTIVE:  Marti Hinton (: 1990)   New patient presents today for a work-related injury at left ankle, states on 2022 while at work for employer \"[employer]\", she jumped down off of a truck and twisted the left ankle. Patient was seen at local emergency room after this injury on same day of injury 2022 where x-rays of the foot and ankle were obtained.   This patient describes having moderate throbbing pain that is constant associated with numbness, tingling weakness sensations. States standing, walking, stairs/steps make it worse. Symptoms unchanged despite rest.  She is not diabetic, non-smoker. Physical Exam  Pleasant well-nourished female , alert and oriented to person, time and place, no acute distress. Antalgic gait, satisfactory weightbearing stance. Left lower extremity/ankle: Tender to palpation around the ATFL, minimal at CFL, no tenderness at syndesmosis, mild tenderness deltoid ligament, nontender medial and lateral malleolus, posterior malleolus and Achilles tendon also nontender. Achilles tendon intact with negative Guadalupe test, negative ankle squeeze test.  Mild swelling, resolving ecchymosis. No gross instabilities for anterior drawer lateral talar tilt stress. Left foot: No malalignment or deformity, nontender, no swelling, ligaments grossly stable. Able to flex and extend toes satisfactory range of motion and strength. Contralateral foot and ankle exam, nontender, no swelling ligaments grossly stable. Normal weightbearing stance. Neurovascular exam intact for light touch sensation, cap refill, dorsalis pedis pulse palpable, flexion/extension strength 5/5. Skin intact without open wounds, lesions or ulcers, no skin discolorations, normal warmth to skin. Imaging:    XR Results (maximum last 2): Results from [place] encounter on 02/07/22    XR FOOT LT MIN 3 V    Narrative  EXAM: XR ANKLE LT MIN 3 V, XR FOOT LT MIN 3 V    INDICATION: left ankle and foot pain after trauma. COMPARISON: None. FINDINGS:    Three views of the left ankle demonstrate no fracture or disruption of the ankle  mortise. There is no other acute osseous or articular abnormality. The soft  tissues are within normal limits. 3  views of the left foot demonstrate no fracture or other osseous, articular or  soft tissue abnormality.     Impression  Normal left ankle and foot      XR ANKLE LT MIN 3 V    Narrative  EXAM: XR ANKLE LT MIN 3 V, XR FOOT LT MIN 3 V    INDICATION: left ankle and foot pain after trauma. COMPARISON: None. FINDINGS:    Three views of the left ankle demonstrate no fracture or disruption of the ankle  mortise. There is no other acute osseous or articular abnormality. The soft  tissues are within normal limits. 3  views of the left foot demonstrate no fracture or other osseous, articular or  soft tissue abnormality. Impression  Normal left ankle and foot    Reviewed the above x-rays obtained on February 7, 2022 on PACS both the left foot and ankle AP, lateral and oblique nonweightbearing x-rays show no obvious fractures or dislocations, there is normal ankle mortise, previous x-rays are grossly stable. There is normal bone density. An electronic signature was used to authenticate this note.   -- Karuna Sam MD

## 2022-02-10 NOTE — LETTER
2/14/2022    Patient: Hansel Miranda   YOB: 1990   Date of Visit: 2/10/2022     Leo Lu, [facility]  [address]  Via In Basket    Dear Leo Lu MD,      Thank you for referring Ms. Jennifer Vences to [facility] for evaluation. My notes for this consultation are attached. If you have questions, please do not hesitate to call me. I look forward to following your patient along with you.       Sincerely,    Opal Way MD

## 2022-03-10 ENCOUNTER — OFFICE VISIT (OUTPATIENT)
Dept: ORTHOPEDIC SURGERY | Age: 32
End: 2022-03-10
Payer: OTHER MISCELLANEOUS

## 2022-03-10 VITALS — WEIGHT: 228 LBS | BODY MASS INDEX: 36.64 KG/M2 | HEIGHT: 66 IN

## 2022-03-10 DIAGNOSIS — M25.572 ACUTE LEFT ANKLE PAIN: ICD-10-CM

## 2022-03-10 DIAGNOSIS — S93.402D MODERATE LEFT ANKLE SPRAIN, SUBSEQUENT ENCOUNTER: Primary | ICD-10-CM

## 2022-03-10 PROCEDURE — 99213 OFFICE O/P EST LOW 20 MIN: CPT | Performed by: ORTHOPAEDIC SURGERY

## 2022-03-10 NOTE — PROGRESS NOTES
Arianna Burton (: 1990) is a 34 y.o. female, patient,here for evaluation of the following   Chief Complaint   Patient presents with    Work Related Injury     Moderate left ankle sprain, that happened on 22        ASSESSMENT/PLAN:  Below is the assessment and plan developed based on review of pertinent history, physical exam, labs, studies, and medications. 1. Moderate left ankle sprain, subsequent encounter  -     XR FOOT LT MIN 3 V; Future  -     XR ANKLE LT MIN 3 V; Future  -     REFERRAL TO PHYSICAL THERAPY  2. Acute left ankle pain  -     XR FOOT LT MIN 3 V; Future  -     XR ANKLE LT MIN 3 V; Future  -     REFERRAL TO PHYSICAL THERAPY    Patient back for follow-up regarding left ankle sprain that was considered a moderate sprain. She also has some complaints of foot pain so we went ahead and got foot x-rays today in addition to the ankle x-rays. Both x-rays show no fractures or dislocations and normal overall alignment. She will continue weightbearing as tolerated to full weightbearing in a boot and will continue with anti-inflammatory medications for pain as needed. I did recommend physical therapy and referral is made today. Patient was not released to work today but anticipate return to some type of work duty next visit if symptoms improved. No x-rays are needed  Since both the left ankle and foot x-rays were normal x-ray findings today    The work status report form was completed for this patient to provide to employer. Return in about 4 weeks (around 2022). Allergies   Allergen Reactions    Tomato Angioedema       No current outpatient medications on file. No current facility-administered medications for this visit.        Past Medical History:   Diagnosis Date    Abnormal Pap smear     Anemia     Gastrointestinal disorder     Ulcers    Headache     Herniated disc     Hx gestational diabetes     HX OTHER MEDICAL     trich treated at beginning of pregnancy    HX OTHER MEDICAL     pituitary tumor-benign     Miscarriage     Molar pregnancy     Pap smear for cervical cancer screening 19 neg HPV POS- rp pap one year    PCOS (polycystic ovarian syndrome)     Pelvic pain in female     Pituitary adenoma ([facility] [address]     Pituitary tumor     Psychiatric problem     depression never on medication    Thyroid activity decreased     hypothyroid awaiting appnt for endo       Past Surgical History:   Procedure Laterality Date    HX  SECTION      HX COLPOSCOPY  11/3/10    HX DILATION AND CURETTAGE      HX LIPOMA RESECTION         Family History   Problem Relation Age of Onset    Diabetes Father     Headache Mother        Social History     Socioeconomic History    Marital status: SINGLE     Spouse name: Not on file    Number of children: Not on file    Years of education: Not on file    Highest education level: Not on file   Occupational History    Not on file   Tobacco Use    Smoking status: Never Smoker    Smokeless tobacco: Never Used   Vaping Use    Vaping Use: Never used   Substance and Sexual Activity    Alcohol use: Not Currently     Comment: social    Drug use: No    Sexual activity: Not Currently     Birth control/protection: Injection   Other Topics Concern    Not on file   Social History Narrative    Not on file     Social Determinants of Health     Financial Resource Strain:     Difficulty of Paying Living Expenses: Not on file   Food Insecurity:     Worried About Running Out of Food in the Last Year: Not on file    Abi of Food in the Last Year: Not on file   Transportation Needs:     Lack of Transportation (Medical): Not on file    Lack of Transportation (Non-Medical):  Not on file   Physical Activity:     Days of Exercise per Week: Not on file    Minutes of Exercise per Session: Not on file   Stress:     Feeling of Stress : Not on file   Social Connections:     Frequency of Communication with Friends and Family: Not on file  Frequency of Social Gatherings with Friends and Family: Not on file    Attends Mormonism Services: Not on file    Active Member of Clubs or Organizations: Not on file    Attends Club or Organization Meetings: Not on file    Marital Status: Not on file   Intimate Partner Violence:     Fear of Current or Ex-Partner: Not on file    Emotionally Abused: Not on file    Physically Abused: Not on file    Sexually Abused: Not on file   Housing Stability:     Unable to Pay for Housing in the Last Year: Not on file    Number of [place] in the Last Year: Not on file    Unstable Housing in the Last Year: Not on file           Vitals:  Ht 5' 6\" (1.676 m)   Wt 228 lb (103.4 kg)   BMI 36.80 kg/m²    Body mass index is 36.8 kg/m². SUBJECTIVE/OBJECTIVE:  Clarita Biswas (: 1990)   Worker's Comp. patient follow-up of left ankle moderate sprain sustained 2022, states there is still pain present and some trouble walking. Patient is in a boot. She is not back to work. Denies any other problems such as chest pain, shortness of breath, calf pain or swelling. Physical Exam  Pleasant well-nourished female , alert and oriented to person, time and place, no acute distress. Mild antalgic gait, satisfactory weightbearing stance. Left lower extremity/ankle: Calf nontender and no swelling. There is diffuse tenderness around the ATFL area of ankle and deltoid ligament. The syndesmosis is mostly intact and nontender, nontender medial and lateral malleolus. Negative Guadalupe test, negative ankle squeeze test.    Left foot: Nontender, no swelling, ligaments grossly stable. Able to flex and extend toes suspect range of motion strength 5/5    Contralateral foot and ankle exam, nontender, no swelling ligaments grossly stable. Normal weightbearing stance.     Neurovascular exam intact for light touch sensation, cap refill, dorsalis pedis pulse palpable, flexion/extension strength 5/5.    Skin intact without open wounds, lesions or ulcers, no skin discolorations, normal warmth to skin. Imaging:    XR Results (maximum last 2): Results from Appointment encounter on 03/10/22    XR FOOT LT MIN 3 V    Narrative  Left foot AP, lateral and oblique nonweightbearing x-rays show no acute fractures or dislocations, normal bone density, no widening of syndesmosis or medial clear space, no fracture seen and that includes area of base of fifth metatarsal, navicular or cuboid. No acute abnormalities. XR ANKLE LT MIN 3 V    Narrative  Left ankle AP, lateral and oblique nonweightbearing x-rays show no acute fractures or dislocations, no significant soft tissue swelling. There is a normal ankle mortise, joint space. Normal bone density. An electronic signature was used to authenticate this note.   -- Myranda Eisenberg MD

## 2022-03-10 NOTE — LETTER
3/14/2022    Patient: Davi Patel   YOB: 1990   Date of Visit: 3/10/2022     Myra Causey, [facility]  [address]  Via In Basket    Dear Myra Causey MD,      Thank you for referring Ms. Chance Pedraza to [facility] for evaluation. My notes for this consultation are attached. If you have questions, please do not hesitate to call me. I look forward to following your patient along with you.       Sincerely,    Zafar Adams MD

## 2022-03-18 PROBLEM — G43.909 MIGRAINE HEADACHE: Status: ACTIVE | Noted: 2021-02-10

## 2022-03-19 PROBLEM — E78.5 HLD (HYPERLIPIDEMIA): Status: ACTIVE | Noted: 2021-02-11

## 2022-03-19 PROBLEM — R79.89 ELEVATED PROLACTIN LEVEL: Status: ACTIVE | Noted: 2019-04-01

## 2022-03-19 PROBLEM — R53.1 LEFT-SIDED WEAKNESS: Status: ACTIVE | Noted: 2021-02-10

## 2022-03-19 PROBLEM — E66.01 SEVERE OBESITY (HCC): Status: ACTIVE | Noted: 2019-02-13

## 2022-03-19 PROBLEM — R73.03 PREDIABETES: Status: ACTIVE | Noted: 2021-02-11

## 2022-03-23 ENCOUNTER — OFFICE VISIT (OUTPATIENT)
Dept: FAMILY MEDICINE CLINIC | Age: 32
End: 2022-03-23
Payer: MEDICAID

## 2022-03-23 VITALS
SYSTOLIC BLOOD PRESSURE: 132 MMHG | BODY MASS INDEX: 36.32 KG/M2 | WEIGHT: 226 LBS | HEART RATE: 88 BPM | DIASTOLIC BLOOD PRESSURE: 80 MMHG | TEMPERATURE: 97.7 F | RESPIRATION RATE: 20 BRPM | OXYGEN SATURATION: 98 % | HEIGHT: 66 IN

## 2022-03-23 DIAGNOSIS — K62.5 BRBPR (BRIGHT RED BLOOD PER RECTUM): Primary | ICD-10-CM

## 2022-03-23 DIAGNOSIS — K62.89 ANAL PAIN: ICD-10-CM

## 2022-03-23 DIAGNOSIS — K62.5 RECTAL BLEEDING: ICD-10-CM

## 2022-03-23 DIAGNOSIS — Z12.11 SCREENING FOR COLON CANCER: ICD-10-CM

## 2022-03-23 DIAGNOSIS — K59.00 CONSTIPATION, UNSPECIFIED CONSTIPATION TYPE: ICD-10-CM

## 2022-03-23 PROCEDURE — 99213 OFFICE O/P EST LOW 20 MIN: CPT | Performed by: INTERNAL MEDICINE

## 2022-03-23 NOTE — PROGRESS NOTES
Identified pt with two pt identifiers(name and ). Chief Complaint   Patient presents with    Rectal Bleeding     since Sat     Constipation     last full BP 2 wks ago- only small amounts since then        Health Maintenance Due   Topic    Hepatitis C Screening     COVID-19 Vaccine (1)    Depression Screen     A1C test (Diabetic or Prediabetic)        Wt Readings from Last 3 Encounters:   22 226 lb (102.5 kg)   03/10/22 228 lb (103.4 kg)   02/10/22 228 lb (103.4 kg)     Temp Readings from Last 3 Encounters:   22 97.7 °F (36.5 °C) (Temporal)   22 98.8 °F (37.1 °C)   21 98.5 °F (36.9 °C)     BP Readings from Last 3 Encounters:   22 132/80   22 139/83   21 105/74     Pulse Readings from Last 3 Encounters:   22 88   22 90   21 67         Learning Assessment:  :     Learning Assessment 2016   PRIMARY LEARNER Patient   PRIMARY LANGUAGE ENGLISH   LEARNER PREFERENCE PRIMARY READING   ANSWERED BY pt   RELATIONSHIP SELF       Depression Screening:  :     3 most recent PHQ Screens 3/23/2022   Little interest or pleasure in doing things Not at all   Feeling down, depressed, irritable, or hopeless Not at all   Total Score PHQ 2 0       Fall Risk Assessment:  :     No flowsheet data found. Abuse Screening:  :     Abuse Screening Questionnaire 3/23/2022 2020 2020   Do you ever feel afraid of your partner? N N N   Are you in a relationship with someone who physically or mentally threatens you? N N N   Is it safe for you to go home? Nehemias Arora       Coordination of Care Questionnaire:  :     1) Have you been to an emergency room, urgent care clinic since your last visit?  yes [facility] 22   Hospitalized since your last visit? no             2) Have you seen or consulted any other health care providers outside of [address] [place] [address] since your last visit? no  (Include any pap smears or colon screenings in this section.)    3) Do you have an Advance Directive on file? no  Are you interested in receiving information about Advance Directives? no    4. For patients aged 39-70: Has the patient had a colonoscopy / FIT/ Cologuard? NA - based on age      If the patient is female:    11. For patients aged 41-77: Has the patient had a mammogram within the past 2 years? NA - based on age or sex      10. For patients aged 21-65: Has the patient had a pap smear?  Yes - no [place] present

## 2022-03-23 NOTE — PROGRESS NOTES
Chief Complaint   Patient presents with    Rectal Bleeding     since Sat     Constipation     last full BP 2 wks ago- only small amounts since then     Assessment/ Plan:   Diagnoses and all orders for this visit:    1. BRBPR (bright red blood per rectum)  -     REFERRAL TO GASTROENTEROLOGY    2. Anal pain   REFERRAL TO GASTROENTEROLOGY    3. Rectal bleeding  -     REFERRAL TO GASTROENTEROLOGY    4. Constipation, unspecified constipation type  -     OCCULT BLOOD IMMUNOASSAY,DIAGNOSTIC; Future    5. Screening for colon cancer  -     OCCULT BLOOD IMMUNOASSAY,DIAGNOSTIC; Future    I have discussed the diagnosis with the patient and the intended treatment plan as seen in the above orders. The patient has received an after-visit summary and questions were answered concerning future plans. Asked to return should symptoms worsen or not improve with treatment. Any pending labs and studies will be relayed to patient when they become available. Pt verbalizes understanding of plan of care and denies further questions or concerns at this time. Follow-up and Dispositions    · Return if symptoms worsen or fail to improve. Subjective:   Davi Patel is a 34 y.o. female who presents with BRPR with mucous that started about 5 days ago. The patient reports that she had a endoscopy about 2 years ago and that there were 2 polyps noted. Pathology returned benign. She has not had a colonoscopy per the record. She reports crampy abdominal discomfort. She does have a h/o constipation, but does not feel like this contributed. She has noticed a fair amount of BRBPR in the toilet and toilet paper. She denies dizziness, headaches or SOB. HISTORICAL  PMH, PSH, FHX, SOCHX, ALLERGIES and MES were reviewed and updated today. Review of Systems  Review of Systems   Gastrointestinal: Positive for abdominal pain, blood in stool and constipation. All other systems reviewed and are negative.       Objective: Vitals:    03/23/22 1347   BP: 132/80   Pulse: 88   Resp: 20   Temp: 97.7 °F (36.5 °C)   TempSrc: Temporal   SpO2: 98%   Weight: 226 lb (102.5 kg)   Height: 5' 6\" (1.676 m)       Physical Exam  Vitals and nursing note reviewed. Constitutional:       Appearance: Normal appearance. She is obese. Cardiovascular:      Rate and Rhythm: Normal rate and regular rhythm. Pulses: Normal pulses. Heart sounds: Normal heart sounds. Pulmonary:      Effort: Pulmonary effort is normal.      Breath sounds: Normal breath sounds. Genitourinary:     Comments: Not examined. Referred to colonoscopy. Musculoskeletal:      Cervical back: Normal range of motion and neck supple. Skin:     General: Skin is warm. Neurological:      General: No focal deficit present. Mental Status: She is alert. Mental status is at baseline.          Tone Osuna MD  [facility]   02/21/22 10:06 AM

## 2022-03-24 ENCOUNTER — APPOINTMENT (OUTPATIENT)
Dept: CT IMAGING | Age: 32
End: 2022-03-24
Attending: EMERGENCY MEDICINE
Payer: MEDICAID

## 2022-03-24 ENCOUNTER — HOSPITAL ENCOUNTER (EMERGENCY)
Age: 32
Discharge: HOME | End: 2022-03-24
Attending: EMERGENCY MEDICINE
Payer: MEDICAID

## 2022-03-24 VITALS
SYSTOLIC BLOOD PRESSURE: 143 MMHG | DIASTOLIC BLOOD PRESSURE: 92 MMHG | HEART RATE: 77 BPM | RESPIRATION RATE: 16 BRPM | OXYGEN SATURATION: 97 % | TEMPERATURE: 99.1 F

## 2022-03-24 DIAGNOSIS — K62.5 RECTAL BLEEDING: ICD-10-CM

## 2022-03-24 DIAGNOSIS — K59.00 CONSTIPATION, UNSPECIFIED CONSTIPATION TYPE: Primary | ICD-10-CM

## 2022-03-24 LAB
ALBUMIN SERPL-MCNC: 3.7 G/DL (ref 3.5–5)
ALBUMIN/GLOB SERPL: 1.1 {RATIO} (ref 1.1–2.2)
ALP SERPL-CCNC: 129 U/L (ref 45–117)
ALT SERPL-CCNC: 26 U/L (ref 12–78)
ANION GAP SERPL CALC-SCNC: 11 MMOL/L (ref 5–15)
APPEARANCE UR: ABNORMAL
AST SERPL-CCNC: 18 U/L (ref 15–37)
BACTERIA URNS QL MICRO: ABNORMAL /HPF
BASOPHILS # BLD: 0 K/UL (ref 0–0.1)
BASOPHILS NFR BLD: 0 % (ref 0–1)
BILIRUB SERPL-MCNC: 0.4 MG/DL (ref 0.2–1)
BILIRUB UR QL: NEGATIVE
BUN SERPL-MCNC: 9 MG/DL (ref 6–20)
BUN/CREAT SERPL: 11 (ref 12–20)
CALCIUM SERPL-MCNC: 9 MG/DL (ref 8.5–10.1)
CHLORIDE SERPL-SCNC: 107 MMOL/L (ref 97–108)
CO2 SERPL-SCNC: 24 MMOL/L (ref 21–32)
COLOR UR: ABNORMAL
CREAT SERPL-MCNC: 0.81 MG/DL (ref 0.55–1.02)
DIFFERENTIAL METHOD BLD: NORMAL
EOSINOPHIL # BLD: 0.2 K/UL (ref 0–0.4)
EOSINOPHIL NFR BLD: 2 % (ref 0–7)
EPITH CASTS URNS QL MICRO: ABNORMAL /LPF
ERYTHROCYTE [DISTWIDTH] IN BLOOD BY AUTOMATED COUNT: 14.2 % (ref 11.5–14.5)
GLOBULIN SER CALC-MCNC: 3.4 G/DL (ref 2–4)
GLUCOSE SERPL-MCNC: 111 MG/DL (ref 65–100)
GLUCOSE UR STRIP.AUTO-MCNC: NEGATIVE MG/DL
HCG UR QL: NEGATIVE
HCT VFR BLD AUTO: 38 % (ref 35–47)
HGB BLD-MCNC: 12.4 G/DL (ref 11.5–16)
HGB UR QL STRIP: ABNORMAL
IMM GRANULOCYTES # BLD AUTO: 0 K/UL (ref 0–0.04)
IMM GRANULOCYTES NFR BLD AUTO: 0 % (ref 0–0.5)
KETONES UR QL STRIP.AUTO: NEGATIVE MG/DL
LACTATE SERPL-SCNC: 0.9 MMOL/L (ref 0.4–2)
LEUKOCYTE ESTERASE UR QL STRIP.AUTO: ABNORMAL
LIPASE SERPL-CCNC: 106 U/L (ref 73–393)
LYMPHOCYTES # BLD: 2 K/UL (ref 0.8–3.5)
LYMPHOCYTES NFR BLD: 24 % (ref 12–49)
MCH RBC QN AUTO: 26.7 PG (ref 26–34)
MCHC RBC AUTO-ENTMCNC: 32.6 G/DL (ref 30–36.5)
MCV RBC AUTO: 81.7 FL (ref 80–99)
MONOCYTES # BLD: 0.4 K/UL (ref 0–1)
MONOCYTES NFR BLD: 5 % (ref 5–13)
MUCOUS THREADS URNS QL MICRO: ABNORMAL /LPF
NEUTS SEG # BLD: 5.7 K/UL (ref 1.8–8)
NEUTS SEG NFR BLD: 69 % (ref 32–75)
NITRITE UR QL STRIP.AUTO: NEGATIVE
NRBC # BLD: 0 K/UL (ref 0–0.01)
NRBC BLD-RTO: 0 PER 100 WBC
PH UR STRIP: 7 [PH] (ref 5–8)
PLATELET # BLD AUTO: 228 K/UL (ref 150–400)
PMV BLD AUTO: 10.5 FL (ref 8.9–12.9)
POTASSIUM SERPL-SCNC: 3.9 MMOL/L (ref 3.5–5.1)
PROT SERPL-MCNC: 7.1 G/DL (ref 6.4–8.2)
PROT UR STRIP-MCNC: NEGATIVE MG/DL
RBC # BLD AUTO: 4.65 M/UL (ref 3.8–5.2)
RBC #/AREA URNS HPF: ABNORMAL /HPF (ref 0–5)
SODIUM SERPL-SCNC: 142 MMOL/L (ref 136–145)
SP GR UR REFRACTOMETRY: 1.02 (ref 1–1.03)
UROBILINOGEN UR QL STRIP.AUTO: 0.2 EU/DL (ref 0.2–1)
WBC # BLD AUTO: 8.3 K/UL (ref 3.6–11)
WBC URNS QL MICRO: ABNORMAL /HPF (ref 0–4)

## 2022-03-24 PROCEDURE — 81001 URINALYSIS AUTO W/SCOPE: CPT

## 2022-03-24 PROCEDURE — 36415 COLL VENOUS BLD VENIPUNCTURE: CPT

## 2022-03-24 PROCEDURE — 74011000636 HC RX REV CODE- 636: Performed by: EMERGENCY MEDICINE

## 2022-03-24 PROCEDURE — 85025 COMPLETE CBC W/AUTO DIFF WBC: CPT

## 2022-03-24 PROCEDURE — 83605 ASSAY OF LACTIC ACID: CPT

## 2022-03-24 PROCEDURE — 81025 URINE PREGNANCY TEST: CPT

## 2022-03-24 PROCEDURE — 80053 COMPREHEN METABOLIC PANEL: CPT

## 2022-03-24 PROCEDURE — 83690 ASSAY OF LIPASE: CPT

## 2022-03-24 PROCEDURE — 74011250636 HC RX REV CODE- 250/636: Performed by: EMERGENCY MEDICINE

## 2022-03-24 PROCEDURE — 99285 EMERGENCY DEPT VISIT HI MDM: CPT

## 2022-03-24 PROCEDURE — 74177 CT ABD & PELVIS W/CONTRAST: CPT

## 2022-03-24 RX ADMIN — IOPAMIDOL 100 ML: 755 INJECTION, SOLUTION INTRAVENOUS at 08:08

## 2022-03-24 RX ADMIN — SODIUM CHLORIDE 1000 ML: 9 INJECTION, SOLUTION INTRAVENOUS at 07:40

## 2022-03-24 NOTE — ED NOTES
Pt dcd to home; verbalized understanding of dc instructions, and FU. SL dcd and bleeding controlled, no hematoma noted, dressing applied.

## 2022-03-24 NOTE — Clinical Note
[address] EMERGENCY DEPT  [address] [facility] [id number] [phone]    Work/School Note    Date: 3/24/2022    To Whom It May concern:    Marti Turk was seen and treated today in the emergency room by the following provider(s):  Attending Provider: Charleen Galindo MD.      Hansel Miranda is excused from work/school on 03/24/22 and 03/25/22. She is medically clear to return to work/school on 3/26/2022.        Sincerely,          Delfina Greene MD

## 2022-03-24 NOTE — ED TRIAGE NOTES
Pt noticed on Saturday that she was experiencing rectal bleeding. She reports that she feels she needs to pass stool constantly but has been only been passing blood and mucus. Last normal BM 2 weeks ago. Pt reports this am she got up to urinate and noticed that the toilet was full of blood and that her abdomen was hurting and bloated.

## 2022-03-24 NOTE — ED PROVIDER NOTES
53-year-old female history of gastric ulcers, PCOS, hypothyroidism, presenting to the ED for evaluation of rectal bleeding. She reports she has been constipated for 2 weeks. She has felt the need to pass stool but has only been able to pass bloody mucus. This morning, she became concerned when she awoke to urinate and after going noticed that the toilet was full of blood and that her abdomen was tender and bloated. She denies pregnancy.            Past Medical History:   Diagnosis Date    Abnormal Pap smear     Anemia     Gastrointestinal disorder     Ulcers    Headache     Herniated disc     Hx gestational diabetes     HX OTHER MEDICAL     trich treated at beginning of pregnancy    HX OTHER MEDICAL     pituitary tumor-benign     Miscarriage     Molar pregnancy     Pap smear for cervical cancer screening 19 neg HPV POS- rp pap one year    PCOS (polycystic ovarian syndrome)     Pelvic pain in female     Pituitary adenoma ([address]     Pituitary tumor     Psychiatric problem     depression never on medication    Thyroid activity decreased     hypothyroid awaiting appnt for endo       Past Surgical History:   Procedure Laterality Date    HX  SECTION      HX COLPOSCOPY  11/3/10    HX DILATION AND CURETTAGE      HX LIPOMA RESECTION      HX ORTHOPAEDIC      carpal tunnel on left hand         Family History:   Problem Relation Age of Onset    Diabetes Father     Headache Mother        Social History     Socioeconomic History    Marital status: SINGLE     Spouse name: Not on file    Number of children: Not on file    Years of education: Not on file    Highest education level: Not on file   Occupational History    Not on file   Tobacco Use    Smoking status: Never Smoker    Smokeless tobacco: Never Used   Vaping Use    Vaping Use: Never used   Substance and Sexual Activity    Alcohol use: Not Currently    Drug use: No    Sexual activity: Not Currently   Other Topics Concern  Not on file   Social History Narrative    Not on file     Social Determinants of Health     Financial Resource Strain:     Difficulty of Paying Living Expenses: Not on file   Food Insecurity:     Worried About Running Out of Food in the Last Year: Not on file    Abi of Food in the Last Year: Not on file   Transportation Needs:     Lack of Transportation (Medical): Not on file    Lack of Transportation (Non-Medical): Not on file   Physical Activity:     Days of Exercise per Week: Not on file    Minutes of Exercise per Session: Not on file   Stress:     Feeling of Stress : Not on file   Social Connections:     Frequency of Communication with Friends and Family: Not on file    Frequency of Social Gatherings with Friends and Family: Not on file    Attends Gnosticism Services: Not on file    Active Member of [address] [place] [address] [employer] or Organizations: Not on file    Attends Club or Organization Meetings: Not on file    Marital Status: Not on file   Intimate Partner Violence:     Fear of Current or Ex-Partner: Not on file    Emotionally Abused: Not on file    Physically Abused: Not on file    Sexually Abused: Not on file   Housing Stability:     Unable to Pay for Housing in the Last Year: Not on file    Number of [place] in the Last Year: Not on file    Unstable Housing in the Last Year: Not on file         ALLERGIES: Tomato    Review of Systems   Constitutional: Negative for chills and fever. HENT: Negative for congestion. Eyes: Negative for visual disturbance. Respiratory: Negative for shortness of breath. Cardiovascular: Negative for chest pain. Gastrointestinal: Positive for abdominal distention, abdominal pain and blood in stool. Negative for nausea and vomiting. Genitourinary: Negative for dysuria and hematuria. Musculoskeletal: Negative for back pain. Skin: Negative for rash. Allergic/Immunologic: Negative for immunocompromised state.    Neurological: Negative for syncope, weakness and headaches. Psychiatric/Behavioral: Negative for confusion. Vitals:    03/24/22 0703   BP: (!) 143/92   Pulse: 77   Resp: 16   Temp: 99.1 °F (37.3 °C)   SpO2: 97%            Physical Exam  Vitals and nursing note reviewed. Constitutional:       General: She is not in acute distress. Appearance: She is well-developed. HENT:      Head: Normocephalic and atraumatic. Eyes:      General: No scleral icterus. Neck:      Trachea: No tracheal deviation. Cardiovascular:      Rate and Rhythm: Normal rate and regular rhythm. Heart sounds: Normal heart sounds. Pulmonary:      Effort: Pulmonary effort is normal. No respiratory distress. Breath sounds: Normal breath sounds. Abdominal:      General: There is no distension. Palpations: Abdomen is soft. Tenderness: There is abdominal tenderness in the right lower quadrant. There is no guarding. Musculoskeletal:         General: Normal range of motion. Skin:     General: Skin is warm and dry. Neurological:      Mental Status: She is alert and oriented to person, place, and time. Cranial Nerves: No cranial nerve deficit. MDM  Number of Diagnoses or Management Options  Constipation, unspecified constipation type  Rectal bleeding  Diagnosis management comments: 69-year-old female presenting with rectal bleeding and abdominal pain. VS WNL. Exam notable for lower abd pain. Will pursue CT abd/pelv to further evaluate. Amount and/or Complexity of Data Reviewed  Clinical lab tests: ordered and reviewed  Tests in the radiology section of CPT®: ordered and reviewed      ED Course as of 03/24/22 0900   Thu Mar 24, 2022   0852 Blood(!): LARGE  Labs notable for hematuria, CT scan shows renal stone but nothing in the ureter. Suspect she passed a kidney stone and that was the source of the blood that she visualized in the toilet.  [SL]   [id number] Patient is frustrated and dissatisfied, stating she will go somewhere else for evaluation. I reassured her that based on her labs and imaging today, there is no emergent condition and encouraged her to follow-up with her gastroenterologist on Monday. I offered her prescription for enema and aggressive bowel regimen however she states she was already doing all of this at home. Unfortunately she is leaving dissatisfied as we were unable to meet her expectations. She is requesting discharge so that she may be evaluated at a different facility.  [SL]      ED Course User Index  [SL] Deanna Caballero MD       Procedures

## 2022-04-07 ENCOUNTER — OFFICE VISIT (OUTPATIENT)
Dept: ORTHOPEDIC SURGERY | Age: 32
End: 2022-04-07
Payer: OTHER MISCELLANEOUS

## 2022-04-07 VITALS — HEIGHT: 66 IN | BODY MASS INDEX: 36.96 KG/M2 | WEIGHT: 230 LBS
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
## 2022-04-07 DIAGNOSIS — S93.402D MODERATE ANKLE SPRAIN, LEFT, SUBSEQUENT ENCOUNTER: Primary | ICD-10-CM

## 2022-04-07 PROCEDURE — 99213 OFFICE O/P EST LOW 20 MIN: CPT | Performed by: ORTHOPAEDIC SURGERY

## 2022-04-07 NOTE — PROGRESS NOTES
Arsenio Watters (: 1990) is a 34 y.o. female, patient,here for evaluation of the following   Chief Complaint   Patient presents with    Follow-up     Moderate left ankle sprain, that happened on 22        ASSESSMENT/PLAN:  Below is the assessment and plan developed based on review of pertinent history, physical exam, labs, studies, and medications. 1. Moderate ankle sprain, left, subsequent encounter  -     REFERRAL TO PHYSICAL THERAPY  2. Work related injury  -     REFERRAL TO PHYSICAL THERAPY    Patient is doing much better today and symptoms are less significant. She is referred to physical therapy program again to maximize conservative treatment. Anticipate return to work after 4 more weeks, her job does require frequent walking and standing so we will allow her to recover and maximize physical therapy prior to returning to work. So anticipate return next time. She is not at [address] [place] [address]. No x-rays are needed next time unless she has persistent pain that is not improved further, we can obtain new x-rays left ankle 3 views weightbearing compared to contralateral AP ankle    The paperwork for workers comp was completed today and that includes the work status report form to provide to employer. Patient also had information from physical therapist which is reviewed and signed so patient can return  to therapist      Return in about 4 weeks (around 2022). Allergies   Allergen Reactions    Tomato Angioedema       Current Outpatient Medications   Medication Sig    linaclotide (LINZESS PO) Take  by mouth. No current facility-administered medications for this visit.        Past Medical History:   Diagnosis Date    Abnormal Pap smear     Anemia     Gastrointestinal disorder     Ulcers    Headache     Herniated disc     Hx gestational diabetes     HX OTHER MEDICAL     trich treated at beginning of pregnancy    HX OTHER MEDICAL     pituitary tumor-benign     Miscarriage     Molar pregnancy     Pap smear for cervical cancer screening 19 neg HPV POS- rp pap one year    PCOS (polycystic ovarian syndrome)     Pelvic pain in female     Pituitary adenoma ([address]     Pituitary tumor     Psychiatric problem     depression never on medication    Thyroid activity decreased     hypothyroid awaiting appnt for endo       Past Surgical History:   Procedure Laterality Date    HX  SECTION      HX COLPOSCOPY  11/3/10    HX DILATION AND CURETTAGE      HX LIPOMA RESECTION      HX ORTHOPAEDIC      carpal tunnel on left hand       Family History   Problem Relation Age of Onset    Diabetes Father     Headache Mother        Social History     Socioeconomic History    Marital status: SINGLE     Spouse name: Not on file    Number of children: Not on file    Years of education: Not on file    Highest education level: Not on file   Occupational History    Not on file   Tobacco Use    Smoking status: Never Smoker    Smokeless tobacco: Never Used   Vaping Use    Vaping Use: Never used   Substance and Sexual Activity    Alcohol use: Not Currently    Drug use: No    Sexual activity: Not Currently   Other Topics Concern    Not on file   Social History Narrative    Not on file     Social Determinants of Health     Financial Resource Strain:     Difficulty of Paying Living Expenses: Not on file   Food Insecurity:     Worried About Running Out of Food in the Last Year: Not on file    Abi of Food in the Last Year: Not on file   Transportation Needs:     Lack of Transportation (Medical): Not on file    Lack of Transportation (Non-Medical):  Not on file   Physical Activity:     Days of Exercise per Week: Not on file    Minutes of Exercise per Session: Not on file   Stress:     Feeling of Stress : Not on file   Social Connections:     Frequency of Communication with Friends and Family: Not on file    Frequency of Social Gatherings with Friends and Family: Not on file   Heber Attends Quaker Services: Not on file    Active Member of Clubs or Organizations: Not on file    Attends Club or Organization Meetings: Not on file    Marital Status: Not on file   Intimate Partner Violence:     Fear of Current or Ex-Partner: Not on file    Emotionally Abused: Not on file    Physically Abused: Not on file    Sexually Abused: Not on file   Housing Stability:     Unable to Pay for Housing in the Last Year: Not on file    Number of [place] in the Last Year: Not on file    Unstable Housing in the Last Year: Not on file           Vitals:  Ht 5' 6\" (1.676 m)   Wt 230 lb (104.3 kg)   LMP 2022   BMI 37.12 kg/m²    Body mass index is 37.12 kg/m². SUBJECTIVE/OBJECTIVE:  Deep Mcneill (: 1990)   Worker's Comp. patient returns for follow-up of left ankle sprain, she had this injury on 2022 and has been going to physical therapy program.  Overall she is doing much better than she was last time. She has no other complaints. Physical Exam  Pleasant well-nourished female , alert and oriented to person, time and place, no acute distress. Mild antalgic gait, satisfactory weightbearing stance. Left lower extremity/ankle: Calf soft nontender, there is still a little bit of swelling medial lateral ankle but a lot improved since last seen, ligaments are grossly stable. Achilles tendon intact negative Guadalupe test, negative ankle squeeze test.    Left foot: There is no malalignment or deformity, no swelling, no tenderness to palpation. Able to flex extend all toes with good range of motion strength. Contralateral foot and ankle exam, nontender, no swelling ligaments grossly stable. Normal weightbearing stance. Neurovascular exam intact for light touch sensation, cap refill, dorsalis pedis pulse palpable, flexion/extension strength 5/5.     Skin intact without open wounds, lesions or ulcers, no skin discolorations, normal warmth to skin.      Imaging:    No x-rays were obtained today. An electronic signature was used to authenticate this note.   -- Luke Guy MD

## 2022-04-07 NOTE — LETTER
4/12/2022    Patient: Mauro Swain   YOB: 1990   Date of Visit: 4/7/2022     Estrellita Cavazos, Rebecca [place]   [address] [facility]  Via In Basket    Dear Estrellita Cavazos MD,      Thank you for referring Ms. Beau Perkins to [facility] for evaluation. My notes for this consultation are attached. If you have questions, please do not hesitate to call me. I look forward to following your patient along with you.       Sincerely,    Harlan Kelsey MD

## 2022-05-05 ENCOUNTER — OFFICE VISIT (OUTPATIENT)
Dept: ORTHOPEDIC SURGERY | Age: 32
End: 2022-05-05
Payer: OTHER MISCELLANEOUS

## 2022-05-05 VITALS — BODY MASS INDEX: 35.84 KG/M2 | WEIGHT: 223 LBS | HEIGHT: 66 IN

## 2022-05-05 DIAGNOSIS — S93.402D MODERATE ANKLE SPRAIN, LEFT, SUBSEQUENT ENCOUNTER: Primary | ICD-10-CM
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
## 2022-05-05 PROCEDURE — 99212 OFFICE O/P EST SF 10 MIN: CPT | Performed by: ORTHOPAEDIC SURGERY

## 2022-05-05 NOTE — PROGRESS NOTES
Sadie Ravi (: 1990) is a 34 y.o. female, patient,here for evaluation of the following   Chief Complaint   Patient presents with    Follow-up     Moderate left ankle sprain, that happened on 22        ASSESSMENT/PLAN:  Below is the assessment and plan developed based on review of pertinent history, physical exam, labs, studies, and medications. 1. Moderate ankle sprain, left, subsequent encounter  -     XR STANDING ANKLE LT MIN 3 V; Future  2. Work related injury    Patient doing very well, she has no complaints today and feels that she is ready to return to work. She is released to full duty work today and a work status report is provided to her to give to employer and/or . She is at maximum medical improvement today May 5, 2022. Return if symptoms worsen or fail to improve. Allergies   Allergen Reactions    Tomato Angioedema       Current Outpatient Medications   Medication Sig    linaclotide (LINZESS PO) Take  by mouth. No current facility-administered medications for this visit.        Past Medical History:   Diagnosis Date    Abnormal Pap smear     Anemia     Gastrointestinal disorder     Ulcers    Headache     Herniated disc     Hx gestational diabetes     HX OTHER MEDICAL     trich treated at beginning of pregnancy    HX OTHER MEDICAL     pituitary tumor-benign     Miscarriage     Molar pregnancy     Pap smear for cervical cancer screening 19 neg HPV POS- rp pap one year    PCOS (polycystic ovarian syndrome)     Pelvic pain in female     Pituitary [address] ([facility] [address]     Pituitary tumor     Psychiatric problem     depression never on medication    Thyroid activity decreased     hypothyroid awaiting appnt for endo       Past Surgical History:   Procedure Laterality Date    HX  SECTION      HX COLPOSCOPY  11/3/10    HX DILATION AND CURETTAGE      HX LIPOMA RESECTION      HX ORTHOPAEDIC      carpal tunnel on left hand       Family History   Problem Relation Age of Onset    Diabetes Father     Headache Mother        Social History     Socioeconomic History    Marital status: SINGLE     Spouse name: Not on file    Number of children: Not on file    Years of education: Not on file    Highest education level: Not on file   Occupational History    Not on file   Tobacco Use    Smoking status: Never Smoker    Smokeless tobacco: Never Used   Vaping Use    Vaping Use: Never used   Substance and Sexual Activity    Alcohol use: Not Currently    Drug use: No    Sexual activity: Not Currently   Other Topics Concern    Not on file   Social History Narrative    Not on file     Social Determinants of Health     Financial Resource Strain:     Difficulty of Paying Living Expenses: Not on file   Food Insecurity:     Worried About [employer] [employer] in the Last Year: Not on file    [address] [email or web address] in the Last Year: Not on file   Transportation Needs:     Lack of Transportation (Medical): Not on file    Lack of Transportation (Non-Medical):  Not on file   Physical Activity:     Days of Exercise per Week: Not on file    Minutes of Exercise per Session: Not on file   Stress:     Feeling of Stress : Not on file   Social Connections:     Frequency of Communication with Friends and Family: Not on file    Frequency of Social Gatherings with Friends and Family: Not on file    Attends [employer] Services: Not on file    Active Member of [address] [place] [address] or Organizations: Not on file    Attends Club or Organization Meetings: Not on file    Marital Status: Not on file   Intimate Partner Violence:     Fear of Current or Ex-Partner: Not on file    Emotionally Abused: Not on file    Physically Abused: Not on file    Sexually Abused: Not on file   Housing Stability:     Unable to Pay for Housing in the Last Year: Not on file    Number of [place] in the Last Year: Not on file    Unstable Housing in the Last Year: Not on file           Vitals:  Ht 5' 6\" (1.676 m)   Wt 223 lb (101.2 kg)   BMI 35.99 kg/m²    Body mass index is 35.99 kg/m². SUBJECTIVE/OBJECTIVE:  Sadie Ravi (: 1990)   Patient is a work-related injury Worker's Comp. patient returns for follow-up regarding injury sustained 2022. This injury was a left ankle moderate sprain. Today patient is doing very well she has no complaints. She feels she is ready to return to full duty work. Physical Exam  Pleasant well-nourished female , alert and oriented to person, time and place, no acute distress. Mostly normal gait, normal weightbearing stance. Left lower extremity/ankle: Calf soft nontender, ligaments are grossly stable, range of motion intact, negative ankle squeeze test, negative Guadalupe test.    Left foot: Nontender, no swelling, ligament stable. Contralateral foot and ankle exam, nontender, no swelling ligaments grossly stable. Normal weightbearing stance. Neurovascular exam intact for light touch sensation, cap refill, dorsalis pedis pulse palpable, flexion/extension strength 5/5. Skin intact without open wounds, lesions or ulcers, no skin discolorations, normal warmth to skin. Imaging:    XR Results (most recent):  Results from Appointment encounter on 22    XR STANDING ANKLE LT MIN 3 V    Narrative  Left ankle standing AP, lateral and oblique x-rays show no acute fractures or dislocations, there is similar position to the left ankle and right ankle on AP view. Normal ankle mortise, no widening of medial clear space or syndesmosis. Normal bone density. An electronic signature was used to authenticate this note.   -- Chuck Herbert MD

## 2022-05-05 NOTE — LETTER
5/11/2022    Patient: Mauro Swain   YOB: 1990   Date of Visit: 5/5/2022     Estrellita Cavazos, Rebecca E [place]   [address] [facility]  Via In Basket    Dear Estrellita Cavazos MD,      Thank you for referring Ms. Beau Perkins to [facility] for evaluation. My notes for this consultation are attached. If you have questions, please do not hesitate to call me. I look forward to following your patient along with you.       Sincerely,    Harlan Kelsey MD

## 2022-05-17 ENCOUNTER — VIRTUAL VISIT (OUTPATIENT)
Dept: FAMILY MEDICINE CLINIC | Age: 32
End: 2022-05-17
Payer: MEDICAID

## 2022-05-17 DIAGNOSIS — J01.00 ACUTE NON-RECURRENT MAXILLARY SINUSITIS: Primary | ICD-10-CM

## 2022-05-17 PROCEDURE — 99213 OFFICE O/P EST LOW 20 MIN: CPT | Performed by: NURSE PRACTITIONER

## 2022-05-17 RX ORDER — AZITHROMYCIN 250 MG/1
TABLET, FILM COATED ORAL
Qty: 6 TABLET | Refills: 0 | Status: SHIPPED | OUTPATIENT
Start: 2022-05-17 | End: 2022-05-22

## 2022-05-17 NOTE — PROGRESS NOTES
HISTORY OF PRESENT ILLNESS  Marti Devries is a 34 y.o. female. HPI  Pt presents with \"sinus congestion\"  Visit was conducted via [employer]. ana [place], who was evaluated through a synchronous (real-time) audio-video encounter, and/or her healthcare decision maker, is aware that it is a billable service, which includes applicable co-pays, with coverage as determined by her insurance carrier. She provided verbal consent to proceed and patient identification was verified. This visit was conducted pursuant to the emergency declaration under the [address], [address] [place] [address] authority and the [employer] General Act. A caregiver was present when appropriate. Ability to conduct physical exam was limited. The patient was located at home in a state where the provider was licensed to provide care. --Ovidio Lee NP on 5/17/2022 at 3:02 PM          Pt states that she believes that she has a sinus infection  Sore throat  Itching in ears  Nasal congestion  Sinus pressure  Symptoms have been present for 4 days  No fever  OTC: advil cold and sinus  Review of Systems   Constitutional: Negative for fever. HENT: Positive for congestion, sinus pain and sore throat. Physical Exam  Constitutional:       Appearance: Normal appearance. Neurological:      Mental Status: She is alert. Psychiatric:         Mood and Affect: Mood normal.         Behavior: Behavior normal.         ASSESSMENT and PLAN    ICD-10-CM ICD-9-CM    1. Acute non-recurrent maxillary sinusitis  J01.00 461.0 azithromycin (ZITHROMAX) 250 mg tablet     Educated about taking medication as prescribed  Should use nasal saline as well    Pt informed to return to office with worsening of symptoms, or PRN with any questions or concerns. Pt verbalizes understanding of plan of care and denies further questions or concerns at this time.

## 2022-06-27 ENCOUNTER — APPOINTMENT (OUTPATIENT)
Dept: CT IMAGING | Age: 32
End: 2022-06-27
Attending: EMERGENCY MEDICINE
Payer: MEDICAID

## 2022-06-27 ENCOUNTER — HOSPITAL ENCOUNTER (OUTPATIENT)
Age: 32
Setting detail: OBSERVATION
Discharge: HOME | End: 2022-06-28
Attending: EMERGENCY MEDICINE | Admitting: FAMILY MEDICINE
Payer: MEDICAID

## 2022-06-27 ENCOUNTER — NURSE TRIAGE (OUTPATIENT)
Dept: OTHER | Facility: CLINIC | Age: 32
End: 2022-06-27

## 2022-06-27 ENCOUNTER — HOSPITAL ENCOUNTER (EMERGENCY)
Dept: MRI IMAGING | Age: 32
Discharge: HOME | End: 2022-06-27
Attending: FAMILY MEDICINE
Payer: MEDICAID

## 2022-06-27 DIAGNOSIS — I63.9 CEREBROVASCULAR ACCIDENT (CVA), UNSPECIFIED MECHANISM (HCC): Primary | ICD-10-CM

## 2022-06-27 DIAGNOSIS — R51.9 INTRACTABLE EPISODIC HEADACHE, UNSPECIFIED HEADACHE TYPE: ICD-10-CM

## 2022-06-27 DIAGNOSIS — R07.9 CHEST PAIN, UNSPECIFIED TYPE: ICD-10-CM

## 2022-06-27 PROBLEM — R29.898 LEFT ARM WEAKNESS: Status: ACTIVE | Noted: 2022-06-27

## 2022-06-27 LAB
ALBUMIN SERPL-MCNC: 3.7 G/DL (ref 3.5–5)
ALBUMIN/GLOB SERPL: 1.1 {RATIO} (ref 1.1–2.2)
ALP SERPL-CCNC: 120 U/L (ref 45–117)
ALT SERPL-CCNC: 26 U/L (ref 12–78)
AMPHET UR QL SCN: NEGATIVE
ANION GAP SERPL CALC-SCNC: 10 MMOL/L (ref 5–15)
AST SERPL-CCNC: 15 U/L (ref 15–37)
BARBITURATES UR QL SCN: NEGATIVE
BASOPHILS # BLD: 0 K/UL (ref 0–0.1)
BASOPHILS NFR BLD: 1 % (ref 0–1)
BENZODIAZ UR QL: NEGATIVE
BILIRUB SERPL-MCNC: 0.3 MG/DL (ref 0.2–1)
BUN SERPL-MCNC: 12 MG/DL (ref 6–20)
BUN/CREAT SERPL: 13 (ref 12–20)
CALCIUM SERPL-MCNC: 8.6 MG/DL (ref 8.5–10.1)
CANNABINOIDS UR QL SCN: NEGATIVE
CHLORIDE SERPL-SCNC: 108 MMOL/L (ref 97–108)
CO2 SERPL-SCNC: 22 MMOL/L (ref 21–32)
COCAINE UR QL SCN: NEGATIVE
CREAT SERPL-MCNC: 0.9 MG/DL (ref 0.55–1.02)
CRP SERPL-MCNC: 1.52 MG/DL (ref 0–0.6)
D DIMER PPP FEU-MCNC: 0.24 MG/L FEU (ref 0–0.65)
DIFFERENTIAL METHOD BLD: ABNORMAL
DRUG SCRN COMMENT,DRGCM: NORMAL
EOSINOPHIL # BLD: 0.2 K/UL (ref 0–0.4)
EOSINOPHIL NFR BLD: 3 % (ref 0–7)
ERYTHROCYTE [DISTWIDTH] IN BLOOD BY AUTOMATED COUNT: 14.6 % (ref 11.5–14.5)
ERYTHROCYTE [SEDIMENTATION RATE] IN BLOOD: 26 MM/HR (ref 0–20)
ETHANOL SERPL-MCNC: <10 MG/DL
GLOBULIN SER CALC-MCNC: 3.5 G/DL (ref 2–4)
GLUCOSE BLD STRIP.AUTO-MCNC: 147 MG/DL (ref 65–117)
GLUCOSE SERPL-MCNC: 115 MG/DL (ref 65–100)
HCT VFR BLD AUTO: 38.3 % (ref 35–47)
HGB BLD-MCNC: 12.5 G/DL (ref 11.5–16)
IMM GRANULOCYTES # BLD AUTO: 0 K/UL (ref 0–0.04)
IMM GRANULOCYTES NFR BLD AUTO: 0 % (ref 0–0.5)
LYMPHOCYTES # BLD: 2.2 K/UL (ref 0.8–3.5)
LYMPHOCYTES NFR BLD: 30 % (ref 12–49)
MAGNESIUM SERPL-MCNC: 1.6 MG/DL (ref 1.6–2.4)
MCH RBC QN AUTO: 26.6 PG (ref 26–34)
MCHC RBC AUTO-ENTMCNC: 32.6 G/DL (ref 30–36.5)
MCV RBC AUTO: 81.5 FL (ref 80–99)
METHADONE UR QL: NEGATIVE
MONOCYTES # BLD: 0.4 K/UL (ref 0–1)
MONOCYTES NFR BLD: 5 % (ref 5–13)
NEUTS SEG # BLD: 4.5 K/UL (ref 1.8–8)
NEUTS SEG NFR BLD: 62 % (ref 32–75)
NRBC # BLD: 0 K/UL (ref 0–0.01)
NRBC BLD-RTO: 0 PER 100 WBC
OPIATES UR QL: NEGATIVE
PCP UR QL: NEGATIVE
PHOSPHATE SERPL-MCNC: 2.8 MG/DL (ref 2.6–4.7)
PLATELET # BLD AUTO: 244 K/UL (ref 150–400)
PMV BLD AUTO: 10.5 FL (ref 8.9–12.9)
POTASSIUM SERPL-SCNC: 3.8 MMOL/L (ref 3.5–5.1)
PROT SERPL-MCNC: 7.2 G/DL (ref 6.4–8.2)
RBC # BLD AUTO: 4.7 M/UL (ref 3.8–5.2)
SERVICE CMNT-IMP: ABNORMAL
SODIUM SERPL-SCNC: 140 MMOL/L (ref 136–145)
TROPONIN-HIGH SENSITIVITY: 4 NG/L (ref 0–51)
TSH SERPL DL<=0.05 MIU/L-ACNC: 1.18 UIU/ML (ref 0.36–3.74)
WBC # BLD AUTO: 7.3 K/UL (ref 3.6–11)

## 2022-06-27 PROCEDURE — G0378 HOSPITAL OBSERVATION PER HR: HCPCS

## 2022-06-27 PROCEDURE — 84100 ASSAY OF PHOSPHORUS: CPT

## 2022-06-27 PROCEDURE — 80053 COMPREHEN METABOLIC PANEL: CPT

## 2022-06-27 PROCEDURE — 85025 COMPLETE CBC W/AUTO DIFF WBC: CPT

## 2022-06-27 PROCEDURE — 74011250637 HC RX REV CODE- 250/637: Performed by: EMERGENCY MEDICINE

## 2022-06-27 PROCEDURE — 84443 ASSAY THYROID STIM HORMONE: CPT

## 2022-06-27 PROCEDURE — 83036 HEMOGLOBIN GLYCOSYLATED A1C: CPT

## 2022-06-27 PROCEDURE — 80061 LIPID PANEL: CPT

## 2022-06-27 PROCEDURE — 74011250636 HC RX REV CODE- 250/636: Performed by: RADIOLOGY

## 2022-06-27 PROCEDURE — 36415 COLL VENOUS BLD VENIPUNCTURE: CPT

## 2022-06-27 PROCEDURE — A9576 INJ PROHANCE MULTIPACK: HCPCS | Performed by: RADIOLOGY

## 2022-06-27 PROCEDURE — 80307 DRUG TEST PRSMV CHEM ANLYZR: CPT

## 2022-06-27 PROCEDURE — 99285 EMERGENCY DEPT VISIT HI MDM: CPT

## 2022-06-27 PROCEDURE — 74011000636 HC RX REV CODE- 636: Performed by: EMERGENCY MEDICINE

## 2022-06-27 PROCEDURE — 82962 GLUCOSE BLOOD TEST: CPT

## 2022-06-27 PROCEDURE — 96372 THER/PROPH/DIAG INJ SC/IM: CPT

## 2022-06-27 PROCEDURE — 93005 ELECTROCARDIOGRAM TRACING: CPT

## 2022-06-27 PROCEDURE — 82077 ASSAY SPEC XCP UR&BREATH IA: CPT

## 2022-06-27 PROCEDURE — 85652 RBC SED RATE AUTOMATED: CPT

## 2022-06-27 PROCEDURE — 83735 ASSAY OF MAGNESIUM: CPT

## 2022-06-27 PROCEDURE — 74011250636 HC RX REV CODE- 250/636: Performed by: EMERGENCY MEDICINE

## 2022-06-27 PROCEDURE — 86592 SYPHILIS TEST NON-TREP QUAL: CPT

## 2022-06-27 PROCEDURE — 70496 CT ANGIOGRAPHY HEAD: CPT

## 2022-06-27 PROCEDURE — 70450 CT HEAD/BRAIN W/O DYE: CPT

## 2022-06-27 PROCEDURE — 85379 FIBRIN DEGRADATION QUANT: CPT

## 2022-06-27 PROCEDURE — 70553 MRI BRAIN STEM W/O & W/DYE: CPT

## 2022-06-27 PROCEDURE — 84484 ASSAY OF TROPONIN QUANT: CPT

## 2022-06-27 PROCEDURE — 86140 C-REACTIVE PROTEIN: CPT

## 2022-06-27 PROCEDURE — 74011250636 HC RX REV CODE- 250/636: Performed by: STUDENT IN AN ORGANIZED HEALTH CARE EDUCATION/TRAINING PROGRAM

## 2022-06-27 RX ORDER — ENOXAPARIN SODIUM 100 MG/ML
40 INJECTION SUBCUTANEOUS EVERY 24 HOURS
Status: DISCONTINUED | OUTPATIENT
Start: 2022-06-27 | End: 2022-06-27

## 2022-06-27 RX ORDER — GUAIFENESIN 100 MG/5ML
325 LIQUID (ML) ORAL
Status: COMPLETED | OUTPATIENT
Start: 2022-06-27 | End: 2022-06-27

## 2022-06-27 RX ORDER — ENOXAPARIN SODIUM 100 MG/ML
30 INJECTION SUBCUTANEOUS EVERY 12 HOURS
Status: DISCONTINUED | OUTPATIENT
Start: 2022-06-27 | End: 2022-06-28 | Stop reason: HOSPADM

## 2022-06-27 RX ORDER — BUTALBITAL, ACETAMINOPHEN AND CAFFEINE 50; 325; 40 MG/1; MG/1; MG/1
1 TABLET ORAL
Status: DISCONTINUED | OUTPATIENT
Start: 2022-06-27 | End: 2022-06-27

## 2022-06-27 RX ORDER — ACETAMINOPHEN 325 MG/1
650 TABLET ORAL
Status: DISCONTINUED | OUTPATIENT
Start: 2022-06-27 | End: 2022-06-28 | Stop reason: HOSPADM

## 2022-06-27 RX ORDER — ACETAMINOPHEN 650 MG/1
650 SUPPOSITORY RECTAL
Status: DISCONTINUED | OUTPATIENT
Start: 2022-06-27 | End: 2022-06-28 | Stop reason: HOSPADM

## 2022-06-27 RX ADMIN — IOPAMIDOL 100 ML: 755 INJECTION, SOLUTION INTRAVENOUS at 12:54

## 2022-06-27 RX ADMIN — SODIUM CHLORIDE 1000 ML: 9 INJECTION, SOLUTION INTRAVENOUS at 11:45

## 2022-06-27 RX ADMIN — GADOTERIDOL 20 ML: 279.3 INJECTION, SOLUTION INTRAVENOUS at 20:37

## 2022-06-27 RX ADMIN — ENOXAPARIN SODIUM 30 MG: 100 INJECTION SUBCUTANEOUS at 21:34

## 2022-06-27 RX ADMIN — ASPIRIN 324 MG: 81 TABLET, CHEWABLE ORAL at 13:10

## 2022-06-27 NOTE — ED NOTES
Patient resting on the stretcher, texting on her phone without difficulty. Call bell within reach; will continue to monitor.

## 2022-06-27 NOTE — ED NOTES
TRANSFER - OUT REPORT:    Verbal report given to SR RN (name) on Mauro Swain  being transferred to [facility] ED (unit) for routine progression of care       Report consisted of patients Situation, Background, Assessment and   Recommendations(SBAR). Information from the following report(s) SBAR was reviewed with the receiving nurse. Lines:   Peripheral IV 06/27/22 Right Antecubital (Active)   Site Assessment Clean, dry, & intact 06/27/22 1145   Phlebitis Assessment 0 06/27/22 1145   Infiltration Assessment 0 06/27/22 1145   Dressing Status Clean, dry, & intact 06/27/22 1145   Dressing Type Transparent 06/27/22 1145   Hub Color/Line Status Pink 06/27/22 1145   Action Taken Blood drawn 06/27/22 1145        Opportunity for questions and clarification was provided.       Patient transported with:   Monitor

## 2022-06-27 NOTE — PROGRESS NOTES
Received patient in the hallway. A&Ox4. Cardiac monitor placed. MRI re-ordered since it was not seen on worklist per MRI. Report given to Drop Mess[employer]. Plan of care discussed. Pt.  Awaiting

## 2022-06-27 NOTE — ED PROVIDER NOTES
HPI presents with multiple complaints. She complains of having left facial pain/left ear pain/left sore throat for approximately 10 days. She also complains of intermittent chest pain for 1 week, and starting at 930 this morning, after waking up, she noticed \"heaviness \"in her left arm.   Last known well was 1:30 AM.    Past Medical History:   Diagnosis Date    Abnormal Pap smear     Anemia     Gastrointestinal disorder     Ulcers    Headache     Herniated disc     Hx gestational diabetes     HX OTHER MEDICAL     trich treated at beginning of pregnancy    HX OTHER MEDICAL     pituitary tumor-benign     Miscarriage     Molar pregnancy     Pap smear for cervical cancer screening 19 neg HPV POS- rp pap one year    PCOS (polycystic ovarian syndrome)     Pelvic pain in female     Pituitary adenoma ([address]     Pituitary tumor     Psychiatric problem     depression never on medication    Thyroid activity decreased     hypothyroid awaiting appnt for endo       Past Surgical History:   Procedure Laterality Date    HX  SECTION      HX COLPOSCOPY  11/3/10    HX DILATION AND CURETTAGE      HX LIPOMA RESECTION      HX ORTHOPAEDIC      carpal tunnel on left hand         Family History:   Problem Relation Age of Onset    Diabetes Father     Headache Mother        Social History     Socioeconomic History    Marital status: SINGLE     Spouse name: Not on file    Number of children: Not on file    Years of education: Not on file    Highest education level: Not on file   Occupational History    Not on file   Tobacco Use    Smoking status: Never Smoker    Smokeless tobacco: Never Used   Vaping Use    Vaping Use: Never used   Substance and Sexual Activity    Alcohol use: Not Currently    Drug use: No    Sexual activity: Not Currently   Other Topics Concern    Not on file   Social History Narrative    Not on file     Social Determinants of Health     Financial Resource Strain:     Difficulty of Paying Living Expenses: Not on file   Food Insecurity:     Worried About Running Out of Food in the Last Year: Not on file    Ran Out of Food in the Last Year: Not on file   Transportation Needs:     Lack of Transportation (Medical): Not on file    Lack of Transportation (Non-Medical): Not on file   Physical Activity:     Days of Exercise per Week: Not on file    Minutes of Exercise per Session: Not on file   Stress:     Feeling of Stress : Not on file   Social Connections:     Frequency of Communication with Friends and Family: Not on file    Frequency of Social Gatherings with Friends and Family: Not on file    Attends [employer] Services: Not on file    Active Member of [address] [place] [address] [employer] or Organizations: Not on file    Attends Club or Organization Meetings: Not on file    Marital Status: Not on file   Intimate Partner Violence:     Fear of Current or Ex-Partner: Not on file    Emotionally Abused: Not on file    Physically Abused: Not on file    Sexually Abused: Not on file   Housing Stability:     Unable to Pay for Housing in the Last Year: Not on file    Number of [place] in the Last Year: Not on file    Unstable Housing in the Last Year: Not on file         ALLERGIES: Tomato    Review of Systems   Constitutional: Negative for fever. HENT: Positive for ear pain and sore throat. Negative for voice change. Eyes: Negative for visual disturbance. Respiratory: Negative for cough and shortness of breath. Cardiovascular: Positive for chest pain. Gastrointestinal: Negative for abdominal pain, nausea and vomiting. Genitourinary: Negative for flank pain. Musculoskeletal: Negative for back pain. Skin: Negative for rash. Neurological: Positive for weakness and headaches. Psychiatric/Behavioral: Negative for confusion. There were no vitals filed for this visit. Physical Exam  Constitutional:       General: She is not in acute distress.      Appearance: She is well-developed. HENT:      Head: Normocephalic. Comments: Left TM is normal  Eyes:      Pupils: Pupils are equal, round, and reactive to light. Cardiovascular:      Rate and Rhythm: Normal rate. Heart sounds: No murmur heard. Pulmonary:      Effort: Pulmonary effort is normal.      Breath sounds: Normal breath sounds. Abdominal:      Palpations: Abdomen is soft. Tenderness: There is no abdominal tenderness. Musculoskeletal:         General: Normal range of motion. Cervical back: Normal range of motion. Skin:     General: Skin is warm and dry. Capillary Refill: Capillary refill takes less than 2 seconds. Neurological:      Mental Status: She is alert. Comments: There is drift of the left arm and mild  strength weakness   Psychiatric:         Behavior: Behavior normal.          MDM       Procedures ED EKG interpretation:  Rhythm: NSR, rate 97. Nonspecific ST changes. This EKG was interpreted by Anders Wynn MD,ED Provider. Perfect Serve Consult for Admission  12:39 PM    ED Room Number: WER03/03  Patient Name and age:  Kalin Senland 34 y.o.  female  Working Diagnosis:   1. Cerebrovascular accident (CVA), unspecified mechanism ([address]    2. Chest pain, unspecified type        COVID-19 Suspicion:  no  Sepsis present:  no  Reassessment needed: no  Code Status:  Full Code  Readmission: no  Isolation Requirements:  no  Recommended Level of Care:  med/surg  Department:[facility] - [phone]  Other: Patient with left arm heaviness/weakness that she noted at 9:30 AM when she first got up. Last known well, 1:30 AM.  Patient also complains of 1 week of intermittent chest pain, and left-sided facial pain. Patient seen by teleneurology and he is unsure if this is a stroke or some other problem.

## 2022-06-27 NOTE — ED TRIAGE NOTES
CP left chest pressure \"something sitting on my chest intermittent\" ; left side facial pain, ear pain, headache left eye x 1.5 weeks (constant pain back in left socket\" and blurred vision\"  throat pain (when I swallow or yawn) left arm feels heavy onset 0930  (that started this AM). Pt is alert and oriented x 4; Drift noted to left upper extremity and lower extremity;  weaker in left; sensation decreaased in left side  face symmetrical, speech clear, Pt states she is having trouble swallowing her secretions.  LENI,  stronger on Left; ; 1119  at bedside to rachael

## 2022-06-27 NOTE — TELEPHONE ENCOUNTER
Received call from Olive Savage at [facility] with [employer]. Subjective: Caller states \"a week and a half ago I noticed I was having a large amount of left eye twitching and now I'm feeling it in my jaw/lip area. Now I'm having shooting pains in my left ear/left side of throat when I swallow, ringing in left side of ear. \"  States that she took doxycycline  2 doses yesterday that she had left over    Current Symptoms: left side of face pain - behind left eye, left ear, left side of throat when swallowing and left side of tongue. Also mentions that she is having some chest pressure that is new. Denies new numbness/tingling, denies new weakness on any extremities    Onset: 1 week ago; gradual    Associated Symptoms: NA    Pain Severity: 7/10; stabbing; constant - ears. Reports 0/10 pain in chest, just reports pressure    Temperature: Denies     What has been tried: Ibuprofen, minimal relief    LMP: NA Pregnant: No    Recommended disposition: Go to ED Now    Care advice provided, patient verbalizes understanding; denies any other questions or concerns; instructed to call back for any new or worsening symptoms. Patient/caller agrees to proceed to nearest Emergency Department    Attention Provider: Thank you for allowing me to participate in the care of your patient. The patient was connected to triage in response to information provided to the [facility]. Please do not respond through this encounter as the response is not directed to a shared pool.     Reason for Disposition   Pain also in shoulder(s) or arm(s) or jaw    Protocols used: CHEST PAIN-ADULT-[place]

## 2022-06-27 NOTE — ED NOTES
Discharge or Transfer Assessment: Patient A&O x4 and in no distress. Physical re-examination reveals pts condition with reassessment of vital signs completed at the time of admission transfer and/or discharge.

## 2022-06-27 NOTE — H&P
[address] [place] [address]   Office [phone]  Fax [phone]       Admission H&P     Name: Charles Baig MRN: [id number]  Sex: Female   YOB: 1990  Age: 34 y.o. PCP: Marie Dominguez MD     Source of Information: patient, medical records    Chief complaint: Left sided Weakness and heaviness, headache. History of Present Illness  Marti Galarza is a 34 y.o. female with PMH of migraines, pituitary adenoma  who presents to the ER complaining of left sided heaviness and tingling sensation that started the morning of admission. According to patient, she woke up with a headache. The headache is located behind her left eye. 8/10 in severity, non-radiating. She also woke up with left sided heaviness and weakness. She went to see her PCP today and her PCP suggest she goes to the ED for stroke evaluation. Patient has a history of migraine, diagnosed when she was 15years of age. Not on any medication for migraine. Per patient, she has been having left sided chest pain, pressure like sensation for about 1.5 weeks. Intermittent in nature. Non-radiating. In the ED:  - Vitals: 97.3 F. HR: 52. BP: 172/84 --> 121/77 RR: 14-24 Oxygen  Saturation 97 -100%    - Labs - ESR: 26 CRP: 1.52     - Imaging -     CT head: No acute process or change compared to the prior exam  CTA H/N:   There is no evidence of aneurysm, dissection or hemodynamically significant  stenosis. .     There is no intracranial mass, hemorrhage or evidence of acute infarction.     No acute intracranial process is identified. .     - Treatment -  mg. 1,000 ml NS    EKG: NSR 97. No acute ischemic [place] when compared to prior EKG.  Normal axis    Past Medical History:   Diagnosis Date    Abnormal Pap smear     Anemia     Gastrointestinal disorder     Ulcers    Headache     Herniated disc     Hx gestational diabetes     HX OTHER MEDICAL     trich treated at beginning of pregnancy    HX OTHER MEDICAL pituitary tumor-benign     Miscarriage     Molar pregnancy     Pap smear for cervical cancer screening 19 neg HPV POS- rp pap one year    PCOS (polycystic ovarian syndrome)     Pelvic pain in female     [facility]     Pituitary tumor     Psychiatric problem     depression never on medication    Thyroid activity decreased     hypothyroid awaiting appnt for endo      Patient Vitals for the past 12 hrs:   Temp Pulse Resp BP SpO2   22 2138 98.3 °F (36.8 °C) 70 16 126/78 97 %   22 1452 98.1 °F (36.7 °C) 62 14 121/77 100 %   22 1344 -- 61 -- 116/66 98 %   22 1256 -- 72 15 129/82 97 %   22 1156 -- 83 17 127/76 98 %   22 1145 -- 96 24 (!) 159/86 99 %   22 1142 -- 91 21 (!) 160/93 99 %       Home Medications   Prior to Admission medications    Not on File       Allergies  Allergies   Allergen Reactions    Tomato Angioedema       Past Medical History:   Diagnosis Date    Abnormal Pap smear     Anemia     Gastrointestinal disorder     Ulcers    Headache     Herniated disc     Hx gestational diabetes     HX OTHER MEDICAL     trich treated at beginning of pregnancy    HX OTHER MEDICAL     pituitary tumor-benign     Miscarriage     Molar pregnancy     Pap smear for cervical cancer screening 19 neg HPV POS- rp pap one year    PCOS (polycystic ovarian syndrome)     Pelvic pain in female     Pit[facility]     Pituitary tumor     Psychiatric problem     depression never on medication    Thyroid activity decreased     hypothyroid awaiting appnt for endo       Previous Hospitalization(s)    Past Surgical History:   Procedure Laterality Date    HX  SECTION      HX COLPOSCOPY  11/3/10    HX DILATION AND CURETTAGE      HX LIPOMA RESECTION      HX ORTHOPAEDIC      carpal tunnel on left hand       Family History   Problem Relation Age of Onset    Diabetes Father     Headache Mother        Social History  Social History Socioeconomic History    Marital status: SINGLE     Spouse name: Not on file    Number of children: Not on file    Years of education: Not on file    Highest education level: Not on file   Occupational History    Not on file   Tobacco Use    Smoking status: Never Smoker    Smokeless tobacco: Never Used   Vaping Use    Vaping Use: Never used   Substance and Sexual Activity    Alcohol use: Not Currently    Drug use: No    Sexual activity: Not Currently   Other Topics Concern    Not on file   Social History Narrative    Not on file     Social Determinants of Health     Financial Resource Strain:     Difficulty of Paying Living Expenses: Not on file   Food Insecurity:     Worried About Running Out of Food in the Last Year: Not on file    Abi of Food in the Last Year: Not on file   Transportation Needs:     Lack of Transportation (Medical): Not on file    Lack of Transportation (Non-Medical):  Not on file   Physical Activity:     Days of Exercise per Week: Not on file    Minutes of Exercise per Session: Not on file   Stress:     Feeling of Stress : Not on file   Social Connections:     Frequency of Communication with Friends and Family: Not on file    Frequency of Social Gatherings with Friends and Family: Not on file    Attends Buddhist Services: Not on file    Active Member of [address] [place] [address] [employer] or Organizations: Not on file    Attends Club or Organization Meetings: Not on file    Marital Status: Not on file   Intimate Partner Violence:     Fear of Current or Ex-Partner: Not on file    Emotionally Abused: Not on file    Physically Abused: Not on file    Sexually Abused: Not on file   Housing Stability:     Unable to Pay for Housing in the Last Year: Not on file    Number of [place] in the Last Year: Not on file    Unstable Housing in the Last Year: Not on file       Alcohol history: Not at all  Smoking history: Non-smoker  Illicit drug history: Not at all    Living arrangement: patient lives with their family. Ambulates: Independently     Review of Systems  Review of Systems   Constitutional: Negative for fever. HENT: Negative for hearing loss, sore throat and trouble swallowing. Eyes: Positive for pain. Respiratory: Negative for cough and shortness of breath. Cardiovascular: Positive for chest pain. Negative for leg swelling. Gastrointestinal: Negative for abdominal pain, constipation, diarrhea, nausea and vomiting. Genitourinary: Negative for difficulty urinating and dysuria. Musculoskeletal: Positive for back pain. Skin: Negative for color change. Neurological: Positive for headaches. Negative for seizures. Physical Exam  Visit Vitals  /78   Pulse 70   Temp 98.3 °F (36.8 °C)   Resp 16   Ht 5' 3\" (1.6 m)   Wt 230 lb (104.3 kg)   SpO2 97%   BMI 40.74 kg/m²     Physical Exam  Constitutional:       General: She is not in acute distress. Appearance: Normal appearance. She is not ill-appearing, toxic-appearing or diaphoretic. HENT:      Head: Normocephalic and atraumatic. Mouth/Throat:      Mouth: Mucous membranes are moist.      Pharynx: No oropharyngeal exudate or posterior oropharyngeal erythema. Cardiovascular:      Rate and Rhythm: Normal rate and regular rhythm. Pulses: Normal pulses. Heart sounds: Normal heart sounds. Comments: Left sided tenderness on palpation. (upper thoracic region)  Pulmonary:      Effort: Pulmonary effort is normal. No respiratory distress. Abdominal:      General: There is no distension. Palpations: Abdomen is soft. Musculoskeletal:         General: Normal range of motion. Cervical back: Normal range of motion. No rigidity or tenderness. Skin:     General: Skin is warm. Neurological:      Mental Status: She is alert and oriented to person, place, and time. Motor: Weakness present. Comments: Strength: 4/5 in left Upper and lower extremity. 5/5 in Right upper and lower extremity. Decrease sensation in on the Left side (U/L) compared to the right side. Decrease sensation on left side of patient's face compared to the right side. Psychiatric:         Mood and Affect: Mood normal.         Behavior: Behavior normal.       Laboratory Data  Recent Results (from the past 8 hour(s))   DRUG SCREEN, URINE    Collection Time: 06/27/22  6:25 PM   Result Value Ref Range    AMPHETAMINES Negative NEG      BARBITURATES Negative NEG      BENZODIAZEPINES Negative NEG      COCAINE Negative NEG      METHADONE Negative NEG      OPIATES Negative NEG      PCP(PHENCYCLIDINE) Negative NEG      THC (TH-CANNABINOL) Negative NEG      Drug screen comment (NOTE)    TSH 3RD GENERATION    Collection Time: 06/27/22  8:48 PM   Result Value Ref Range    TSH 1.18 0.36 - 3.74 uIU/mL   ETHYL ALCOHOL    Collection Time: 06/27/22  8:48 PM   Result Value Ref Range    ALCOHOL(ETHYL),SERUM <10 <10 MG/DL   MAGNESIUM    Collection Time: 06/27/22  8:48 PM   Result Value Ref Range    Magnesium 1.6 1.6 - 2.4 mg/dL   PHOSPHORUS    Collection Time: 06/27/22  8:48 PM   Result Value Ref Range    Phosphorus 2.8 2.6 - 4.7 MG/DL       Imaging  CXR Results  (Last 48 hours)    None        CT Results  (Last 48 hours)               06/27/22 1254  CTA CODE NEURO HEAD AND NECK W CONT Final result    Impression:  There is no evidence of aneurysm, dissection or hemodynamically significant   stenosis. .       There is no intracranial mass, hemorrhage or evidence of acute infarction. No acute intracranial process is identified. .        Narrative:  CLINICAL HISTORY: cva   INDICATION: cva       COMPARISON: 2/10/2021. CONTRAST: 100 ml Isovue-[id number]       TECHNIQUE:  Unenhanced  images were obtained to localize the volume for   acquisition. Multislice helical axial CT angiography was performed from the   aortic arch to the top of the head during uneventful rapid bolus intravenous   contrast administration.    Coronal and sagittal reformations and 3D post   processing was performed. CT dose reduction was achieved through use of a   standardized protocol tailored for this examination and automatic exposure   control for dose modulation. Evaluated by the [facility]. AI algorithm       FINDINGS:   CTA NECK   There is conventional three vessel arch anatomy. The bilateral subclavian,   common carotid, and internal carotid arteries are patent with no flow-limiting   stenosis. % of right carotid artery stenosis: 0   % of left carotid artery stenosis: 0   Measurements utilizing NASCET criteria. NASCET method was utilized for calculating stenosis. Right vertebral artery slightly larger than the left vertebral artery. Vertebral   arteries are diminutive in size. . The cervical soft tissues are unremarkable. There are degenerative changes of the cervical spine. CTA HEAD   Diminutive basilar artery. There are bilateral posterior communicating   arteries/persistent fetal circulation. Petrous and cavernous carotid arteries   are patent. .A 2 segments are patent. Symmetric arborization of M2 vessels is   demonstrated. The basilar artery is patent. . The M1 segments are patent   bilaterally. .The posterior cerebral arteries on the right and on the left are   patent. .  There are A1 segments bilaterally. There are no sizable posterior   communicating arteries. No evidence of acute intracranial hemorrhage or midline shift is demonstrated. 06/27/22 1141  CT CODE NEURO HEAD WO CONTRAST Final result    Impression:  No acute process or change compared to the prior exam.               Narrative:  EXAM: CT CODE NEURO HEAD WO CONTRAST       INDICATION: Left facial pain and ear pain, left upper extremity heaviness       COMPARISON: 2/10/2021. CONTRAST: None. TECHNIQUE: Unenhanced CT of the head was performed using 5 mm images. Brain and   bone windows were generated. Coronal and sagittal reformats.  CT dose reduction   was achieved through use of a standardized protocol tailored for this   examination and automatic exposure control for dose modulation. FINDINGS:   The ventricles and sulci are normal in size, shape and configuration. . There is   no significant white matter disease. There is no intracranial hemorrhage,   extra-axial collection, or mass effect. The basilar cisterns are open. No CT   evidence of acute infarct. The bone windows demonstrate no abnormalities. The visualized portions of the   paranasal sinuses and mastoid air cells are clear. Assessment and Plan     Kalin Oliva is a 34 y.o. female migraines, pituitary adenoma who is admitted for CVA/TIA rule out. CVA/TIA r/o: May be due to complicated migraine. CT head: NAP. CTA H/N: No mass or hemorrhage or evidence of cute infarction.   - Admit to tele  - Vitals per unit protocol  - CBC and BMP daily   - Neurology consulted, appreciate recs  - Follow up on EtoH level, RPR, A1c, UDS  - Follow up on ECHO result. Chest pain: On going for 1.5 weeks May be 2/2 MSK. . Reproducible on exam. Trop: 4. D-dimer: 0.24. Roslynn Mutton NSR 97. No acute ischemic change when compared to prior EKG. Normal axis  - Follow up on Echo and Chest x-ray. Elevated inflamatory markers: ESR:26. CRP: 1.52. No urinary symptoms  - Follow up on Chest x-ray. - Consider checking SACHIN, for autoimmune etiology. Hx of Migraine: Pt was previously on Amytriptiline, but no long on medication. She has not seen neurology for a while. Pituitary microadenoma: diagnosed at the age or 15.   - Follow up on MRI result. Obesity  - PT with BMI of Body mass index is 40.74 kg/m². - Encouraging lifestyle modifications and further follow up outpatient. FEN/GI - NPO. Can advance diet as tolerated after bedside eval  Activity - Ambulate as tolerated  DVT prophylaxis - Lovenox  GI prophylaxis - Not indicated at this time  Fall prophylaxis - Not indicated at this time.   Disposition - Admit to Telemetry. Plan to d/c to Home. Consulting PT/OT/CM  Code Status - Full, discussed with patient / caregivers. Next of Ward Nuñez Name and Contact: Dora Amato (Mother): [phone].     Patient Gail Quick will be discussed Dr. Lucinda John.    6:21 PM, 06/27/22  Greg Perea MD  Family Medicine Resident       For Billing    Chief Complaint   Patient presents with   Jasiel Gibran Chest Pain       Hospital Problems  Date Reviewed: 5/11/2022          Codes Class Noted POA    Facial pain ICD-10-CM: R51.9  ICD-9-CM: 784.0  6/27/2022 Unknown        Chest pain ICD-10-CM: R07.9  ICD-9-CM: 786.50  6/27/2022 Unknown        Left arm weakness ICD-10-CM: R29.898  ICD-9-CM: 729.89  6/27/2022 Unknown

## 2022-06-27 NOTE — ED NOTES
TRANSFER - OUT REPORT:    Verbal report given to CCT RN (name) on Joo Valente  being transferred to [facility] ED (unit) for routine progression of care       Report consisted of patients Situation, Background, Assessment and   Recommendations(SBAR). Information from the following report(s) SBAR was reviewed with the receiving nurse. Lines:   Peripheral IV 06/27/22 Right Antecubital (Active)   Site Assessment Clean, dry, & intact 06/27/22 1145   Phlebitis Assessment 0 06/27/22 1145   Infiltration Assessment 0 06/27/22 1145   Dressing Status Clean, dry, & intact 06/27/22 1145   Dressing Type Transparent 06/27/22 1145   Hub Color/Line Status Pink 06/27/22 1145   Action Taken Blood drawn 06/27/22 1145        Opportunity for questions and clarification was provided.       Patient transported with:   Monitor

## 2022-06-27 NOTE — ED NOTES
Patient ambulated with a steady gait to the bathroom to void. Does not appear to be weak or limping. Although when doing her NIH testing, she says that her left arm and leg still feel heavy and there is a difference in sensation from the right side of her body. Also, still c/o left ear and throat pain and difference in facial sensation from the right side to the left side.

## 2022-06-27 NOTE — ED NOTES
Being evaluated by teleneuro currently. Preparing to start PIV when that is done.  Patient does not sound to be having difficulty with her speech

## 2022-06-28 ENCOUNTER — APPOINTMENT (OUTPATIENT)
Dept: GENERAL RADIOLOGY | Age: 32
End: 2022-06-28
Attending: STUDENT IN AN ORGANIZED HEALTH CARE EDUCATION/TRAINING PROGRAM
Payer: MEDICAID

## 2022-06-28 ENCOUNTER — APPOINTMENT (OUTPATIENT)
Dept: NON INVASIVE DIAGNOSTICS | Age: 32
End: 2022-06-28
Attending: STUDENT IN AN ORGANIZED HEALTH CARE EDUCATION/TRAINING PROGRAM
Payer: MEDICAID

## 2022-06-28 VITALS
OXYGEN SATURATION: 99 % | HEIGHT: 63 IN | TEMPERATURE: 98.4 F | SYSTOLIC BLOOD PRESSURE: 126 MMHG | RESPIRATION RATE: 16 BRPM | HEART RATE: 52 BPM | WEIGHT: 230 LBS | DIASTOLIC BLOOD PRESSURE: 73 MMHG | BODY MASS INDEX: 40.75 KG/M2

## 2022-06-28 LAB
ANION GAP SERPL CALC-SCNC: 10 MMOL/L (ref 5–15)
BUN SERPL-MCNC: 9 MG/DL (ref 6–20)
BUN/CREAT SERPL: 12 (ref 12–20)
CALCIUM SERPL-MCNC: 8.7 MG/DL (ref 8.5–10.1)
CHLORIDE SERPL-SCNC: 111 MMOL/L (ref 97–108)
CHOLEST SERPL-MCNC: 174 MG/DL
CO2 SERPL-SCNC: 23 MMOL/L (ref 21–32)
CREAT SERPL-MCNC: 0.77 MG/DL (ref 0.55–1.02)
ECHO AO ASC DIAM: 2.5 CM
ECHO AO ASCENDING AORTA INDEX: 1.22 CM/M2
ECHO AV AREA PEAK VELOCITY: 2 CM2
ECHO AV AREA VTI: 1.9 CM2
ECHO AV AREA/BSA PEAK VELOCITY: 1 CM2/M2
ECHO AV AREA/BSA VTI: 0.9 CM2/M2
ECHO AV MEAN GRADIENT: 4 MMHG
ECHO AV MEAN VELOCITY: 0.9 M/S
ECHO AV PEAK GRADIENT: 8 MMHG
ECHO AV PEAK VELOCITY: 1.4 M/S
ECHO AV VELOCITY RATIO: 0.64
ECHO AV VTI: 29.8 CM
ECHO LA DIAMETER INDEX: 1.8 CM/M2
ECHO LA DIAMETER: 3.7 CM
ECHO LA VOL 2C: 38 ML (ref 22–52)
ECHO LA VOL 4C: 50 ML (ref 22–52)
ECHO LA VOL BP: 46 ML (ref 22–52)
ECHO LA VOL/BSA BIPLANE: 22 ML/M2 (ref 16–34)
ECHO LA VOLUME AREA LENGTH: 53 ML
ECHO LA VOLUME INDEX A2C: 19 ML/M2 (ref 16–34)
ECHO LA VOLUME INDEX A4C: 24 ML/M2 (ref 16–34)
ECHO LA VOLUME INDEX AREA LENGTH: 26 ML/M2 (ref 16–34)
ECHO LV E' LATERAL VELOCITY: 15 CM/S
ECHO LV E' SEPTAL VELOCITY: 13 CM/S
ECHO LV FRACTIONAL SHORTENING: 29 % (ref 28–44)
ECHO LV INTERNAL DIMENSION DIASTOLE INDEX: 2.49 CM/M2
ECHO LV INTERNAL DIMENSION DIASTOLIC: 5.1 CM (ref 3.9–5.3)
ECHO LV INTERNAL DIMENSION SYSTOLIC INDEX: 1.76 CM/M2
ECHO LV INTERNAL DIMENSION SYSTOLIC: 3.6 CM
ECHO LV IVSD: 1.1 CM (ref 0.6–0.9)
ECHO LV MASS 2D: 213.9 G (ref 67–162)
ECHO LV MASS INDEX 2D: 104.3 G/M2 (ref 43–95)
ECHO LV POSTERIOR WALL DIASTOLIC: 1.1 CM (ref 0.6–0.9)
ECHO LV RELATIVE WALL THICKNESS RATIO: 0.43
ECHO LVOT AREA: 3.1 CM2
ECHO LVOT AV VTI INDEX: 0.6
ECHO LVOT DIAM: 2 CM
ECHO LVOT MEAN GRADIENT: 2 MMHG
ECHO LVOT PEAK GRADIENT: 3 MMHG
ECHO LVOT PEAK VELOCITY: 0.9 M/S
ECHO LVOT STROKE VOLUME INDEX: 27.4 ML/M2
ECHO LVOT SV: 56.2 ML
ECHO LVOT VTI: 17.9 CM
ECHO MV A VELOCITY: 0.56 M/S
ECHO MV E DECELERATION TIME (DT): 280.3 MS
ECHO MV E VELOCITY: 0.85 M/S
ECHO MV E/A RATIO: 1.52
ECHO MV E/E' LATERAL: 5.67
ECHO MV E/E' RATIO (AVERAGED): 6.1
ECHO MV E/E' SEPTAL: 6.54
ECHO PV MAX VELOCITY: 1 M/S
ECHO PV PEAK GRADIENT: 4 MMHG
ECHO RV INTERNAL DIMENSION: 3.5 CM
ECHO RV TAPSE: 2.1 CM (ref 1.7–?)
ECHO RVOT PEAK GRADIENT: 2 MMHG
ECHO RVOT PEAK VELOCITY: 0.6 M/S
ERYTHROCYTE [DISTWIDTH] IN BLOOD BY AUTOMATED COUNT: 14.6 % (ref 11.5–14.5)
EST. AVERAGE GLUCOSE BLD GHB EST-MCNC: 120 MG/DL
GLUCOSE SERPL-MCNC: 95 MG/DL (ref 65–100)
HBA1C MFR BLD: 5.8 % (ref 4–5.6)
HCT VFR BLD AUTO: 34.7 % (ref 35–47)
HDLC SERPL-MCNC: 44 MG/DL
HDLC SERPL: 4 {RATIO} (ref 0–5)
HGB BLD-MCNC: 11.2 G/DL (ref 11.5–16)
LDLC SERPL CALC-MCNC: 118.6 MG/DL (ref 0–100)
MAGNESIUM SERPL-MCNC: 2 MG/DL (ref 1.6–2.4)
MCH RBC QN AUTO: 26.5 PG (ref 26–34)
MCHC RBC AUTO-ENTMCNC: 32.3 G/DL (ref 30–36.5)
MCV RBC AUTO: 82.2 FL (ref 80–99)
NRBC # BLD: 0 K/UL (ref 0–0.01)
NRBC BLD-RTO: 0 PER 100 WBC
PHOSPHATE SERPL-MCNC: 3.6 MG/DL (ref 2.6–4.7)
PLATELET # BLD AUTO: 236 K/UL (ref 150–400)
PMV BLD AUTO: 10.7 FL (ref 8.9–12.9)
POTASSIUM SERPL-SCNC: 3.7 MMOL/L (ref 3.5–5.1)
RBC # BLD AUTO: 4.22 M/UL (ref 3.8–5.2)
RPR SER QL: NONREACTIVE
SODIUM SERPL-SCNC: 144 MMOL/L (ref 136–145)
TRIGL SERPL-MCNC: 57 MG/DL (ref ?–150)
VLDLC SERPL CALC-MCNC: 11.4 MG/DL
WBC # BLD AUTO: 6.6 K/UL (ref 3.6–11)

## 2022-06-28 PROCEDURE — 96365 THER/PROPH/DIAG IV INF INIT: CPT

## 2022-06-28 PROCEDURE — 97165 OT EVAL LOW COMPLEX 30 MIN: CPT

## 2022-06-28 PROCEDURE — 80048 BASIC METABOLIC PNL TOTAL CA: CPT

## 2022-06-28 PROCEDURE — 93306 TTE W/DOPPLER COMPLETE: CPT | Performed by: INTERNAL MEDICINE

## 2022-06-28 PROCEDURE — G0378 HOSPITAL OBSERVATION PER HR: HCPCS

## 2022-06-28 PROCEDURE — 84100 ASSAY OF PHOSPHORUS: CPT

## 2022-06-28 PROCEDURE — 96372 THER/PROPH/DIAG INJ SC/IM: CPT

## 2022-06-28 PROCEDURE — 74011000258 HC RX REV CODE- 258: Performed by: PSYCHIATRY & NEUROLOGY

## 2022-06-28 PROCEDURE — 36415 COLL VENOUS BLD VENIPUNCTURE: CPT

## 2022-06-28 PROCEDURE — 97535 SELF CARE MNGMENT TRAINING: CPT

## 2022-06-28 PROCEDURE — 85027 COMPLETE CBC AUTOMATED: CPT

## 2022-06-28 PROCEDURE — 74011250636 HC RX REV CODE- 250/636: Performed by: STUDENT IN AN ORGANIZED HEALTH CARE EDUCATION/TRAINING PROGRAM

## 2022-06-28 PROCEDURE — 93306 TTE W/DOPPLER COMPLETE: CPT

## 2022-06-28 PROCEDURE — 71046 X-RAY EXAM CHEST 2 VIEWS: CPT

## 2022-06-28 PROCEDURE — 99215 OFFICE O/P EST HI 40 MIN: CPT | Performed by: PSYCHIATRY & NEUROLOGY

## 2022-06-28 PROCEDURE — 97110 THERAPEUTIC EXERCISES: CPT

## 2022-06-28 PROCEDURE — 96375 TX/PRO/DX INJ NEW DRUG ADDON: CPT

## 2022-06-28 PROCEDURE — 74011000250 HC RX REV CODE- 250: Performed by: PSYCHIATRY & NEUROLOGY

## 2022-06-28 PROCEDURE — 83735 ASSAY OF MAGNESIUM: CPT

## 2022-06-28 PROCEDURE — 99235 HOSP IP/OBS SAME DATE MOD 70: CPT | Performed by: FAMILY MEDICINE

## 2022-06-28 PROCEDURE — 74011250636 HC RX REV CODE- 250/636: Performed by: PSYCHIATRY & NEUROLOGY

## 2022-06-28 PROCEDURE — 97161 PT EVAL LOW COMPLEX 20 MIN: CPT

## 2022-06-28 PROCEDURE — 96367 TX/PROPH/DG ADDL SEQ IV INF: CPT

## 2022-06-28 PROCEDURE — 96376 TX/PRO/DX INJ SAME DRUG ADON: CPT

## 2022-06-28 PROCEDURE — 74011250637 HC RX REV CODE- 250/637: Performed by: PSYCHIATRY & NEUROLOGY

## 2022-06-28 PROCEDURE — 96366 THER/PROPH/DIAG IV INF ADDON: CPT

## 2022-06-28 RX ORDER — KETOROLAC TROMETHAMINE 30 MG/ML
30 INJECTION, SOLUTION INTRAMUSCULAR; INTRAVENOUS
Status: COMPLETED | OUTPATIENT
Start: 2022-06-28 | End: 2022-06-28

## 2022-06-28 RX ORDER — PROMETHAZINE HYDROCHLORIDE 25 MG/1
25 TABLET ORAL
Status: COMPLETED | OUTPATIENT
Start: 2022-06-28 | End: 2022-06-28

## 2022-06-28 RX ORDER — KETOROLAC TROMETHAMINE 30 MG/ML
15 INJECTION, SOLUTION INTRAMUSCULAR; INTRAVENOUS ONCE
Status: COMPLETED | OUTPATIENT
Start: 2022-06-28 | End: 2022-06-28

## 2022-06-28 RX ADMIN — ENOXAPARIN SODIUM 30 MG: 100 INJECTION SUBCUTANEOUS at 08:29

## 2022-06-28 RX ADMIN — VALPROATE SODIUM 1000 MG: 100 INJECTION, SOLUTION INTRAVENOUS at 11:59

## 2022-06-28 RX ADMIN — PROMETHAZINE HYDROCHLORIDE 25 MG: 25 TABLET ORAL at 11:55

## 2022-06-28 RX ADMIN — SODIUM CHLORIDE 1000 MG: 900 INJECTION INTRAVENOUS at 13:09

## 2022-06-28 RX ADMIN — KETOROLAC TROMETHAMINE 30 MG: 30 INJECTION, SOLUTION INTRAMUSCULAR at 11:55

## 2022-06-28 RX ADMIN — KETOROLAC TROMETHAMINE 15 MG: 30 INJECTION, SOLUTION INTRAMUSCULAR at 08:29

## 2022-06-28 NOTE — PROGRESS NOTES
Dear Dr Fernanda Harvey,    The Lovenox dose/schedule was changed to reflect the new Enoxaparin Routine Prophylaxis Dosing guidelines which is based on the patient's weight and renal function. The dose/schedule was changed from 40mg sq q24h to 30mg sq bid. Thank you,    Vedia Bosworth. Shankar, Pharm D.         Contact: [phone]

## 2022-06-28 NOTE — DISCHARGE SUMMARY
Discharge / Transfer / Off-Service Note                          [facility]   [address], [place] [address]    Office [phone]  Fax [phone]            Name: Sheree Maciel MRN: [id number]  Sex: Female   YOB: 1990  Age: 34 y.o. PCP: Rafita Griffiths MD     Date of admission: 6/27/2022  Date of discharge/transfer: 6/28/2022    Attending physician at admission: Iraj Marie MD.  Attending physician at discharge/transfer: Iraj Marie MD  Resident physician at discharge/transfer: Rozina Cade DO     Consultants during hospitalization  IP CONSULT TO [address]     Admission diagnoses   Chest pain [R07.9]  Facial pain [R51.9]  Left arm weakness [R29.898]    Recommended follow-up after discharge    1. PCP-Abdelrahman Rodriguez MD  2. Neurology- Dr. Abel Rose      Things to follow up on with PCP:   - F/u Hgb A1c   - Consider repeat ESR/CRP. Initial levels were elevated    Medication Changes: There are no discharge medications for this patient. OTC Excedrin as needed. History of Present Illness    Marti Baker is a 34 y.o. female with PMH of migraines, pituitary adenoma  who presents to the ER complaining of left sided heaviness and tingling sensation that started the morning of admission. According to patient, she woke up with a headache. The headache is located behind her left eye. 8/10 in severity, non-radiating. She also woke up with left sided heaviness and weakness. She went to see her PCP today and her PCP suggest she goes to the ED for stroke evaluation. Patient has a history of migraine, diagnosed when she was 15years of age. Not on any medication for migraine. Per patient, she has been having left sided chest pain, pressure like sensation for about 1.5 weeks. Intermittent in nature. Non-radiating.          Hospital course    CVA ruled out with imaging including head CT/CTA and brain MRI.  Symptoms likely 2/2 acute on chronic migraine. The following conditions were addressed:     CVA/TIA r/o: Symptoms 2/2 complicated migraine. CT head: NAP. CTA H/N: No mass or hemorrhage or evidence of cute infarction. MRI: No acute process  Etoh/UDS: neg. S/p migraine cocktail with resolution of symptoms. No home meds for migraine but has previously been on amitriptyline. - F/u with neurology outpatient        Chest pain: Resolved Likely 2/2 MSK. . Reproducible on exam. Trop: 4. D-dimer: 0.24. . NSR 97. No acute ischemic change when compared to prior EKG. Normal axis. CXR: NAP. TSH wnl. ECHO also normal with EF 50-55%, normal wall motion.      Elevated inflamatory markers: ESR:26. CRP: 1.52. No urinary symptoms. CXR: neg.   - Consider checking SACHIN, for autoimmune etiology w/ PCP     Pituitary microadenoma: diagnosed at the age or 16. MRI (06/28): no acute process.      Obesity  - PT with BMI of Body mass index is 40.74 kg/m². - Encouraging lifestyle modifications and further follow up outpatient.        Physical exam at discharge:    [facility]. Patient Vitals for the past 12 hrs:   Temp Pulse Resp BP SpO2   06/28/22 1231 98.4 °F (36.9 °C) (!) 52 16 126/73 99 %   06/28/22 1158 -- -- -- 112/73 --   06/28/22 0826 98.1 °F (36.7 °C) 67 16 112/73 100 %   06/28/22 0425 98 °F (36.7 °C) 81 16 121/89 100 %      General Oriented to person, place, and time and well-developed. Appears well-nourished, no distress. Not diaphoretic. HENT Head Normocephalic and atraumatic. Eyes Conjunctivae are normal, no discharge. No scleral icterus. Nose Nose normal, clear turbinates. Oral Oropharynx is clear and moist. No oropharyngeal exudate. Neck No thyromegaly present. No cervical adenopathy. Cardio Normal rate, regular rhythm. Exam reveals no gallop and no friction rub. No murmur heard. No chest wall tenderness. Pulmonary Effort normal and breath sounds normal. No respiratory distress. No wheezes, no rales. Abdominal Soft. Bowel sounds normal. No distension. No tenderness.  Deferred. Extremities No edema of lower extremities. No tenderness. Distal pulses intact. Neurological Alert and oriented to person, place, and time. Dermatology Skin is warm and dry. No rash noted. No erythema or pallor. Psychiatric Affect and judgment normal.        Condition at discharge: Stable. Labs  Recent Labs     06/28/22  0505 06/27/22  1201   WBC 6.6 7.3   HGB 11.2* 12.5   HCT 34.7* 38.3    244     Recent Labs     06/28/22  0505 06/27/22  2048 06/27/22  1201     --  140   K 3.7  --  3.8   *  --  108   CO2 23  --  22   BUN 9  --  12   CREA 0.77  --  0.90   GLU 95  --  115*   CA 8.7  --  8.6   MG 2.0 1.6  --    PHOS 3.6 2.8  --      Recent Labs     06/27/22  1201   ALT 26   *   TBILI 0.3   TP 7.2   ALB 3.7   GLOB 3.5     Recent Labs     06/27/22  1122   GLUCPOC 147*       Micro:  Lab Results   Component Value Date/Time    Culture result: NORMAL RESPIRATORY RAJENDRA/NO BETA STREP ISOLATED 01/21/2020 02:44 PM    Culture result: STAPHYLOCOCCUS SAPROPHYTICUS 10/06/2014 11:20 PM    Culture result:  10/06/2014 11:20 PM     CLSI GUIDELINES DO NOT RECOMMEND REPORTING SUSCEPTIBILITIES FOR S. SAPROPHYTICUS. ORGANISM TYPICALLY RESPONDS TO AGENTS USED TO TREAT UNCOMPLICATED UTIs (E.G. NITROFURANTOIN, TMP/SMX OR A FLUOROQUINOLONE). Imaging:  XR CHEST PA LAT    Result Date: 6/28/2022  Indication:  Right sided chest pain. Exam: PA and lateral views of the chest. Direct comparison is made to prior CXR dated June 2021. Findings: Cardiomediastinal silhouette is within normal limits. Lungs are clear bilaterally. Pleural spaces are normal. Osseous structures are intact. No acute cardiopulmonary disease. MRI BRAIN W WO CONT    Result Date: 6/27/2022  BRAIN MRI WITH AND WITHOUT CONTRAST: 6/27/2022 8:37 PM INDICATION: Left upper extremity weakness. COMPARISON: 2/10/2021.  TECHNIQUE: Images were obtained before and after IV contrast in multiple planes and sequences to emphasize T1, T2, and T2* information. Diffusion-weighted images and ADC maps were also obtained. 20 cc IV gadoteridol (ProHance) was administered. FINDINGS: No restricted diffusion to suggest acute infarction. Scattered periventricular and subcortical white matter signal abnormalities are consistent with chronic small vessel ischemic disease. The ventricles and sulci are appropriate for age. The main intracranial arterial flow-voids are normal. No hemorrhage. No abnormal parenchymal or meningeal enhancement. Normal.     CTA CODE NEURO HEAD AND NECK W CONT    Result Date: 6/27/2022  CLINICAL HISTORY: cva INDICATION: cva COMPARISON: 2/10/2021. CONTRAST: 100 ml Isovue-370 TECHNIQUE:  Unenhanced  images were obtained to localize the volume for acquisition. Multislice helical axial CT angiography was performed from the aortic arch to the top of the head during uneventful rapid bolus intravenous contrast administration. Coronal and sagittal reformations and 3D post processing was performed. CT dose reduction was achieved through use of a standardized protocol tailored for this examination and automatic exposure control for dose modulation. Evaluated by the viz. AI algorithm FINDINGS: CTA NECK There is conventional three vessel arch anatomy. The bilateral subclavian, common carotid, and internal carotid arteries are patent with no flow-limiting stenosis. % of right carotid artery stenosis: 0 % of left carotid artery stenosis: 0 Measurements utilizing NASCET criteria. NASCET method was utilized for calculating stenosis. Right vertebral artery slightly larger than the left vertebral artery. Vertebral arteries are diminutive in size. . The cervical soft tissues are unremarkable. There are degenerative changes of the cervical spine. CTA HEAD Diminutive basilar artery. There are bilateral posterior communicating arteries/persistent fetal circulation. Petrous and cavernous carotid arteries are patent. .A 2 segments are patent. Symmetric arborization of M2 vessels is demonstrated. The basilar artery is patent. . The M1 segments are patent bilaterally. .The posterior cerebral arteries on the right and on the left are patent. .  There are A1 segments bilaterally. There are no sizable posterior communicating arteries. No evidence of acute intracranial hemorrhage or midline shift is demonstrated. There is no evidence of aneurysm, dissection or hemodynamically significant stenosis. . There is no intracranial mass, hemorrhage or evidence of acute infarction. No acute intracranial process is identified. .     CT CODE NEURO HEAD WO CONTRAST    Result Date: 6/27/2022  EXAM: CT CODE NEURO HEAD WO CONTRAST INDICATION: Left facial pain and ear pain, left upper extremity heaviness COMPARISON: 2/10/2021. CONTRAST: None. TECHNIQUE: Unenhanced CT of the head was performed using 5 mm images. Brain and bone windows were generated. Coronal and sagittal reformats. CT dose reduction was achieved through use of a standardized protocol tailored for this examination and automatic exposure control for dose modulation. FINDINGS: The ventricles and sulci are normal in size, shape and configuration. . There is no significant white matter disease. There is no intracranial hemorrhage, extra-axial collection, or mass effect. The basilar cisterns are open. No CT evidence of acute infarct. The bone windows demonstrate no abnormalities. The visualized portions of the paranasal sinuses and mastoid air cells are clear. No acute process or change compared to the prior exam.     ECHO ADULT COMPLETE    Result Date: 6/28/2022    Left Ventricle: Low normal left ventricular systolic function with a visually estimated EF of 50 - 55%. Left ventricle size is normal. Mildly increased wall thickness. Normal wall motion. Normal diastolic function.        Chronic diagnoses   Problem List as of 6/28/2022 Date Reviewed: 5/11/2022          Codes Class Noted - Resolved    Facial pain ICD-10-CM: R51.9  ICD-9-CM: 784.0  6/27/2022 - Present        Chest pain ICD-10-CM: R07.9  ICD-9-CM: 786.50  6/27/2022 - Present        Left arm weakness ICD-10-CM: R29.898  ICD-9-CM: 729.89  6/27/2022 - Present        HLD (hyperlipidemia) ICD-10-CM: E78.5  ICD-9-CM: 272.4  2/11/2021 - Present        Prediabetes ICD-10-CM: R73.03  ICD-9-CM: 790.29  2/11/2021 - Present        Left-sided weakness ICD-10-CM: R53.1  ICD-9-CM: 728.87  2/10/2021 - Present        * (Principal) Migraine headache ICD-10-CM: H25.141  ICD-9-CM: 346.90  2/10/2021 - Present        Elevated prolactin level ICD-10-CM: R79.89  ICD-9-CM: 790.99  4/1/2019 - Present        Severe obesity ([facility] ICD-10-CM: E66.01  ICD-9-CM: 278.01  2/13/2019 - Present        Right hand weakness ICD-10-CM: R29.898  ICD-9-CM: 728.87  11/16/2016 - Present        Numbness of right hand ICD-10-CM: R20.0  ICD-9-CM: 782.0  11/16/2016 - Present        Chronic tension-type headache, intractable ICD-10-CM: U66.323  ICD-9-CM: 339.12  11/16/2016 - Present        Intractable chronic migraine without aura and without status migrainosus ICD-10-CM: X76.884  ICD-9-CM: 346.71  9/22/2016 - Present        Pituitary adenoma ([facility] ICD-10-CM: D35.2  ICD-9-CM: 227.3  9/22/2016 - Present        RESOLVED: Peripheral vascular disease ([facility] ICD-10-CM: I73.9  ICD-9-CM: 443.9  4/1/2019 - 4/1/2019        RESOLVED: Headache, chronic daily ICD-10-CM: R51.9  ICD-9-CM: 784.0  9/22/2016 - 11/16/2016        RESOLVED: Medication overuse headache ICD-10-CM: G44.40  ICD-9-CM: 339.3  9/22/2016 - 11/16/2016        RESOLVED: Pregnancy, twins ICD-10-CM: O30.009  ICD-9-CM: 651.00, V91.00  11/25/2013 - 4/8/2014    Overview Addendum 2/10/2014 12:14 PM by Abram Arita MD     H/o pituitary tumor  H/o GDM, abn 1 hour this pregn, refused 3 hour and/or endocrine consult  Primary C/S 2/24/14 @9:30am                      Diet:  Regular diet.    Activity:  As tolerated     Disposition: Home or Self Care    Discharge instructions to patient/family  Please seek medical attention for any new or worsening symptoms particularly fever, chest pain, shortness of breath, abdominal pain, nausea, vomiting    Follow up plans/appointments  Follow-up Information     Follow up With Specialties Details Why Contact Info    Guillermo Saez MD Family Medicine, Pediatric Medicine On 6/30/2022 Hospital follow up at [address]      Romayne Leys, MD Neurology Go to  [address] [place] [phone]             Jodie Kingsley DO  Family Medicine Resident  3:08 PM       For Billing    Chief Complaint   Patient presents with   [place] Chest Pain       Hospital Problems  Date Reviewed: 5/11/2022          Codes Class Noted POA    Facial pain ICD-10-CM: R51.9  ICD-9-CM: 784.0  6/27/2022 Unknown        Chest pain ICD-10-CM: R07.9  ICD-9-CM: 786.50  6/27/2022 Unknown        Left arm weakness ICD-10-CM: R29.898  ICD-9-CM: 729.89  6/27/2022 Unknown        * (Principal) Migraine headache ICD-10-CM: Y31.702  ICD-9-CM: 346.90  2/10/2021 Yes

## 2022-06-28 NOTE — PROGRESS NOTES
0430- when MD rounding and patient awake for vitals patient states 9/10 headache. MD ordered now dose toradol. Once medication cleared to give from [facility] patient is asleep as she has been prior to stating 9/10 headache.

## 2022-06-28 NOTE — PROGRESS NOTES
PHYSICAL THERAPY EVALUATION/DISCHARGE  Patient: Ed Biswas (81 y.o. female)  Date: 6/28/2022  Primary Diagnosis: Chest pain [R07.9]  Facial pain [R51.9]  Left arm weakness [R29.898]       Precautions:          ASSESSMENT  Based on the objective data described below, the patient presents with functional mobility at an overall independent level following admission for chest pain, LUE tingling and eye pain. MRI negative. Pt with significant muscle tightness in neck and shoulder moreso L>R. Trigger points in musculature referring pain to head. Ambulates independently. Pt provided with stretching exercises and would benefit from OP PT is tingling and weakness persist.     Functional Outcome Measure: The patient scored 100/100 on the Barthel outcome measure. Other factors to consider for discharge: up ad reginald     Further skilled acute physical therapy is not indicated at this time. PLAN :  Recommendation for discharge: (in order for the patient to meet his/her long term goals)  Outpatient physical therapy follow up recommended for stretching    This discharge recommendation:  Has been made in collaboration with the attending provider and/or case management    IF patient discharges home will need the following DME: none       SUBJECTIVE:   Patient stated I can walk now.     OBJECTIVE DATA SUMMARY:   HISTORY:    Past Medical History:   Diagnosis Date    Abnormal Pap smear     Anemia     Gastrointestinal disorder     Ulcers    Headache     Herniated disc     Hx gestational diabetes     HX OTHER MEDICAL     trich treated at beginning of pregnancy    HX OTHER MEDICAL     pituitary tumor-benign     Miscarriage     Molar pregnancy     Pap smear for cervical cancer screening 2/13/19 neg HPV POS- rp pap one year    PCOS (polycystic ovarian syndrome)     Pelvic pain in female     Pituitary adenoma ([facility]     Pituitary tumor     Psychiatric problem     depression never on medication    Thyroid activity decreased     hypothyroid awaiting appnt for endo     Past Surgical History:   Procedure Laterality Date    HX  SECTION  2014    HX COLPOSCOPY  11/3/10    HX DILATION AND CURETTAGE      HX LIPOMA RESECTION      HX ORTHOPAEDIC      carpal tunnel on left hand       Prior level of function: Independent  Personal factors and/or comorbidities impacting plan of care: works as a CNA in [employer]         EXAMINATION/PRESENTATION/DECISION MAKING:   Critical Behavior:  Neurologic State: Alert  Orientation Level: Oriented X4  Cognition: Appropriate decision making,Follows commands     Hearing:     Skin:    Edema:   Range Of Motion:  AROM: Within functional limits (L shoulder limited)                       Strength:    Strength: Within functional limits (L shoulder limited)                    Tone & Sensation:   Tone: Normal                              Coordination:  Coordination: Within functional limits  Vision:      Functional Mobility:  Bed Mobility:     Supine to Sit: Independent  Sit to Supine: Independent     Transfers:  Sit to Stand: Independent  Stand to Sit: Independent        Bed to Chair: Independent              Balance:   Sitting: Intact; Without support  Standing: Intact; Without support  Ambulation/Gait Training:              Gait Description (WDL): Within defined limits                                          Stairs: Therapeutic Exercises:       Functional Measure:  Barthel Index:    Bathin  Bladder: 10  Bowels: 10  Groomin  Dressing: 10  Feeding: 10  Mobility: 15  Stairs: 10  Toilet Use: 10  Transfer (Bed to Chair and Back): 15  Total: 100/100       The Barthel ADL Index: Guidelines  1. The index should be used as a record of what a patient does, not as a record of what a patient could do. 2. The main aim is to establish degree of independence from any help, physical or verbal, however minor and for whatever reason.   3. The need for supervision renders the patient not independent. 4. A patient's performance should be established using the best available evidence. Asking the patient, friends/relatives and nurses are the usual sources, but direct observation and common sense are also important. However direct testing is not needed. 5. Usually the patient's performance over the preceding 24-48 hours is important, but occasionally longer periods will be relevant. 6. Middle categories imply that the patient supplies over 50 per cent of the effort. 7. Use of aids to be independent is allowed. Score Interpretation (from [facility])    Independent   60-79 Minimally independent   40-59 Partially dependent   20-39 Very dependent   <20 Totally dependent     -Mak Ann., Barthel, D.W. (1965). Functional evaluation: the Barthel Index. [address] ([address] (1997). The Barthel activities of daily living index: self-reporting versus actual performance in the old (> or = 75 years). Journal of [address] [place] [address] 45(7), [facility], SEKOUF, Ayleen Coppola., [facility]. (1999). Measuring the change in disability after inpatient rehabilitation; comparison of the responsiveness of the Barthel Index and Functional Geary Measure. Journal of Neurology, Neurosurgery, and Psychiatry, 66(4), 414-103. Joselyn Rosenberg, N.J.A, AMINTA James, & Kj Hammer, M.A. (2004) Assessment of post-stroke quality of life in cost-effectiveness studies: The usefulness of the Barthel Index and the EuroQoL-5D.  Quality of Life Research, 15, 155-50            Physical Therapy Evaluation Charge Determination   History Examination Presentation Decision-Making   MEDIUM  Complexity : 1-2 comorbidities / personal factors will impact the outcome/ POC  MEDIUM Complexity : 3 Standardized tests and measures addressing body structure, function, activity limitation and / or participation in recreation  LOW Complexity : Stable, uncomplicated  LOW Complexity : FOTO score of       Based on the above components, the patient evaluation is determined to be of the following complexity level: LOW     Pain Rating:  headache    Activity Tolerance:   Good      After treatment patient left in no apparent distress:   Supine in bed    COMMUNICATION/EDUCATION:   The patients plan of care was discussed with: Registered nurse. Fall prevention education was provided and the patient/caregiver indicated understanding., Patient/family have participated as able in goal setting and plan of care. and Patient/family agree to work toward stated goals and plan of care.     Thank you for this referral.  Manuel Benjamin, PT   Time Calculation: 24 mins

## 2022-06-28 NOTE — PROGRESS NOTES
[address] [place] [address]   Office [phone]  Fax [phone]          Assessment and Apryl Manzano is a 34 y.o. female migraines, pituitary adenoma who is admitted for CVA/TIA rule out. 24 Hour Events: No acute events. CVA/TIA r/o: May be due to complicated migraine. CT head: NAP. CTA H/N: No mass or hemorrhage or evidence of cute infarction. MRI: No acute process  Etoh/UDS: neg.   - CBC and BMP daily   - Neurology consulted, appreciate recs  - Follow up on RPR, A1c  - Follow up on ECHO result. Headache in s/o Hx of Migraine: Feels like her usual migraine. Pt was previously on Amytriptiline, but no long on medication. She has not seen neurology for a while. - Toradol 15 mg x 1  - Neurology consulted, appreciate recs. Chest pain: Improved On going for 1.5 weeks May be 2/2 MSK. . Reproducible on exam. Trop: 4. D-dimer: 0.24. Mesfinmargarita Melgarles NSR 97. No acute ischemic change when compared to prior EKG. Normal axis. CXR: NAP. TSH wnl  - Follow up on Echo     Elevated inflamatory markers: ESR:26. CRP: 1.52. No urinary symptoms. CXR: neg.   - Consider checking SACHIN, for autoimmune etiology.      Pituitary microadenoma: diagnosed at the age or 15. MRI (06/28): Normal     Obesity  - PT with BMI of Body mass index is 40.74 kg/m². - Encouraging lifestyle modifications and further follow up outpatient.         FEN/GI - NPO. Can advance diet as tolerated after bedside eval  Activity - Ambulate as tolerated  DVT prophylaxis - Lovenox  GI prophylaxis - Not indicated at this time  Fall prophylaxis - Not indicated at this time. Disposition - Admit to Telemetry. Plan to d/c to Home. Consulting PT/OT/CM  Code Status - Full, discussed with patient / caregivers. Troy Nuñez Name and Contact: Baldev Huber (Mother): [phone]. Doron Josue MD  Family Medicine Resident         Subjective / Objective     Subjective  Patient was evaluated at bedside. Endorses ongoing headache, 9/10 in severity. Patient does not currently have chest pain. Still endorses left sided weakness. Objective:  Vital signs: (most recent): Blood pressure 121/89, pulse 81, temperature 98 °F (36.7 °C), resp. rate 16, height 5' 3\" (1.6 m), weight 230 lb (104.3 kg), SpO2 100 %. Lungs:  Normal effort. She is not in respiratory distress. Neurological: Patient is alert. Respiratory:       Visit Vitals  /89 (BP 1 Location: Left upper arm, BP Patient Position: At rest)   Pulse 81   Temp 98 °F (36.7 °C)   Resp 16   Ht 5' 3\" (1.6 m)   Wt 230 lb (104.3 kg)   SpO2 100%   BMI 40.74 kg/m²     Physical Exam  Constitutional:       Appearance: Normal appearance. HENT:      Head: Normocephalic and atraumatic. Pulmonary:      Effort: Pulmonary effort is normal. No respiratory distress. Musculoskeletal:      Cervical back: Normal range of motion. Neurological:      Mental Status: She is alert and oriented to person, place, and time. Motor: Weakness present. Comments: Strength: 4/5 in left Upper and lower extremity. 5/5 in Right upper and lower extremity. Decrease sensation in on the Left side (U/L) compared to the right side. Decrease sensation on left side of patient's face compared to the right side.        I/O:      CBC:  Recent Labs     06/27/22  1201   WBC 7.3   HGB 12.5   HCT 38.3          Metabolic Panel:  Recent Labs     06/27/22  2048 06/27/22  1201   NA  --  140   K  --  3.8   CL  --  108   CO2  --  22   BUN  --  12   CREA  --  0.90   GLU  --  115*   CA  --  8.6   MG 1.6  --    PHOS 2.8  --    ALB  --  3.7   ALT  --  26          For Billing    Chief Complaint   Patient presents with    Chest Pain       Hospital Problems  Date Reviewed: 5/11/2022          Codes Class Noted POA    Facial pain ICD-10-CM: R51.9  ICD-9-CM: 784.0  6/27/2022 Unknown        Chest pain ICD-10-CM: R07.9  ICD-9-CM: 786.50  6/27/2022 Unknown        Left arm weakness ICD-10-CM: R29.898  ICD-9-CM: 729.89  6/27/2022 Unknown

## 2022-06-28 NOTE — CONSULTS
NEUROLOGY IN-PATIENT NEW CONSULTATION      6/28/2022    RE: Zain Clarke         1990      REFERRED BY:  Mary Jo Yost MD      CHIEF COMPLAINT:  This is Zain Clarke is a 34 y.o. female who had concerns including Chest Pain. HPI:     Was last seen on 2/11/21 by Dr Kathryn Cranker for headache and L sided numbness and was diagnosed with complicated migraine. Placed on Amitriptyline 25 mg QHS and Prednisone pack. Dwight now comes in after waking up with headache behind her L eye, 8/10 non-radiating and L sided heaviness. Currently not on any meds.     (+) migraine since 16 yo.  (+) chest pain      ROS   (-) fever  (-) rash  All other systems reviewed and are negative    Past Medical Hx  Past Medical History:   Diagnosis Date    Abnormal Pap smear     Anemia     Gastrointestinal disorder     Ulcers    Headache     Herniated disc     Hx gestational diabetes     HX OTHER MEDICAL     trich treated at beginning of pregnancy    HX OTHER MEDICAL     pituitary tumor-benign     Miscarriage     Molar pregnancy     Pap smear for cervical cancer screening 2/13/19 neg HPV POS- rp pap one year    PCOS (polycystic ovarian syndrome)     Pelvic pain in female     Pituitary adenoma ([facility] [address]     Pituitary tumor     Psychiatric problem     depression never on medication    Thyroid activity decreased     hypothyroid awaiting appnt for endo       Social Hx  Social History     Socioeconomic History    Marital status: SINGLE   Tobacco Use    Smoking status: Never Smoker    Smokeless tobacco: Never Used   Vaping Use    Vaping Use: Never used   Substance and Sexual Activity    Alcohol use: Not Currently    Drug use: No    Sexual activity: Not Currently       Family Hx  Family History   Problem Relation Age of Onset    Diabetes Father     Headache Mother        ALLERGIES  Allergies   Allergen Reactions    Tomato Angioedema       CURRENT MEDS  Current Facility-Administered Medications   Medication Dose Route Frequency Provider Last Rate Last Admin    acetaminophen (TYLENOL) tablet 650 mg  650 mg Oral Q4H PRN Felecia Coats MD        Or   Lauren Vic acetaminophen (TYLENOL) solution 650 mg  650 mg Per NG tube Q4H PRN Felecia Coats MD        Or   Lauren Vic acetaminophen (TYLENOL) suppository 650 mg  650 mg Rectal Q4H PRN Felecia Coats MD        enoxaparin (LOVENOX) injection 30 mg  30 mg SubCUTAneous Q12H Felecia Coats MD   30 mg at 06/28/22 1543           PREVIOUS WORKUP: (reviewed)  IMAGING:    CT Results (recent):  Results from Hospital Encounter encounter on 06/27/22    CTA CODE NEURO HEAD AND NECK W CONT    Narrative  CLINICAL HISTORY: cva  INDICATION: cva    COMPARISON: 2/10/2021. CONTRAST: 100 ml [id number]    TECHNIQUE:  Unenhanced  images were obtained to localize the volume for  acquisition. Multislice helical axial CT angiography was performed from the  aortic arch to the top of the head during uneventful rapid bolus intravenous  contrast administration. Coronal and sagittal reformations and 3D post  processing was performed. CT dose reduction was achieved through use of a  standardized protocol tailored for this examination and automatic exposure  control for dose modulation. Evaluated by the [facility]. AI algorithm    FINDINGS:  CTA NECK  There is conventional three vessel arch anatomy. The bilateral subclavian,  common carotid, and internal carotid arteries are patent with no flow-limiting  stenosis. % of right carotid artery stenosis: 0  % of left carotid artery stenosis: 0  Measurements utilizing NASCET criteria. NASCET method was utilized for calculating stenosis. Right vertebral artery slightly larger than the left vertebral artery. Vertebral  arteries are diminutive in size. . The cervical soft tissues are unremarkable. There are degenerative changes of the cervical spine. CTA HEAD  Diminutive basilar artery. There are bilateral posterior communicating  arteries/persistent fetal circulation.  Petrous and cavernous carotid arteries  are patent. .A 2 segments are patent. Symmetric arborization of M2 vessels is  demonstrated. The basilar artery is patent. . The M1 segments are patent  bilaterally. .The posterior cerebral arteries on the right and on the left are  patent. .  There are A1 segments bilaterally. There are no sizable posterior  communicating arteries. No evidence of acute intracranial hemorrhage or midline shift is demonstrated. Impression  There is no evidence of aneurysm, dissection or hemodynamically significant  stenosis. .    There is no intracranial mass, hemorrhage or evidence of acute infarction. No acute intracranial process is identified. [facility] MRI Results (recent):  Results from [place] encounter on 22    MRI BRAIN W WO CONT    Narrative  BRAIN MRI WITH AND WITHOUT CONTRAST: 2022 8:37 PM    INDICATION: Left upper extremity weakness. COMPARISON: 2/10/2021. TECHNIQUE: Images were obtained before and after IV contrast in multiple planes  and sequences to emphasize T1, T2, and T2* information. Diffusion-weighted  images and ADC maps were also obtained. 20 cc IV gadoteridol (ProHance) was  administered. FINDINGS: No restricted diffusion to suggest acute infarction. Scattered  periventricular and subcortical white matter signal abnormalities are consistent  with chronic small vessel ischemic disease. The ventricles and sulci are  appropriate for age. The main intracranial arterial flow-voids are normal. No  hemorrhage. No abnormal parenchymal or meningeal enhancement. Impression  Normal.      IR Results (recent):  No results found for this or any previous visit.       VAS/US Results (recent):  Results from Hospital Encounter encounter on 16    ANKLE BRACHIAL INDEX    Narrative  [place]   FINAL REPORT   Name: Tom Che  MRN: [id number]    Outpatient  : 1990  HIS Order #: [id number]  [address] [facility] Visit #: [id number]  Date: 06 Sep 2016    TYPE OF TEST: Peripheral Arterial Testing    REASON FOR TEST  PVD unspecified    Right Leg  Segmentals: Normal    mmHg  Brachial         123  High thigh  Low thigh  Calf  Posterior tibial 151  Dorsalis pedis   140  Peroneal  Metatarsal  Toe pressure     126  Doppler:    Normal  PVR:        Normal  Ankle/Brachial: 1.23    Left Leg  Segmentals: Normal    mmHg  Brachial         120  High thigh  Low thigh  Calf  Posterior tibial 154  Dorsalis pedis   141  Peroneal  Metatarsal  Toe pressure     128  Doppler:    Normal  PVR:        Normal  Ankle/Brachial: 1.25    INTERPRETATION/FINDINGS  PROCEDURE:  Evaluation of lower extremity arteries with systolic blood  pressure measurement at the ankle and brachial level and calculation  of the ankle/brachial pressure index (RONEY). The exam may also include  pulse volume recording (PVR) plethysmography at the ankle level. FINDINGS:  1. No evidence of significant peripheral arterial disease at rest in  the right leg. 2. No evidence of significant peripheral arterial disease at rest in  the left leg. 3. The right ankle/brachial index is 1.23 and the left ankle/brachial  index is 1.25.  4.  Multiphasic waveforms at all levels bilaterally. 5.  TBI are normal bilaterally. CONCLUSION:  Normal arterial perfusion bilaterally. ADDITIONAL COMMENTS    I have personally reviewed the data relevant to the interpretation of  this  study. TECHNOLOGIST: Abdoul Balderas RVT  Signed: 09/06/2016 10:45 AM    PHYSICIAN: Patience Brock.  Selam Sanchez MD  Signed: 09/07/2016 12:52 PM          LABS (reviewed)  Results for orders placed or performed during the hospital encounter of 06/27/22   CBC WITH AUTOMATED DIFF   Result Value Ref Range    WBC 7.3 3.6 - 11.0 K/uL    RBC 4.70 3.80 - 5.20 M/uL    HGB 12.5 11.5 - 16.0 g/dL    HCT 38.3 35.0 - 47.0 %    MCV 81.5 80.0 - 99.0 FL    MCH 26.6 26.0 - 34.0 PG    MCHC 32.6 30.0 - 36.5 g/dL    RDW 14.6 (H) 11.5 - 14.5 %    PLATELET 587 867 - 586 K/uL    MPV 10.5 8.9 - 12.9 FL    NRBC 0.0 0.0  WBC    ABSOLUTE NRBC 0.00 0.00 - 0.01 K/uL    NEUTROPHILS 62 32 - 75 %    LYMPHOCYTES 30 12 - 49 %    MONOCYTES 5 5 - 13 %    EOSINOPHILS 3 0 - 7 %    BASOPHILS 1 0 - 1 %    IMMATURE GRANULOCYTES 0 0 - 0.5 %    ABS. NEUTROPHILS 4.5 1.8 - 8.0 K/UL    ABS. LYMPHOCYTES 2.2 0.8 - 3.5 K/UL    ABS. MONOCYTES 0.4 0.0 - 1.0 K/UL    ABS. EOSINOPHILS 0.2 0.0 - 0.4 K/UL    ABS. BASOPHILS 0.0 0.0 - 0.1 K/UL    ABS. IMM. GRANS. 0.0 0.00 - 0.04 K/UL    DF AUTOMATED     METABOLIC PANEL, COMPREHENSIVE   Result Value Ref Range    Sodium 140 136 - 145 mmol/L    Potassium 3.8 3.5 - 5.1 mmol/L    Chloride 108 97 - 108 mmol/L    CO2 22 21 - 32 mmol/L    Anion gap 10 5 - 15 mmol/L    Glucose 115 (H) 65 - 100 mg/dL    BUN 12 6 - 20 MG/DL    Creatinine 0.90 0.55 - 1.02 MG/DL    BUN/Creatinine ratio 13 12 - 20      GFR est AA >60 >60 ml/min/1.73m2    GFR est non-AA >60 >60 ml/min/1.73m2    Calcium 8.6 8.5 - 10.1 MG/DL    Bilirubin, total 0.3 0.2 - 1.0 MG/DL    ALT (SGPT) 26 12 - 78 U/L    AST (SGOT) 15 15 - 37 U/L    Alk.  phosphatase 120 (H) 45 - 117 U/L    Protein, total 7.2 6.4 - 8.2 g/dL    Albumin 3.7 3.5 - 5.0 g/dL    Globulin 3.5 2.0 - 4.0 g/dL    A-G Ratio 1.1 1.1 - 2.2     TROPONIN-HIGH SENSITIVITY   Result Value Ref Range    Troponin-High Sensitivity 4 0 - 51 ng/L   SED RATE (ESR)   Result Value Ref Range    Sed rate, automated 26 (H) 0 - 20 mm/hr   C REACTIVE PROTEIN, QT   Result Value Ref Range    C-Reactive protein 1.52 (H) 0.00 - 0.60 mg/dL   D DIMER   Result Value Ref Range    D-dimer 0.24 0.00 - 0.65 mg/L FEU   TSH 3RD GENERATION   Result Value Ref Range    TSH 1.18 0.36 - 3.74 uIU/mL   LIPID PANEL   Result Value Ref Range    Cholesterol, total 174 <200 MG/DL    Triglyceride 57 <150 MG/DL    HDL Cholesterol 44 MG/DL    LDL, calculated 118.6 (H) 0 - 100 MG/DL    VLDL, calculated 11.4 MG/DL    CHOL/HDL Ratio 4.0 0.0 - 5.0     DRUG SCREEN, URINE   Result Value Ref Range    AMPHETAMINES Negative NEG      BARBITURATES Negative NEG      BENZODIAZEPINES Negative NEG      COCAINE Negative NEG      METHADONE Negative NEG      OPIATES Negative NEG      PCP(PHENCYCLIDINE) Negative NEG      THC (TH-CANNABINOL) Negative NEG      Drug screen comment (NOTE)    ETHYL ALCOHOL   Result Value Ref Range    ALCOHOL(ETHYL),SERUM <10 <10 MG/DL   MAGNESIUM   Result Value Ref Range    Magnesium 1.6 1.6 - 2.4 mg/dL   PHOSPHORUS   Result Value Ref Range    Phosphorus 2.8 2.6 - 4.7 MG/DL   METABOLIC PANEL, BASIC   Result Value Ref Range    Sodium 144 136 - 145 mmol/L    Potassium 3.7 3.5 - 5.1 mmol/L    Chloride 111 (H) 97 - 108 mmol/L    CO2 23 21 - 32 mmol/L    Anion gap 10 5 - 15 mmol/L    Glucose 95 65 - 100 mg/dL    BUN 9 6 - 20 MG/DL    Creatinine 0.77 0.55 - 1.02 MG/DL    BUN/Creatinine ratio 12 12 - 20      GFR est AA >60 >60 ml/min/1.73m2    GFR est non-AA >60 >60 ml/min/1.73m2    Calcium 8.7 8.5 - 10.1 MG/DL   MAGNESIUM   Result Value Ref Range    Magnesium 2.0 1.6 - 2.4 mg/dL   PHOSPHORUS   Result Value Ref Range    Phosphorus 3.6 2.6 - 4.7 MG/DL   CBC W/O DIFF   Result Value Ref Range    WBC 6.6 3.6 - 11.0 K/uL    RBC 4.22 3.80 - 5.20 M/uL    HGB 11.2 (L) 11.5 - 16.0 g/dL    HCT 34.7 (L) 35.0 - 47.0 %    MCV 82.2 80.0 - 99.0 FL    MCH 26.5 26.0 - 34.0 PG    MCHC 32.3 30.0 - 36.5 g/dL    RDW 14.6 (H) 11.5 - 14.5 %    PLATELET 612 303 - 279 K/uL    MPV 10.7 8.9 - 12.9 FL    NRBC 0.0 0  WBC    ABSOLUTE NRBC 0.00 0.00 - 0.01 K/uL   GLUCOSE, POC   Result Value Ref Range    Glucose (POC) 147 (H) 65 - 117 mg/dL    Performed by OG BEAVER        Physical Exam:     Visit Vitals  /73 (BP 1 Location: Left upper arm, BP Patient Position: At rest)   Pulse 67   Temp 98.1 °F (36.7 °C)   Resp 16   Ht 5' 3\" (1.6 m)   Wt 104.3 kg (230 lb)   SpO2 100%   BMI 40.74 kg/m²     General:  Alert, cooperative, no distress. Head:  Normocephalic, without obvious abnormality, atraumatic.    Eyes:  Conjunctivae/corneas clear.    Lungs:  Heart:   Non labored breathing  Regular rate and rhythm, no carotid bruits   Abdomen:   Soft, non-distended   Extremities: Extremities normal, atraumatic, no cyanosis or edema. Pulses: 2+ and symmetric all extremities. Skin: Skin color, texture, turgor normal. No rashes or lesions. Neurologic Exam     Gen: Attention normal             Language: naming, repetition, fluency normal             Memory: intact recent and remote memory  Cranial Nerves:  I: smell Not tested   II: visual fields Full to confrontation   II: pupils Equal, round, reactive to light   II: optic disc No papilledema   III,VII: ptosis none   III,IV,VI: extraocular muscles  Full ROM   V: mastication normal   V: facial light touch sensation  normal   VII: facial muscle function   symmetric   VIII: hearing symmetric   IX: soft palate elevation  normal   XI: trapezius strength  5/5   XI: sternocleidomastoid strength 5/5   XI: neck flexion strength  5/5   XII: tongue  midline     Motor: normal bulk and tone, no tremor              Strength: 5/5 all four extremities  Sensory: intact to LT, PP, vibration, and temperature  Reflexes: 2+ throughout; Down going toes  Coordination: Good FTN and HTS  Gait: deferred           Impression:     Arsenio Watters is a [address] [place] female who  has a past medical history of Abnormal Pap smear, Anemia, Gastrointestinal disorder, Headache, Herniated disc, gestational diabetes, OTHER MEDICAL, OTHER MEDICAL, Miscarriage, Molar pregnancy, Pap smear for cervical cancer screening (2/13/19 neg HPV POS- rp pap one year), PCOS (polycystic ovarian syndrome), Pelvic pain in female, Pituitary adenoma ([facility] [address] Pituitary tumor, Psychiatric problem, and Thyroid activity decreased who was last seen on 2/11/21 by Dr Keith Hernandez for headache and L sided numbness and was diagnosed with complicated migraine. Placed on Amitriptyline 25 mg QHS and Prednisone pack. Also tried TOpamax with no benefit.     Dwight now comes in after waking up with headache behind her L eye, 8/10 non-radiating and L sided heaviness. Currently not on any meds. Considerations include complicated migraine    MRI Brain normal with no evidence of stroke    CTA head and neck: There is no evidence of aneurysm, dissection or hemodynamically significant stenosis. [facility] RECOMMENDATIONS  1. I had a long discussion with patient. Discussed diagnosis, prognosis, pathophysiology and available treatment. Reviewed test results. All questions were answered. 2. Discussed MRI brain results  3. Will give migraine cocktail (Solumedrol 1 gm IV , Depakote 500 mg , Toradol 30 and Promethazine 25 mg) to break headache cycle  4.  Can take Excedrin prn    Follow up with her neurologist Dr Avani Iglesias to discuss available migraine preventative (I.e. CGRPs)    Please call for questions        Thank you for the consultation      Rosario Roberto MD  Diplomate, American Board of Psychiatry and Neurology  Diplomate, Neuromuscular Medicine  Diplomate, American Board of Electrodiagnostic Medicine    Greater than 50% of time spent counseling patient        CC: Guillermo Saez MD  Fax: [phone]

## 2022-06-28 NOTE — DISCHARGE INSTRUCTIONS
HOME DISCHARGE INSTRUCTIONS    Bill Aguilar / [id number] : 1990    Admission date: 2022 Discharge date: 2022 12:20 PM     Please bring this form with you to show your care provider at your follow-up appointment. Primary care provider:  Felisha Yeh MD    Discharging provider:  Odell Homans, DO  - Family Medicine Resident  Zully Gutierrez MD - Family Medicine Attending      You have been admitted to the hospital with the following diagnoses:    ACUTE DIAGNOSES:  Chest pain [R07.9]  Facial pain [R51.9]  Left arm weakness [R29.898]    . . . . . . . . . . . . . . . . . . . . . . . . . . . . . . . . . . . . . . . . . . . . . . . . . . . . . . . . . . . . . . . . . . . . . . . .   You are well enough to be discharged from the hospital. However, because you were inpatient in a hospital, you are at greater risk of having been exposed to the coronavirus. PLEASE stay inside and self-quarantine for 14 days to prevent further spread of the coronavirus. . . . . . . . . . . . . . . . . . . . . . . . . . . . . . . . . . . . . . . . . . . . . . . . . . . . . . . . . . . . . . . . . . . . . . . . [place] FOLLOW-UP CARE RECOMMENDATIONS:    Appointments  Follow-up Information     Follow up With Specialties Details Why Contact Info    Felisha Yeh MD Family Medicine, Pediatric Medicine On 2022 Hospital follow up at [address]      Nenita Li MD Neurology Go to  [place] [address] [phone]             You may take Excedrin over the counter, as needed for migraine pain. Follow-up tests needed:   - Follow up with neurology to determine if you need additional testing   - Repeat ESR and CRP with your PCP     Pending test results: At the time of your discharge the following test results are still pending: HgbA1c and RPR.    Please make sure you review these results with your outpatient follow-up provider(s). DIET/what to eat:  Regular Diet    ACTIVITY:  Outpatient physical therapy    Wound care: n/a    Equipment needed:  n/a    Specific symptoms to watch for: chest pain, shortness of breath, fever, chills, nausea, vomiting, diarrhea, change in mentation, falling, weakness, bleeding. What to do if new or unexpected symptoms occur? If you experience any of the above symptoms (or should other concerns or questions arise after discharge) please call your primary care physician. Return to the emergency room if you cannot get hold of your doctor. · It is very important that you keep your follow-up appointment(s). · Please bring discharge papers, medication list (and/or medication bottles) to your follow-up appointments for review by your outpatient provider(s). · Please check the list of medications and be sure it includes every medication (even non-prescription medications) that your provider wants you to take. · It is important that you take the medication exactly as they are prescribed. · Keep your medication in the bottles provided by the pharmacist and keep a list of the medication names, dosages, and times to be taken in your wallet. · Do not take other medications without consulting your doctor. · If you have any questions about your medications or other instructions, please talk to your nurse or care provider before you leave the hospital.     Information obtained by:     I understand that if any problems occur once I am at home I am to contact my physician. These instructions were explained to me and I had the opportunity to ask questions. I understand and acknowledge receipt of the instructions indicated above.                                                                                                                                                Physician's or R.N.'s Signature Date/Time                                                                                                                                              Patient or Representative Signature                                                          Date/Time

## 2022-06-28 NOTE — PROGRESS NOTES
Patient reports that her facial pain was resolved and she is eating fine. She suspects migraine. SLP evaluation deferred at this time.

## 2022-06-28 NOTE — PROGRESS NOTES
OCCUPATIONAL THERAPY EVALUATION/DISCHARGE  Patient: Rigo Baca (18 y.o. female)  Date: 6/28/2022  Primary Diagnosis: Chest pain [R07.9]  Facial pain [R51.9]  Left arm weakness [R29.898]       Precautions:        ASSESSMENT  Based on the objective data described below, the patient presents with modified independence during ADL tasks with compensatory strategies. Patient is impacted by decreased AROM and pain throughout LUE likely caused by muscle tightness and tenderness noted along left lateral aspect of patient's neck and upper shoulder area. Patient provided with stretching exercises by physical therapist which target neck and shoulder area. Further services OT services not indicated at this time    Current Level of Function (ADLs/self-care): Mod I     Functional Outcome Measure: The patient scored 100/100 on the Barthel Index outcome measure     Other factors to consider for discharge:      PLAN :  Recommend with staff: up ad reginald    Recommendation for discharge: (in order for the patient to meet his/her long term goals)  No skilled occupational therapy/ follow up rehabilitation needs identified at this time.     This discharge recommendation:  Has been made in collaboration with the attending provider and/or case management    IF patient discharges home will need the following DME: none       SUBJECTIVE:   Patient pleasant and cooperative    OBJECTIVE DATA SUMMARY:   HISTORY:   Past Medical History:   Diagnosis Date    Abnormal Pap smear     Anemia     Gastrointestinal disorder     Ulcers    Headache     Herniated disc     Hx gestational diabetes     HX OTHER MEDICAL     trich treated at beginning of pregnancy    HX OTHER MEDICAL     pituitary tumor-benign     Miscarriage     Molar pregnancy     Pap smear for cervical cancer screening 2/13/19 neg HPV POS- rp pap one year    PCOS (polycystic ovarian syndrome)     Pelvic pain in female     Pituitary adenoma ([facility] [address]     Pituitary tumor     Psychiatric problem     depression never on medication    Thyroid activity decreased     hypothyroid awaiting appnt for endo     Past Surgical History:   Procedure Laterality Date    HX  SECTION  2014    HX COLPOSCOPY  11/3/10    HX DILATION AND CURETTAGE      HX LIPOMA RESECTION      HX ORTHOPAEDIC      carpal tunnel on left hand       Prior Level of Function/Environment/Context: independent. 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  Expanded or extensive additional review of patient history:        Hand dominance: Right    EXAMINATION OF PERFORMANCE DEFICITS:  Cognitive/Behavioral Status:  Neurologic State: Alert  Orientation Level: Oriented X4  Cognition: Appropriate decision making; Follows commands             Skin: intact      Range of Motion:    AROM: Within functional limits (L shoulder limited)                         Strength:    Strength: Within functional limits (L shoulder limited)                Coordination:  Coordination: Within functional limits  Fine Motor Skills-Upper: Left Intact; Right Intact    Gross Motor Skills-Upper: Left Intact; Right Intact    Tone & Sensation:    Tone: Normal                         Balance:  Sitting: Intact; Without support  Standing: Intact; Without support    Functional Mobility and Transfers for ADLs:  Bed Mobility:  Supine to Sit: Independent  Sit to Supine: Independent    Transfers:  Sit to Stand: Independent  Stand to Sit: Independent  Bed to Chair: Independent  Bathroom Mobility: Independent    ADL Assessment:  Feeding: Independent    Oral Facial Hygiene/Grooming: Independent    Bathing: Modified independent         Upper Body Dressing: Modified independent    Lower Body Dressing: Modified independent    Toileting: Independent                ADL Intervention and task modifications:             Functional Measure:    Barthel Index:  Bathin  Bladder: 10  Bowels: 10  Groomin  Dressing: 10  Feeding: 10  Mobility: 15  Stairs: 10  Toilet Use: 10  Transfer (Bed to Chair and Back): 15  Total: 100/100      The Barthel ADL Index: Guidelines  1. The index should be used as a record of what a patient does, not as a record of what a patient could do. 2. The main aim is to establish degree of independence from any help, physical or verbal, however minor and for whatever reason. 3. The need for supervision renders the patient not independent. 4. A patient's performance should be established using the best available evidence. Asking the patient, friends/relatives and nurses are the usual sources, but direct observation and common sense are also important. However direct testing is not needed. 5. Usually the patient's performance over the preceding 24-48 hours is important, but occasionally longer periods will be relevant. 6. Middle categories imply that the patient supplies over 50 per cent of the effort. 7. Use of aids to be independent is allowed. Score Interpretation (from [address] [facility] [address])    Independent   60-79 Minimally independent   40-59 Partially dependent   20-39 Very dependent   <20 Totally dependent     -Mak Ann., Barthel, D.W. (1965). Functional evaluation: the Barthel Index. [facility] ([address] (1997). The Barthel activities of daily living index: self-reporting versus actual performance in the old (> or = 75 years). Journal [address] 45(7), [address] [facility], ..., Abad Oliver., Henry Milner. (1999). Measuring the change in disability after inpatient rehabilitation; comparison of the responsiveness of the Barthel Index and Functional [place]un Measure. Journal of Neurology, Neurosurgery, and Psychiatry, 66(4), 022-422. Jhon Copeland, N.J.A, AMINTA James, & Hemant Brown, MRegiA. (2004) Assessment of post-stroke quality of life in cost-effectiveness studies: The usefulness of the Barthel Index and the EuroQoL-5D.  Quality of Life Research, 13, 987-29       Occupational Therapy Evaluation Charge Determination   History Examination Decision-Making   LOW Complexity : Brief history review  LOW Complexity : 1-3 performance deficits relating to physical, cognitive , or psychosocial skils that result in activity limitations and / or participation restrictions  LOW Complexity : No comorbidities that affect functional and no verbal or physical assistance needed to complete eval tasks       Based on the above components, the patient evaluation is determined to be of the following complexity level: LOW   Pain Rating:  L UE 3/10    Activity Tolerance:   Good    After treatment patient left in no apparent distress:    Supine in bed    COMMUNICATION/EDUCATION:   The patients plan of care was discussed with: Physical therapist, Registered nurse and Case management.      Thank you for this referral.  Karine Jones, OT  Time Calculation: 9 mins

## 2022-06-28 NOTE — PROGRESS NOTES
Problem: Falls - Risk of  Goal: *Absence of Falls  Description: Document Olamide Mcburney Fall Risk and appropriate interventions in the flowsheet.   Outcome: Progressing Towards Goal  Note: Fall Risk Interventions:            Medication Interventions: Bed/chair exit alarm,Evaluate medications/consider consulting pharmacy,Patient to call before getting OOB,Teach patient to arise slowly

## 2022-06-28 NOTE — PROGRESS NOTES
Patient received discharge instructions and prescription for PT given to patient. Stroke and heart attack information given. Answered all questions. Left time for clarification. IV removed. Patient escorted out of hospital with standby assist. No needs.

## 2022-06-29 DIAGNOSIS — R51.9 FACIAL PAIN: ICD-10-CM

## 2022-06-29 DIAGNOSIS — G43.901 MIGRAINE WITH STATUS MIGRAINOSUS, NOT INTRACTABLE, UNSPECIFIED MIGRAINE TYPE: ICD-10-CM

## 2022-06-29 DIAGNOSIS — R53.1 LEFT-SIDED WEAKNESS: ICD-10-CM

## 2022-06-29 DIAGNOSIS — R29.898 LEFT ARM WEAKNESS: ICD-10-CM

## 2022-06-30 ENCOUNTER — OFFICE VISIT (OUTPATIENT)
Dept: FAMILY MEDICINE CLINIC | Age: 32
End: 2022-06-30
Payer: MEDICAID

## 2022-06-30 VITALS
SYSTOLIC BLOOD PRESSURE: 136 MMHG | HEIGHT: 63 IN | DIASTOLIC BLOOD PRESSURE: 86 MMHG | RESPIRATION RATE: 18 BRPM | HEART RATE: 76 BPM | BODY MASS INDEX: 40.22 KG/M2 | TEMPERATURE: 98.8 F | WEIGHT: 227 LBS | OXYGEN SATURATION: 98 %

## 2022-06-30 DIAGNOSIS — G43.811 OTHER MIGRAINE WITH STATUS MIGRAINOSUS, INTRACTABLE: Primary | ICD-10-CM

## 2022-06-30 PROCEDURE — 99213 OFFICE O/P EST LOW 20 MIN: CPT | Performed by: INTERNAL MEDICINE

## 2022-06-30 RX ORDER — METHYLPREDNISOLONE 4 MG/1
TABLET ORAL
Qty: 1 DOSE PACK | Refills: 0 | Status: SHIPPED | OUTPATIENT
Start: 2022-06-30 | End: 2022-07-22

## 2022-06-30 RX ORDER — METOPROLOL SUCCINATE 25 MG/1
12.5 TABLET, EXTENDED RELEASE ORAL DAILY
COMMUNITY

## 2022-06-30 RX ORDER — BUTALBITAL, ACETAMINOPHEN AND CAFFEINE 50; 325; 40 MG/1; MG/1; MG/1
1 TABLET ORAL
Qty: 30 TABLET | Refills: 0 | Status: SHIPPED | OUTPATIENT
Start: 2022-06-30 | End: 2022-07-22

## 2022-06-30 NOTE — PROGRESS NOTES
Chief Complaint   Patient presents with    Headache     still has behind left eye with left side weakness- [facility] 6/27[facility]232       Assessment/ Plan:   Diagnoses and all orders for this visit:    1. Other migraine with status migrainosus, intractable  -     butalbital-acetaminophen-caffeine (FIORICET, ESGIC) -40 mg per tablet; Take 1 Tablet by mouth every six (6) hours as needed for Headache for up to 30 days. -     methylPREDNISolone (MEDROL DOSEPACK) 4 mg tablet; Take medication as directed. Will follow up with neurology on 7/22/2022. Worrisome symptoms should return to ER. Letter written for work. I have discussed the diagnosis with the patient and the intended treatment plan as seen in the above orders. The patient has received an after-visit summary and questions were answered concerning future plans. Asked to return should symptoms worsen or not improve with treatment. Any pending labs and studies will be relayed to patient when they become available. Pt verbalizes understanding of plan of care and denies further questions or concerns at this time. Follow-up and Dispositions    · Return if symptoms worsen or fail to improve. Subjective:   Rigo Baca is a 34 y.o. female who presents today for follow up from the ER for onset of migraine. Seems to be a complex migraine headaches. She reports that today, she is still having the headaches. She was admitted at [facility]. She is wearing sunglasses and continues to have 8/10 headache today. \"Nothing has helped\". Offered Toradol shot in the office. She declined. Needs work for note. Cites that she is under a great deal of stress and has 3-children at home. She works 7-days on and then 7-days off. She was to start her 7-days on today. Does not feel that she will be able to return to work \"the way I am feeling. \". Also encouraged to contact her HR for FMLA. \"I am too new in the job to get that. [facility] course  CVA ruled out with imaging including head CT/CTA and brain MRI. Symptoms likely 2/2 acute on chronic migraine. The following conditions were addressed:      CVA/TIA r/o: Symptoms 2/2 complicated migraine. CT head: NAP. CTA H/N: No mass or hemorrhage or evidence of cute infarction. MRI: No acute process  Etoh/UDS: neg. S/p migraine cocktail with resolution of symptoms. No home meds for migraine but has previously been on amitriptyline. - F/u with neurology outpatient        Chest pain: Resolved Likely 2/2 MSK. . Reproducible on exam. Trop: 4. D-dimer: 0.24. . NSR 97. No acute ischemic change when compared to prior EKG. Normal axis. CXR: NAP. TSH wnl. ECHO also normal with EF 50-55%, normal wall motion.      Elevated inflamatory markers: ESR:26. CRP: 1.52. No urinary symptoms. CXR: neg.   - Consider checking [facility]ACHIN, for autoimmune etiology w/ PCP     Pituitary microadenoma: diagnosed at the age or 16. MRI (06/28): no acute process.      Obesity  - PT with BMI of Body mass index is 40.74 kg/m². - Encouraging lifestyle modifications and further follow up outpatient    She reports that she is suppose to return to work today, but is still having significant headaches and not feeling well. She reports that she is unable to sleep. She is not due to see neurologist until 7/22/2022. The patient states that \"nothing\" has worked for her headaches. She has been seen by neurology in the past.     HISTORICAL  PMH, PSH, FHX, SOCHX, ALLERGIES and MES were reviewed and updated today. Review of Systems  Review of Systems   Eyes: Positive for photophobia. Gastrointestinal: Positive for nausea. Neurological: Positive for headaches. All other systems reviewed and are negative. Objective:     Vitals:    06/30/22 0921   BP: 136/86   Pulse: 76   Resp: 18   Temp: 98.8 °F (37.1 °C)   TempSrc: Temporal   SpO2: 98%   Weight: 227 lb (103 kg)   Height: 5' 3\" (1.6 m)       Physical Exam  Constitutional:       Appearance: She is obese. Comments: Wearing shades     Cardiovascular:      Rate and Rhythm: Normal rate and regular rhythm. Pulses: Normal pulses. Heart sounds: Normal heart sounds. Musculoskeletal:      Cervical back: Normal range of motion and neck supple. Neurological:      General: No focal deficit present. Mental Status: She is oriented to person, place, and time. Psychiatric:         Attention and Perception: Attention normal.         Mood and Affect: Affect is flat. Speech: Speech normal.         Behavior: Behavior is cooperative. Thought Content:  Thought content normal.       Chuy Us MD  [address] [place] [address]

## 2022-06-30 NOTE — LETTER
NOTIFICATION RETURN TO WORK / SCHOOL    6/30/2022 9:46 AM    Ms. Marti Diaz [facility] [id number]      To Whom It May Concern:    Leif Baumann is currently under the care of [address] [place] [address]. She will return to work/school on:  July 8, 2022. If there are questions or concerns please have the patient contact our office.         Sincerely,      Tabatha Cano MD

## 2022-06-30 NOTE — PROGRESS NOTES
Identified pt with two pt identifiers(name and ). Chief Complaint   Patient presents with    Headache     still has behind left eye with left side weakness- [facility]         Health Maintenance Due   Topic    Hepatitis C Screening     COVID-19 Vaccine (1)       Wt Readings from Last 3 Encounters:   22 227 lb (103 kg)   22 230 lb (104.3 kg)   22 223 lb (101.2 kg)     Temp Readings from Last 3 Encounters:   22 98.8 °F (37.1 °C) (Temporal)   22 98.4 °F (36.9 °C)   22 99.1 °F (37.3 °C)     BP Readings from Last 3 Encounters:   22 136/86   22 126/73   22 (!) 143/92     Pulse Readings from Last 3 Encounters:   22 76   22 (!) 52   22 77         Learning Assessment:  :     Learning Assessment 2016   PRIMARY LEARNER Patient   PRIMARY LANGUAGE ENGLISH   LEARNER PREFERENCE PRIMARY READING   ANSWERED BY pt   RELATIONSHIP SELF       Depression Screening:  :     3 most recent PHQ Screens 2022   Little interest or pleasure in doing things Not at all   Feeling down, depressed, irritable, or hopeless Not at all   Total Score PHQ 2 0       Fall Risk Assessment:  :     No flowsheet data found. Abuse Screening:  :     Abuse Screening Questionnaire 3/23/2022 2020 2020   Do you ever feel afraid of your partner? N N N   Are you in a relationship with someone who physically or mentally threatens you? N N N   Is it safe for you to go home? Amanda Jurado       Coordination of Care Questionnaire:  :     1) Have you been to an emergency room, urgent care clinic since your last visit? yes [facility] 22  Hospitalized since your last visit? no             2) Have you seen or consulted any other health care providers outside of [address] [place] [address] since your last visit? no  (Include any pap smears or colon screenings in this section.)    3) Do you have an Advance Directive on file?  no  Are you interested in receiving information about Advance Directives? no    4. For patients aged 39-70: Has the patient had a colonoscopy / FIT/ Cologuard? NA - based on age      If the patient is female:    11. For patients aged 41-77: Has the patient had a mammogram within the past 2 years? NA - based on age or sex      10. For patients aged 21-65: Has the patient had a pap smear?  Yes - no Care [place] present

## 2022-06-30 NOTE — PATIENT INSTRUCTIONS
Migraine Headache: Care Instructions  Overview     Migraines are painful, throbbing headaches that often start on one side of the head. They may cause nausea and vomiting and make you sensitive to light, sound, or smell. Without treatment, migraines can last from 4 hours to a few days. Medicines can help prevent migraines or stop them after they have started. Your doctor can help you find which ones work best for you. Follow-up care is a key part of your treatment and safety. Be sure to make and go to all appointments, and call your doctor if you are having problems. It's also a good idea to know your test results and keep a list of the medicines you take. How can you care for yourself at home? · Do not drive if you have taken a prescription pain medicine. · Rest in a quiet, dark room until your headache is gone. Close your eyes, and try to relax or go to sleep. Don't watch TV or read. · Put a cold, moist cloth or cold pack on the painful area for 10 to 20 minutes at a time. Put a thin cloth between the cold pack and your skin. · Use a warm, moist towel or a heating pad set on low to relax tight shoulder and neck muscles. · Have someone gently massage your neck and shoulders. · Take your medicines exactly as prescribed. Call your doctor if you think you are having a problem with your medicine. You will get more details on the specific medicines your doctor prescribes. · Don't take medicine for headache pain too often. Talk to your doctor if you are taking medicine more than 2 days a week to stop a headache. Taking too much pain medicine can lead to more headaches. These are called medicine-overuse headaches. To prevent migraines  · Keep a headache diary so you can figure out what triggers your headaches. Avoiding triggers may help you prevent headaches. Record when each headache began, how long it lasted, and what the pain was like.  Write down any other symptoms you had with the headache, such as nausea, flashing lights or dark spots, or sensitivity to bright light or loud noise. Note if the headache occurred near your period. List anything that might have triggered the headache. Triggers may include certain foods (chocolate, cheese, wine) or odors, smoke, bright light, stress, or lack of sleep. · If your doctor has prescribed medicine for your migraines, take it as directed. You may have medicine that you take only when you get a migraine and medicine that you take all the time to help prevent migraines. ? If your doctor has prescribed medicine for when you get a headache, take it at the first sign of a migraine, unless your doctor has given you other instructions. ? If your doctor has prescribed medicine to prevent migraines, take it exactly as prescribed. Call your doctor if you think you are having a problem with your medicine. · Find healthy ways to deal with stress. Migraines are most common during or right after stressful times. Try finding ways to reduce stress like practicing mindfulness or deep breathing exercises. · Get plenty of sleep and exercise. But be careful to not push yourself too hard during exercise. It may trigger a headache. · Eat meals on a regular schedule. Avoid foods and drinks that often trigger migraines. These include chocolate, alcohol (especially red wine and port), aspartame, monosodium glutamate (MSG), and some additives found in foods (such as hot dogs, werner, cold cuts, aged cheeses, and pickled foods). · Limit caffeine. Don't drink too much coffee, tea, or soda. But don't quit caffeine suddenly. That can also give you migraines. · Do not smoke or allow others to smoke around you. If you need help quitting, talk to your doctor about stop-smoking programs and medicines. These can increase your chances of quitting for good. · If you are taking birth control pills or hormone therapy, talk to your doctor about whether they are triggering your migraines.   When should you call for help? Call 911 anytime you think you may need emergency care. For example, call if:    · You have signs of a stroke. These may include:  ? Sudden numbness, paralysis, or weakness in your face, arm, or leg, especially on only one side of your body. ? Sudden vision changes. ? Sudden trouble speaking. ? Sudden confusion or trouble understanding simple statements. ? Sudden problems with walking or balance. ? A sudden, severe headache that is different from past headaches. Call your doctor now or seek immediate medical care if:    · You have new or worse nausea and vomiting.     · You have a new or higher fever.     · Your headache gets much worse. Watch closely for changes in your health, and be sure to contact your doctor if:    · You are not getting better after 2 days (48 hours). Where can you learn more? Go to http://www.gray.com/  Enter P544 in the search box to learn more about \"Migraine Headache: Care Instructions. \"  Current as of: December 13, 2021               Content Version: 13.2  © 2006-2022 Healthwise[employer] Inc[employer]. Care instructions adapted under license by [employer] (which disclaims liability or warranty for this information). If you have questions about a medical condition or this instruction, always ask your healthcare professional. [address] any warranty or liability for your use of this information.

## 2022-07-01 LAB
ATRIAL RATE: 97 BPM
CALCULATED P AXIS, ECG09: 44 DEGREES
CALCULATED R AXIS, ECG10: 51 DEGREES
CALCULATED T AXIS, ECG11: -8 DEGREES
DIAGNOSIS, 93000: NORMAL
P-R INTERVAL, ECG05: 132 MS
Q-T INTERVAL, ECG07: 338 MS
QRS DURATION, ECG06: 78 MS
QTC CALCULATION (BEZET), ECG08: 429 MS
VENTRICULAR RATE, ECG03: 97 BPM

## 2022-07-22 ENCOUNTER — OFFICE VISIT (OUTPATIENT)
Dept: NEUROLOGY | Age: 32
End: 2022-07-22
Payer: MEDICAID

## 2022-07-22 VITALS
BODY MASS INDEX: 40.21 KG/M2 | SYSTOLIC BLOOD PRESSURE: 130 MMHG | OXYGEN SATURATION: 98 % | DIASTOLIC BLOOD PRESSURE: 76 MMHG | RESPIRATION RATE: 14 BRPM | HEIGHT: 63 IN | HEART RATE: 78 BPM

## 2022-07-22 DIAGNOSIS — G43.109 COMPLICATED MIGRAINE: Primary | ICD-10-CM

## 2022-07-22 DIAGNOSIS — G43.709 CHRONIC MIGRAINE WITHOUT AURA WITHOUT STATUS MIGRAINOSUS, NOT INTRACTABLE: ICD-10-CM

## 2022-07-22 DIAGNOSIS — R53.1 LEFT-SIDED WEAKNESS: ICD-10-CM

## 2022-07-22 PROCEDURE — 99215 OFFICE O/P EST HI 40 MIN: CPT | Performed by: PSYCHIATRY & NEUROLOGY

## 2022-07-22 RX ORDER — ERENUMAB-AOOE 70 MG/ML
70 INJECTION SUBCUTANEOUS
Qty: 1 EACH | Refills: 0 | Status: SHIPPED | COMMUNITY
Start: 2022-07-22

## 2022-07-22 RX ORDER — ERENUMAB-AOOE 70 MG/ML
70 INJECTION SUBCUTANEOUS
Qty: 1 EACH | Refills: 2 | Status: SHIPPED | OUTPATIENT
Start: 2022-07-22

## 2022-07-22 NOTE — PROGRESS NOTES
Cristiano Verde (1990) is a 34 y.o. female, established patient, here for evaluation of the following     Chief complaint(s):   Chief Complaint   Patient presents with    Hospital Follow Up     Patient states she was in [facility] 6/27/22 for stroke sx after being seen in the ER at [facility], she c/o numbness and tingling left side, arm face and foot with a heavy feeling on her shoulder, pain behind left eye and temple area       SUBJECTIVE/ OBJECTIVE:    HPI: 34 y.o. female      Hx of Intractable Chronic Migraine. Initial clinic visit was in , with follow up in  then not again till 2-2019 (due to insurance loss). Had tried/ failed propranolol, topamax. Next interaction was Hospital consult in 2-2021 when she had Complicated Migraine (left sided numbness, Brain MRI negative for stroke). Started on Amitriptyline. Did not show up for clinic follow up on 2-. Most recent Neurology interaction was on 6- when seen by my Colleague/ Dr Irby Homans as Inpatient neurology consult at [facility] for headache with left-sided symptoms, strokelike symptoms. Brain MRI was normal during that evaluation. Reviewing his note patient's neurologic exam was normal.  He diagnosed her as having complicated migraine. She presents today as a hospital follow-up visit. She says she has not followed up earlier because none of the meds prescribed in the past have reduced her migraine frequency. She takes Ibuprofen daily for headache pain relief. She was prescribed Fioricet at time of recent discharge but says that does not help either.   She continues to report left periorbital pain and tingling/ paresthesia of left face/ arm/ foot    # of headache days a week: 7  # of migraine days a week: 4-5    Has tried/ failed: propranolol, Topamax, amitriptyline, sumatriptan SQ injector      Allergies   Allergen Reactions    Tomato Angioedema         Current Outpatient Medications:     ubrogepant Ryleyantolin Conde) 100 mg tablet, Take one tablet at onset of migraine. May repeat one tablet in 2 hours if headache remains. Limit use to 2 days a week., Disp: 16 Tablet, Rfl: 2    erenumab-aooe (Aimovig Autoinjector) 70 mg/mL injection, 1 mL by SubCUTAneous route every thirty (30) days. Indications: migraine prevention, Disp: 1 Each, Rfl: 2    erenumab-aooe (Aimovig Autoinjector) 70 mg/mL injection, 1 mL by SubCUTAneous route every thirty (30) days. , Disp: 1 Each, Rfl: 0    aspirin-acetaminophen-caffeine (EXCEDRIN ES) 250-250-65 mg per tablet, Take 2 Tablets by mouth every eight (8) hours as needed for Headache., Disp: , Rfl:     metoprolol succinate (TOPROL-XL) 25 mg XL tablet, Take 12.5 mg by mouth daily. , Disp: , Rfl:      has a past medical history of Abnormal Pap smear, Anemia, Gastrointestinal disorder, Headache, Herniated disc, gestational diabetes, OTHER MEDICAL, OTHER MEDICAL, Miscarriage, Molar pregnancy, Pap smear for cervical cancer screening (2/13/19 neg HPV POS- rp pap one year), PCOS (polycystic ovarian syndrome), Pelvic pain in female, Pituitary adenoma ([address] Pituitary tumor, Psychiatric problem, and Thyroid activity decreased. She has no past medical history of Asthma, Brain tumor ([address] Chlamydia, Complication of anesthesia, Diabetes ([address] Essential hypertension, Genital herpes, unspecified, Gonorrhea, Heart abnormality, Herpes gestationis, Herpes simplex without mention of complication, Human immunodeficiency virus (HIV) disease ([address] Infertility, female, Kidney disease, Liver disease, Phlebitis and thrombophlebitis of unspecified site, Postpartum depression, Rhesus isoimmunization unspecified as to episode of care in pregnancy, Sickle-cell disease, unspecified, Sleep apnea, Syphilis, Trauma, Unspecified breast disorder, Unspecified diseases of blood and blood-forming organs, or Unspecified epilepsy without mention of intractable epilepsy.      has a past surgical history that includes hx colposcopy (11/3/10); hx lipoma resection; hx dilation and curettage; hx  section (2014); and hx orthopaedic. Physical Exam:    Vitals:    22 1131   BP: 130/76   Pulse: 78   Resp: 14   Height: 5' 3\" (1.6 m)   SpO2: 98%     Awake alert conversant EOMI, hearing and speech are normal, no dysarthria no aphasia. Mild reduced light touch in left arm compared to right. Moves all extremities spontaneously. Upper extremities 5 out of 5. Right lower extremity: 5/5. Mild functional weakness in left lower extremity (4+/5 proximally) as patient can walk on both toes/ heels w/o weakness.       ========================================    ASSESSMENT/ PLAN:       ICD-10-CM ICD-9-CM    1. Complicated migraine  T97.089 346.00 ubrogepant (Ubrelvy) 100 mg tablet      erenumab-aooe (Aimovig Autoinjector) 70 mg/mL injection      erenumab-aooe (Aimovig Autoinjector) 70 mg/mL injection      2. Left-sided weakness  R53.1 728.87       3. Chronic migraine without aura without status migrainosus, not intractable  G43.709 346.70 ubrogepant (Ubrelvy) 100 mg tablet      erenumab-aooe (Aimovig Autoinjector) 70 mg/mL injection      erenumab-aooe (Aimovig Autoinjector) 70 mg/mL injection         Has tried and failed multiple (3) different headache preventive medications as noted above  Discussed Botox versus Aimovig versus Ajovy versus Emgality versus Nurtec versus Costa Ally  Pt wants to try Aimovig once a month SQ injector as that's most convenient for her  This will need a Prior Auth (Level 1, preferred on her ins)  Given Aimovig 70 mg SQ injector sample to get started with     For acute migraine pain relief, has tried/ failed: otc analgesics, Fioricet, Sumatripan SQ injector  -Triptans are contraindicated in her as she reports left-sided weakness with her migraines    Will try Ubrelvy (CGRP antagonist) up to 2 days a week as migraine pain reliever. This didn't need a Prior Auth so sent prescription directly to her pharmacy.     Advised her to stop taking Fioricet and any other migraine pain reliever      Follow-up in 2 months      An electronic signature was used to authenticate this note.   -- Silvia Bella MD

## 2022-07-22 NOTE — PATIENT INSTRUCTIONS
The left sided symptoms (numbness/ weakness) you have with your migraines are called Complicated Migraines    Start Aimovig 70 mg subcutaneous injector once every month (28-30 days) as a migraine preventive    Use Ubrelvy 100 mg tablet up to 2 days a week for migraine pain relief    Stop using Fioricet and any other headache pain reliever    Follow up in 2 months    Send a [employer] message to Dr Salma Alex or call the office in 3 weeks and let us know if the [employer] is decreasing your headache frequency or if you want to increase the dose of the Aimovig.

## 2022-07-22 NOTE — PROGRESS NOTES
Chief Complaint   Patient presents with    Hospital Follow Up     Patient states she was in [facility] 6/27/22 for stroke sx after being seen in the ER at [facility], she c/o numbness and tingling left side, arm face and foot with a heavy feeling on her shoulder, pain behind left eye and temple area

## 2022-10-04 ENCOUNTER — VIRTUAL VISIT (OUTPATIENT)
Dept: FAMILY MEDICINE CLINIC | Age: 32
End: 2022-10-04
Payer: MEDICAID

## 2022-10-04 DIAGNOSIS — J06.9 VIRAL URI WITH COUGH: Primary | ICD-10-CM

## 2022-10-04 PROCEDURE — 99442 PR PHYS/QHP TELEPHONE EVALUATION 11-20 MIN: CPT | Performed by: FAMILY MEDICINE

## 2022-10-04 NOTE — LETTER
NOTIFICATION RETURN TO WORK / SCHOOL    10/4/2022 5:07 PM    Ms. Marti SHAHID [address] [facility] [address]      To Whom It May Concern:    Kya Cuevas is currently under the care of [address] [place] [address]. Patient has a hx of autoimmune disease requiring immuno modulators'. Has COVID like symptoms, may require an infusion before going back to work. She will return to work/school on: 10/6/22    If there are questions or concerns please have the patient contact our office.         Sincerely,      Danii Aldana MD

## 2022-10-05 ENCOUNTER — TELEPHONE (OUTPATIENT)
Dept: FAMILY MEDICINE CLINIC | Age: 32
End: 2022-10-05

## 2022-10-05 NOTE — PROGRESS NOTES
Griselda Murcia (: 1990) is a 34 y.o. female, established patient, here for evaluation of the following chief complaint(s):   URI       ASSESSMENT/PLAN:  Below is the assessment and plan developed based on review of pertinent history, labs, studies, and medications. 1. Viral URI with cough      No follow-ups on file. SUBJECTIVE/OBJECTIVE:  SUBJECTIVE:   Griselda Murcia is a 34 y.o. female who complains of coryza, congestion, sneezing, and productive cough for 3 days. She denies a history of chills, fatigue, and fevers and denies a history of asthma. Patient denies smoke cigarettes. Review of Systems   Constitutional:  Negative for appetite change, chills, fatigue and fever. HENT: Negative. Eyes: Negative. Respiratory:  Positive for cough. Negative for shortness of breath. Cardiovascular: Negative. Gastrointestinal: Negative. Endocrine: Negative. Genitourinary: Negative. Musculoskeletal: Negative. Skin: Negative. Allergic/Immunologic: Positive for environmental allergies. Neurological: Negative. Hematological: Negative. Psychiatric/Behavioral: Negative.         No data recorded     Physical Exam    [INSTRUCTIONS:  \"[x]\" Indicates a positive item  \"[]\" Indicates a negative item  -- DELETE ALL ITEMS NOT EXAMINED]    Constitutional: [x] Appears well-developed and well-nourished [x] No apparent distress      [] Abnormal -     Mental status: [x] Alert and awake  [x] Oriented to person/place/time [x] Able to follow commands    [] Abnormal -     Eyes:   EOM    [x]  Normal    [] Abnormal -   Sclera  [x]  Normal    [] Abnormal -          Discharge [x]  None visible   [] Abnormal -     HENT: [x] Normocephalic, atraumatic  [] Abnormal -   [x] Mouth/Throat: Mucous membranes are moist    External Ears [x] Normal  [] Abnormal -    Neck: [x] No visualized mass [] Abnormal -     Pulmonary/Chest: [x] Respiratory effort normal   [x] No visualized signs of difficulty breathing or respiratory distress        [] Abnormal -      Musculoskeletal:   [x] Normal gait with no signs of ataxia         [x] Normal range of motion of neck        [] Abnormal -     Neurological:        [x] No Facial Asymmetry (Cranial nerve 7 motor function) (limited exam due to video visit)          [x] No gaze palsy        [] Abnormal -          Skin:        [x] No significant exanthematous lesions or discoloration noted on facial skin         [] Abnormal -            Psychiatric:       [x] Normal Affect [] Abnormal -        [x] No Hallucinations    Other pertinent observable physical exam findings:-    On this date 10/04/2022 I have spent 15 minutes reviewing previous notes, test results and face to face (virtual) with the patient discussing the diagnosis and importance of compliance with the treatment plan as well as documenting on the day of the visit. Griselda Murcia, was evaluated through a synchronous (real-time) audio-video encounter. The patient (or guardian if applicable) is aware that this is a billable service, which includes applicable co-pays. This Virtual Visit was conducted with patient's (and/or legal guardian's) consent. The visit was conducted pursuant to the emergency declaration under the [address] [place] [address], [address] [place] [address] [facility] authority and the [employer] and [facility] [employer] Act. Patient identification was verified, and a caregiver was present when appropriate. The patient was located at: Home: [address] [facility] [address]  The provider was located at: Facility (Appt Department): [address] [place] [address] Avenue       An electronic signature was used to authenticate this note.   -- Viky Ferguson MD

## 2022-10-05 NOTE — TELEPHONE ENCOUNTER
----- Message from Vinny Saleh sent at 10/5/2022  8:45 AM EDT -----  Subject: Message to Provider    QUESTIONS  Information for Provider? Patient had a virtual visit with Dr. Angelita Kaur   yesterday and she had advised her to take a home covid test and call today   with the test results. Patient did take the home covid test and her test   results were negative according to the test. Please advise,   ---------------------------------------------------------------------------  --------------  [facility] KRAIG  [phone]; OK to leave message on voicemail  ---------------------------------------------------------------------------  --------------  SCRIPT ANSWERS  Relationship to Patient?  Self

## 2022-11-10 NOTE — PROGRESS NOTES
HPI: Flakito Avelar (: 1990) is a 34 y.o. female, patient, here for evaluation of the following chief complaint(s):    Hand Pain (Left hand pain with numbness, tingling. Thumb, ring finger clicking, locking, throbbing pain. Started 3 months ago when started new job. No specific injury. Right hand dominant.)  Patient is seen today to evaluate her left hand. The patient has developed painful clicking and locking to the left thumb but also complains of radial sided left wrist pain. She denies any fall or recent injury. She states that she has difficulty opening bottles and lifting a 3year-old. She has had worsening pain since the late summer. She has tried wearing a thumb spica splint at night. She has tried alternating ice and heat and even ibuprofen without long-term relief. She has undergone a left endoscopic carpal tunnel release in . Vitals:  Ht 5' 6\" (1.676 m)   Wt 220 lb (99.8 kg)   BMI 35.51 kg/m²    Body mass index is 35.51 kg/m². Allergies   Allergen Reactions    Tomato Angioedema       Current Outpatient Medications   Medication Sig    ubrogepant (Ubrelvy) 100 mg tablet Take one tablet at onset of migraine. May repeat one tablet in 2 hours if headache remains. Limit use to 2 days a week. (Patient not taking: Reported on 2022)    erenumab-aooe (Aimovig Autoinjector) 70 mg/mL injection 1 mL by SubCUTAneous route every thirty (30) days. Indications: migraine prevention (Patient not taking: Reported on 2022)    erenumab-aooe (Aimovig Autoinjector) 70 mg/mL injection 1 mL by SubCUTAneous route every thirty (30) days. (Patient not taking: Reported on 2022)    aspirin-acetaminophen-caffeine (EXCEDRIN ES) 250-250-65 mg per tablet Take 2 Tablets by mouth every eight (8) hours as needed for Headache. (Patient not taking: Reported on 2022)    metoprolol succinate (TOPROL-XL) 25 mg XL tablet Take 12.5 mg by mouth daily.  (Patient not taking: Reported on 2022) Current Facility-Administered Medications   Medication    betamethasone (CELESTONE) injection 6 mg    bupivacaine (PF) (MARCAINE) 0.75 % (7.5 mg/mL) injection 7.5 mg       Past Medical History:   Diagnosis Date    Abnormal Pap smear     Anemia     Gastrointestinal disorder     Ulcers    Headache     Herniated disc     Hx gestational diabetes     HX OTHER MEDICAL     trich treated at beginning of pregnancy    HX OTHER MEDICAL     pituitary tumor-benign     Miscarriage     Molar pregnancy     Pap smear for cervical cancer screening 19 neg HPV POS- rp pap one year    PCOS (polycystic ovarian syndrome)     Pelvic pain in female     Pituitary adenoma ([facility] [address]     Pituitary tumor     Psychiatric problem     depression never on medication    Thyroid activity decreased     hypothyroid awaiting appnt for endo        Past Surgical History:   Procedure Laterality Date    HX  SECTION      HX COLPOSCOPY  11/3/10    HX DILATION AND CURETTAGE      HX LIPOMA RESECTION      HX ORTHOPAEDIC      carpal tunnel on left hand       Family History   Problem Relation Age of Onset    Diabetes Father     Headache Mother         Social History     Tobacco Use    Smoking status: Never    Smokeless tobacco: Never   Vaping Use    Vaping Use: Never used   Substance Use Topics    Alcohol use: Not Currently    Drug use: No        Review of Systems   All other systems reviewed and are negative. Physical Exam    Patient's wrist in general demonstrate good range of motion but she has a positive tenderness and Finkelstein to the first dorsal compartment left wrist as well as tenderness to the A1 pulley of the left thumb with clicking and subtle locking. No other digital locking.     Imaging:    XR Results (most recent):  Results from Appointment encounter on 22    XR HAND LT MIN 3 V    Narrative  AP, lateral and oblique x-ray of the left hand that includes the wrist shows effectively normal osseous and joint space findings no evidence of fracture, arthritis or calcifications. ASSESSMENT/PLAN:  Below is the assessment and plan developed based on review of pertinent history, physical exam, labs, studies, and medications. Patient examination was clinically consistent with left wrist de Quervain's tenosynovitis and thumb stenosing tenosynovitis. She was offered but deferred oral steroids and did receive a left wrist de Quervain's injection on 11/14/2022. She wants to hold off on a left trigger thumb injection and reconsider this for the future if needed. She may continue with use of a splint and symptomatic care and return in 3 to 4 weeks. 1. Trigger finger of left thumb  2. Left hand pain  -     XR HAND LT MIN 3 V; Future  3. De Quervain's tenosynovitis, left  -     INJECT TENDON SHEATH/LIGAMENT  -     betamethasone (CELESTONE) injection 6 mg; 6 mg, Other, ONCE, 1 dose, On Mon 11/14/22 at 1800  -     bupivacaine (PF) (MARCAINE) 0.75 % (7.5 mg/mL) injection 7.5 mg; 7.5 mg (1 mL), Intra artICUlar, ONCE, 1 dose, On Mon 11/14/22 at 1800    Informed consent was obtained. The patient received a left wrist de Quervain's injection with 6 mg betamethasone mixed with 1 cc 0.75% bupivacaine. Return in about 4 weeks (around 12/12/2022). An electronic signature was used to authenticate this note.   -- Lauro Vela MD

## 2022-11-14 ENCOUNTER — OFFICE VISIT (OUTPATIENT)
Dept: ORTHOPEDIC SURGERY | Age: 32
End: 2022-11-14
Payer: MEDICAID

## 2022-11-14 VITALS — BODY MASS INDEX: 35.36 KG/M2 | WEIGHT: 220 LBS | HEIGHT: 66 IN

## 2022-11-14 DIAGNOSIS — M65.312 TRIGGER FINGER OF LEFT THUMB: Primary | ICD-10-CM

## 2022-11-14 DIAGNOSIS — M65.4 DE QUERVAIN'S TENOSYNOVITIS, LEFT: ICD-10-CM

## 2022-11-14 DIAGNOSIS — M79.642 LEFT HAND PAIN: ICD-10-CM

## 2022-11-14 PROCEDURE — 20550 NJX 1 TENDON SHEATH/LIGAMENT: CPT | Performed by: ORTHOPAEDIC SURGERY

## 2022-11-14 PROCEDURE — 99213 OFFICE O/P EST LOW 20 MIN: CPT | Performed by: ORTHOPAEDIC SURGERY

## 2022-11-14 RX ORDER — BETAMETHASONE SODIUM PHOSPHATE AND BETAMETHASONE ACETATE 3; 3 MG/ML; MG/ML
6 INJECTION, SUSPENSION INTRA-ARTICULAR; INTRALESIONAL; INTRAMUSCULAR; SOFT TISSUE ONCE
Status: COMPLETED | OUTPATIENT
Start: 2022-11-14 | End: 2022-11-14

## 2022-11-14 RX ORDER — BUPIVACAINE HYDROCHLORIDE 7.5 MG/ML
1 INJECTION, SOLUTION EPIDURAL; RETROBULBAR ONCE
Status: COMPLETED | OUTPATIENT
Start: 2022-11-14 | End: 2022-11-14

## 2022-11-14 RX ADMIN — BETAMETHASONE SODIUM PHOSPHATE AND BETAMETHASONE ACETATE 6 MG: 3; 3 INJECTION, SUSPENSION INTRA-ARTICULAR; INTRALESIONAL; INTRAMUSCULAR; SOFT TISSUE at 17:29

## 2022-11-14 RX ADMIN — BUPIVACAINE HYDROCHLORIDE 7.5 MG: 7.5 INJECTION, SOLUTION EPIDURAL; RETROBULBAR at 17:29

## 2022-11-14 NOTE — PATIENT INSTRUCTIONS
[facility]: Exercises  Introduction  Here are some examples of exercises for you to try. The exercises may be suggested for a condition or for rehabilitation. Start each exercise slowly. Ease off the exercises if you start to have pain. You will be told when to start these exercises and which ones will work best for you. How to do the exercises  Thumb lifts  Place your hand on a flat surface, with your palm up. Lift your thumb away from your palm to make a \"C\" shape. Hold for about 6 seconds. Repeat 8 to 12 times. Passive thumb MP flexion  Hold your hand in front of you, and turn your hand so your little finger faces down and your thumb faces up. (Your hand should be in the position used for shaking someone's hand.) You may also rest your hand on a flat surface. Use the fingers on your other hand to bend your thumb down at the point where your thumb connects to your palm. Hold for at least 15 to 30 seconds. Repeat 2 to 4 times. Finkelstein [employer] your arms out in front of you. (Your hand should be in the position used for shaking someone's hand.)  Bend your thumb toward your palm. Use your other hand to gently stretch your thumb and wrist downward until you feel the stretch on the thumb side of your wrist.  Hold for at least 15 to 30 seconds. Repeat 2 to 4 times. Resisted ulnar deviation  For this exercise, you will need elastic exercise material, such as surgical tubing or Thera-Band. Sit leaning forward with your legs slightly spread and your elbow on your thigh. Grasp one end of the band with your palm down, and step on the other end with the foot opposite the hand holding the band. Slowly bend your wrist sideways and away from your knee. Repeat 8 to 12 times. Follow-up care is a key part of your treatment and safety. Be sure to make and go to all appointments, and call your doctor if you are having problems.  It's also a good idea to know your test results and keep a list of the medicines you take. Current as of: March 9, 2022               Content Version: 13.4  © 2006-2022 [employer]. Care instructions adapted under license by "[employer] (which disclaims liability or warranty for this information). If you have questions about a medical condition or this instruction, always ask your healthcare professional. [address] any warranty or liability for your use of this information. Trigger Finger: Care Instructions  Overview  A trigger finger is a finger stuck in a bent position. It happens when the tendon that bends and straightens the thumb or finger can't slide smoothly under the ligaments that hold the tendon against the bones. In most cases, it's caused by a bump (nodule) that forms on the tendon. The bent finger usually straightens out on its own. A trigger finger can be painful. But it normally isn't a serious problem. Trigger fingers seem to occur more in some groups of people. These groups include:  People who have diabetes or arthritis. People who have injured their hands in the past.  Musicians. People who  tools often. Rest and exercises may help your finger relax so that it can bend. You may get a corticosteroid shot. This can reduce swelling and pain. Your doctor may put a splint on your finger. It will give your finger some rest. You may need surgery if the finger keeps locking in a bent position. Follow-up care is a key part of your treatment and safety. Be sure to make and go to all appointments, and call your doctor if you are having problems. It's also a good idea to know your test results and keep a list of the medicines you take. How can you care for yourself at home? If your doctor put a splint on your finger, wear it as directed. Don't take it off until your doctor says you can. You may need to change your activities to avoid movements that irritate the finger.   Take your medicines exactly as prescribed. Call your doctor if you think you are having a problem with your medicine. Ask your doctor if you can take an over-the-counter pain medicine, such as acetaminophen (Tylenol), ibuprofen (Advil, Motrin), or naproxen (Aleve). Be safe with medicines. Read and follow all instructions on the label. If your doctor recommends exercises, do them as directed. When should you call for help? Call your doctor now or seek immediate medical care if:    Your finger locks in a bent position and will not straighten. Watch closely for changes in your health, and be sure to contact your doctor if:    You do not get better as expected. Where can you learn more? Go to http://www.[employer].com/  Enter M826 in the search box to learn more about \"Trigger Finger: Care Instructions. \"  Current as of: March 9, 2022               Content Version: 13.4  © 2006-2022 [employer]. Care instructions adapted under license by [employer] (which disclaims liability or warranty for this information). If you have questions about a medical condition or this instruction, always ask your healthcare professional. [place] [address] any warranty or liability for your use of this information.

## 2022-11-14 NOTE — LETTER
11/14/2022    Patient: Felicity Ash   YOB: 1990   Date of Visit: 11/14/2022     Chauncey Hua, [address] [facility]  [address] [facility]  Via In Basket    Dear Chauncey Hua MD,      Thank you for referring Ms. Aixa Joy to [facility] for evaluation. My notes for this consultation are attached. If you have questions, please do not hesitate to call me. I look forward to following your patient along with you.       Sincerely,    Arnulfo Guevara MD

## 2023-02-05 ENCOUNTER — APPOINTMENT (OUTPATIENT)
Dept: GENERAL RADIOLOGY | Age: 33
End: 2023-02-05
Attending: STUDENT IN AN ORGANIZED HEALTH CARE EDUCATION/TRAINING PROGRAM
Payer: MEDICAID

## 2023-02-05 ENCOUNTER — HOSPITAL ENCOUNTER (EMERGENCY)
Age: 33
Discharge: HOME | End: 2023-02-05
Attending: STUDENT IN AN ORGANIZED HEALTH CARE EDUCATION/TRAINING PROGRAM
Payer: MEDICAID

## 2023-02-05 VITALS
BODY MASS INDEX: 37.52 KG/M2 | WEIGHT: 233.47 LBS | HEART RATE: 68 BPM | DIASTOLIC BLOOD PRESSURE: 84 MMHG | OXYGEN SATURATION: 99 % | TEMPERATURE: 98.5 F | SYSTOLIC BLOOD PRESSURE: 123 MMHG | RESPIRATION RATE: 16 BRPM | HEIGHT: 66 IN

## 2023-02-05 DIAGNOSIS — S63.502A SPRAIN OF LEFT WRIST, INITIAL ENCOUNTER: Primary | ICD-10-CM

## 2023-02-05 PROCEDURE — 73110 X-RAY EXAM OF WRIST: CPT

## 2023-02-05 PROCEDURE — 99283 EMERGENCY DEPT VISIT LOW MDM: CPT

## 2023-02-05 NOTE — ED NOTES
The patient was discharged home by Dr Alvarez Jung in stable condition. The patient is alert and oriented, in no respiratory distress. The patient's diagnosis, condition and treatment were explained. The patient expressed understanding. No prescriptions given/e-scribed to pharmacy. No work/school note given. A discharge plan has been developed. A  was not involved in the process. Aftercare instructions were given. Pt ambulatory out of the ED.

## 2023-02-05 NOTE — ED PROVIDER NOTES
HPI     Date of Service:  2023    Patient:  Darrin Cannon    Chief Complaint:  Wrist Pain (left)       HPI:  Darrin Cannon is a 28 y.o.  female with a past medical history of below who presents for evaluation of wrist pain. Patient reports she is in the process of moving, yesterday while lifting an item to throw into the dumpster  she developed pain and swelling to the left wrist. Denies direct trauma to the wrist. No other recent illness.     Past Medical History:   Diagnosis Date    Abnormal Pap smear     Anemia     Gastrointestinal disorder     Ulcers    Headache     Herniated disc     Hx gestational diabetes     HX OTHER MEDICAL     trich treated at beginning of pregnancy    HX OTHER MEDICAL     pituitary tumor-benign     Miscarriage     Molar pregnancy     Pap smear for cervical cancer screening 19 neg HPV POS- rp pap one year    PCOS (polycystic ovarian syndrome)     Pelvic pain in female     Pituitary adenoma ([facility] [address]     Pituitary tumor     Psychiatric problem     depression never on medication    Thyroid activity decreased     hypothyroid awaiting appnt for endo       Past Surgical History:   Procedure Laterality Date    HX  SECTION      HX COLPOSCOPY  11/3/10    HX DILATION AND CURETTAGE      HX LIPOMA RESECTION      HX ORTHOPAEDIC      carpal tunnel on left hand         Family History:   Problem Relation Age of Onset    Diabetes Father     Headache Mother        Social History     Socioeconomic History    Marital status: SINGLE     Spouse name: Not on file    Number of children: Not on file    Years of education: Not on file    Highest education level: Not on file   Occupational History    Not on file   Tobacco Use    Smoking status: Never    Smokeless tobacco: Never   Vaping Use    Vaping Use: Never used   Substance and Sexual Activity    Alcohol use: Not Currently    Drug use: No    Sexual activity: Not Currently   Other Topics Concern    Not on file   Social History Narrative Not on file     Social Determinants of Health     Financial Resource Strain: Not on file   Food Insecurity: Not on file   Transportation Needs: Not on file   Physical Activity: Not on file   Stress: Not on file   Social Connections: Not on file   Intimate Partner Violence: Not on file   Housing Stability: Not on file         ALLERGIES: Tomato    Review of Systems   Constitutional:  Negative for chills and fever. HENT:  Negative for congestion and rhinorrhea. Eyes:  Negative for discharge and redness. Respiratory:  Negative for shortness of breath. Musculoskeletal:  Positive for arthralgias. Neurological:  Negative for speech difficulty. Psychiatric/Behavioral:  Negative for agitation and confusion. Vitals:    02/05/23 1659   BP: 123/84   Pulse: 68   Resp: 16   Temp: 98.5 °F (36.9 °C)   SpO2: 99%   Weight: 105.9 kg (233 lb 7.5 oz)   Height: 5' 6\" (1.676 m)            Physical Exam  Vitals and nursing note reviewed. Constitutional:       General: She is not in acute distress. Appearance: Normal appearance. She is not ill-appearing or toxic-appearing. HENT:      Head: Normocephalic and atraumatic. Eyes:      General: No scleral icterus. Conjunctiva/sclera: Conjunctivae normal.   Cardiovascular:      Rate and Rhythm: Normal rate. Pulses: Normal pulses. Pulmonary:      Effort: Pulmonary effort is normal. No respiratory distress. Abdominal:      General: Abdomen is flat. Musculoskeletal:      Left wrist: Swelling and tenderness present. No snuff box tenderness. Decreased range of motion. Normal pulse. Skin:     General: Skin is warm and dry. Capillary Refill: Capillary refill takes less than 2 seconds. Neurological:      General: No focal deficit present. Mental Status: She is alert and oriented to person, place, and time.    Psychiatric:         Mood and Affect: Mood normal.         Behavior: Behavior normal.        Medical Decision Making      DECISION MAKING:  Shady Sherwood is a 28 y.o. female who comes in as above. Patient, there is diffuse tenderness over the left wrist with mild swelling and limited range of motion, no erythema or warmth of the wrist.  Neurovascularly intact. Differential diagnosis includes, but not limited to wrist sprain, tendinopathy, fracture. X-ray of the left wrist was performed and no acute bony abnormality. I discussed plan for treatment for suspected sprain with a wrist splint, alternating Tylenol and ibuprofen, ice and rest.  She was encouraged on follow-up with orthopedic clinic should symptoms persist or worsen. Patient was instructed on ER return precautions. She verbalized understanding will be discharged home. Amount and/or Complexity of Data Reviewed  Radiology: ordered. Decision-making details documented in ED Course. Procedures    LABS:  No results found for this or any previous visit (from the past 6 hour(s)). IMAGING:  XR WRIST LT AP/LAT/OBL MIN 3V   Final Result   No acute abnormality. Medications During Visit:  Medications - No data to display      IMPRESSION:  1. Sprain of left wrist, initial encounter        DISPOSITION:  Discharged      Discharge Medication List as of 2/5/2023  6:04 PM           Follow-up Information       Follow up With Specialties Details Why Contact Info    your hand doctor  Schedule an appointment as soon as possible for a visit       [facility] EMERGENCY DEPT Emergency Medicine  If symptoms worsen [address]  [phone]              The patient is asked to follow-up with their primary care provider in the next several days. They are to call tomorrow for an appointment. The patient is asked to return promptly for any increased concerns or worsening of symptoms. They can return to this emergency department or any other emergency department.     Nisha Emmanuel DO

## 2023-02-05 NOTE — DISCHARGE INSTRUCTIONS
Alternate Tylenol and ibuprofen every 4 hours for pain control. Apply ice and elevate wrist to help with swelling and pain. Limit activity for the next several days until symptoms improve.

## 2023-02-07 ENCOUNTER — OFFICE VISIT (OUTPATIENT)
Dept: ORTHOPEDIC SURGERY | Age: 33
End: 2023-02-07
Payer: MEDICAID

## 2023-02-07 VITALS — WEIGHT: 224 LBS | HEIGHT: 66 IN | BODY MASS INDEX: 36 KG/M2

## 2023-02-07 DIAGNOSIS — M65.4 DE QUERVAIN'S TENOSYNOVITIS, LEFT: Primary | ICD-10-CM

## 2023-02-07 DIAGNOSIS — M79.642 LEFT HAND PAIN: ICD-10-CM

## 2023-02-07 DIAGNOSIS — M65.312 TRIGGER FINGER OF LEFT THUMB: ICD-10-CM

## 2023-02-07 RX ORDER — METHYLPREDNISOLONE 4 MG/1
TABLET ORAL
Qty: 1 DOSE PACK | Refills: 0 | Status: SHIPPED | OUTPATIENT
Start: 2023-02-07

## 2023-02-07 NOTE — PROGRESS NOTES
HPI: Digna Granado (: 1990) is a 28 y.o. female, patient, here for evaluation of the following chief complaint(s):    Wrist Pain (Left wrist pain with weakness and instability. Pt threw something heavy into a dumpster on 2023 and felt her left wrist \"pop\". She had a xray at a [facility] facility.)  Patient is seen today to evaluate her left hand. The patient has developed painful clicking and locking to the left thumb but also complains of radial sided left wrist pain. She denies any fall or recent injury. She states that she has difficulty opening bottles and lifting a 3year-old. She has had worsening pain since the late summer. She has tried wearing a thumb spica splint at night. She has tried alternating ice and heat and even ibuprofen without long-term relief. She has undergone a left endoscopic carpal tunnel release in . She did receive a left wrist de Quervain's injection on 2022. She states she had little improvement. She had also been evaluated in the [facility] ER on 2023 complaining of weakness and a pop sensation to the radial side of the left wrist.    Vitals:  Ht 5' 6\" (1.676 m)   Wt 224 lb (101.6 kg)   BMI 36.15 kg/m²    Body mass index is 36.15 kg/m². Allergies   Allergen Reactions    Tomato Hives and Angioedema       Current Outpatient Medications   Medication Sig    methylPREDNISolone (MEDROL DOSEPACK) 4 mg tablet Per dose pack instructions    ubrogepant (Ubrelvy) 100 mg tablet Take one tablet at onset of migraine. May repeat one tablet in 2 hours if headache remains. Limit use to 2 days a week. (Patient not taking: No sig reported)    erenumab-aooe (Aimovig Autoinjector) 70 mg/mL injection 1 mL by SubCUTAneous route every thirty (30) days. Indications: migraine prevention (Patient not taking: No sig reported)    erenumab-aooe (Aimovig Autoinjector) 70 mg/mL injection 1 mL by SubCUTAneous route every thirty (30) days.  (Patient not taking: No sig reported)    aspirin-acetaminophen-caffeine (EXCEDRIN ES) 250-250-65 mg per tablet Take 2 Tablets by mouth every eight (8) hours as needed for Headache. (Patient not taking: No sig reported)    metoprolol succinate (TOPROL-XL) 25 mg XL tablet Take 12.5 mg by mouth daily. (Patient not taking: No sig reported)     No current facility-administered medications for this visit. Past Medical History:   Diagnosis Date    Abnormal Pap smear     Anemia     Gastrointestinal disorder     Ulcers    Headache     Herniated disc     Hx gestational diabetes     HX OTHER MEDICAL     trich treated at beginning of pregnancy    HX OTHER MEDICAL     pituitary tumor-benign     Miscarriage     Molar pregnancy     Pap smear for cervical cancer screening 19 neg HPV POS- rp pap one year    PCOS (polycystic ovarian syndrome)     Pelvic pain in female     Pituitary adenoma ([facility] [address]     Pituitary tumor     Psychiatric problem     depression never on medication    Thyroid activity decreased     hypothyroid awaiting appnt for endo        Past Surgical History:   Procedure Laterality Date    HX  SECTION      HX COLPOSCOPY  11/3/10    HX DILATION AND CURETTAGE      HX LIPOMA RESECTION      HX ORTHOPAEDIC      carpal tunnel on left hand       Family History   Problem Relation Age of Onset    Diabetes Father     Headache Mother         Social History     Tobacco Use    Smoking status: Never    Smokeless tobacco: Never   Vaping Use    Vaping Use: Never used   Substance Use Topics    Alcohol use: Not Currently    Drug use: No        Review of Systems   All other systems reviewed and are negative. Physical Exam    Patient's wrist in general demonstrate good range of motion but she has a positive tenderness and Finkelstein to the first dorsal compartment left wrist as well as tenderness to the A1 pulley of the left thumb with clicking and subtle locking. No other digital locking.     Imaging:    XR Results (most recent):  Results from [place] encounter on 02/05/23    XR WRIST LT AP/LAT/OBL MIN 3V    Narrative  EXAM: XR WRIST LT AP/LAT/OBL MIN 3V    INDICATION: Lifting injury, left wrist pain. COMPARISON: 11/14/2022. FINDINGS: Three views of the left wrist demonstrate no fracture or other acute  osseous or articular abnormality. The soft tissues are within normal limits. Impression  No acute abnormality. ASSESSMENT/PLAN:  Below is the assessment and plan developed based on review of pertinent history, physical exam, labs, studies, and medications. Patient examination was clinically consistent with left wrist de Quervain's tenosynovitis and thumb stenosing tenosynovitis. She was offered but deferred oral steroids and did receive a left wrist de Quervain's injection on 11/14/2022. She wants to hold off on a left trigger thumb injection and reconsider this for the future if needed. We spoke about further treatment options especially after her ER evaluation on 2/5/2023. The x-ray showed normal findings. She has not improved and she has continued pain. I recommend an MRI to assess for an occult ganglion as well as a de Quervain's tenosynovitis involving her left wrist.  I will see her back once completed for review. .    1. De Quervain's tenosynovitis, left  -     MRI WRIST LT WO CONT; Future  -     MA WHFO W/O JOINTS PRE OTS  -     REFERRAL TO DME  -     methylPREDNISolone (MEDROL DOSEPACK) 4 mg tablet; Per dose pack instructions, Normal, Disp-1 Dose Pack, R-0  2. Left hand pain  3. Trigger finger of left thumb      No follow-ups on file. An electronic signature was used to authenticate this note.   -- Helen Teague MD

## 2023-02-07 NOTE — PATIENT INSTRUCTIONS
Wrist: Exercises  Introduction  Here are some examples of exercises for you to try. The exercises may be suggested for a condition or for rehabilitation. Start each exercise slowly. Ease off the exercises if you start to have pain. You will be told when to start these exercises and which ones will work best for you. How to do the exercises  Prayer stretch  Start with your palms together in front of your chest just below your chin. Slowly lower your hands toward your waistline, keeping your hands close to your stomach and your palms together until you feel a mild to moderate stretch under your forearms. Hold for at least 15 to 30 seconds. Repeat 2 to 4 times. Wrist flexor stretch  Extend your arm in front of you with your palm up. Bend your wrist, pointing your hand toward the floor. With your other hand, gently bend your wrist farther until you feel a mild to moderate stretch in your forearm. Hold for at least 15 to 30 seconds. Repeat 2 to 4 times. Wrist extensor stretch  Repeat steps 1 through 4 of the stretch above, but begin with your extended hand palm down. Follow-up care is a key part of your treatment and safety. Be sure to make and go to all appointments, and call your doctor if you are having problems. It's also a good idea to know your test results and keep a list of the medicines you take. Current as of: March 9, 2022               Content Version: 13.4  © 2006-2022 Healthwise, Incorporated. Care instructions adapted under license by [employer] (which disclaims liability or warranty for this information). If you have questions about a medical condition or this instruction, always ask your healthcare professional. [place] [address] any warranty or liability for your use of this information. Aaliyah Prabhakar Disease: Exercises  Introduction  Here are some examples of exercises for you to try.  The exercises may be suggested for a condition or for rehabilitation. Start each exercise slowly. Ease off the exercises if you start to have pain. You will be told when to start these exercises and which ones will work best for you. How to do the exercises  Thumb lifts  Place your hand on a flat surface, with your palm up. Lift your thumb away from your palm to make a \"C\" shape. Hold for about 6 seconds. Repeat 8 to 12 times. Passive thumb MP flexion  Hold your hand in front of you, and turn your hand so your little finger faces down and your thumb faces up. (Your hand should be in the position used for shaking someone's hand.) You may also rest your hand on a flat surface. Use the fingers on your other hand to bend your thumb down at the point where your thumb connects to your palm. Hold for at least 15 to 30 seconds. Repeat 2 to 4 times. Finkelstein [employer] your arms out in front of you. (Your hand should be in the position used for shaking someone's hand.)  Bend your thumb toward your palm. Use your other hand to gently stretch your thumb and wrist downward until you feel the stretch on the thumb side of your wrist.  Hold for at least 15 to 30 seconds. Repeat 2 to 4 times. Resisted ulnar deviation  For this exercise, you will need elastic exercise material, such as surgical tubing or Thera-Band. Sit leaning forward with your legs slightly spread and your elbow on your thigh. Grasp one end of the band with your palm down, and step on the other end with the foot opposite the hand holding the band. Slowly bend your wrist sideways and away from your knee. Repeat 8 to 12 times. Follow-up care is a key part of your treatment and safety. Be sure to make and go to all appointments, and call your doctor if you are having problems. It's also a good idea to know your test results and keep a list of the medicines you take. Current as of: March 9, 2022               Content Version: 13.4  © 2006-2022 Healthwise, [employer].    Care instructions adapted under license by [employer] (which disclaims liability or warranty for this information). If you have questions about a medical condition or this instruction, always ask your healthcare professional. [address] any warranty or liability for your use of this information.

## 2023-02-07 NOTE — LETTER
2/7/2023    Patient: Audrey Underwood   YOB: 1990   Date of Visit: 2/7/2023     Vidal Joyce, [address] [place] [address]    Dear Vidal Joyce MD,      Thank you for referring Ms. Dang Hu to [facility] for evaluation. My notes for this consultation are attached. If you have questions, please do not hesitate to call me. I look forward to following your patient along with you.       Sincerely,    Governor MD [place]

## 2023-02-13 ENCOUNTER — TELEPHONE (OUTPATIENT)
Dept: ORTHOPEDIC SURGERY | Age: 33
End: 2023-02-13

## 2023-02-13 DIAGNOSIS — M65.4 DE QUERVAIN'S TENOSYNOVITIS, LEFT: Primary | ICD-10-CM

## 2023-02-13 RX ORDER — MELOXICAM 7.5 MG/1
15 TABLET ORAL DAILY
Qty: 28 TABLET | Refills: 0 | Status: SHIPPED | OUTPATIENT
Start: 2023-02-13 | End: 2023-02-17 | Stop reason: SDUPTHER

## 2023-02-13 NOTE — TELEPHONE ENCOUNTER
Pt c/o pain and swelling of her left radial wrist. She was given Medrol on 2/7/23 for left de Quervain's tenosynovitis and trigger thumb. She states this has not helped. She has been taking ibuprofen and Tylenol without relief. She is scheduled for her MRI tomorrow 2/14/23. S/w Dr. Sachin Willis about options and he recommended trying Meloxicam. This was sent to pt's confirmed pharmacy, [address] [place] [address] on [facility]. Advised pt to return to the office after the MRI is completed to review results and treatment options with Dr. Sachin Willis. States understanding and will call the office in the morning.

## 2023-02-17 ENCOUNTER — OFFICE VISIT (OUTPATIENT)
Dept: ORTHOPEDIC SURGERY | Age: 33
End: 2023-02-17
Payer: MEDICAID

## 2023-02-17 VITALS — WEIGHT: 225 LBS | BODY MASS INDEX: 36.16 KG/M2 | HEIGHT: 66 IN

## 2023-02-17 DIAGNOSIS — M65.4 DE QUERVAIN'S TENOSYNOVITIS, LEFT: ICD-10-CM

## 2023-02-17 DIAGNOSIS — M79.642 LEFT HAND PAIN: Primary | ICD-10-CM

## 2023-02-17 PROCEDURE — 99213 OFFICE O/P EST LOW 20 MIN: CPT | Performed by: ORTHOPAEDIC SURGERY

## 2023-02-17 RX ORDER — MELOXICAM 7.5 MG/1
15 TABLET ORAL DAILY
Qty: 28 TABLET | Refills: 1 | Status: SHIPPED | OUTPATIENT
Start: 2023-02-17 | End: 2023-03-03

## 2023-02-17 NOTE — PATIENT INSTRUCTIONS
Lavinia Race Disease: Exercises  Introduction  Here are some examples of exercises for you to try. The exercises may be suggested for a condition or for rehabilitation. Start each exercise slowly. Ease off the exercises if you start to have pain. You will be told when to start these exercises and which ones will work best for you. How to do the exercises  Thumb lifts  Place your hand on a flat surface, with your palm up. Lift your thumb away from your palm to make a \"C\" shape. Hold for about 6 seconds. Repeat 8 to 12 times. Passive thumb MP flexion  Hold your hand in front of you, and turn your hand so your little finger faces down and your thumb faces up. (Your hand should be in the position used for shaking someone's hand.) You may also rest your hand on a flat surface. Use the fingers on your other hand to bend your thumb down at the point where your thumb connects to your palm. Hold for at least 15 to 30 seconds. Repeat 2 to 4 times. Finkelstein [employer] your arms out in front of you. (Your hand should be in the position used for shaking someone's hand.)  Bend your thumb toward your palm. Use your other hand to gently stretch your thumb and wrist downward until you feel the stretch on the thumb side of your wrist.  Hold for at least 15 to 30 seconds. Repeat 2 to 4 times. Resisted ulnar deviation  For this exercise, you will need elastic exercise material, such as surgical tubing or Thera-Band. Sit leaning forward with your legs slightly spread and your elbow on your thigh. Grasp one end of the band with your palm down, and step on the other end with the foot opposite the hand holding the band. Slowly bend your wrist sideways and away from your knee. Repeat 8 to 12 times. Follow-up care is a key part of your treatment and safety. Be sure to make and go to all appointments, and call your doctor if you are having problems.  It's also a good idea to know your test results and keep a list of the medicines you take. Current as of: March 9, 2022               Content Version: 13.4  © 6612-7178 Healthwise, Incorporated. Care instructions adapted under license by [employer] (which disclaims liability or warranty for this information). If you have questions about a medical condition or this instruction, always ask your healthcare professional. [place] [address] any warranty or liability for your use of this information.

## 2023-02-17 NOTE — LETTER
2/17/2023    Patient: Rosangela Alarcon   YOB: 1990   Date of Visit: 2/17/2023     Luis Alberto Dunn, [address] [facility]  [address] [facility]  [address]    Dear Luis Alberto Dunn MD,      Thank you for referring Ms. Jailene Shabazz to [facility] for evaluation. My notes for this consultation are attached. If you have questions, please do not hesitate to call me. I look forward to following your patient along with you.       Sincerely,    Namrata Farah MD

## 2023-02-17 NOTE — PROGRESS NOTES
HPI: Rosangela Alarcon (: 1990) is a 28 y.o. female, patient, here for evaluation of the following chief complaint(s):    Wrist Pain (Pt stated the Medrol given her on 23 helped the swelling to go down and helped lessen the pain. She is here for MRI results.)  Patient is seen today to evaluate her left hand. The patient has developed painful clicking and locking to the left thumb but also complains of radial sided left wrist pain. She denies any fall or recent injury. She states that she has difficulty opening bottles and lifting a 3year-old. She has had worsening pain since the late summer. She has tried wearing a thumb spica splint at night. She has tried alternating ice and heat and even ibuprofen without long-term relief. She has undergone a left endoscopic carpal tunnel release in . She did receive a left wrist de Quervain's injection on 2022. She states she had little improvement. She had also been evaluated in the [facility] ER on 2023 complaining of weakness and a pop sensation to the radial side of the left wrist.  MRI assessment from 2023 did reveal first extensor compartment tenosynovitis with overlying soft tissue edema and swelling compatible with de Quervain's tenosynovitis. 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 Thinning of the TFC disc complex likely degenerative was noted as well as mild osteoarthritis of the radiocarpal and thumb CMC articulations. Lastly, findings were suggestive of a chronic sprain of the central and dorsal scapholunate ligament. Vitals:  Ht 5' 6\" (1.676 m)   Wt 225 lb (102.1 kg)   BMI 36.32 kg/m²    Body mass index is 36.32 kg/m².     Allergies   Allergen Reactions    Codeine Nausea Only    Tomato Hives and Angioedema       Current Outpatient Medications   Medication Sig    meloxicam (MOBIC) 7.5 mg tablet Take 2 Tablets by mouth daily for 14 days.    methylPREDNISolone (MEDROL DOSEPACK) 4 mg tablet Per dose pack instructions    ubrogepant (Ubrelvy) 100 mg tablet Take one tablet at onset of migraine. May repeat one tablet in 2 hours if headache remains. Limit use to 2 days a week. (Patient not taking: No sig reported)    erenumab-aooe (Aimovig Autoinjector) 70 mg/mL injection 1 mL by SubCUTAneous route every thirty (30) days. Indications: migraine prevention (Patient not taking: No sig reported)    erenumab-aooe (Aimovig Autoinjector) 70 mg/mL injection 1 mL by SubCUTAneous route every thirty (30) days. (Patient not taking: No sig reported)    aspirin-acetaminophen-caffeine (EXCEDRIN ES) 250-250-65 mg per tablet Take 2 Tablets by mouth every eight (8) hours as needed for Headache. (Patient not taking: No sig reported)    metoprolol succinate (TOPROL-XL) 25 mg XL tablet Take 12.5 mg by mouth daily. (Patient not taking: No sig reported)     No current facility-administered medications for this visit.        Past Medical History:   Diagnosis Date    Abnormal Pap smear     Anemia     Gastrointestinal disorder     Ulcers    Headache     Herniated disc     Hx gestational diabetes     HX OTHER MEDICAL     trich treated at beginning of pregnancy    HX OTHER MEDICAL     pituitary tumor-benign     Miscarriage     Molar pregnancy     Pap smear for cervical cancer screening 19 neg HPV POS- rp pap one year    PCOS (polycystic ovarian syndrome)     Pelvic pain in female     Pituitary adenoma [facility])     Pituitary tumor     Psychiatric problem     depression never on medication    Thyroid activity decreased     hypothyroid awaiting appnt for endo        Past Surgical History:   Procedure Laterality Date    HX  SECTION      HX COLPOSCOPY  11/3/10    HX DILATION AND CURETTAGE      HX LIPOMA RESECTION      HX ORTHOPAEDIC      carpal tunnel on left hand       Family History   Problem Relation Age of Onset    Diabetes Father     Headache Mother         Social History     Tobacco Use    Smoking status: Never    Smokeless tobacco: Never   Vaping Use    Vaping Use: Never used   Substance Use Topics    Alcohol use: Not Currently    Drug use: No        Review of Systems   All other systems reviewed and are negative. Physical Exam    Patient's wrist in general demonstrate good range of motion but she has a weakly positive tenderness and Finkelstein to the first dorsal compartment left wrist as well as tenderness to the A1 pulley of the left thumb with clicking and subtle locking. No other digital locking. Imaging:    XR Results (most recent):  Results from Hospital Encounter encounter on 02/05/23    XR WRIST LT AP/LAT/OBL MIN 3V    Narrative  EXAM: XR WRIST LT AP/LAT/OBL MIN 3V    INDICATION: Lifting injury, left wrist pain. COMPARISON: 11/14/2022. FINDINGS: Three views of the left wrist demonstrate no fracture or other acute  osseous or articular abnormality. The soft tissues are within normal limits. Impression  No acute abnormality. ASSESSMENT/PLAN:  Below is the assessment and plan developed based on review of pertinent history, physical exam, labs, studies, and medications. Patient examination was clinically consistent with left wrist de Quervain's tenosynovitis and thumb stenosing tenosynovitis. She was offered but deferred oral steroids and did receive a left wrist de Quervain's injection on 11/14/2022. She wants to hold off on a left trigger thumb injection and reconsider this for the future if needed. We spoke about further treatment options especially after her ER evaluation on 2/5/2023. The x-ray showed normal findings. She has not improved and she has continued pain.   I recommend an MRI to assess for an occult ganglion as well as a de Quervain's tenosynovitis involving her left wrist.  MRI assessment from 2/14/2023 did reveal first extensor compartment tenosynovitis with overlying soft tissue edema and swelling compatible with de Quervain's tenosynovitis. 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 Thinning of the TFC disc complex likely degenerative was noted as well as mild osteoarthritis of the radiocarpal and thumb CMC articulations. Lastly, findings were suggestive of a chronic sprain of the central and dorsal scapholunate ligament. He feels better from recent use of a steroid pack and meloxicam which has been renewed. Future consideration can be given for an injection repeat but she deferred. Lastly, a surgical option may ultimately be a first compartment release possible intersection release with a diagnostic wrist arthroscopy to confirm MRI findings if indeed needed. She will continue with conservative symptomatic care and may return in 4 to 6 weeks or anytime for further treatment. 1. Left hand pain  2. De Quervain's tenosynovitis, left  -     meloxicam (MOBIC) 7.5 mg tablet; Take 2 Tablets by mouth daily for 14 days. , Normal, Disp-28 Tablet, R-1      Return in about 6 weeks (around 3/31/2023). An electronic signature was used to authenticate this note.   -- Camille Valentin MD

## 2023-05-04 ENCOUNTER — VIRTUAL VISIT (OUTPATIENT)
Dept: FAMILY MEDICINE CLINIC | Age: 33
End: 2023-05-04
Payer: MEDICAID

## 2023-05-04 DIAGNOSIS — J06.9 URI WITH COUGH AND CONGESTION: Primary | ICD-10-CM

## 2023-05-04 PROCEDURE — 99213 OFFICE O/P EST LOW 20 MIN: CPT | Performed by: INTERNAL MEDICINE

## 2023-05-04 RX ORDER — AZITHROMYCIN 250 MG/1
TABLET, FILM COATED ORAL
Qty: 6 TABLET | Refills: 0 | Status: SHIPPED | OUTPATIENT
Start: 2023-05-04 | End: 2023-05-05 | Stop reason: SINTOL

## 2023-05-05 ENCOUNTER — TELEPHONE (OUTPATIENT)
Dept: FAMILY MEDICINE CLINIC | Age: 33
End: 2023-05-05

## 2023-05-05 RX ORDER — AMOXICILLIN 500 MG/1
500 CAPSULE ORAL 3 TIMES DAILY
Qty: 30 CAPSULE | Refills: 0 | Status: SHIPPED | OUTPATIENT
Start: 2023-05-05 | End: 2023-05-15

## 2023-05-15 ENCOUNTER — TELEPHONE (OUTPATIENT)
Age: 33
End: 2023-05-15

## 2023-05-15 NOTE — PROGRESS NOTES
Jazzy Ball is a 28 y.o. female presents for a problem visit. Chief Complaint   Patient presents with    Vaginitis     Patient's last menstrual period was 05/06/2023. Birth Control: none. Last Pap: see report obtained 3 year(s) ago. The patient is reporting having: vaginitis. She was on an antibiotic recently. She wants STD testing today. She also wants to restart the depo injection. 1. Have you been to the ER, urgent care clinic, or hospitalized since your last visit? No    2. Have you seen or consulted any other health care providers outside of the [address] [place] [address] since your last visit? No    Examination chaperoned by Vitor Martin CMA.

## 2023-05-15 NOTE — TELEPHONE ENCOUNTER
[phone]    Pt called because she was prescribed Amoxicillin for an Upper Respiratory infection but she thinks she developed a yeast infection and she wanted to know if she could get some medication called in for that or if she needed to be seen

## 2023-05-16 ENCOUNTER — OFFICE VISIT (OUTPATIENT)
Age: 33
End: 2023-05-16
Payer: MEDICAID

## 2023-05-16 VITALS — DIASTOLIC BLOOD PRESSURE: 78 MMHG | SYSTOLIC BLOOD PRESSURE: 128 MMHG | WEIGHT: 233 LBS | BODY MASS INDEX: 37.61 KG/M2

## 2023-05-16 DIAGNOSIS — N89.8 VAGINAL ITCHING: Primary | ICD-10-CM

## 2023-05-16 DIAGNOSIS — Z20.2 EXPOSURE TO SEXUALLY TRANSMITTED DISEASE (STD): ICD-10-CM

## 2023-05-16 DIAGNOSIS — N76.0 ACUTE VAGINITIS: ICD-10-CM

## 2023-05-16 PROCEDURE — 99213 OFFICE O/P EST LOW 20 MIN: CPT | Performed by: OBSTETRICS & GYNECOLOGY

## 2023-05-16 RX ORDER — MEDROXYPROGESTERONE ACETATE 150 MG/ML
150 INJECTION, SUSPENSION INTRAMUSCULAR ONCE
Qty: 1 ML | Refills: 3 | Status: SHIPPED | OUTPATIENT
Start: 2023-05-16 | End: 2023-05-16

## 2023-05-16 RX ORDER — FLUCONAZOLE 150 MG/1
150 TABLET ORAL DAILY
Qty: 3 TABLET | Refills: 0 | Status: SHIPPED | OUTPATIENT
Start: 2023-05-16 | End: 2023-05-19

## 2023-05-16 NOTE — PROGRESS NOTES
GYN Problem Visit Note    Chief Complaint   No chief complaint on file. No LMP recorded. Pink Marc HPI  Irma Rodriguez is a 28 y.o. female who complains of vaginal irritation. She used antibiotics recently. She also would like to restart the Depo so she will be amenorrheic. Per Nursing Note:  Patient's last menstrual period was 2023. Birth Control: none. Last Pap: see report obtained 3 year(s) ago. The patient is reporting having: vaginitis. She was on an antibiotic recently. She wants STD testing today. She also wants to restart the depo injection. Past Medical History:   Diagnosis Date    Abnormal Pap smear     Anemia     Gastrointestinal disorder     Ulcers    Headache     Herniated disc     Hx gestational diabetes     Miscarriage     Molar pregnancy     Pap smear for cervical cancer screening 19 neg HPV POS- rp pap one year    PCOS (polycystic ovarian syndrome)     Pelvic pain in female     Pituitary adenoma [facility])     Pituitary tumor     Psychiatric problem     depression never on medication    Thyroid activity decreased     hypothyroid awaiting appnt for endo     Past Surgical History:   Procedure Laterality Date     SECTION      COLPOSCOPY  11/3/10    DILATION AND CURETTAGE OF UTERUS      LIPOMA RESECTION      ORTHOPEDIC SURGERY      carpal tunnel on left hand     Social History     Occupational History    Not on file   Tobacco Use    Smoking status: Never    Smokeless tobacco: Never   Substance and Sexual Activity    Alcohol use: Not Currently    Drug use: No    Sexual activity: Not on file     Family History   Problem Relation Age of Onset    Diabetes Father     Headache Mother        Allergies   Allergen Reactions    Codeine Nausea Only    Tomato Angioedema and Hives     Prior to Admission medications    Medication Sig Start Date End Date Taking?  Authorizing Provider   aspirin-acetaminophen-caffeine (EXCEDRIN MIGRAINE) 864-347-59 MG per tablet Take 2 tablets by

## 2023-05-18 ENCOUNTER — TELEPHONE (OUTPATIENT)
Age: 33
End: 2023-05-18

## 2023-05-18 RX ORDER — NYSTATIN 100000 U/G
CREAM TOPICAL
Qty: 30 G | Refills: 0 | Status: SHIPPED | OUTPATIENT
Start: 2023-05-18

## 2023-05-18 RX ORDER — TRIAMCINOLONE ACETONIDE 0.25 MG/G
CREAM TOPICAL
Qty: 30 G | Refills: 0 | Status: SHIPPED | OUTPATIENT
Start: 2023-05-18

## 2023-05-19 LAB
A VAGINAE DNA VAG QL NAA+PROBE: ABNORMAL SCORE
BVAB2 DNA VAG QL NAA+PROBE: ABNORMAL SCORE
C ALBICANS DNA VAG QL NAA+PROBE: NEGATIVE
C GLABRATA DNA VAG QL NAA+PROBE: NEGATIVE
C TRACH DNA VAG QL NAA+PROBE: NEGATIVE
MEGA1 DNA VAG QL NAA+PROBE: ABNORMAL SCORE
N GONORRHOEA DNA VAG QL NAA+PROBE: NEGATIVE
T VAGINALIS DNA VAG QL NAA+PROBE: NEGATIVE

## 2023-05-30 RX ORDER — METRONIDAZOLE 500 MG/1
500 TABLET ORAL 2 TIMES DAILY
Qty: 14 TABLET | Refills: 0 | Status: SHIPPED | OUTPATIENT
Start: 2023-05-30 | End: 2023-06-06

## 2023-06-01 ENCOUNTER — NURSE ONLY (OUTPATIENT)
Age: 33
End: 2023-06-01
Payer: MEDICAID

## 2023-06-01 DIAGNOSIS — Z30.013 INITIATION OF DEPO PROVERA: Primary | ICD-10-CM

## 2023-06-01 LAB
HCG, PREGNANCY, URINE, POC: NEGATIVE
VALID INTERNAL CONTROL, POC: YES

## 2023-06-01 PROCEDURE — 81025 URINE PREGNANCY TEST: CPT | Performed by: OBSTETRICS & GYNECOLOGY

## 2023-06-01 RX ORDER — MEDROXYPROGESTERONE ACETATE 150 MG/ML
150 INJECTION, SUSPENSION INTRAMUSCULAR
Status: SHIPPED | OUTPATIENT
Start: 2023-06-01

## 2023-06-01 RX ADMIN — MEDROXYPROGESTERONE ACETATE 150 MG: 150 INJECTION, SUSPENSION INTRAMUSCULAR at 09:21

## 2023-06-01 NOTE — PROGRESS NOTES
After obtaining consent, and per orders of Dr. Hemran Monteiro, injection of depo given in Left upper quad. gluteus by Katerina Posada CMA. Patient instructed to remain in clinic for 20 minutes afterwards, and to report any adverse reaction to me immediately. UPT negative in office today.

## 2023-06-23 ENCOUNTER — APPOINTMENT (OUTPATIENT)
Facility: HOSPITAL | Age: 33
End: 2023-06-23
Payer: MEDICAID

## 2023-06-23 ENCOUNTER — HOSPITAL ENCOUNTER (EMERGENCY)
Facility: HOSPITAL | Age: 33
Discharge: HOME | End: 2023-06-23
Attending: EMERGENCY MEDICINE
Payer: MEDICAID

## 2023-06-23 VITALS
HEIGHT: 66 IN | HEART RATE: 80 BPM | WEIGHT: 230 LBS | BODY MASS INDEX: 36.96 KG/M2 | TEMPERATURE: 98.2 F | OXYGEN SATURATION: 99 % | SYSTOLIC BLOOD PRESSURE: 171 MMHG | DIASTOLIC BLOOD PRESSURE: 83 MMHG

## 2023-06-23 DIAGNOSIS — R51.9 ACUTE NONINTRACTABLE HEADACHE, UNSPECIFIED HEADACHE TYPE: ICD-10-CM

## 2023-06-23 DIAGNOSIS — S06.0X0A CONCUSSION WITHOUT LOSS OF CONSCIOUSNESS, INITIAL ENCOUNTER: ICD-10-CM
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
## 2023-06-23 DIAGNOSIS — S16.1XXA ACUTE STRAIN OF NECK MUSCLE, INITIAL ENCOUNTER: ICD-10-CM

## 2023-06-23 LAB — HCG UR QL: NEGATIVE

## 2023-06-23 PROCEDURE — 81025 URINE PREGNANCY TEST: CPT

## 2023-06-23 PROCEDURE — 99284 EMERGENCY DEPT VISIT MOD MDM: CPT

## 2023-06-23 PROCEDURE — 70450 CT HEAD/BRAIN W/O DYE: CPT

## 2023-06-23 PROCEDURE — 6370000000 HC RX 637 (ALT 250 FOR IP): Performed by: EMERGENCY MEDICINE

## 2023-06-23 PROCEDURE — 72125 CT NECK SPINE W/O DYE: CPT

## 2023-06-23 PROCEDURE — 71045 X-RAY EXAM CHEST 1 VIEW: CPT

## 2023-06-23 RX ORDER — OXYCODONE HYDROCHLORIDE 5 MG/1
5 TABLET ORAL
Status: COMPLETED | OUTPATIENT
Start: 2023-06-23 | End: 2023-06-23

## 2023-06-23 RX ORDER — METHOCARBAMOL 500 MG/1
500 TABLET, FILM COATED ORAL 4 TIMES DAILY PRN
Qty: 20 TABLET | Refills: 0 | Status: SHIPPED | OUTPATIENT
Start: 2023-06-23 | End: 2023-06-28

## 2023-06-23 RX ORDER — IBUPROFEN 600 MG/1
600 TABLET ORAL EVERY 6 HOURS PRN
Qty: 21 TABLET | Refills: 0 | Status: SHIPPED | OUTPATIENT
Start: 2023-06-23 | End: 2023-06-30

## 2023-06-23 RX ORDER — IBUPROFEN 600 MG/1
600 TABLET ORAL
Status: COMPLETED | OUTPATIENT
Start: 2023-06-23 | End: 2023-06-23

## 2023-06-23 RX ORDER — ONDANSETRON 4 MG/1
4 TABLET, ORALLY DISINTEGRATING ORAL
Status: COMPLETED | OUTPATIENT
Start: 2023-06-23 | End: 2023-06-23

## 2023-06-23 RX ORDER — ONDANSETRON 4 MG/1
4 TABLET, ORALLY DISINTEGRATING ORAL EVERY 8 HOURS PRN
Qty: 30 TABLET | Refills: 0 | Status: SHIPPED | OUTPATIENT
Start: 2023-06-23

## 2023-06-23 RX ORDER — ONDANSETRON 4 MG/1
4 TABLET, ORALLY DISINTEGRATING ORAL ONCE
Status: COMPLETED | OUTPATIENT
Start: 2023-06-23 | End: 2023-06-23

## 2023-06-23 RX ORDER — ACETAMINOPHEN 500 MG
1000 TABLET ORAL 3 TIMES DAILY
Qty: 120 TABLET | Refills: 3 | Status: SHIPPED | OUTPATIENT
Start: 2023-06-23

## 2023-06-23 RX ORDER — ACETAMINOPHEN 500 MG
1000 TABLET ORAL
Status: COMPLETED | OUTPATIENT
Start: 2023-06-23 | End: 2023-06-23

## 2023-06-23 RX ORDER — METHOCARBAMOL 500 MG/1
750 TABLET, FILM COATED ORAL ONCE
Status: COMPLETED | OUTPATIENT
Start: 2023-06-23 | End: 2023-06-23

## 2023-06-23 RX ADMIN — IBUPROFEN 600 MG: 600 TABLET, FILM COATED ORAL at 20:52

## 2023-06-23 RX ADMIN — METHOCARBAMOL 750 MG: 500 TABLET ORAL at 19:37

## 2023-06-23 RX ADMIN — OXYCODONE 5 MG: 5 TABLET ORAL at 19:37

## 2023-06-23 RX ADMIN — ACETAMINOPHEN 1000 MG: 500 TABLET, FILM COATED ORAL at 19:36

## 2023-06-23 RX ADMIN — ONDANSETRON 4 MG: 4 TABLET, ORALLY DISINTEGRATING ORAL at 19:37

## 2023-06-23 RX ADMIN — ONDANSETRON 4 MG: 4 TABLET, ORALLY DISINTEGRATING ORAL at 20:52

## 2023-06-23 ASSESSMENT — PAIN DESCRIPTION - ORIENTATION: ORIENTATION: LEFT

## 2023-06-23 ASSESSMENT — LIFESTYLE VARIABLES
HOW MANY STANDARD DRINKS CONTAINING ALCOHOL DO YOU HAVE ON A TYPICAL DAY: PATIENT DOES NOT DRINK
HOW OFTEN DO YOU HAVE A DRINK CONTAINING ALCOHOL: NEVER

## 2023-06-23 ASSESSMENT — PAIN SCALES - GENERAL: PAINLEVEL_OUTOF10: 9

## 2023-06-23 ASSESSMENT — PAIN DESCRIPTION - LOCATION: LOCATION: NECK;SHOULDER
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
## 2023-06-27 ENCOUNTER — NURSE TRIAGE (OUTPATIENT)
Dept: OTHER | Facility: CLINIC | Age: 33
End: 2023-06-27

## 2023-06-27 SDOH — ECONOMIC STABILITY: TRANSPORTATION INSECURITY
IN THE PAST 12 MONTHS, HAS LACK OF TRANSPORTATION KEPT YOU FROM MEETINGS, WORK, OR FROM GETTING THINGS NEEDED FOR DAILY LIVING?: NO

## 2023-06-27 SDOH — ECONOMIC STABILITY: INCOME INSECURITY: HOW HARD IS IT FOR YOU TO PAY FOR THE VERY BASICS LIKE FOOD, HOUSING, MEDICAL CARE, AND HEATING?: NOT HARD AT ALL

## 2023-06-27 SDOH — ECONOMIC STABILITY: HOUSING INSECURITY
IN THE LAST 12 MONTHS, WAS THERE A TIME WHEN YOU DID NOT HAVE A STEADY PLACE TO SLEEP OR SLEPT IN A SHELTER (INCLUDING NOW)?: NO

## 2023-06-27 SDOH — ECONOMIC STABILITY: FOOD INSECURITY: WITHIN THE PAST 12 MONTHS, THE FOOD YOU BOUGHT JUST DIDN'T LAST AND YOU DIDN'T HAVE MONEY TO GET MORE.: NEVER TRUE

## 2023-06-27 SDOH — ECONOMIC STABILITY: FOOD INSECURITY: WITHIN THE PAST 12 MONTHS, YOU WORRIED THAT YOUR FOOD WOULD RUN OUT BEFORE YOU GOT MONEY TO BUY MORE.: NEVER TRUE

## 2023-06-28 ENCOUNTER — OFFICE VISIT (OUTPATIENT)
Age: 33
End: 2023-06-28
Payer: MEDICAID

## 2023-06-28 VITALS
WEIGHT: 231.4 LBS | OXYGEN SATURATION: 99 % | RESPIRATION RATE: 18 BRPM | DIASTOLIC BLOOD PRESSURE: 79 MMHG | HEART RATE: 64 BPM | BODY MASS INDEX: 37.19 KG/M2 | TEMPERATURE: 98.5 F | HEIGHT: 66 IN | SYSTOLIC BLOOD PRESSURE: 120 MMHG

## 2023-06-28 DIAGNOSIS — M54.50 ACUTE MIDLINE LOW BACK PAIN WITHOUT SCIATICA: Primary | ICD-10-CM

## 2023-06-28 PROCEDURE — 99212 OFFICE O/P EST SF 10 MIN: CPT | Performed by: FAMILY MEDICINE

## 2023-06-28 ASSESSMENT — PATIENT HEALTH QUESTIONNAIRE - PHQ9
SUM OF ALL RESPONSES TO PHQ QUESTIONS 1-9: 0
1. LITTLE INTEREST OR PLEASURE IN DOING THINGS: 0
SUM OF ALL RESPONSES TO PHQ9 QUESTIONS 1 & 2: 0
2. FEELING DOWN, DEPRESSED OR HOPELESS: 0

## 2023-06-28 ASSESSMENT — ANXIETY QUESTIONNAIRES
5. BEING SO RESTLESS THAT IT IS HARD TO SIT STILL: 0
GAD7 TOTAL SCORE: 0
1. FEELING NERVOUS, ANXIOUS, OR ON EDGE: 0
4. TROUBLE RELAXING: 0
IF YOU CHECKED OFF ANY PROBLEMS ON THIS QUESTIONNAIRE, HOW DIFFICULT HAVE THESE PROBLEMS MADE IT FOR YOU TO DO YOUR WORK, TAKE CARE OF THINGS AT HOME, OR GET ALONG WITH OTHER PEOPLE: NOT DIFFICULT AT ALL
6. BECOMING EASILY ANNOYED OR IRRITABLE: 0
7. FEELING AFRAID AS IF SOMETHING AWFUL MIGHT HAPPEN: 0
3. WORRYING TOO MUCH ABOUT DIFFERENT THINGS: 0
2. NOT BEING ABLE TO STOP OR CONTROL WORRYING: 0

## 2023-06-30 ASSESSMENT — ENCOUNTER SYMPTOMS: BACK PAIN: 1

## 2023-07-18 ENCOUNTER — TELEPHONE (OUTPATIENT)
Age: 33
End: 2023-07-18

## 2023-08-18 ENCOUNTER — OFFICE VISIT (OUTPATIENT)
Age: 33
End: 2023-08-18

## 2023-08-18 DIAGNOSIS — Z30.42 ENCOUNTER FOR MANAGEMENT AND INJECTION OF DEPO-PROVERA: Primary | ICD-10-CM

## 2023-08-18 RX ORDER — MEDROXYPROGESTERONE ACETATE 150 MG/ML
150 INJECTION, SUSPENSION INTRAMUSCULAR ONCE
Status: COMPLETED | OUTPATIENT
Start: 2023-08-18 | End: 2023-08-18

## 2023-08-18 RX ADMIN — MEDROXYPROGESTERONE ACETATE 150 MG: 150 INJECTION, SUSPENSION INTRAMUSCULAR at 10:57

## 2023-08-18 NOTE — PROGRESS NOTES
After obtaining consent, and per orders of Dr. John Saldana, injection of depo given in Left upper quad. gluteus by Amber Maier CMA. Patient instructed to remain in clinic for 20 minutes afterwards, and to report any adverse reaction to me immediately.

## 2023-09-27 ENCOUNTER — TELEMEDICINE (OUTPATIENT)
Age: 33
End: 2023-09-27
Payer: MEDICAID

## 2023-09-27 DIAGNOSIS — G47.00 INSOMNIA, UNSPECIFIED TYPE: ICD-10-CM

## 2023-09-27 DIAGNOSIS — F41.9 ANXIETY: Primary | ICD-10-CM

## 2023-09-27 PROCEDURE — 99213 OFFICE O/P EST LOW 20 MIN: CPT | Performed by: NURSE PRACTITIONER

## 2023-09-27 RX ORDER — HYDROXYZINE 50 MG/1
50 TABLET, FILM COATED ORAL NIGHTLY
Qty: 90 TABLET | Refills: 0 | Status: SHIPPED | OUTPATIENT
Start: 2023-09-27 | End: 2023-12-26

## 2023-09-27 RX ORDER — ESCITALOPRAM OXALATE 10 MG/1
10 TABLET ORAL DAILY
Qty: 90 TABLET | Refills: 0 | Status: SHIPPED | OUTPATIENT
Start: 2023-09-27

## 2023-09-27 ASSESSMENT — PATIENT HEALTH QUESTIONNAIRE - PHQ9
SUM OF ALL RESPONSES TO PHQ QUESTIONS 1-9: 0
1. LITTLE INTEREST OR PLEASURE IN DOING THINGS: 0
SUM OF ALL RESPONSES TO PHQ9 QUESTIONS 1 & 2: 0
SUM OF ALL RESPONSES TO PHQ QUESTIONS 1-9: 0
2. FEELING DOWN, DEPRESSED OR HOPELESS: 0
SUM OF ALL RESPONSES TO PHQ QUESTIONS 1-9: 0
SUM OF ALL RESPONSES TO PHQ QUESTIONS 1-9: 0
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
## 2023-12-28 ENCOUNTER — TELEPHONE (OUTPATIENT)
Age: 33
End: 2023-12-28

## 2023-12-28 NOTE — TELEPHONE ENCOUNTER
I called patient and she stated that her chest is tight, she is short of breath, her body is aching, and her head is pounding. Her symptoms started on Monday. She took a Covid test and it was negative. I let her know that we didn't have any available appointments and recommended urgent care for her symptoms.

## 2024-03-31 ENCOUNTER — HOSPITAL ENCOUNTER (EMERGENCY)
Facility: HOSPITAL | Age: 34
Discharge: HOME | End: 2024-03-31
Attending: STUDENT IN AN ORGANIZED HEALTH CARE EDUCATION/TRAINING PROGRAM
Payer: MEDICAID

## 2024-03-31 ENCOUNTER — APPOINTMENT (OUTPATIENT)
Facility: HOSPITAL | Age: 34
End: 2024-03-31
Payer: MEDICAID

## 2024-03-31 VITALS
OXYGEN SATURATION: 99 % | TEMPERATURE: 98.1 F | DIASTOLIC BLOOD PRESSURE: 81 MMHG | HEIGHT: 66 IN | SYSTOLIC BLOOD PRESSURE: 141 MMHG | BODY MASS INDEX: 36.85 KG/M2 | RESPIRATION RATE: 16 BRPM | WEIGHT: 229.28 LBS | HEART RATE: 81 BPM

## 2024-03-31 DIAGNOSIS — S93.401A SPRAIN OF RIGHT ANKLE, UNSPECIFIED LIGAMENT, INITIAL ENCOUNTER: Primary | ICD-10-CM

## 2024-03-31 PROCEDURE — 6370000000 HC RX 637 (ALT 250 FOR IP): Performed by: STUDENT IN AN ORGANIZED HEALTH CARE EDUCATION/TRAINING PROGRAM

## 2024-03-31 PROCEDURE — 99283 EMERGENCY DEPT VISIT LOW MDM: CPT

## 2024-03-31 PROCEDURE — 73610 X-RAY EXAM OF ANKLE: CPT

## 2024-03-31 RX ORDER — IBUPROFEN 600 MG/1
600 TABLET ORAL
Status: COMPLETED | OUTPATIENT
Start: 2024-03-31 | End: 2024-03-31

## 2024-03-31 RX ADMIN — IBUPROFEN 600 MG: 600 TABLET, FILM COATED ORAL at 18:34

## 2024-03-31 ASSESSMENT — PAIN DESCRIPTION - LOCATION
LOCATION: ANKLE
LOCATION: FOOT

## 2024-03-31 ASSESSMENT — PAIN DESCRIPTION - ORIENTATION
ORIENTATION: RIGHT
ORIENTATION: RIGHT

## 2024-03-31 ASSESSMENT — PAIN DESCRIPTION - DESCRIPTORS
DESCRIPTORS: ACHING
DESCRIPTORS: ACHING;DULL

## 2024-03-31 ASSESSMENT — PAIN DESCRIPTION - PAIN TYPE: TYPE: ACUTE PAIN

## 2024-03-31 ASSESSMENT — PAIN SCALES - GENERAL
PAINLEVEL_OUTOF10: 8
PAINLEVEL_OUTOF10: 8

## 2024-03-31 ASSESSMENT — PAIN - FUNCTIONAL ASSESSMENT: PAIN_FUNCTIONAL_ASSESSMENT: 0-10

## 2024-03-31 NOTE — ED TRIAGE NOTES
Pt presents to ED with c/o missing step and rolling right ankle within the hour. Ice pack to right ankle. Pt denies other injuries.

## 2024-03-31 NOTE — ED PROVIDER NOTES
[facility] EMERGENCY DEPT  EMERGENCY DEPARTMENT ENCOUNTER      Pt Name: Cheri Trujillo  MRN: [id number]  Birthdate 1990  Date of evaluation: 3/31/2024  Provider: Rylie Matthews MD    CHIEF COMPLAINT       Chief Complaint   Patient presents with    Ankle Injury     HISTORY OF PRESENT ILLNESS   (Location/Symptom, Timing/Onset, Context/Setting, Quality, Duration, Modifying Factors, Severity)  Note limiting factors.   HPI  34-year-old female presents the ER for evaluation of right ankle pain after twisting her ankle just PTA. Currently localizes pain to the lateral and posterior aspect of the ankle and proximal foot. Reports some intermittent shooting tingling and numbness up and down her leg and foot.     Review of External Medical Records:     Nursing Notes were reviewed.    REVIEW OF SYSTEMS    (2-9 systems for level 4, 10 or more for level 5)     Review of Systems   All other systems reviewed and are negative.      Except as noted above the remainder of the review of systems was reviewed and negative.       PAST MEDICAL HISTORY     Past Medical History:   Diagnosis Date    Abnormal Pap smear     Anemia     Gastrointestinal disorder     Ulcers    Headache     Herniated disc     Hx gestational diabetes     Miscarriage     Molar pregnancy     Pap smear for cervical cancer screening 19 neg HPV POS- rp pap one year    PCOS (polycystic ovarian syndrome)     Pelvic pain in female     Pituitary adenoma (HCC)     Pituitary tumor     Psychiatric problem     depression never on medication    Thyroid activity decreased     hypothyroid awaiting appnt for endo         SURGICAL HISTORY       Past Surgical History:   Procedure Laterality Date     SECTION      COLPOSCOPY  11/3/10    DILATION AND CURETTAGE OF UTERUS      LIPOMA RESECTION      ORTHOPEDIC SURGERY      carpal tunnel on left hand         CURRENT MEDICATIONS       Previous Medications    ESCITALOPRAM (LEXAPRO) 10 MG TABLET    Take 1 tablet by mouth daily

## 2024-06-17 RX ORDER — NORGESTIMATE AND ETHINYL ESTRADIOL 0.25-0.035
1 KIT ORAL DAILY
Qty: 3 PACKET | Refills: 4 | Status: SHIPPED | OUTPATIENT
Start: 2024-06-17

## 2025-01-08 ENCOUNTER — HOSPITAL ENCOUNTER (OUTPATIENT)
Facility: HOSPITAL | Age: 35
Discharge: HOME | End: 2025-01-11
Payer: MEDICAID

## 2025-01-08 DIAGNOSIS — M25.562 LEFT KNEE PAIN, UNSPECIFIED CHRONICITY: ICD-10-CM

## 2025-01-08 PROCEDURE — 73721 MRI JNT OF LWR EXTRE W/O DYE: CPT

## 2025-05-05 ENCOUNTER — TELEPHONE (OUTPATIENT)
Age: 35
End: 2025-05-05

## 2025-05-05 NOTE — TELEPHONE ENCOUNTER
Two patient identifiers used    35 year old patient last seen in the office on 6/25/2020 for ae and was seen for problem visit on  5/16/2023 for problem visit.      Patient report she is on the depo and her cycle has been irregular. Patient reports she bleeds for weeks at a time . I due to tampon irritation she starts with a discharge that was white and is now thick yellow,itchy ,red and is uncomfortable when she walks.    Patient reports she has had the symptoms for the past two weeks and starts a  new job on Friday.  Patient was offered appointment on 5/9/2025 and declined due to new job.    Patient was suggested that she can try monistat over the counter 3,5,or 7 day treatment or she can go to urgent care.    Patient was also offered appointment on 5/13/2025 and declined.    Patient verbalized understanding.

## 2025-05-07 ENCOUNTER — OFFICE VISIT (OUTPATIENT)
Age: 35
End: 2025-05-07
Payer: MEDICAID

## 2025-05-07 VITALS
RESPIRATION RATE: 16 BRPM | HEIGHT: 66 IN | OXYGEN SATURATION: 98 % | WEIGHT: 220.2 LBS | HEART RATE: 89 BPM | SYSTOLIC BLOOD PRESSURE: 132 MMHG | BODY MASS INDEX: 35.39 KG/M2 | DIASTOLIC BLOOD PRESSURE: 84 MMHG | TEMPERATURE: 97.9 F

## 2025-05-07 DIAGNOSIS — N94.9 VAGINAL DISCOMFORT: Primary | ICD-10-CM

## 2025-05-07 DIAGNOSIS — N76.0 ACUTE VAGINITIS: ICD-10-CM

## 2025-05-07 LAB
BILIRUBIN, URINE, POC: NEGATIVE
BLOOD URINE, POC: NEGATIVE
GLUCOSE URINE, POC: NEGATIVE
KETONES, URINE, POC: NEGATIVE
LEUKOCYTE ESTERASE, URINE, POC: NEGATIVE
NITRITE, URINE, POC: NEGATIVE
PH, URINE, POC: 7 (ref 4.6–8)
PROTEIN,URINE, POC: NEGATIVE
SPECIFIC GRAVITY, URINE, POC: 1.01 (ref 1–1.03)
URINALYSIS CLARITY, POC: CLEAR
URINALYSIS COLOR, POC: YELLOW
UROBILINOGEN, POC: NORMAL

## 2025-05-07 PROCEDURE — PBSHW AMB POC URINALYSIS DIP STICK AUTO W/O MICRO: Performed by: INTERNAL MEDICINE

## 2025-05-07 PROCEDURE — 99213 OFFICE O/P EST LOW 20 MIN: CPT | Performed by: INTERNAL MEDICINE

## 2025-05-07 PROCEDURE — 81003 URINALYSIS AUTO W/O SCOPE: CPT | Performed by: INTERNAL MEDICINE

## 2025-05-07 RX ORDER — FLUCONAZOLE 150 MG/1
150 TABLET ORAL
Qty: 2 TABLET | Refills: 0 | Status: SHIPPED | OUTPATIENT
Start: 2025-05-07 | End: 2025-05-13

## 2025-05-07 SDOH — ECONOMIC STABILITY: INCOME INSECURITY: IN THE LAST 12 MONTHS, WAS THERE A TIME WHEN YOU WERE NOT ABLE TO PAY THE MORTGAGE OR RENT ON TIME?: NO

## 2025-05-07 SDOH — ECONOMIC STABILITY: FOOD INSECURITY: WITHIN THE PAST 12 MONTHS, THE FOOD YOU BOUGHT JUST DIDN'T LAST AND YOU DIDN'T HAVE MONEY TO GET MORE.: NEVER TRUE

## 2025-05-07 SDOH — ECONOMIC STABILITY: FOOD INSECURITY: WITHIN THE PAST 12 MONTHS, YOU WORRIED THAT YOUR FOOD WOULD RUN OUT BEFORE YOU GOT MONEY TO BUY MORE.: NEVER TRUE

## 2025-05-07 SDOH — ECONOMIC STABILITY: TRANSPORTATION INSECURITY
IN THE PAST 12 MONTHS, HAS THE LACK OF TRANSPORTATION KEPT YOU FROM MEDICAL APPOINTMENTS OR FROM GETTING MEDICATIONS?: NO

## 2025-05-07 ASSESSMENT — PATIENT HEALTH QUESTIONNAIRE - PHQ9
1. LITTLE INTEREST OR PLEASURE IN DOING THINGS: NOT AT ALL
8. MOVING OR SPEAKING SO SLOWLY THAT OTHER PEOPLE COULD HAVE NOTICED. OR THE OPPOSITE, BEING SO FIGETY OR RESTLESS THAT YOU HAVE BEEN MOVING AROUND A LOT MORE THAN USUAL: NOT AT ALL
7. TROUBLE CONCENTRATING ON THINGS, SUCH AS READING THE NEWSPAPER OR WATCHING TELEVISION: NOT AT ALL
2. FEELING DOWN, DEPRESSED OR HOPELESS: NOT AT ALL
6. FEELING BAD ABOUT YOURSELF - OR THAT YOU ARE A FAILURE OR HAVE LET YOURSELF OR YOUR FAMILY DOWN: NEARLY EVERY DAY
2. FEELING DOWN, DEPRESSED OR HOPELESS: NOT AT ALL
10. IF YOU CHECKED OFF ANY PROBLEMS, HOW DIFFICULT HAVE THESE PROBLEMS MADE IT FOR YOU TO DO YOUR WORK, TAKE CARE OF THINGS AT HOME, OR GET ALONG WITH OTHER PEOPLE: NOT DIFFICULT AT ALL
SUM OF ALL RESPONSES TO PHQ QUESTIONS 1-9: 7
8. MOVING OR SPEAKING SO SLOWLY THAT OTHER PEOPLE COULD HAVE NOTICED. OR THE OPPOSITE - BEING SO FIDGETY OR RESTLESS THAT YOU HAVE BEEN MOVING AROUND A LOT MORE THAN USUAL: NOT AT ALL
SUM OF ALL RESPONSES TO PHQ QUESTIONS 1-9: 7
3. TROUBLE FALLING OR STAYING ASLEEP: MORE THAN HALF THE DAYS
10. IF YOU CHECKED OFF ANY PROBLEMS, HOW DIFFICULT HAVE THESE PROBLEMS MADE IT FOR YOU TO DO YOUR WORK, TAKE CARE OF THINGS AT HOME, OR GET ALONG WITH OTHER PEOPLE: NOT DIFFICULT AT ALL
9. THOUGHTS THAT YOU WOULD BE BETTER OFF DEAD, OR OF HURTING YOURSELF: NOT AT ALL
4. FEELING TIRED OR HAVING LITTLE ENERGY: SEVERAL DAYS
4. FEELING TIRED OR HAVING LITTLE ENERGY: SEVERAL DAYS
9. THOUGHTS THAT YOU WOULD BE BETTER OFF DEAD, OR OF HURTING YOURSELF: NOT AT ALL
SUM OF ALL RESPONSES TO PHQ QUESTIONS 1-9: 7
SUM OF ALL RESPONSES TO PHQ QUESTIONS 1-9: 7
7. TROUBLE CONCENTRATING ON THINGS, SUCH AS READING THE NEWSPAPER OR WATCHING TELEVISION: NOT AT ALL
5. POOR APPETITE OR OVEREATING: SEVERAL DAYS
6. FEELING BAD ABOUT YOURSELF - OR THAT YOU ARE A FAILURE OR HAVE LET YOURSELF OR YOUR FAMILY DOWN: NEARLY EVERY DAY
5. POOR APPETITE OR OVEREATING: SEVERAL DAYS
1. LITTLE INTEREST OR PLEASURE IN DOING THINGS: NOT AT ALL
3. TROUBLE FALLING OR STAYING ASLEEP: MORE THAN HALF THE DAYS
SUM OF ALL RESPONSES TO PHQ QUESTIONS 1-9: 7

## 2025-05-07 NOTE — PROGRESS NOTES
[facility]   Acute Visit Progress Note  Patient: Cheri Trujillo  1990, 35 y.o., female  Encounter Date: 5/7/25    CHIEF COMPLAINT:  No chief complaint on file.       ASSESSMENT & PLAN:    ICD-10-CM    1. Vaginal discomfort  N94.9 AMB POC URINALYSIS DIP STICK AUTO W/O MICRO     NuSwab Vaginitis Plus (VG+) with Candida (Six Species)     NuSwab Vaginitis Plus (VG+) with Candida (Six Species)      2. Acute vaginitis  N76.0 NuSwab Vaginitis Plus (VG+) with Candida (Six Species)     fluconazole (DIFLUCAN) 150 MG tablet     NuSwab Vaginitis Plus (VG+) with Candida (Six Species)        Will empirically treat for possible yeast vaginitis as we await the return of the nuswab.     I have discussed the diagnosis with the patient and the intended treatment plan as seen in the above orders. The patient has received an after-visit summary and questions were answered concerning future plans. Asked to return should symptoms worsen or not improve with treatment. Any pending labs and studies will be relayed to patient when they become available.     Pt verbalizes understanding of plan of care and denies further questions or concerns at this time    Return if symptoms worsen or fail to improve.    SUBJECTIVE  Cheri Trujillo is a 35 y.o. female presenting today for concerns about vaginal discomfort.   She called her OBGYN who offered several appointments and also suggested monistat. She used the monistat, but did not feel it was helpful.   She has had some discharge. No order. She is still having itching and burning in the vaginal area.      Review of Systems   Constitutional: Negative.    HENT: Negative.     Eyes: Negative.    Respiratory: Negative.     Cardiovascular: Negative.    Gastrointestinal: Negative.    Endocrine: Negative.    Genitourinary:  Positive for vaginal discharge.   Musculoskeletal: Negative.    Skin: Negative.    Allergic/Immunologic: Negative.    Neurological: Negative.    Hematological:

## 2025-05-07 NOTE — PROGRESS NOTES
Identified pt with two pt identifiers(name and )    Chief Complaint   Patient presents with    Menstrual Problem     Pt got off the Depo shot. She states that she is having irregular periods, it comes about 2 times a month. She states it is very uncomfortable        Health Maintenance Due   Topic    Varicella vaccine (1 of  - 13+ 2-dose series)    Hepatitis C screen     Hepatitis B vaccine (1 of 3 - + 3-dose series)    A1C test (Diabetic or Prediabetic)     DTaP/Tdap/Td vaccine (2 - Td or Tdap)    COVID-19 Vaccine (3 - 2024-25 season)       Wt Readings from Last 3 Encounters:   25 99.9 kg (220 lb 3.2 oz)   25 104.3 kg (230 lb)   24 104.3 kg (230 lb)     Temp Readings from Last 3 Encounters:   25 97.9 °F (36.6 °C) (Temporal)   24 98.1 °F (36.7 °C) (Oral)   23 98.5 °F (36.9 °C) (Temporal)     BP Readings from Last 3 Encounters:   25 132/84   24 (!) 141/81   23 120/79     Pulse Readings from Last 3 Encounters:   25 89   24 81   23 64           Depression Screening:  :         2025    11:26 AM 2023     8:32 AM 2023     2:06 PM 2022     9:00 AM 2022     7:27 PM   PHQ-9 Questionaire   Little interest or pleasure in doing things 0 0 0 0 0   Feeling down, depressed, or hopeless 0 0 0 2 0   Trouble falling or staying asleep, or sleeping too much 2       Feeling tired or having little energy 1       Poor appetite or overeating 1       Feeling bad about yourself - or that you are a failure or have let yourself or your family down 3       Trouble concentrating on things, such as reading the newspaper or watching television 0       Moving or speaking so slowly that other people could have noticed. Or the opposite - being so fidgety or restless that you have been moving around a lot more than usual 0       Thoughts that you would be better off dead, or of hurting yourself in some way 0       PHQ-9 Total Score 7  0 0 2 0   If you

## 2025-05-10 ENCOUNTER — RESULTS FOLLOW-UP (OUTPATIENT)
Age: 35
End: 2025-05-10

## 2025-05-10 DIAGNOSIS — B96.89 BACTERIAL VAGINOSIS: Primary | ICD-10-CM

## 2025-05-10 DIAGNOSIS — N76.0 BACTERIAL VAGINOSIS: Primary | ICD-10-CM

## 2025-05-10 LAB
A VAGINAE DNA VAG QL NAA+PROBE: ABNORMAL SCORE
BVAB2 DNA VAG QL NAA+PROBE: ABNORMAL SCORE
C ALBICANS DNA VAG QL NAA+PROBE: NEGATIVE
C GLABRATA DNA VAG QL NAA+PROBE: NEGATIVE
C KRUSEI DNA VAG QL NAA+PROBE: NEGATIVE
C LUSITANIAE DNA VAG QL NAA+PROBE: NEGATIVE
C TRACH DNA SPEC QL NAA+PROBE: NEGATIVE
CANDIDA DNA VAG QL NAA+PROBE: NEGATIVE
MEGA1 DNA VAG QL NAA+PROBE: ABNORMAL SCORE
N GONORRHOEA DNA VAG QL NAA+PROBE: NEGATIVE
T VAGINALIS DNA VAG QL NAA+PROBE: NEGATIVE

## 2025-05-10 RX ORDER — METRONIDAZOLE 500 MG/1
500 TABLET ORAL 2 TIMES DAILY
Qty: 14 TABLET | Refills: 0 | Status: SHIPPED | OUTPATIENT
Start: 2025-05-10 | End: 2025-05-17

## 2025-05-23 ENCOUNTER — APPOINTMENT (OUTPATIENT)
Facility: HOSPITAL | Age: 35
End: 2025-05-23
Payer: MEDICAID

## 2025-05-23 ENCOUNTER — HOSPITAL ENCOUNTER (EMERGENCY)
Facility: HOSPITAL | Age: 35
Discharge: HOME | End: 2025-05-23
Attending: STUDENT IN AN ORGANIZED HEALTH CARE EDUCATION/TRAINING PROGRAM
Payer: MEDICAID

## 2025-05-23 VITALS
TEMPERATURE: 98.1 F | HEIGHT: 66 IN | HEART RATE: 67 BPM | DIASTOLIC BLOOD PRESSURE: 80 MMHG | BODY MASS INDEX: 35.36 KG/M2 | WEIGHT: 220 LBS | RESPIRATION RATE: 16 BRPM | SYSTOLIC BLOOD PRESSURE: 142 MMHG | OXYGEN SATURATION: 100 %
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
## 2025-05-23 DIAGNOSIS — M54.2 NECK PAIN: ICD-10-CM

## 2025-05-23 DIAGNOSIS — R10.13 EPIGASTRIC PAIN: ICD-10-CM

## 2025-05-23 DIAGNOSIS — R07.89 NON-CARDIAC CHEST PAIN: ICD-10-CM

## 2025-05-23 LAB
ALBUMIN SERPL-MCNC: 3.8 G/DL (ref 3.5–5)
ALBUMIN/GLOB SERPL: 1 (ref 1.1–2.2)
ALP SERPL-CCNC: 95 U/L (ref 45–117)
ALT SERPL-CCNC: 33 U/L (ref 12–78)
ANION GAP SERPL CALC-SCNC: 8 MMOL/L (ref 2–12)
AST SERPL-CCNC: 17 U/L (ref 15–37)
BASOPHILS # BLD: 0.03 K/UL (ref 0–0.1)
BASOPHILS NFR BLD: 0.3 % (ref 0–1)
BILIRUB SERPL-MCNC: 0.3 MG/DL (ref 0.2–1)
BUN SERPL-MCNC: 14 MG/DL (ref 6–20)
BUN/CREAT SERPL: 15 (ref 12–20)
CALCIUM SERPL-MCNC: 8.9 MG/DL (ref 8.5–10.1)
CHLORIDE SERPL-SCNC: 105 MMOL/L (ref 97–108)
CO2 SERPL-SCNC: 28 MMOL/L (ref 21–32)
CREAT SERPL-MCNC: 0.92 MG/DL (ref 0.55–1.02)
DIFFERENTIAL METHOD BLD: NORMAL
EOSINOPHIL # BLD: 0.16 K/UL (ref 0–0.4)
EOSINOPHIL NFR BLD: 1.6 % (ref 0–7)
ERYTHROCYTE [DISTWIDTH] IN BLOOD BY AUTOMATED COUNT: 14.1 % (ref 11.5–14.5)
GLOBULIN SER CALC-MCNC: 3.7 G/DL (ref 2–4)
GLUCOSE SERPL-MCNC: 87 MG/DL (ref 65–100)
HCT VFR BLD AUTO: 38.7 % (ref 35–47)
HGB BLD-MCNC: 12.7 G/DL (ref 11.5–16)
IMM GRANULOCYTES # BLD AUTO: 0.04 K/UL (ref 0–0.04)
IMM GRANULOCYTES NFR BLD AUTO: 0.4 % (ref 0–0.5)
LIPASE SERPL-CCNC: 46 U/L (ref 13–75)
LYMPHOCYTES # BLD: 2.36 K/UL (ref 0.8–3.5)
LYMPHOCYTES NFR BLD: 23.9 % (ref 12–49)
MCH RBC QN AUTO: 27 PG (ref 26–34)
MCHC RBC AUTO-ENTMCNC: 32.8 G/DL (ref 30–36.5)
MCV RBC AUTO: 82.2 FL (ref 80–99)
MONOCYTES # BLD: 0.68 K/UL (ref 0–1)
MONOCYTES NFR BLD: 6.9 % (ref 5–13)
NEUTS SEG # BLD: 6.6 K/UL (ref 1.8–8)
NEUTS SEG NFR BLD: 66.9 % (ref 32–75)
NRBC # BLD: 0 K/UL (ref 0–0.01)
NRBC BLD-RTO: 0 PER 100 WBC
PLATELET # BLD AUTO: 223 K/UL (ref 150–400)
PMV BLD AUTO: 11 FL (ref 8.9–12.9)
POTASSIUM SERPL-SCNC: 3.6 MMOL/L (ref 3.5–5.1)
PROT SERPL-MCNC: 7.5 G/DL (ref 6.4–8.2)
RBC # BLD AUTO: 4.71 M/UL (ref 3.8–5.2)
SODIUM SERPL-SCNC: 141 MMOL/L (ref 136–145)
TROPONIN I SERPL HS-MCNC: 5 NG/L (ref 0–51)
WBC # BLD AUTO: 9.9 K/UL (ref 3.6–11)

## 2025-05-23 PROCEDURE — 83690 ASSAY OF LIPASE: CPT

## 2025-05-23 PROCEDURE — 99285 EMERGENCY DEPT VISIT HI MDM: CPT

## 2025-05-23 PROCEDURE — 36415 COLL VENOUS BLD VENIPUNCTURE: CPT

## 2025-05-23 PROCEDURE — 96375 TX/PRO/DX INJ NEW DRUG ADDON: CPT

## 2025-05-23 PROCEDURE — 93005 ELECTROCARDIOGRAM TRACING: CPT | Performed by: STUDENT IN AN ORGANIZED HEALTH CARE EDUCATION/TRAINING PROGRAM

## 2025-05-23 PROCEDURE — 70450 CT HEAD/BRAIN W/O DYE: CPT

## 2025-05-23 PROCEDURE — 6360000002 HC RX W HCPCS: Performed by: STUDENT IN AN ORGANIZED HEALTH CARE EDUCATION/TRAINING PROGRAM

## 2025-05-23 PROCEDURE — 72125 CT NECK SPINE W/O DYE: CPT

## 2025-05-23 PROCEDURE — 6360000004 HC RX CONTRAST MEDICATION: Performed by: STUDENT IN AN ORGANIZED HEALTH CARE EDUCATION/TRAINING PROGRAM

## 2025-05-23 PROCEDURE — 96374 THER/PROPH/DIAG INJ IV PUSH: CPT

## 2025-05-23 PROCEDURE — 80053 COMPREHEN METABOLIC PANEL: CPT

## 2025-05-23 PROCEDURE — 85025 COMPLETE CBC W/AUTO DIFF WBC: CPT

## 2025-05-23 PROCEDURE — 71260 CT THORAX DX C+: CPT

## 2025-05-23 PROCEDURE — 84484 ASSAY OF TROPONIN QUANT: CPT

## 2025-05-23 RX ORDER — KETOROLAC TROMETHAMINE 30 MG/ML
15 INJECTION, SOLUTION INTRAMUSCULAR; INTRAVENOUS ONCE
Status: COMPLETED | OUTPATIENT
Start: 2025-05-23 | End: 2025-05-23

## 2025-05-23 RX ORDER — ACETAMINOPHEN 500 MG
1000 TABLET ORAL EVERY 6 HOURS PRN
Qty: 80 TABLET | Refills: 0 | Status: SHIPPED | OUTPATIENT
Start: 2025-05-23 | End: 2025-06-02

## 2025-05-23 RX ORDER — ONDANSETRON 2 MG/ML
4 INJECTION INTRAMUSCULAR; INTRAVENOUS ONCE
Status: COMPLETED | OUTPATIENT
Start: 2025-05-23 | End: 2025-05-23

## 2025-05-23 RX ORDER — IOPAMIDOL 755 MG/ML
100 INJECTION, SOLUTION INTRAVASCULAR
Status: COMPLETED | OUTPATIENT
Start: 2025-05-23 | End: 2025-05-23

## 2025-05-23 RX ORDER — CYCLOBENZAPRINE HCL 10 MG
10 TABLET ORAL 3 TIMES DAILY PRN
Qty: 30 TABLET | Refills: 0 | Status: SHIPPED | OUTPATIENT
Start: 2025-05-23 | End: 2025-06-02

## 2025-05-23 RX ADMIN — IOPAMIDOL 100 ML: 755 INJECTION, SOLUTION INTRAVENOUS at 16:40

## 2025-05-23 RX ADMIN — KETOROLAC TROMETHAMINE 15 MG: 30 INJECTION, SOLUTION INTRAMUSCULAR at 16:07

## 2025-05-23 RX ADMIN — ONDANSETRON 4 MG: 2 INJECTION, SOLUTION INTRAMUSCULAR; INTRAVENOUS at 16:07

## 2025-05-23 ASSESSMENT — PAIN - FUNCTIONAL ASSESSMENT: PAIN_FUNCTIONAL_ASSESSMENT: 0-10

## 2025-05-23 ASSESSMENT — PAIN DESCRIPTION - LOCATION: LOCATION: BACK;CHEST;NECK

## 2025-05-23 ASSESSMENT — PAIN SCALES - GENERAL
PAINLEVEL_OUTOF10: 8
PAINLEVEL_OUTOF10: 8

## 2025-05-23 NOTE — ED PROVIDER NOTES
There is abdominal tenderness.   Musculoskeletal:         General: Tenderness present.      Cervical back: Tenderness present.      Comments: Some tenderness over the chest wall/sternum, upper abdominal tenderness   Skin:     General: Skin is warm and dry.   Neurological:      General: No focal deficit present.      Mental Status: She is alert and oriented to person, place, and time.   Psychiatric:         Mood and Affect: Mood normal.         Behavior: Behavior normal.           ED Course:    ED Course as of 05/25/25 1754   Sun May 25, 2025   1753 Troponin, High Sensitivity: 5 [AL]   1753 Lipase: 46 [AL]   1753 WBC: 9.9 [AL]   1753 Hemoglobin Quant: 12.7 [AL]   1753 Platelet Count: 223 [AL]   1753 Sodium: 141 [AL]   1753 Potassium: 3.6 [AL]   1753 Creatinine: 0.92 [AL]   1753 Total Bilirubin: 0.3 [AL]   1753 ALT: 33 [AL]   1753 AST: 17 [AL]      ED Course User Index  [AL] Josué Griffith MD           Laboratory Results:  Labs Reviewed   COMPREHENSIVE METABOLIC PANEL - Abnormal; Notable for the following components:       Result Value    Albumin/Globulin Ratio 1.0 (*)     All other components within normal limits   URINE CULTURE HOLD SAMPLE   LIPASE   CBC WITH AUTO DIFFERENTIAL   TROPONIN   EXTRA TUBES HOLD   URINALYSIS WITH MICROSCOPIC   URINALYSIS WITH MICROSCOPIC     ED physician interpretation of laboratory results: Documented in ED course    Imaging Results:  CT CHEST ABDOMEN PELVIS W CONTRAST Additional Contrast? None   Final Result   No evidence of acute process.      Electronically signed by RAMON MCFADDEN      CT CERVICAL SPINE WO CONTRAST   Final Result   No acute process.      Electronically signed by RAMON MCFADDEN      CT HEAD WO CONTRAST   Final Result   No evidence of acute process.            Electronically signed by RAMON MCFADDEN        ED physician interpretation of imaging: Documented in ED course    Medications Given:  Medications   ketorolac (TORADOL) injection 15 mg (15 mg IntraVENous Given  needed Tylenol and as needed muscle relaxers.  He will likely be more sore tomorrow than you are today.  Continue to follow-up with your outpatient physical therapy team for your neck pain.    The patient has been re-evaluated and feeling better. Patient is stable for discharge.  All available radiology and laboratory results have been reviewed with patient and/or available family.  Patient and/or family verbally conveyed their understanding and agreement of the patient's signs, symptoms, diagnosis, treatment and prognosis and additionally agree to follow-up as recommended in the discharge instructions or to return to the Emergency Department should their condition change or worsen prior to their follow-up appointment.  All questions have been answered and patient and/or available family express understanding.      Prescriptions provided to the patient:     Discharge Medication List as of 5/23/2025  5:56 PM        START taking these medications    Details   cyclobenzaprine (FLEXERIL) 10 MG tablet Take 1 tablet by mouth 3 times daily as needed for Muscle spasms, Disp-30 tablet, R-0Normal      !! acetaminophen (TYLENOL) 500 MG tablet Take 2 tablets by mouth every 6 hours as needed for Pain, Disp-80 tablet, R-0Normal       !! - Potential duplicate medications found. Please discuss with provider.           Josué Griffith MD   Emergency Medicine Attending Physician          Josué Griffith MD  05/25/25 [id number]
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
## 2025-05-24 LAB
EKG ATRIAL RATE: 58 BPM
EKG DIAGNOSIS: NORMAL
EKG P AXIS: 25 DEGREES
EKG P-R INTERVAL: 138 MS
EKG Q-T INTERVAL: 396 MS
EKG QRS DURATION: 84 MS
EKG QTC CALCULATION (BAZETT): 388 MS
EKG R AXIS: 60 DEGREES
EKG T AXIS: 32 DEGREES
EKG VENTRICULAR RATE: 58 BPM

## 2025-05-24 PROCEDURE — 93010 ELECTROCARDIOGRAM REPORT: CPT | Performed by: INTERNAL MEDICINE

## 2025-06-04 ENCOUNTER — OFFICE VISIT (OUTPATIENT)
Age: 35
End: 2025-06-04
Payer: MEDICAID

## 2025-06-04 VITALS
BODY MASS INDEX: 36.64 KG/M2 | HEART RATE: 50 BPM | SYSTOLIC BLOOD PRESSURE: 120 MMHG | WEIGHT: 227 LBS | DIASTOLIC BLOOD PRESSURE: 77 MMHG

## 2025-06-04 DIAGNOSIS — Z01.419 ENCOUNTER FOR WELL WOMAN EXAM WITH ROUTINE GYNECOLOGICAL EXAM: Primary | ICD-10-CM

## 2025-06-04 DIAGNOSIS — Z12.4 SCREENING FOR CERVICAL CANCER: ICD-10-CM

## 2025-06-04 DIAGNOSIS — Z11.3 SCREEN FOR STD (SEXUALLY TRANSMITTED DISEASE): ICD-10-CM

## 2025-06-04 PROCEDURE — 99459 PELVIC EXAMINATION: CPT | Performed by: OBSTETRICS & GYNECOLOGY

## 2025-06-04 PROCEDURE — 99395 PREV VISIT EST AGE 18-39: CPT | Performed by: OBSTETRICS & GYNECOLOGY

## 2025-06-04 RX ORDER — MEDROXYPROGESTERONE ACETATE 150 MG/ML
150 INJECTION, SUSPENSION INTRAMUSCULAR ONCE
Qty: 1 ML | Refills: 3
Start: 2025-06-04 | End: 2025-06-04 | Stop reason: SDUPTHER

## 2025-06-04 RX ORDER — MEDROXYPROGESTERONE ACETATE 150 MG/ML
150 INJECTION, SUSPENSION INTRAMUSCULAR ONCE
Qty: 1 ML | Refills: 3 | Status: SHIPPED | OUTPATIENT
Start: 2025-06-04 | End: 2025-06-04

## 2025-06-04 NOTE — TELEPHONE ENCOUNTER
35 year old patient last seen in the office on today.    Prescription sent by Md but it was no print.    Prescription resent as per MD order with verbal read back    Patient has appointment for injection tomorrow.

## 2025-06-04 NOTE — PROGRESS NOTES
Cheri Trujillo is a 35 y.o. female returns for an annual exam     Chief Complaint   Patient presents with    Annual Exam       Patient's last menstrual period was 05/12/2025 (approximate).  Her periods are moderate in flow and often irregular with no apparent pattern.  She does not have dysmenorrhea.  Problems: problems - irritation from tampons  Birth Control: vasectomy.  Last Pap: abnormal obtained 5 year(s) ago.  She does not have a history of SKINNY 2, 3 or cervical cancer.   With regard to the Gardisil vaccine, she declines to receive it          Examination chaperoned by MOSES YOST RN.

## 2025-06-04 NOTE — PROGRESS NOTES
Annual exam  Cheri Trujillo is a No obstetric history on file.,  35 y.o. female   Patient's last menstrual period was 2025 (approximate).    She presents for her annual checkup.     She has no significant complaints     Sexual history:    She  reports being sexually active and has had partner(s) who are male.    Per Rooming Note:  Patient's last menstrual period was 2025 (approximate).  Her periods are moderate in flow and often irregular with no apparent pattern.  She does not have dysmenorrhea.  Problems: problems - irritation from tampons  Birth Control: vasectomy.  Last Pap: abnormal obtained 5 year(s) ago.  She does not have a history of SKINNY 2, 3 or cervical cancer.   With regard to the Gardisil vaccine, she declines to receive it    Past Surgical History:   Procedure Laterality Date     SECTION      COLPOSCOPY  11/3/10    DILATION AND CURETTAGE OF UTERUS      LIPOMA RESECTION      ORTHOPEDIC SURGERY      carpal tunnel on left hand       Current Outpatient Medications   Medication Sig Dispense Refill    meloxicam (MOBIC) 15 MG tablet Take 1 tablet by mouth daily 30 tablet 2    gabapentin (NEURONTIN) 300 MG capsule Take 1 capsule by mouth nightly for 30 days. Max Daily Amount: 300 mg 30 capsule 0    traMADol (ULTRAM) 50 MG tablet Take 1 tablet by mouth every 6 hours as needed for Pain for up to 7 days. Max Daily Amount: 200 mg 30 tablet 0    acetaminophen (TYLENOL) 500 MG tablet Take 2 tablets by mouth every 6 hours as needed for Pain 80 tablet 0    methylPREDNISolone (MEDROL DOSEPACK) 4 MG tablet Per dose pack instructions 1 kit 0    acetaminophen (TYLENOL) 500 MG tablet TAKE 2 TABLETS BY MOUTH IN THE MORNING, AT NOON, AND AT BEDTIME      hydrOXYzine HCl (ATARAX) 50 MG tablet Take 1 tablet by mouth nightly      ibuprofen (ADVIL;MOTRIN) 600 MG tablet Take 1 tablet by mouth 3 times daily as needed for Pain 30 tablet 0     No current facility-administered medications for this visit.

## 2025-06-05 ENCOUNTER — CLINICAL SUPPORT (OUTPATIENT)
Age: 35
End: 2025-06-05
Payer: MEDICAID

## 2025-06-05 VITALS
HEART RATE: 85 BPM | DIASTOLIC BLOOD PRESSURE: 58 MMHG | RESPIRATION RATE: 16 BRPM | BODY MASS INDEX: 35.52 KG/M2 | SYSTOLIC BLOOD PRESSURE: 115 MMHG | OXYGEN SATURATION: 99 % | WEIGHT: 221 LBS | HEIGHT: 66 IN

## 2025-06-05 DIAGNOSIS — Z30.42 ENCOUNTER FOR MANAGEMENT AND INJECTION OF DEPO-PROVERA: Primary | ICD-10-CM

## 2025-06-05 PROCEDURE — 96372 THER/PROPH/DIAG INJ SC/IM: CPT | Performed by: OBSTETRICS & GYNECOLOGY

## 2025-06-05 RX ORDER — MEDROXYPROGESTERONE ACETATE 150 MG/ML
150 INJECTION, SUSPENSION INTRAMUSCULAR
Status: SHIPPED | OUTPATIENT
Start: 2025-06-05

## 2025-06-05 RX ADMIN — MEDROXYPROGESTERONE ACETATE 150 MG: 150 INJECTION, SUSPENSION INTRAMUSCULAR at 14:09

## 2025-06-05 NOTE — PROGRESS NOTES
After obtaining consent, and per orders of Dr Arlene Ordonez, injection of depo provera given in ventrogluteal right by DELMI COLLADO LPN. Patient instructed to remain in clinic for 20 minutes afterwards, and to report any adverse reaction to me immediately. Lot: [id number] Exp: 10/28/25 NDC: 25950-904-38.

## 2025-06-11 LAB
C TRACH RRNA CVX QL NAA+PROBE: NEGATIVE
CYTOLOGIST CVX/VAG CYTO: ABNORMAL
CYTOLOGY CVX/VAG DOC CYTO: ABNORMAL
CYTOLOGY CVX/VAG DOC THIN PREP: ABNORMAL
DX ICD CODE: ABNORMAL
DX ICD CODE: ABNORMAL
HPV GENOTYPE REFLEX: ABNORMAL
HPV I/H RISK 4 DNA CVX QL PROBE+SIG AMP: POSITIVE
N GONORRHOEA RRNA CVX QL NAA+PROBE: NEGATIVE
OTHER STN SPEC: ABNORMAL
PATHOLOGIST CVX/VAG CYTO: ABNORMAL
RECOM F/U CVX/VAG CYTO: ABNORMAL
SERVICE CMNT-IMP: ABNORMAL
STAT OF ADQ CVX/VAG CYTO-IMP: ABNORMAL
T VAGINALIS RRNA SPEC QL NAA+PROBE: NEGATIVE

## 2025-06-12 ENCOUNTER — RESULTS FOLLOW-UP (OUTPATIENT)
Age: 35
End: 2025-06-12

## 2025-07-21 NOTE — ED PROVIDER NOTES
HPI Comments: Rhonda Sherwood is a 32 yo F with history of herniated disc, PCOS and prior molar pregnancy who presents to the ED with one week of intermittent vaginal bleeding and crampy abdominal pain. She states that she first developed the pain when she was moving furniture and experienced vaginal bleeding that lasted about 2 hours. The pain has come and gone several times over the past week and she has also had a few additional episodes of bleeding that last 1-2 hours at a time. Tonight she noteid more brown spotting but now also feels light headed. She states that prior to last week she had not had a period in over 3 years as she has been on depo which she continues to use. Past Medical History:   Diagnosis Date    Abnormal Pap smear     Anemia     Gastrointestinal disorder     Ulcers    Headache     Herniated disc     Hx gestational diabetes     HX OTHER MEDICAL     trich treated at beginning of pregnancy    HX OTHER MEDICAL     pituitary tumor-benign     Miscarriage     Molar pregnancy     Pap smear for cervical cancer screening 13    Negative    PCOS (polycystic ovarian syndrome)     Pelvic pain in female     [facility]uitary adenoma ([facility] [address]     Pituitary tumor     Psychiatric problem     depression never on medication    Thyroid activity decreased     hypothyroid awaiting appnt for endo       Past Surgical History:   Procedure Laterality Date    HX  SECTION      HX COLPOSCOPY  11/3/10    HX DILATION AND CURETTAGE      HX LIPOMA RESECTION           Family History:   Problem Relation Age of Onset    Diabetes Father     Headache Mother        Social History     Social History    Marital status: SINGLE     Spouse name: N/A    Number of children: N/A    Years of education: N/A     Occupational History    Not on file.      Social History Main Topics    Smoking status: Never Smoker    Smokeless tobacco: Never Used    Alcohol use No      Comment: =    Drug use: No    Sexual activity: Yes     Partners: Male     Birth control/ protection: Injection     Other Topics Concern    Not on file     Social History Narrative         ALLERGIES: Tomato    Review of Systems   Constitutional: Negative for fever. HENT: Negative for sore throat. Eyes: Negative for visual disturbance. Respiratory: Negative for cough. Cardiovascular: Negative for chest pain. Gastrointestinal: Positive for abdominal pain. Genitourinary: Positive for vaginal bleeding. Negative for dysuria. Musculoskeletal: Negative for back pain. Skin: Negative for rash. Neurological: Positive for light-headedness. Negative for headaches. Vitals:    03/07/18 2013 03/07/18 2155   BP: 142/81 154/80   Pulse: 82 78   Resp: 18 20   Temp: 98.2 °F (36.8 °C)    SpO2: 98% 99%   Weight: 99.2 kg (218 lb 11.1 oz)             Physical Exam   Constitutional: She appears well-developed and well-nourished. No distress. HENT:   Head: Normocephalic and atraumatic. Mouth/Throat: Oropharynx is clear and moist.   Eyes: Conjunctivae and EOM are normal.   Neck: Normal range of motion and phonation normal.   Cardiovascular: Normal rate, regular rhythm, normal heart sounds and intact distal pulses. Pulmonary/Chest: Effort normal. No respiratory distress. She has no wheezes. Abdominal: She exhibits no distension. There is no tenderness. There is no rebound. Genitourinary: There is no injury on the right labia. There is no injury on the left labia. Cervix exhibits no motion tenderness, no discharge and no friability. No bleeding in the vagina. Musculoskeletal: Normal range of motion. She exhibits no tenderness. Neurological: She is alert. She is not disoriented. She exhibits normal muscle tone. Skin: Skin is warm and dry. Nursing note and vitals reviewed.        MDM      ED Course       Procedures Add 52 Modifier (Optional): no Spray Paint Text: The liquid nitrogen was applied to the skin utilizing a spray paint frosting technique. Show Applicator Variable?: Yes Medical Necessity Clause: This procedure was medically necessary because the lesions that were treated were: Medical Necessity Information: It is in your best interest to select a reason for this procedure from the list below. All of these items fulfill various CMS LCD requirements except the new and changing color options. Detail Level: Detailed Consent: The patient's consent was obtained including but not limited to risks of crusting, scabbing, blistering, scarring, darker or lighter pigmentary change, recurrence, incomplete removal and infection. Post-Care Instructions: I reviewed with the patient in detail post-care instructions. Patient is to wear sunprotection, and avoid picking at any of the treated lesions. Pt may apply Vaseline to crusted or scabbing areas. Duration Of Freeze Thaw-Cycle (Seconds): 0

## 2025-08-22 ENCOUNTER — CLINICAL SUPPORT (OUTPATIENT)
Age: 35
End: 2025-08-22
Payer: MEDICAID

## 2025-08-22 DIAGNOSIS — Z30.42 ENCOUNTER FOR DEPO-PROVERA CONTRACEPTION: Primary | ICD-10-CM

## 2025-08-22 PROCEDURE — 96372 THER/PROPH/DIAG INJ SC/IM: CPT | Performed by: OBSTETRICS & GYNECOLOGY

## 2025-08-22 RX ORDER — MEDROXYPROGESTERONE ACETATE 150 MG/ML
150 INJECTION, SUSPENSION INTRAMUSCULAR
Status: SHIPPED | OUTPATIENT
Start: 2025-08-22 | End: 2026-05-19

## 2025-08-22 RX ADMIN — MEDROXYPROGESTERONE ACETATE 150 MG: 150 INJECTION, SUSPENSION INTRAMUSCULAR at 08:00

## (undated) DEVICE — CANNULA CUSH AD W/ 14FT TBG

## (undated) DEVICE — Device

## (undated) DEVICE — 3M™ CUROS™ DISINFECTING CAP FOR NEEDLELESS CONNECTORS 270/CARTON 20 CARTONS/CASE CFF1-270: Brand: CUROS™

## (undated) DEVICE — CANN NASAL O2 CAPNOGRAPHY AD -- FILTERLINE

## (undated) DEVICE — BAG SPEC BIOHZRD 10 X 10 IN --

## (undated) DEVICE — 1200 GUARD II KIT W/5MM TUBE W/O VAC TUBE: Brand: GUARDIAN

## (undated) DEVICE — ADULT SPO2 SENSOR: Brand: NELLCOR

## (undated) DEVICE — SET GRAV CK VLV NEEDLESS ST 3 GANGED 4WAY STPCOCK HI FLO 10

## (undated) DEVICE — BITEBLOCK ENDOSCP 60FR MAXI WHT POLYETH STURDY W/ VELC WVN

## (undated) DEVICE — CATH IV AUTOGRD BC BLU 22GA 25 -- INSYTE

## (undated) DEVICE — SOLIDIFIER MEDC 1200ML -- CONVERT TO 356117

## (undated) DEVICE — FORCEPS BX L240CM JAW DIA2.8MM L CAP W/ NDL MIC MESH TOOTH

## (undated) DEVICE — CONTAINER SPEC 20 ML LID NEUT BUFF FORMALIN 10 % POLYPR STS

## (undated) DEVICE — BAG BELONG PT PERS CLEAR HANDL

## (undated) DEVICE — ELECTRODE,RADIOTRANSLUCENT,FOAM,3PK: Brand: MEDLINE

## (undated) DEVICE — KIT COLON W/ 1.1OZ LUB AND 2 END